# Patient Record
Sex: MALE | Race: WHITE | NOT HISPANIC OR LATINO | Employment: OTHER | URBAN - METROPOLITAN AREA
[De-identification: names, ages, dates, MRNs, and addresses within clinical notes are randomized per-mention and may not be internally consistent; named-entity substitution may affect disease eponyms.]

---

## 2017-01-10 ENCOUNTER — APPOINTMENT (OUTPATIENT)
Dept: LAB | Facility: CLINIC | Age: 82
End: 2017-01-10
Payer: MEDICARE

## 2017-01-10 DIAGNOSIS — D64.9 ANEMIA: ICD-10-CM

## 2017-01-10 LAB
BASOPHILS # BLD AUTO: 0.05 THOUSANDS/ΜL (ref 0–0.1)
BASOPHILS NFR BLD AUTO: 1 % (ref 0–1)
EOSINOPHIL # BLD AUTO: 0.47 THOUSAND/ΜL (ref 0–0.61)
EOSINOPHIL NFR BLD AUTO: 4 % (ref 0–6)
ERYTHROCYTE [DISTWIDTH] IN BLOOD BY AUTOMATED COUNT: 15.3 % (ref 11.6–15.1)
FERRITIN SERPL-MCNC: 46 NG/ML (ref 8–388)
HCT VFR BLD AUTO: 34.1 % (ref 36.5–49.3)
HGB BLD-MCNC: 11.1 G/DL (ref 12–17)
IRON SATN MFR SERPL: 22 %
IRON SERPL-MCNC: 60 UG/DL (ref 65–175)
LYMPHOCYTES # BLD AUTO: 2.64 THOUSANDS/ΜL (ref 0.6–4.47)
LYMPHOCYTES NFR BLD AUTO: 25 % (ref 14–44)
MCH RBC QN AUTO: 28 PG (ref 26.8–34.3)
MCHC RBC AUTO-ENTMCNC: 32.6 G/DL (ref 31.4–37.4)
MCV RBC AUTO: 86 FL (ref 82–98)
MONOCYTES # BLD AUTO: 1.06 THOUSAND/ΜL (ref 0.17–1.22)
MONOCYTES NFR BLD AUTO: 10 % (ref 4–12)
NEUTROPHILS # BLD AUTO: 6.4 THOUSANDS/ΜL (ref 1.85–7.62)
NEUTS SEG NFR BLD AUTO: 60 % (ref 43–75)
NRBC BLD AUTO-RTO: 0 /100 WBCS
PLATELET # BLD AUTO: 327 THOUSANDS/UL (ref 149–390)
PMV BLD AUTO: 10.6 FL (ref 8.9–12.7)
RBC # BLD AUTO: 3.96 MILLION/UL (ref 3.88–5.62)
TIBC SERPL-MCNC: 279 UG/DL (ref 250–450)
WBC # BLD AUTO: 10.65 THOUSAND/UL (ref 4.31–10.16)

## 2017-01-10 PROCEDURE — 85025 COMPLETE CBC W/AUTO DIFF WBC: CPT

## 2017-01-10 PROCEDURE — 83550 IRON BINDING TEST: CPT

## 2017-01-10 PROCEDURE — 36415 COLL VENOUS BLD VENIPUNCTURE: CPT

## 2017-01-10 PROCEDURE — 83540 ASSAY OF IRON: CPT

## 2017-01-10 PROCEDURE — 82728 ASSAY OF FERRITIN: CPT

## 2017-01-12 ENCOUNTER — GENERIC CONVERSION - ENCOUNTER (OUTPATIENT)
Dept: OTHER | Facility: OTHER | Age: 82
End: 2017-01-12

## 2017-01-13 DIAGNOSIS — I10 ESSENTIAL (PRIMARY) HYPERTENSION: ICD-10-CM

## 2017-01-13 DIAGNOSIS — R79.9 ABNORMAL FINDING OF BLOOD CHEMISTRY: ICD-10-CM

## 2017-01-13 DIAGNOSIS — D64.9 ANEMIA: ICD-10-CM

## 2017-02-28 ENCOUNTER — TRANSCRIBE ORDERS (OUTPATIENT)
Dept: LAB | Facility: CLINIC | Age: 82
End: 2017-02-28

## 2017-02-28 ENCOUNTER — APPOINTMENT (OUTPATIENT)
Dept: LAB | Facility: CLINIC | Age: 82
End: 2017-02-28
Payer: MEDICARE

## 2017-02-28 DIAGNOSIS — R79.9 ABNORMAL FINDING OF BLOOD CHEMISTRY: ICD-10-CM

## 2017-02-28 DIAGNOSIS — I10 ESSENTIAL (PRIMARY) HYPERTENSION: ICD-10-CM

## 2017-02-28 DIAGNOSIS — D64.9 ANEMIA: ICD-10-CM

## 2017-02-28 LAB
BASOPHILS # BLD AUTO: 0.04 THOUSANDS/ΜL (ref 0–0.1)
BASOPHILS NFR BLD AUTO: 1 % (ref 0–1)
EOSINOPHIL # BLD AUTO: 0.4 THOUSAND/ΜL (ref 0–0.61)
EOSINOPHIL NFR BLD AUTO: 5 % (ref 0–6)
ERYTHROCYTE [DISTWIDTH] IN BLOOD BY AUTOMATED COUNT: 14.3 % (ref 11.6–15.1)
FERRITIN SERPL-MCNC: 55 NG/ML (ref 8–388)
HCT VFR BLD AUTO: 35.4 % (ref 36.5–49.3)
HGB BLD-MCNC: 11.7 G/DL (ref 12–17)
LYMPHOCYTES # BLD AUTO: 2.51 THOUSANDS/ΜL (ref 0.6–4.47)
LYMPHOCYTES NFR BLD AUTO: 28 % (ref 14–44)
MCH RBC QN AUTO: 28.8 PG (ref 26.8–34.3)
MCHC RBC AUTO-ENTMCNC: 33.1 G/DL (ref 31.4–37.4)
MCV RBC AUTO: 87 FL (ref 82–98)
MONOCYTES # BLD AUTO: 0.95 THOUSAND/ΜL (ref 0.17–1.22)
MONOCYTES NFR BLD AUTO: 11 % (ref 4–12)
NEUTROPHILS # BLD AUTO: 4.93 THOUSANDS/ΜL (ref 1.85–7.62)
NEUTS SEG NFR BLD AUTO: 55 % (ref 43–75)
NRBC BLD AUTO-RTO: 0 /100 WBCS
PLATELET # BLD AUTO: 303 THOUSANDS/UL (ref 149–390)
PMV BLD AUTO: 10.9 FL (ref 8.9–12.7)
RBC # BLD AUTO: 4.06 MILLION/UL (ref 3.88–5.62)
WBC # BLD AUTO: 8.84 THOUSAND/UL (ref 4.31–10.16)

## 2017-02-28 PROCEDURE — 85025 COMPLETE CBC W/AUTO DIFF WBC: CPT

## 2017-02-28 PROCEDURE — 82728 ASSAY OF FERRITIN: CPT

## 2017-02-28 PROCEDURE — 36415 COLL VENOUS BLD VENIPUNCTURE: CPT

## 2017-03-17 ENCOUNTER — LAB CONVERSION - ENCOUNTER (OUTPATIENT)
Dept: OTHER | Facility: OTHER | Age: 82
End: 2017-03-17

## 2017-03-17 ENCOUNTER — LAB REQUISITION (OUTPATIENT)
Dept: LAB | Facility: HOSPITAL | Age: 82
End: 2017-03-17
Payer: MEDICARE

## 2017-03-17 DIAGNOSIS — K44.9 DIAPHRAGMATIC HERNIA WITHOUT OBSTRUCTION OR GANGRENE: ICD-10-CM

## 2017-03-17 DIAGNOSIS — K22.70 BARRETT'S ESOPHAGUS WITHOUT DYSPLASIA: ICD-10-CM

## 2017-03-17 PROCEDURE — 88305 TISSUE EXAM BY PATHOLOGIST: CPT | Performed by: INTERNAL MEDICINE

## 2017-04-05 ENCOUNTER — ALLSCRIPTS OFFICE VISIT (OUTPATIENT)
Dept: OTHER | Facility: OTHER | Age: 82
End: 2017-04-05

## 2017-07-13 DIAGNOSIS — I10 ESSENTIAL (PRIMARY) HYPERTENSION: ICD-10-CM

## 2017-07-14 ENCOUNTER — GENERIC CONVERSION - ENCOUNTER (OUTPATIENT)
Dept: OTHER | Facility: OTHER | Age: 82
End: 2017-07-14

## 2017-07-20 ENCOUNTER — TRANSCRIBE ORDERS (OUTPATIENT)
Dept: LAB | Facility: CLINIC | Age: 82
End: 2017-07-20

## 2017-07-20 ENCOUNTER — APPOINTMENT (OUTPATIENT)
Dept: LAB | Facility: CLINIC | Age: 82
End: 2017-07-20
Payer: MEDICARE

## 2017-07-20 DIAGNOSIS — I10 ESSENTIAL (PRIMARY) HYPERTENSION: ICD-10-CM

## 2017-07-20 LAB
25(OH)D3 SERPL-MCNC: 36.4 NG/ML (ref 30–100)
ALBUMIN SERPL BCP-MCNC: 3.5 G/DL (ref 3.5–5)
ALP SERPL-CCNC: 108 U/L (ref 46–116)
ALT SERPL W P-5'-P-CCNC: 24 U/L (ref 12–78)
ANION GAP SERPL CALCULATED.3IONS-SCNC: 5 MMOL/L (ref 4–13)
AST SERPL W P-5'-P-CCNC: 15 U/L (ref 5–45)
BASOPHILS # BLD AUTO: 0.06 THOUSANDS/ΜL (ref 0–0.1)
BASOPHILS NFR BLD AUTO: 1 % (ref 0–1)
BILIRUB SERPL-MCNC: 0.37 MG/DL (ref 0.2–1)
BILIRUB UR QL STRIP: NEGATIVE
BUN SERPL-MCNC: 28 MG/DL (ref 5–25)
CALCIUM SERPL-MCNC: 9.2 MG/DL (ref 8.3–10.1)
CHLORIDE SERPL-SCNC: 105 MMOL/L (ref 100–108)
CHOLEST SERPL-MCNC: 155 MG/DL (ref 50–200)
CLARITY UR: CLEAR
CO2 SERPL-SCNC: 29 MMOL/L (ref 21–32)
COLOR UR: YELLOW
CREAT SERPL-MCNC: 1.6 MG/DL (ref 0.6–1.3)
EOSINOPHIL # BLD AUTO: 0.75 THOUSAND/ΜL (ref 0–0.61)
EOSINOPHIL NFR BLD AUTO: 8 % (ref 0–6)
ERYTHROCYTE [DISTWIDTH] IN BLOOD BY AUTOMATED COUNT: 13.8 % (ref 11.6–15.1)
GFR SERPL CREATININE-BSD FRML MDRD: 41.7 ML/MIN/1.73SQ M
GLUCOSE P FAST SERPL-MCNC: 94 MG/DL (ref 65–99)
GLUCOSE UR STRIP-MCNC: NEGATIVE MG/DL
HCT VFR BLD AUTO: 35.1 % (ref 36.5–49.3)
HDLC SERPL-MCNC: 53 MG/DL (ref 40–60)
HGB BLD-MCNC: 11.7 G/DL (ref 12–17)
HGB UR QL STRIP.AUTO: NEGATIVE
KETONES UR STRIP-MCNC: NEGATIVE MG/DL
LDLC SERPL CALC-MCNC: 91 MG/DL (ref 0–100)
LEUKOCYTE ESTERASE UR QL STRIP: NEGATIVE
LYMPHOCYTES # BLD AUTO: 2.67 THOUSANDS/ΜL (ref 0.6–4.47)
LYMPHOCYTES NFR BLD AUTO: 29 % (ref 14–44)
MCH RBC QN AUTO: 29.1 PG (ref 26.8–34.3)
MCHC RBC AUTO-ENTMCNC: 33.3 G/DL (ref 31.4–37.4)
MCV RBC AUTO: 87 FL (ref 82–98)
MONOCYTES # BLD AUTO: 0.94 THOUSAND/ΜL (ref 0.17–1.22)
MONOCYTES NFR BLD AUTO: 10 % (ref 4–12)
NEUTROPHILS # BLD AUTO: 4.85 THOUSANDS/ΜL (ref 1.85–7.62)
NEUTS SEG NFR BLD AUTO: 52 % (ref 43–75)
NITRITE UR QL STRIP: NEGATIVE
NRBC BLD AUTO-RTO: 0 /100 WBCS
PH UR STRIP.AUTO: 6 [PH] (ref 4.5–8)
PLATELET # BLD AUTO: 303 THOUSANDS/UL (ref 149–390)
PMV BLD AUTO: 10.9 FL (ref 8.9–12.7)
POTASSIUM SERPL-SCNC: 4.2 MMOL/L (ref 3.5–5.3)
PROT SERPL-MCNC: 7.2 G/DL (ref 6.4–8.2)
PROT UR STRIP-MCNC: NEGATIVE MG/DL
RBC # BLD AUTO: 4.02 MILLION/UL (ref 3.88–5.62)
SODIUM SERPL-SCNC: 139 MMOL/L (ref 136–145)
SP GR UR STRIP.AUTO: 1.01 (ref 1–1.03)
TRIGL SERPL-MCNC: 57 MG/DL
TSH SERPL DL<=0.05 MIU/L-ACNC: 0.49 UIU/ML (ref 0.36–3.74)
UROBILINOGEN UR QL STRIP.AUTO: 0.2 E.U./DL
WBC # BLD AUTO: 9.29 THOUSAND/UL (ref 4.31–10.16)

## 2017-07-20 PROCEDURE — 36415 COLL VENOUS BLD VENIPUNCTURE: CPT

## 2017-07-20 PROCEDURE — 80061 LIPID PANEL: CPT

## 2017-07-20 PROCEDURE — 85025 COMPLETE CBC W/AUTO DIFF WBC: CPT

## 2017-07-20 PROCEDURE — 80053 COMPREHEN METABOLIC PANEL: CPT

## 2017-07-20 PROCEDURE — 81003 URINALYSIS AUTO W/O SCOPE: CPT

## 2017-07-20 PROCEDURE — 84443 ASSAY THYROID STIM HORMONE: CPT

## 2017-07-20 PROCEDURE — 82306 VITAMIN D 25 HYDROXY: CPT

## 2017-12-19 ENCOUNTER — TRANSCRIBE ORDERS (OUTPATIENT)
Dept: LAB | Facility: CLINIC | Age: 82
End: 2017-12-19

## 2017-12-19 ENCOUNTER — ALLSCRIPTS OFFICE VISIT (OUTPATIENT)
Dept: OTHER | Facility: OTHER | Age: 82
End: 2017-12-19

## 2017-12-19 ENCOUNTER — APPOINTMENT (OUTPATIENT)
Dept: LAB | Facility: CLINIC | Age: 82
End: 2017-12-19
Payer: MEDICARE

## 2017-12-19 DIAGNOSIS — I10 ESSENTIAL (PRIMARY) HYPERTENSION: ICD-10-CM

## 2017-12-19 LAB
ANION GAP SERPL CALCULATED.3IONS-SCNC: 7 MMOL/L (ref 4–13)
BASOPHILS # BLD AUTO: 0.05 THOUSANDS/ΜL (ref 0–0.1)
BASOPHILS NFR BLD AUTO: 1 % (ref 0–1)
BUN SERPL-MCNC: 30 MG/DL (ref 5–25)
CALCIUM SERPL-MCNC: 9.4 MG/DL (ref 8.3–10.1)
CHLORIDE SERPL-SCNC: 105 MMOL/L (ref 100–108)
CO2 SERPL-SCNC: 27 MMOL/L (ref 21–32)
CREAT SERPL-MCNC: 1.78 MG/DL (ref 0.6–1.3)
EOSINOPHIL # BLD AUTO: 0.76 THOUSAND/ΜL (ref 0–0.61)
EOSINOPHIL NFR BLD AUTO: 8 % (ref 0–6)
ERYTHROCYTE [DISTWIDTH] IN BLOOD BY AUTOMATED COUNT: 13.9 % (ref 11.6–15.1)
GFR SERPL CREATININE-BSD FRML MDRD: 35 ML/MIN/1.73SQ M
GLUCOSE SERPL-MCNC: 91 MG/DL (ref 65–140)
HCT VFR BLD AUTO: 37.6 % (ref 36.5–49.3)
HGB BLD-MCNC: 12.3 G/DL (ref 12–17)
LYMPHOCYTES # BLD AUTO: 2.27 THOUSANDS/ΜL (ref 0.6–4.47)
LYMPHOCYTES NFR BLD AUTO: 23 % (ref 14–44)
MCH RBC QN AUTO: 29.6 PG (ref 26.8–34.3)
MCHC RBC AUTO-ENTMCNC: 32.7 G/DL (ref 31.4–37.4)
MCV RBC AUTO: 90 FL (ref 82–98)
MONOCYTES # BLD AUTO: 1.03 THOUSAND/ΜL (ref 0.17–1.22)
MONOCYTES NFR BLD AUTO: 10 % (ref 4–12)
NEUTROPHILS # BLD AUTO: 5.87 THOUSANDS/ΜL (ref 1.85–7.62)
NEUTS SEG NFR BLD AUTO: 58 % (ref 43–75)
NRBC BLD AUTO-RTO: 0 /100 WBCS
PLATELET # BLD AUTO: 343 THOUSANDS/UL (ref 149–390)
PMV BLD AUTO: 11.3 FL (ref 8.9–12.7)
POTASSIUM SERPL-SCNC: 4.7 MMOL/L (ref 3.5–5.3)
RBC # BLD AUTO: 4.16 MILLION/UL (ref 3.88–5.62)
SODIUM SERPL-SCNC: 139 MMOL/L (ref 136–145)
VIT B12 SERPL-MCNC: 688 PG/ML (ref 100–900)
WBC # BLD AUTO: 10 THOUSAND/UL (ref 4.31–10.16)

## 2017-12-19 PROCEDURE — 82607 VITAMIN B-12: CPT

## 2017-12-19 PROCEDURE — 84165 PROTEIN E-PHORESIS SERUM: CPT

## 2017-12-19 PROCEDURE — 36415 COLL VENOUS BLD VENIPUNCTURE: CPT

## 2017-12-19 PROCEDURE — 80048 BASIC METABOLIC PNL TOTAL CA: CPT

## 2017-12-19 PROCEDURE — 85025 COMPLETE CBC W/AUTO DIFF WBC: CPT

## 2017-12-20 NOTE — PROGRESS NOTES
Assessment  1  Occasional alcohol use   2  Former smoker (V15 82) (M20 481)   3  Hypertension (401 9) (I10)   4  Anemia (285 9) (D64 9)    Plan  Health Maintenance    · Begin a limited exercise program ; Status:Complete - Retrospective Authorization;   Done:19Dec2017  Hypertension    · (1) BASIC METABOLIC PROFILE; Status:Active - Retrospective Authorization; Requestedfor:19Dec2017;    · (1) CBC/PLT/DIFF; Status:Active - Retrospective Authorization; Requested for:19Dec2017;    · (1) PROTEIN ELECTRO, SERUM; Status:Active - Retrospective Authorization; Requestedfor:19Dec2017;    · (1) VITAMIN B12; Status:Active - Retrospective Authorization; Requested for:19Dec2017;     Discussion/Summary  Discussion Summary:   Logan Bautista appears well comfortable and in no distress I took his blood pressure multiple times with a large cuff was approximately 170/90 sitting 156/80 supine he has got a grade 2 to 3/60 harsh ejection murmur at the upper right sternal border lungs are clear the carotids are normal there is a trace of peripheral edema  At this point we are going to have him get some lab studies which will consist of a CBC BMP protein electrophoresis and B12 level of this is being done in view of the fact that he has got hemoglobin of 11 7 previously as far as his blood pressures concerned when asked him it take his pressures at home recorded and reported to us before making any changes in his medications are like to see what his last electrolytes look like I will discuss the situation with him on the phone after completion of the lab studies  Chief Complaint  Chief Complaint Free Text Note Form: 6 MONTH FOLLOWUP  Chief Complaint Chronic Condition [facility]: Patient is here today for follow up of chronic conditions described in HPI  History of Present Illness  Incontinence, Urinary (Initial): The patient is being seen for an initial evaluation of urinary incontinence  The patient is currently asymptomatic   No associated symptoms are reported  HPI: Vikash Clarke is here today for visit he feels well he did have a back surgery done by Dr Sidney Davis which was fortunately very successful and has resulted in an incredible Betha Citron a romero of all of his back pain  He had been using some hydrochlorothiazide for his high blood pressure but has been taking only      Active Problems  1  Anemia (285 9) (D64 9)   2  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)   3  Esophageal reflux (530 81) (K21 9)   4  Hyperlipidemia (272 4) (E78 5)   5  Hypertension (401 9) (I10)   6  Prostate cancer screening (V76 44) (Z12 5)   7  Spinal stenosis of lumbar region (724 02) (M48 061)    Past Medical History  1  Hearing loss (389 9) (H91 90)   2  History of rheumatic fever (V12 09) (Z86 79)   3  Screening for depression (V79 0) (Z13 89)   4  Screening for neurological condition (V80 09) (Z13 89)    Surgical History  1  History of Appendectomy   2  History of Cataract Extraction   3  History of Knee Surgery Left   4  History of Lower Back Surgery   5  History of Tonsillectomy With Adenoidectomy  Surgical History Reviewed: The surgical history was reviewed and updated today  Family History  Mother    1  Family history of Old age  Father    2  Family history of Esophageal cancer   3  Family history of Old age  Son    3  Family history of alcoholism (V17 0) (Z81 1)  Family History Reviewed: The family history was reviewed and updated today  Social History   · Denied: History of Alcohol Use (History)   · Former smoker (V15 82) (W59 476)   · Occasional alcohol use  Social History Reviewed: The social history was reviewed and updated today  The social history was reviewed and is unchanged  Current Meds   1  Aspirin 81 MG Oral Tablet Delayed Release; Therapy: 47IOU4099 to Recorded   2  Ferrous Sulfate 325 (65 Fe) MG Oral Tablet; Therapy: 86LJP8653 to Recorded   3  HydroCHLOROthiazide 25 MG Oral Tablet; take 1/2 tablet daily;  Therapy: 51KFG0763 to (Evaluate:73Qzu1754) Requested for: 99Ocd1508 Recorded   4  Multi Vitamin Daily TABS; Therapy: (Recorded:66Laa9325) to Recorded   5  Omeprazole 20 MG Oral Capsule Delayed Release; Therapy: 02WRB0750 to Recorded   6  Ramipril 10 MG Oral Capsule; TAKE 1 CAPSULE DAILY; Therapy: 60VBR1936 to (Evaluate:93Mgy6236)  Requested for: 20Uxk7029; Last Rx:64Lgc0599 Ordered   7  Rosuvastatin Calcium 5 MG Oral Tablet; TAKE 1 TABLET DAILY; Therapy: 42JMC0718 to (Evaluate:01Dmc9776)  Requested for: 32Pgo6699; Last Rx:57Okk7326 Ordered  Medication List Reviewed: The medication list was reviewed and updated today  Allergies  1  No Known Drug Allergies    Vitals  Vital Signs    Recorded: 58LUF1958 11:26AM Recorded: 60LLR6804 10:38AM   Heart Rate  52   Pulse Quality Normal    Systolic 092, Supine    Diastolic 80, Supine    Height  5 ft 9 in   Weight  211 lb 8 oz   BMI Calculated  31 23   BSA Calculated  2 12   O2 Saturation  97     Physical Exam   Constitutional  General appearance: No acute distress, well appearing and well nourished  Eyes  Conjunctiva and lids: No swelling, erythema, or discharge  Cardiovascular  Auscultation of heart: Abnormal    Examination of extremities for edema and/or varicosities: Abnormal    Carotid pulses: Normal    Abdomen  Abdomen: Non-tender, no masses  Liver and spleen: No hepatomegaly or splenomegaly     Musculoskeletal  Gait and station: Normal    Psychiatric  Orientation to person, place and time: Normal    Mood and affect: Normal          Results/Data  *VB - Urinary Incontinence Screen (Dx Z13 89 Screen for UI) 65UQG0821 10:37AM Dell Henry     Test Name Result Flag Reference   Urinary Incontinence Assessment 19BGE4993         Signatures   Electronically signed by : ROSA M Meadows ; Dec 19 2017 11:28AM EST                       (Author)

## 2017-12-22 LAB
ALBUMIN SERPL ELPH-MCNC: 4.28 G/DL (ref 3.5–5)
ALBUMIN SERPL ELPH-MCNC: 57.1 % (ref 52–65)
ALPHA1 GLOB SERPL ELPH-MCNC: 0.41 G/DL (ref 0.1–0.4)
ALPHA1 GLOB SERPL ELPH-MCNC: 5.4 % (ref 2.5–5)
ALPHA2 GLOB SERPL ELPH-MCNC: 0.87 G/DL (ref 0.4–1.2)
ALPHA2 GLOB SERPL ELPH-MCNC: 11.6 % (ref 7–13)
BETA GLOB ABNORMAL SERPL ELPH-MCNC: 0.4 G/DL (ref 0.4–0.8)
BETA1 GLOB SERPL ELPH-MCNC: 5.3 % (ref 5–13)
BETA2 GLOB SERPL ELPH-MCNC: 5.5 % (ref 2–8)
BETA2+GAMMA GLOB SERPL ELPH-MCNC: 0.41 G/DL (ref 0.2–0.5)
GAMMA GLOB ABNORMAL SERPL ELPH-MCNC: 1.13 G/DL (ref 0.5–1.6)
GAMMA GLOB SERPL ELPH-MCNC: 15.1 % (ref 12–22)
IGG/ALB SER: 1.33 {RATIO} (ref 1.1–1.8)
PROT PATTERN SERPL ELPH-IMP: ABNORMAL
PROT SERPL-MCNC: 7.5 G/DL (ref 6.4–8.2)

## 2018-01-11 NOTE — MISCELLANEOUS
Message  I got a message to call chente Cruz on the telephone since we started to 2 5 mg of Norvasc for his blood pressure uses his legs don't feel flip"te right  They just don't " work right "   I suggested chente Cruz that we just stop the Norvasc wait a few days if the situation resolves and not add a new medicine at this time he'll call me in a few days and report his progress      Signatures   Electronically signed by : Melodee Gowers, MDM D M D ,MD; Mar 16 2016 12:24PM EST                       (Author)

## 2018-01-12 NOTE — MISCELLANEOUS
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)   4  Esophageal reflux (530 81) (K21 9)   5  Hyperlipidemia (272 4) (E78 5)   6  Hypertension (401 9) (I10)   7  Long term use of drug (V58 69) (Z79 899)   8  Prostate cancer screening (V76 44) (Z12 5)   9  Screening for genitourinary condition (V81 6) (Z13 89)   10  Spinal stenosis of lumbar region (724 02) (M48 06)   11  Vertigo (780 4) (R42)    Past Medical History    1  Screening for depression (V79 0) (Z13 89)   2  Screening for neurological condition (V80 09) (Z13 89)    Surgical History    1  History of Appendectomy   2  History of Cataract Extraction   3  History of Tonsillectomy With Adenoidectomy    Family History  Mother    1  Family history of Old age  Father    2  Family history of Esophageal cancer   3  Family history of Old age    Social History    · Denied: History of Alcohol Use (History)   · Former smoker (Y53 66) (G01 018)    Current Meds   1  AmLODIPine Besylate 2 5 MG Oral Tablet; Take 1 tablet daily; Therapy: 10SYP9411 to (Moody Liang)  Requested for: 66PLI6423; Last   Rx:28Mws2733 Ordered   2  Crestor 5 MG Oral Tablet; TAKE 1 TABLET DAILY; Therapy: 65QYJ0585 to (Moody Liang)  Requested for: 82KFG8557; Last   Rx:13Wzb9789 Ordered   3  Hydrochlorothiazide 25 MG Oral Tablet; TAKE 1 TABLET DAILY AS DIRECTED; Therapy: 35RSU0623 to (Evaluate:59Aor3620)  Requested for: 50GVI6406; Last   Rx:24Imf2685 Ordered   4  Ramipril 10 MG Oral Capsule; TAKE 1 CAPSULE DAILY; Therapy: 99ZEB6673 to (Moody Liang)  Requested for: 46EJX8946; Last   Rx:99Gjx9627 Ordered    Allergies    1  No Known Drug Allergies    Future Appointments    Date/Time Provider Specialty Site   11/21/2016 09:40 ROSA M Devries   Internal Medicine [facility]     Signatures   Electronically signed by : ROSA M Fletcher ; Jul 18 2016  5:46PM EST                       (Author)

## 2018-01-14 VITALS
SYSTOLIC BLOOD PRESSURE: 134 MMHG | BODY MASS INDEX: 30.72 KG/M2 | RESPIRATION RATE: 16 BRPM | WEIGHT: 207.38 LBS | HEIGHT: 69 IN | DIASTOLIC BLOOD PRESSURE: 70 MMHG | OXYGEN SATURATION: 96 % | HEART RATE: 50 BPM

## 2018-01-16 NOTE — MISCELLANEOUS
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 he is under the care of Dr Ryann Solares for Linda's esophagus he had a colonoscopy done in January of 2015  I did let Mr Matthew Allen know about his Lotensin the positive fecal immunoglobulin test  I also draft a letter to Dr Dell Rosas going over all of this and asking that he and reevaluate the situation  Plan  Anemia    · (1) CBC/PLT/DIFF; Status:Active; Requested for:58Kvn1819;    · (1) FERRITIN; Status:Active;  Requested for:44Osr1281;    · (1) OCCULT BLOOD, FECAL IMMUNOCHEMICAL TEST; Status:Resulted - Requires  Verification;   Done: 59BPL0128 10:58AM    Signatures   Electronically signed by : ROSA M Hayes ; Jun 6 2016 11:25AM EST                       (Author)

## 2018-01-18 NOTE — PROGRESS NOTES
Assessment    1  Encounter for preventive health examination (V70 0) (Z00 00)   2  Hypertension (401 9) (I10)   3  Spinal stenosis of lumbar region (724 02) (M48 06)    Plan  Hypertension    · (1) CBC/PLT/DIFF; Status:Active; Requested for:11May2016;    · (1) COMPREHENSIVE METABOLIC PANEL; Status:Active; Requested for:11May2016;    · (1) LIPID PANEL, FASTING; Status:Active; Requested for:11May2016;    · (1) TSH; Status:Active; Requested for:11May2016;    · (1) URINALYSIS (will reflex a microscopy if leukocytes, occult blood, protein or nitrites  are not within normal limits); Status:Active; Requested for:11May2016;   Hypertension, Long term use of drug    · (1) VITAMIN D 25-HYDROXY; Status:Active; Requested for:11May2016;   Hypertension, Prostate cancer screening    · (1) PSA (SCREEN) (Dx V76 44 Screen for Prostate Cancer); Status:Active; Requested  for:11May2016;   Screening for genitourinary condition    · *VB-Urinary Incontinence Screen (Dx V81 6 Screen for UI); Status:Complete;   Done:  75CYR9094 09:07AM    Discussion/Summary    Earlyne Caul looks good he walks with a single-point cane in his bit stooped over he has a grade 3/6 ejection murmur the cardiac base is unchanged his carotid pulses are good his lungs are clear there is no edema  I did take his blood pressure with 2 different instruments supine and sitting and on the average was one 158-160/84 or so I told her that there were several different ways that we can lower his blood pressure further one would be to restart the amlodipine  Another possibility would be to at small doses of dog Staticin or terazosin which also might help with nocturnal urination  Or he could simply increase the HCTZ to 25 daily  He chose the latter choice   I will ask him to come in in a few weeks and recheck the blood pressure when he is on the increased dose of the HCTZ a complete set of lab studies will be done today did not perform it PSA because of the patient's age declined Prevnar  Chief Complaint  Annual wellness visit  History of Present Illness  HPI: Madie Chavez is here today for a visit he feels well he and a battling his blood pressure we had the suggested he add 2 5 mg of amlodipine to his regimen he took this for a while at the lower his blood pressure very nicely but unfortunately was awake swelling which was disagreeable to him  His current medications include ramipril 10 mg daily and HCTZ 25 mg which she's only been taking on an alternate day basis he also was thought 15 mg of Crestor  At home his blood pressure he says is in the 120 or 30 range  He continues to see Dr Debbie Barrera he does have to urinate a couple times at night this is not any different for him he continues to have back pain he is already seeing Dr Kirsten Medrano in his acupuncture he's had an MRI of his lumbosacral spine and is planning to see a Dr Elmer Blanc and probably Dr Mitzi Quijano as to whether surgery might help his back  He's had a left total knee replacement by anastacio malagon  For his knee replacement he did have an echocardiogram and so forth which were generally satisfactory  He otherwise feels well there no other new issues  He continues under the care Dr Betzy Garg gastroenterologist for GERD  Denies chest pain shortness of breath or palpitations   Welcome to Medicare and Wellness Visits: The patient is being seen for the subsequent annual wellness visit  Medicare Screening and Risk Factors   Hospitalizations: no previous hospitalizations  Medicare Screening Tests Risk Questions   Abdominal aortic aneurysm risk assessment: over 72years of age  Osteoporosis risk assessment: alcohol use  HIV risk assessment: none indicated  Drug and Alcohol Use: The patient is a former cigarette smoker  The patient reports occasional alcohol use and drinking 2 drinks per month  Alcohol concern:   The patient has no concerns about alcohol abuse  He has never used illicit drugs     Diet and Physical Activity: Current diet includes well balanced meals, 2 servings of dairy products per day, 3 cups of coffee per day and 1 cups of tea per day  He exercises infrequently  Exercise: stretching 20 minutes per week  Mood Disorder and Cognitive Impairment Screening: PHQ-9 Depression Scale   Over the past 2 weeks, how often have you been bothered by the following problems? 1 ) Little interest or pleasure in doing things? Not at all    2 ) Feeling down, depressed or hopeless? Not at all    3 ) Trouble falling asleep or sleeping too much? Not at all    4 ) Feeling tired or having little energy? Not at all    5 ) Poor appetite or overeating? Not at all    6 ) Feeling bad about yourself, or that you are a failure, or have let yourself or your family down? Not at all    7 ) Trouble concentrating on things, such as reading a newspaper or watching television? Not at all    8 ) Moving or speaking so slowly that other people could have noticed, or the opposite, moving or speaking faster than usual? Not at all    9 ) Thoughts that you would be off dead or of hurting yourself in some way? Not at all  TOTAL SCORE: 0  He denies feeling down, depressed, or hopeless over the past two weeks  He denies feeling little interest or pleasure in doing things over the past two weeks  Cognitive impairment screening: denies difficulty learning/retaining new information, denies difficulty handling complex tasks, denies difficulty with reasoning, denies difficulty with spatial ability and orientation, denies difficulty with language and denies difficulty with behavior  Functional Ability/Level of Safety: Hearing is a hearing aid is used  The patient is currently able to do activities of daily living without limitations, able to do instrumental activities of daily living without limitations, able to participate in social activities without limitations and able to drive without limitations   Activities of daily living details: does not need help using the phone, no transportation help needed, does not need help shopping, no meal preparation help needed, does not need help doing laundry, does not need help managing medications and does not need help managing money  Fall risk factors: The patient fell 0 times in the past 12 months , no polypharmacy, no alcohol use, no mobility impairment, no antidepressant use, no deconditioning, no postural hypotension, no sedative use, no visual impairment, no urinary incontinence, no antihypertensive use, no cognitive impairment, up and go test was normal and no previous fall  Home safety risk factors:  loose rugs and no handrails on the stairs, but no unfamiliar surroundings, no poor household lighting, no uneven floors, no household clutter and grab bars in the bathroom  Advance Directives: Advance directives: living will, durable power of  for health care directives and advance directives  Co-Managers and Medical Equipment/Suppliers: See Patient Care Team   Preventive Quality Program 65 and Older: The patient currently has no urinary incontinence symptoms  Incontinence, Urinary (Initial): The patient is being seen for an initial evaluation of urinary incontinence  The patient is currently asymptomatic  No associated symptoms are reported  Patient Care Team    Care Team Member Role Specialty Office Number   [facility]  Orthopedic Surgery [phone]     Review of Systems    Constitutional: negative  Eyes: negative  ENT: negative  Cardiovascular: negative  Respiratory: negative  Gastrointestinal: negative  Genitourinary: negative  Musculoskeletal: as noted in HPI  Integumentary and Breasts: negative  Neurological: negative  Psychiatric: negative  Endocrine: negative  Hematologic and Lymphatic: negative  Active Problems    1  Abnormal blood chemistry (790 6) (R79 9)   2  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)   3  Esophageal reflux (530 81) (K21 9)   4   Hyperlipidemia (272  4) (E78 5)   5  Hypertension (401 9) (I10)   6  Long term use of drug (V58 69) (Z79 899)   7  Prostate cancer screening (V76 44) (Z12 5)   8  Spinal stenosis of lumbar region (724 02) (M48 06)   9  Vertigo (780 4) (R42)    Past Medical History    · Screening for depression (V79 0) (Z13 89)   · Screening for neurological condition (V80 09) (Z13 89)    Surgical History    · History of Appendectomy   · History of Cataract Extraction   · History of Tonsillectomy With Adenoidectomy    Family History  Mother    · Family history of Old age  Father    · Family history of Esophageal cancer   · Family history of Old age    Social History    · Denied: History of Alcohol Use (History)   · Former smoker (J36 46) (S51 963)  The social history was reviewed and updated today  The social history was reviewed and is unchanged  Current Meds   1  AmLODIPine Besylate 2 5 MG Oral Tablet; Take 1 tablet daily; Therapy: 62DDG0778 to (Lisbeth Smith)  Requested for: 25GVH4134; Last   Rx:73Xla5654 Ordered   2  Crestor 5 MG Oral Tablet; TAKE 1 TABLET DAILY; Therapy: 40YIE7037 to (Lisbeth Smith)  Requested for: 38DNE4761; Last   Rx:32Ugl6362 Ordered   3  Hydrochlorothiazide 25 MG Oral Tablet; TAKE 1 TABLET DAILY AS DIRECTED; Therapy: 80GCW3611 to (Evaluate:02Lmu8653)  Requested for: 03PHC3045; Last   Rx:81Zen6522 Ordered   4  Ramipril 10 MG Oral Capsule; TAKE 1 CAPSULE DAILY; Therapy: 62FTL2656 to (Lisbeth Smith)  Requested for: 82IQK4451; Last   Rx:64Ntg7171 Ordered    Allergies    1  No Known Drug Allergies    Immunizations   ** Printed in Appendix #1 below       Vitals  Signs [Data Includes: Current Encounter]    Pulse Quality: Normal  Systolic: 861  Diastolic: 82   Temperature: 98 1 F, Tympanic  Heart Rate: 64  Height: 5 ft 9 in  Weight: 212 lb 4 oz  BMI Calculated: 31 34  BSA Calculated: 2 12  O2 Saturation: 97    Physical Exam    Constitutional   General appearance: No acute distress, well appearing and well nourished  Head and Face   Head and face: Normal     Eyes   Conjunctiva and lids: No erythema, swelling or discharge  Pupils and irises: Equal, round, reactive to light  Ears, Nose, Mouth, and Throat   Otoscopic examination: Tympanic membranes translucent with normal light reflex  Canals patent without erythema  Hearing: Abnormal     Lips, teeth, and gums: Normal, good dentition  Oropharynx: Normal with no erythema, edema, exudate or lesions  Neck   Neck: Supple, symmetric, trachea midline, no masses  Thyroid: Normal, no thyromegaly  Pulmonary   Respiratory effort: No increased work of breathing or signs of respiratory distress  Percussion of chest: Normal     Auscultation of lungs: Clear to auscultation  Cardiovascular   Palpation of heart: Normal PMI, no thrills  Auscultation of heart: Abnormal     Carotid pulses: 2+ bilaterally  Not palpable  Pedal pulses: 2+ bilaterally  Examination of extremities for edema and/or varicosities: Abnormal     Lymphatic   Palpation of lymph nodes in neck: No lymphadenopathy  Skin   Skin and subcutaneous tissue: Normal without rashes or lesions  Neurologic   Cranial nerves: Cranial nerves 2-12 intact  Cortical function: Normal mental status  Psychiatric   Judgment and insight: Normal     Orientation to person, place and time: Normal     Recent and remote memory: Intact  Mood and affect: Normal        Results/Data  *VB-Urinary Incontinence Screen (Dx V81 6 Screen for UI) 50BVJ7335 09:07AM [place]     Test Name Result Flag Reference   Urinary Incontinence Assessment 55NWK9016       PHQ-2 Adult Depression Screening 39IMW8732 09:05AM User, s     Test Name Result Flag Reference   PHQ-2 Adult Depression Score 0     Over the last two weeks, how often have you been bothered by any of the following problems?   Little interest or pleasure in doing things: Not at all - 0  Feeling down, depressed, or hopeless: Not at all - 0   PHQ-2 Adult Depression Screening Negative       Falls Risk Assessment (Dx V80 09 Screen for Neurologic Disorder) 40PCH1648 09:05AM User, Ahs     Test Name Result Flag Reference   Falls Risk      No falls in the past year       Signatures   Electronically signed by : ROSA M Sky ; May 11 2016  9:59AM EST                       (Author)    Appendix #1     Patient: Siddharth Godoy "" ; : 1935; MRN: [id number]      1 2 3 4 5 6    Hepatitis A  2000 69Wnm6177        Influenza  15GCX9934 13WHE8439 10VME1803 Dec 2005 2007 10/03/2012    Zoster  Sep 2007

## 2018-01-19 ENCOUNTER — APPOINTMENT (OUTPATIENT)
Dept: LAB | Facility: CLINIC | Age: 83
End: 2018-01-19
Payer: MEDICARE

## 2018-01-19 ENCOUNTER — TRANSCRIBE ORDERS (OUTPATIENT)
Dept: LAB | Facility: CLINIC | Age: 83
End: 2018-01-19

## 2018-01-19 DIAGNOSIS — I10 ESSENTIAL (PRIMARY) HYPERTENSION: ICD-10-CM

## 2018-01-19 LAB
ANION GAP SERPL CALCULATED.3IONS-SCNC: 6 MMOL/L (ref 4–13)
BUN SERPL-MCNC: 32 MG/DL (ref 5–25)
CALCIUM SERPL-MCNC: 9.5 MG/DL (ref 8.3–10.1)
CHLORIDE SERPL-SCNC: 104 MMOL/L (ref 100–108)
CO2 SERPL-SCNC: 28 MMOL/L (ref 21–32)
CREAT SERPL-MCNC: 1.77 MG/DL (ref 0.6–1.3)
GFR SERPL CREATININE-BSD FRML MDRD: 35 ML/MIN/1.73SQ M
GLUCOSE SERPL-MCNC: 147 MG/DL (ref 65–140)
POTASSIUM SERPL-SCNC: 4.6 MMOL/L (ref 3.5–5.3)
SODIUM SERPL-SCNC: 138 MMOL/L (ref 136–145)

## 2018-01-19 PROCEDURE — 80048 BASIC METABOLIC PNL TOTAL CA: CPT

## 2018-01-19 PROCEDURE — 36415 COLL VENOUS BLD VENIPUNCTURE: CPT

## 2018-01-23 VITALS
DIASTOLIC BLOOD PRESSURE: 80 MMHG | OXYGEN SATURATION: 97 % | WEIGHT: 211.5 LBS | BODY MASS INDEX: 31.32 KG/M2 | HEIGHT: 69 IN | SYSTOLIC BLOOD PRESSURE: 156 MMHG | HEART RATE: 52 BPM

## 2018-01-30 ENCOUNTER — TELEPHONE (OUTPATIENT)
Dept: INTERNAL MEDICINE CLINIC | Facility: CLINIC | Age: 83
End: 2018-01-30

## 2018-04-19 ENCOUNTER — APPOINTMENT (OUTPATIENT)
Dept: LAB | Facility: CLINIC | Age: 83
End: 2018-04-19
Payer: MEDICARE

## 2018-04-19 ENCOUNTER — TRANSCRIBE ORDERS (OUTPATIENT)
Dept: LAB | Facility: CLINIC | Age: 83
End: 2018-04-19

## 2018-04-19 ENCOUNTER — OFFICE VISIT (OUTPATIENT)
Dept: INTERNAL MEDICINE CLINIC | Facility: CLINIC | Age: 83
End: 2018-04-19
Payer: MEDICARE

## 2018-04-19 VITALS
WEIGHT: 208 LBS | OXYGEN SATURATION: 97 % | HEIGHT: 70 IN | HEART RATE: 45 BPM | TEMPERATURE: 97.1 F | BODY MASS INDEX: 29.78 KG/M2

## 2018-04-19 DIAGNOSIS — I10 HYPERTENSION, UNSPECIFIED TYPE: Primary | ICD-10-CM

## 2018-04-19 DIAGNOSIS — I10 HYPERTENSION, UNSPECIFIED TYPE: ICD-10-CM
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
## 2018-04-19 DIAGNOSIS — Z00.00 MEDICARE ANNUAL WELLNESS VISIT, SUBSEQUENT: ICD-10-CM

## 2018-04-19 LAB
ANION GAP SERPL CALCULATED.3IONS-SCNC: 7 MMOL/L (ref 4–13)
BASOPHILS # BLD AUTO: 0.05 THOUSANDS/ΜL (ref 0–0.1)
BASOPHILS NFR BLD AUTO: 1 % (ref 0–1)
BUN SERPL-MCNC: 35 MG/DL (ref 5–25)
CALCIUM SERPL-MCNC: 9.7 MG/DL (ref 8.3–10.1)
CHLORIDE SERPL-SCNC: 106 MMOL/L (ref 100–108)
CO2 SERPL-SCNC: 26 MMOL/L (ref 21–32)
CREAT SERPL-MCNC: 1.79 MG/DL (ref 0.6–1.3)
EOSINOPHIL # BLD AUTO: 0.42 THOUSAND/ΜL (ref 0–0.61)
EOSINOPHIL NFR BLD AUTO: 4 % (ref 0–6)
ERYTHROCYTE [DISTWIDTH] IN BLOOD BY AUTOMATED COUNT: 14 % (ref 11.6–15.1)
GFR SERPL CREATININE-BSD FRML MDRD: 35 ML/MIN/1.73SQ M
GLUCOSE SERPL-MCNC: 101 MG/DL (ref 65–140)
HCT VFR BLD AUTO: 38.1 % (ref 36.5–49.3)
HGB BLD-MCNC: 12.1 G/DL (ref 12–17)
LYMPHOCYTES # BLD AUTO: 2.98 THOUSANDS/ΜL (ref 0.6–4.47)
LYMPHOCYTES NFR BLD AUTO: 30 % (ref 14–44)
MCH RBC QN AUTO: 29.2 PG (ref 26.8–34.3)
MCHC RBC AUTO-ENTMCNC: 31.8 G/DL (ref 31.4–37.4)
MCV RBC AUTO: 92 FL (ref 82–98)
MONOCYTES # BLD AUTO: 1 THOUSAND/ΜL (ref 0.17–1.22)
MONOCYTES NFR BLD AUTO: 10 % (ref 4–12)
NEUTROPHILS # BLD AUTO: 5.58 THOUSANDS/ΜL (ref 1.85–7.62)
NEUTS SEG NFR BLD AUTO: 55 % (ref 43–75)
NRBC BLD AUTO-RTO: 0 /100 WBCS
PLATELET # BLD AUTO: 332 THOUSANDS/UL (ref 149–390)
PMV BLD AUTO: 10.6 FL (ref 8.9–12.7)
POTASSIUM SERPL-SCNC: 4.9 MMOL/L (ref 3.5–5.3)
RBC # BLD AUTO: 4.15 MILLION/UL (ref 3.88–5.62)
SODIUM SERPL-SCNC: 139 MMOL/L (ref 136–145)
WBC # BLD AUTO: 10.06 THOUSAND/UL (ref 4.31–10.16)

## 2018-04-19 PROCEDURE — 36415 COLL VENOUS BLD VENIPUNCTURE: CPT

## 2018-04-19 PROCEDURE — 85025 COMPLETE CBC W/AUTO DIFF WBC: CPT

## 2018-04-19 PROCEDURE — 99213 OFFICE O/P EST LOW 20 MIN: CPT | Performed by: INTERNAL MEDICINE

## 2018-04-19 PROCEDURE — 80048 BASIC METABOLIC PNL TOTAL CA: CPT

## 2018-04-19 PROCEDURE — G0439 PPPS, SUBSEQ VISIT: HCPCS | Performed by: INTERNAL MEDICINE

## 2018-04-19 RX ORDER — OMEPRAZOLE 20 MG/1
CAPSULE, DELAYED RELEASE ORAL
COMMUNITY
Start: 2017-04-05 | End: 2020-01-14 | Stop reason: SDUPTHER

## 2018-04-19 RX ORDER — RAMIPRIL 10 MG/1
CAPSULE ORAL
COMMUNITY
Start: 2018-03-12 | End: 2018-12-10 | Stop reason: SDUPTHER

## 2018-04-19 RX ORDER — ROSUVASTATIN CALCIUM 5 MG/1
TABLET, COATED ORAL
COMMUNITY
Start: 2018-03-12 | End: 2018-08-20 | Stop reason: SDUPTHER

## 2018-04-19 RX ORDER — FERROUS SULFATE 325(65) MG
TABLET ORAL
COMMUNITY
Start: 2017-04-05 | End: 2019-06-21 | Stop reason: ALTCHOICE

## 2018-04-19 RX ORDER — MULTIVITAMIN
TABLET ORAL
COMMUNITY

## 2018-04-19 RX ORDER — HYDROCHLOROTHIAZIDE 25 MG/1
TABLET ORAL
COMMUNITY
Start: 2018-03-12 | End: 2018-05-07 | Stop reason: SDUPTHER

## 2018-04-19 NOTE — PROGRESS NOTES
Assessment/Plan:    No problem-specific Assessment & Plan notes found for this encounter  Diagnoses and all orders for this visit:    Hypertension, unspecified type  -     CBC and differential; Future  -     Basic metabolic panel; Future    Fall, initial encounter    Medicare annual wellness visit, subsequent    Other orders  -     aspirin 81 MG tablet; Take 162 mg by mouth  -     ferrous sulfate 325 (65 Fe) mg tablet; Take by mouth  -     hydrochlorothiazide (HYDRODIURIL) 25 mg tablet;   -     Multiple Vitamin (MULTI-VITAMIN DAILY) TABS; Take by mouth  -     omeprazole (PriLOSEC) 20 mg delayed release capsule; Take by mouth  -     ramipril (ALTACE) 10 MG capsule;   -     rosuvastatin (CRESTOR) 5 mg tablet;         Subjective:      Patient ID: Pacheco An is a 80 y o  male  HPI     Curt Ramirez is here today for visit accompanied by his wife Seng Stark  He generally feels quite well  He and his wife were visiting his wife's brother in the hospital [facility] in apparently the weather was a bit inclement they were rushing to get into the hospital and Curt Ramirez tripped over his own feet and fell he was seen in the emergency room of this hospital where he was examined the CT scan was done which was negative blood studies were not taken any was released his blood pressure during that event was somewhat elevated as was his pulse rate and this was attributable to anxiety and the fall  He has been taking his blood pressures at home on a regular basis and typically at home he recorded 113/78  He was at a dentist that too long ago was found to be 148/82    He takes his medications carefully he has been under the care of Dr Marialuisa Weinberg his gastroenterologist and on a combination of omeprazole and Raya Fore but is now using the omeprazole own as well sometimes he no longer has any GERD he feels well he had no other new issues Curt Ramirez is basically 80years old and happy to be alive    The following portions of the patient's history were reviewed and updated as appropriate: allergies, current medications, past medical history and past social history  Review of Systems    NoncontributoryObjective:      Pulse (!) 45   Temp (!) 97 1 °F (36 2 °C) (Tympanic)   Ht 5' 9 5" (1 765 m)   Wt 94 3 kg (208 lb)   SpO2 97%   BMI 30 28 kg/m²          Physical Exam      On physical examination he is an older gentleman he is alert sharp and gets around without any difficulty he has a resolving ecchymosis around his right eye the lungs are clear neck veins are flat carotid pulses are unremarkable is a grade 3/6 harsh ejection murmur at the cardiac base the rhythm is regular with a physiologic rate there is a trace of dependent edema    Recent lab studies showed a creatinine of 1 77 his electrolytes are otherwise unremarkable I did go over with the Logan Bautista the some basic ideas about hypertension his office blood pressures are somewhat elevated but his blood pressures outside the office seems to be quite normal at this point rather than making a change in his medications and going to ask him to simply a record carefully blood pressures that he takes at home and the next time he visits with us to bring his blood pressure instrument into the office of the we can calibrate it might going to ask him also to get a CBC and BMP    AWV Clinical     ISAR:   Previous hospitalizations?:  Yes   How many hospitalizations have you had in the last year?:  1-2       Once in a Lifetime Medicare Screening:   EKG performed?:  No    AAA screening performed? (if performed, please add date to Health Maintenance):  No       Medicare Screening Tests and Risk Assessment:   AAA Risk Assessment    None Indicated:  Yes    Osteoporosis Risk Assessment     Female:  No   :  Yes :  No   Age over 48:  Yes Low body weight (<127lbs):  No   Tobacco use:  No Alcohol use:  No   Low calcium diet:  No    HIV Risk Assessment    None indicated:  Yes        Drug and Alcohol Use:   Tobacco use    Cigarettes:  former smoker    Tobacco use duration    Tobacco Cessation Readiness    Alcohol use    Alcohol use:  occasional use    Concern about alcohol use:  No    Alcohol Treatment Readiness   Illicit Drug Use    Drug use:  never        Diet & Exercise:   Diet   How many servings a day of the following:   Exercise        Cognitive Impairment Screening:   Depression screening preformed:  Yes     PHQ-9 Depression scale score:  1   Depression screening results:  no significant symptoms   Cognitive Impairment Screening    Do you have difficulty learning or retaining new information?:  No Do you have difficulty handling new tasks?:  No   Do you have difficulty with reasoning?:  No Do you have difficulty with spatial ability and orientation?:  No   Do you have difficulty with language?:  No Do you have difficulty with behavior?:  No       Functional Ability/Level of Safety:   Hearing    Hearing difficulties:  Yes    Hearing aid:  Yes    Hearing Impairment Assessment    Current Activities    Status:  unlimited driving, unlimited ADL's, unlimited IADL's, unlimited social activities   Help needed with the folllowing:    Using the phone:  No Transportation:  No   Shopping:  No    Doing Housework:  No Doing Laundry:  No   Managing Medications:  No Managing Money:  No   ADL    Feeding:  Independant   Oral hygiene and Facial grooming:  Independant   Bathing:  Independant   Upper Body Dressing:  Independant   Lower Body Dressing:  Independant   Toileting:  Independant   Bed Mobility:  Independant   Fall Risk   Have you fallen in the last 12 months?:  Yes    How many times?:  1    Injury History    Antidepressant Use:  No   Previous Fall:  Yes Alcohol Use:  Yes    Urinary Incontinence:  No       Home Safety:   Home Safety Risk Factors   Unfamilar with surroundings:  No Uneven floors:  No   Stairs or handrail saftey risk:  Yes Loose rugs:  Yes   Household clutter:  No    No grab bars in bathroom:  No        Advanced Directives:   Advanced Directives    Living Will:  Yes Durable POA for healthcare:   Yes   Advanced directive:  Yes    Patient's End of Life Decisions        Urinary Incontinence:   Do you have urinary incontinence?:  No        Glaucoma:

## 2018-05-07 DIAGNOSIS — I10 HYPERTENSION, UNSPECIFIED TYPE: Primary | ICD-10-CM

## 2018-05-07 RX ORDER — HYDROCHLOROTHIAZIDE 25 MG/1
25 TABLET ORAL DAILY
Qty: 90 TABLET | Refills: 1 | Status: SHIPPED | OUTPATIENT
Start: 2018-05-07 | End: 2018-11-08 | Stop reason: SDUPTHER

## 2018-08-02 ENCOUNTER — OFFICE VISIT (OUTPATIENT)
Dept: INTERNAL MEDICINE CLINIC | Facility: CLINIC | Age: 83
End: 2018-08-02
Payer: MEDICARE

## 2018-08-02 VITALS
HEIGHT: 70 IN | HEART RATE: 64 BPM | SYSTOLIC BLOOD PRESSURE: 160 MMHG | BODY MASS INDEX: 29.81 KG/M2 | WEIGHT: 208.2 LBS | DIASTOLIC BLOOD PRESSURE: 80 MMHG | OXYGEN SATURATION: 95 %

## 2018-08-02 DIAGNOSIS — I10 HYPERTENSION, UNSPECIFIED TYPE: ICD-10-CM

## 2018-08-02 DIAGNOSIS — Z12.5 SCREENING PSA (PROSTATE SPECIFIC ANTIGEN): ICD-10-CM

## 2018-08-02 DIAGNOSIS — D64.9 ANEMIA, UNSPECIFIED TYPE: Primary | ICD-10-CM

## 2018-08-02 PROCEDURE — 99213 OFFICE O/P EST LOW 20 MIN: CPT | Performed by: INTERNAL MEDICINE

## 2018-08-02 NOTE — PROGRESS NOTES
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 he also feels well he had some back surgery a couple of years ago by Dr Elie Grigsby after which she had a drop in hemoglobin and has taken iron for some time  He was on a bus trip sitting for prolonged period of time and has recurrent episode of back pain he will be seeing Dr Ankush Ho again in near future  He takes his medications correctly generally feels quite well perhaps an occasional mild nondescript dizzy spell at home  The following portions of the patient's history were reviewed and updated as appropriate: allergies, current medications, past family history, past medical history, past social history, past surgical history and problem list     Review of Systems  And  NoncontributoryObjective:      /80 (BP Location: Left arm, Patient Position: Sitting, Cuff Size: Standard)   Pulse 64   Ht 5' 9 5" (1 765 m)   Wt 94 4 kg (208 lb 3 2 oz)   SpO2 95%   BMI 30 30 kg/m²          Physical Exam  on physical examination Yaritza Room appears well in no distress his pressure is 140/70 his pulse is in the mid 70s and is unremarkable  There is a grade 2-3/6 ejection murmur at the cardiac base is bit been present for a long time is unchanged the lungs are clear there is a trace of peripheral edema  I reviewed Manuel's lab studies with him his creatinine is about 1 79 not much different than it has been    It Manuel's medications are adequate I told him that in the summer he he may wish to have or skip his HCTZ periodically I suggested he continue taking his own blood pressure on a regular basis    Variety of lab studies will be done today will check his creatinine again and also get a ferritin to see whether he needs to be on continued iron replacement I also had a discussion about the new shingles vaccine with him and he would like to defer that this time he did however requested in addition to his regular lab studies he would like to have his PSA tested and will go ahead and order this no other changes are made will see him back in 4 months

## 2018-08-09 ENCOUNTER — APPOINTMENT (OUTPATIENT)
Dept: LAB | Facility: CLINIC | Age: 83
End: 2018-08-09
Payer: MEDICARE

## 2018-08-09 ENCOUNTER — TELEPHONE (OUTPATIENT)
Dept: INTERNAL MEDICINE CLINIC | Facility: CLINIC | Age: 83
End: 2018-08-09

## 2018-08-09 DIAGNOSIS — D64.9 ANEMIA, UNSPECIFIED TYPE: ICD-10-CM

## 2018-08-09 DIAGNOSIS — I10 HYPERTENSION, UNSPECIFIED TYPE: ICD-10-CM

## 2018-08-09 DIAGNOSIS — Z12.5 SCREENING PSA (PROSTATE SPECIFIC ANTIGEN): ICD-10-CM

## 2018-08-09 LAB
ANION GAP SERPL CALCULATED.3IONS-SCNC: 6 MMOL/L (ref 4–13)
BASOPHILS # BLD AUTO: 0.08 THOUSANDS/ΜL (ref 0–0.1)
BASOPHILS NFR BLD AUTO: 1 % (ref 0–1)
BILIRUB UR QL STRIP: NEGATIVE
BUN SERPL-MCNC: 40 MG/DL (ref 5–25)
CALCIUM SERPL-MCNC: 9.8 MG/DL (ref 8.3–10.1)
CHLORIDE SERPL-SCNC: 105 MMOL/L (ref 100–108)
CLARITY UR: CLEAR
CO2 SERPL-SCNC: 28 MMOL/L (ref 21–32)
COLOR UR: YELLOW
CREAT SERPL-MCNC: 1.67 MG/DL (ref 0.6–1.3)
EOSINOPHIL # BLD AUTO: 0.65 THOUSAND/ΜL (ref 0–0.61)
EOSINOPHIL NFR BLD AUTO: 7 % (ref 0–6)
ERYTHROCYTE [DISTWIDTH] IN BLOOD BY AUTOMATED COUNT: 13.3 % (ref 11.6–15.1)
FERRITIN SERPL-MCNC: 111 NG/ML (ref 8–388)
GFR SERPL CREATININE-BSD FRML MDRD: 38 ML/MIN/1.73SQ M
GLUCOSE P FAST SERPL-MCNC: 93 MG/DL (ref 65–99)
GLUCOSE UR STRIP-MCNC: NEGATIVE MG/DL
HCT VFR BLD AUTO: 36.4 % (ref 36.5–49.3)
HGB BLD-MCNC: 11.6 G/DL (ref 12–17)
HGB UR QL STRIP.AUTO: NEGATIVE
IMM GRANULOCYTES # BLD AUTO: 0.02 THOUSAND/UL (ref 0–0.2)
IMM GRANULOCYTES NFR BLD AUTO: 0 % (ref 0–2)
KETONES UR STRIP-MCNC: NEGATIVE MG/DL
LEUKOCYTE ESTERASE UR QL STRIP: NEGATIVE
LYMPHOCYTES # BLD AUTO: 2.36 THOUSANDS/ΜL (ref 0.6–4.47)
LYMPHOCYTES NFR BLD AUTO: 27 % (ref 14–44)
MCH RBC QN AUTO: 29.4 PG (ref 26.8–34.3)
MCHC RBC AUTO-ENTMCNC: 31.9 G/DL (ref 31.4–37.4)
MCV RBC AUTO: 92 FL (ref 82–98)
MONOCYTES # BLD AUTO: 0.83 THOUSAND/ΜL (ref 0.17–1.22)
MONOCYTES NFR BLD AUTO: 9 % (ref 4–12)
NEUTROPHILS # BLD AUTO: 4.94 THOUSANDS/ΜL (ref 1.85–7.62)
NEUTS SEG NFR BLD AUTO: 56 % (ref 43–75)
NITRITE UR QL STRIP: NEGATIVE
NRBC BLD AUTO-RTO: 0 /100 WBCS
PH UR STRIP.AUTO: 7 [PH] (ref 4.5–8)
PLATELET # BLD AUTO: 305 THOUSANDS/UL (ref 149–390)
PMV BLD AUTO: 11 FL (ref 8.9–12.7)
POTASSIUM SERPL-SCNC: 4.7 MMOL/L (ref 3.5–5.3)
PROT UR STRIP-MCNC: NEGATIVE MG/DL
PSA SERPL-MCNC: 2.6 NG/ML (ref 0–4)
RBC # BLD AUTO: 3.94 MILLION/UL (ref 3.88–5.62)
SODIUM SERPL-SCNC: 139 MMOL/L (ref 136–145)
SP GR UR STRIP.AUTO: 1.02 (ref 1–1.03)
TSH SERPL DL<=0.05 MIU/L-ACNC: 0.48 UIU/ML (ref 0.36–3.74)
UROBILINOGEN UR QL STRIP.AUTO: 0.2 E.U./DL
WBC # BLD AUTO: 8.88 THOUSAND/UL (ref 4.31–10.16)

## 2018-08-09 PROCEDURE — 84443 ASSAY THYROID STIM HORMONE: CPT

## 2018-08-09 PROCEDURE — 85025 COMPLETE CBC W/AUTO DIFF WBC: CPT

## 2018-08-09 PROCEDURE — G0103 PSA SCREENING: HCPCS

## 2018-08-09 PROCEDURE — 81003 URINALYSIS AUTO W/O SCOPE: CPT | Performed by: INTERNAL MEDICINE

## 2018-08-09 PROCEDURE — 82728 ASSAY OF FERRITIN: CPT

## 2018-08-09 PROCEDURE — 36415 COLL VENOUS BLD VENIPUNCTURE: CPT

## 2018-08-09 PROCEDURE — 80048 BASIC METABOLIC PNL TOTAL CA: CPT

## 2018-08-20 DIAGNOSIS — E78.5 HYPERLIPIDEMIA, UNSPECIFIED HYPERLIPIDEMIA TYPE: Primary | ICD-10-CM

## 2018-08-20 RX ORDER — ROSUVASTATIN CALCIUM 5 MG/1
5 TABLET, COATED ORAL DAILY
Qty: 90 TABLET | Refills: 1 | Status: SHIPPED | OUTPATIENT
Start: 2018-08-20 | End: 2019-03-12 | Stop reason: SDUPTHER

## 2018-10-08 ENCOUNTER — TELEPHONE (OUTPATIENT)
Dept: INTERNAL MEDICINE CLINIC | Facility: CLINIC | Age: 83
End: 2018-10-08

## 2018-10-08 ENCOUNTER — OFFICE VISIT (OUTPATIENT)
Dept: INTERNAL MEDICINE CLINIC | Facility: CLINIC | Age: 83
End: 2018-10-08
Payer: MEDICARE

## 2018-10-08 ENCOUNTER — APPOINTMENT (OUTPATIENT)
Dept: LAB | Facility: CLINIC | Age: 83
End: 2018-10-08
Payer: MEDICARE

## 2018-10-08 VITALS
HEART RATE: 58 BPM | WEIGHT: 207 LBS | HEIGHT: 70 IN | TEMPERATURE: 98.4 F | BODY MASS INDEX: 29.63 KG/M2 | OXYGEN SATURATION: 93 %

## 2018-10-08 DIAGNOSIS — I49.1 PAC (PREMATURE ATRIAL CONTRACTION): ICD-10-CM

## 2018-10-08 DIAGNOSIS — I49.8 OTHER SPECIFIED CARDIAC ARRHYTHMIAS: ICD-10-CM

## 2018-10-08 DIAGNOSIS — I49.1 PAC (PREMATURE ATRIAL CONTRACTION): Primary | ICD-10-CM

## 2018-10-08 LAB
ALBUMIN SERPL BCP-MCNC: 4.1 G/DL (ref 3.5–5)
ALP SERPL-CCNC: 87 U/L (ref 46–116)
ALT SERPL W P-5'-P-CCNC: 26 U/L (ref 12–78)
ANION GAP SERPL CALCULATED.3IONS-SCNC: 6 MMOL/L (ref 4–13)
AST SERPL W P-5'-P-CCNC: 19 U/L (ref 5–45)
BASOPHILS # BLD AUTO: 0.1 THOUSANDS/ΜL (ref 0–0.1)
BASOPHILS NFR BLD AUTO: 1 % (ref 0–1)
BILIRUB SERPL-MCNC: 0.4 MG/DL (ref 0.2–1)
BUN SERPL-MCNC: 35 MG/DL (ref 5–25)
CALCIUM SERPL-MCNC: 9.6 MG/DL (ref 8.3–10.1)
CHLORIDE SERPL-SCNC: 102 MMOL/L (ref 100–108)
CO2 SERPL-SCNC: 27 MMOL/L (ref 21–32)
CREAT SERPL-MCNC: 1.84 MG/DL (ref 0.6–1.3)
EOSINOPHIL # BLD AUTO: 0.52 THOUSAND/ΜL (ref 0–0.61)
EOSINOPHIL NFR BLD AUTO: 5 % (ref 0–6)
ERYTHROCYTE [DISTWIDTH] IN BLOOD BY AUTOMATED COUNT: 13.2 % (ref 11.6–15.1)
GFR SERPL CREATININE-BSD FRML MDRD: 33 ML/MIN/1.73SQ M
GLUCOSE SERPL-MCNC: 94 MG/DL (ref 65–140)
HCT VFR BLD AUTO: 37.9 % (ref 36.5–49.3)
HGB BLD-MCNC: 12.1 G/DL (ref 12–17)
IMM GRANULOCYTES # BLD AUTO: 0.03 THOUSAND/UL (ref 0–0.2)
IMM GRANULOCYTES NFR BLD AUTO: 0 % (ref 0–2)
LYMPHOCYTES # BLD AUTO: 2.33 THOUSANDS/ΜL (ref 0.6–4.47)
LYMPHOCYTES NFR BLD AUTO: 24 % (ref 14–44)
MCH RBC QN AUTO: 29.3 PG (ref 26.8–34.3)
MCHC RBC AUTO-ENTMCNC: 31.9 G/DL (ref 31.4–37.4)
MCV RBC AUTO: 92 FL (ref 82–98)
MONOCYTES # BLD AUTO: 1.04 THOUSAND/ΜL (ref 0.17–1.22)
MONOCYTES NFR BLD AUTO: 11 % (ref 4–12)
NEUTROPHILS # BLD AUTO: 5.64 THOUSANDS/ΜL (ref 1.85–7.62)
NEUTS SEG NFR BLD AUTO: 59 % (ref 43–75)
NRBC BLD AUTO-RTO: 0 /100 WBCS
PLATELET # BLD AUTO: 350 THOUSANDS/UL (ref 149–390)
PMV BLD AUTO: 11 FL (ref 8.9–12.7)
POTASSIUM SERPL-SCNC: 4.8 MMOL/L (ref 3.5–5.3)
PROT SERPL-MCNC: 7.9 G/DL (ref 6.4–8.2)
RBC # BLD AUTO: 4.13 MILLION/UL (ref 3.88–5.62)
SODIUM SERPL-SCNC: 135 MMOL/L (ref 136–145)
TSH SERPL DL<=0.05 MIU/L-ACNC: 0.43 UIU/ML (ref 0.36–3.74)
WBC # BLD AUTO: 9.66 THOUSAND/UL (ref 4.31–10.16)

## 2018-10-08 PROCEDURE — 99214 OFFICE O/P EST MOD 30 MIN: CPT | Performed by: INTERNAL MEDICINE

## 2018-10-08 PROCEDURE — 36415 COLL VENOUS BLD VENIPUNCTURE: CPT

## 2018-10-08 PROCEDURE — 80053 COMPREHEN METABOLIC PANEL: CPT

## 2018-10-08 PROCEDURE — 85025 COMPLETE CBC W/AUTO DIFF WBC: CPT

## 2018-10-08 PROCEDURE — 84443 ASSAY THYROID STIM HORMONE: CPT

## 2018-10-08 NOTE — TELEPHONE ENCOUNTER
Creatinine slightly higher  Pt to decrease hctz to12 5 qd  Repeat bmp 3-4 wks   Also order free t4 andfree t3

## 2018-10-08 NOTE — PROGRESS NOTES
Assessment/Plan:    No problem-specific Assessment & Plan notes found for this encounter  Diagnoses and all orders for this visit:    PAC (premature atrial contraction)  -     Comprehensive metabolic panel; Future  -     CBC and differential; Future  -     TSH, 3rd generation; Future  -     Ambulatory referral to Cardiology; Future    Other specified cardiac arrhythmias   -     TSH, 3rd generation; Future        Subjective:      Monica ent ID: Alvaro Larkin is on that day he was at a restaurant had 3 glasses of iced tea I can not believe this thing just erase my entire dictation  HPI     Luigi Mccoy is here today for visit accompanied by his wife  He developed a 15 min episode where his heart was beating very rapidly and his wife believes irregularly  His wife is a retired registered nurse  The situation began abruptly and terminated abruptly  Before that he went to a  which was upsetting visited a friend in a nursing home which was upsetting and had trouble with his car required a couple of calls to a  which was also upsetting  Finally he was sitting in a restaurant had 3 glasses of iced tea that day  He has a known heart murmur and had a echocardiogram done a few years ago  He is on medication for hypertension  The following portions of the patient's history were reviewed and updated as appropriate: allergies, current medications, past family history, past medical history, past social history, past surgical history and problem list     Review of Systems      Objective:      Pulse 58   Temp 98 4 °F (36 9 °C) (Tympanic)   Ht 5' 9 5" (1 765 m)   Wt 93 9 kg (207 lb)   SpO2 93%   BMI 30 13 kg/m²          Physical Exam  he appears well in no distress his pressure is 138/78 the lungs are clear there is no edema he appears to be clinically in a regular rhythm with a rate in the mid 50s there is a grade 2-3/6 ejection murmur at the cardiac base    Luigi Mccoy appears to have had a 15 min episode of a paroxysmal arrhythmia  His resting a lot to electrocardiogram showed is shows a sinus bradycardia left anterior hemiblock and a single premature atrial beat    Will set him up for some lab studies and refer him to a cardiologist

## 2018-11-01 ENCOUNTER — HOSPITAL ENCOUNTER (EMERGENCY)
Facility: HOSPITAL | Age: 83
Discharge: HOME | End: 2018-11-01
Attending: EMERGENCY MEDICINE
Payer: MEDICARE

## 2018-11-01 VITALS
WEIGHT: 223.33 LBS | SYSTOLIC BLOOD PRESSURE: 172 MMHG | TEMPERATURE: 98 F | HEART RATE: 56 BPM | DIASTOLIC BLOOD PRESSURE: 81 MMHG | BODY MASS INDEX: 32.51 KG/M2 | RESPIRATION RATE: 20 BRPM | OXYGEN SATURATION: 96 %

## 2018-11-01 DIAGNOSIS — D64.9 ANEMIA: ICD-10-CM

## 2018-11-01 DIAGNOSIS — Z87.898 HISTORY OF PALPITATIONS: Primary | ICD-10-CM

## 2018-11-01 LAB
ANION GAP SERPL CALCULATED.3IONS-SCNC: 8 MMOL/L (ref 4–13)
ATRIAL RATE: 64 BPM
BASOPHILS # BLD AUTO: 0.06 THOUSANDS/ΜL (ref 0–0.1)
BASOPHILS NFR BLD AUTO: 1 % (ref 0–1)
BILIRUB UR QL STRIP: NEGATIVE
BUN SERPL-MCNC: 35 MG/DL (ref 5–25)
CALCIUM SERPL-MCNC: 9.4 MG/DL (ref 8.3–10.1)
CHLORIDE SERPL-SCNC: 104 MMOL/L (ref 100–108)
CLARITY UR: CLEAR
CLARITY, POC: CLEAR
CO2 SERPL-SCNC: 28 MMOL/L (ref 21–32)
COLOR UR: YELLOW
COLOR, POC: YELLOW
CREAT SERPL-MCNC: 1.72 MG/DL (ref 0.6–1.3)
EOSINOPHIL # BLD AUTO: 0.5 THOUSAND/ΜL (ref 0–0.61)
EOSINOPHIL NFR BLD AUTO: 6 % (ref 0–6)
ERYTHROCYTE [DISTWIDTH] IN BLOOD BY AUTOMATED COUNT: 13.2 % (ref 11.6–15.1)
GFR SERPL CREATININE-BSD FRML MDRD: 36 ML/MIN/1.73SQ M
GLUCOSE SERPL-MCNC: 125 MG/DL (ref 65–140)
GLUCOSE UR STRIP-MCNC: NEGATIVE MG/DL
HCT VFR BLD AUTO: 32.6 % (ref 36.5–49.3)
HGB BLD-MCNC: 10.7 G/DL (ref 12–17)
HGB UR QL STRIP.AUTO: NEGATIVE
IMM GRANULOCYTES # BLD AUTO: 0.03 THOUSAND/UL (ref 0–0.2)
IMM GRANULOCYTES NFR BLD AUTO: 0 % (ref 0–2)
KETONES UR STRIP-MCNC: NEGATIVE MG/DL
LEUKOCYTE ESTERASE UR QL STRIP: NEGATIVE
LYMPHOCYTES # BLD AUTO: 2.31 THOUSANDS/ΜL (ref 0.6–4.47)
LYMPHOCYTES NFR BLD AUTO: 25 % (ref 14–44)
MAGNESIUM SERPL-MCNC: 2 MG/DL (ref 1.6–2.6)
MCH RBC QN AUTO: 29.4 PG (ref 26.8–34.3)
MCHC RBC AUTO-ENTMCNC: 32.8 G/DL (ref 31.4–37.4)
MCV RBC AUTO: 90 FL (ref 82–98)
MONOCYTES # BLD AUTO: 0.96 THOUSAND/ΜL (ref 0.17–1.22)
MONOCYTES NFR BLD AUTO: 11 % (ref 4–12)
NEUTROPHILS # BLD AUTO: 5.31 THOUSANDS/ΜL (ref 1.85–7.62)
NEUTS SEG NFR BLD AUTO: 57 % (ref 43–75)
NITRITE UR QL STRIP: NEGATIVE
NRBC BLD AUTO-RTO: 0 /100 WBCS
P AXIS: 63 DEGREES
PH UR STRIP.AUTO: 7 [PH] (ref 4.5–8)
PLATELET # BLD AUTO: 285 THOUSANDS/UL (ref 149–390)
PMV BLD AUTO: 10.4 FL (ref 8.9–12.7)
POTASSIUM SERPL-SCNC: 3.6 MMOL/L (ref 3.5–5.3)
PR INTERVAL: 170 MS
PROT UR STRIP-MCNC: NEGATIVE MG/DL
QRS AXIS: -59 DEGREES
QRSD INTERVAL: 100 MS
QT INTERVAL: 412 MS
QTC INTERVAL: 425 MS
RBC # BLD AUTO: 3.64 MILLION/UL (ref 3.88–5.62)
SODIUM SERPL-SCNC: 140 MMOL/L (ref 136–145)
SP GR UR STRIP.AUTO: 1.01 (ref 1–1.03)
T WAVE AXIS: 42 DEGREES
TROPONIN I SERPL-MCNC: 0.03 NG/ML
TSH SERPL DL<=0.05 MIU/L-ACNC: 0.4 UIU/ML (ref 0.36–3.74)
UROBILINOGEN UR QL STRIP.AUTO: 0.2 E.U./DL
VENTRICULAR RATE: 64 BPM
WBC # BLD AUTO: 9.17 THOUSAND/UL (ref 4.31–10.16)

## 2018-11-01 PROCEDURE — 80048 BASIC METABOLIC PNL TOTAL CA: CPT | Performed by: EMERGENCY MEDICINE

## 2018-11-01 PROCEDURE — 93005 ELECTROCARDIOGRAM TRACING: CPT

## 2018-11-01 PROCEDURE — 85025 COMPLETE CBC W/AUTO DIFF WBC: CPT | Performed by: EMERGENCY MEDICINE

## 2018-11-01 PROCEDURE — 83735 ASSAY OF MAGNESIUM: CPT | Performed by: EMERGENCY MEDICINE

## 2018-11-01 PROCEDURE — 99285 EMERGENCY DEPT VISIT HI MDM: CPT

## 2018-11-01 PROCEDURE — 36415 COLL VENOUS BLD VENIPUNCTURE: CPT | Performed by: EMERGENCY MEDICINE

## 2018-11-01 PROCEDURE — 93010 ELECTROCARDIOGRAM REPORT: CPT | Performed by: INTERNAL MEDICINE

## 2018-11-01 PROCEDURE — 84443 ASSAY THYROID STIM HORMONE: CPT | Performed by: EMERGENCY MEDICINE

## 2018-11-01 PROCEDURE — 84484 ASSAY OF TROPONIN QUANT: CPT | Performed by: EMERGENCY MEDICINE

## 2018-11-01 PROCEDURE — 81003 URINALYSIS AUTO W/O SCOPE: CPT

## 2018-11-01 NOTE — DISCHARGE INSTRUCTIONS
Heart Palpitations   WHAT YOU NEED TO KNOW:   Heart palpitations are feelings that your heart races, jumps, throbs, or flutters  You may feel extra beats, no beats for a short time, or skipped beats  You may have these feelings in your chest, throat, or neck  They may happen when you are sitting, standing, or lying  Heart palpitations may be frightening, but are usually not caused by a serious problem  DISCHARGE INSTRUCTIONS:   Call 911 or have someone else call for any of the following:   · You have any of the following signs of a heart attack:      ¨ Squeezing, pressure, or pain in your chest that lasts longer than 5 minutes or returns    ¨ Discomfort or pain in your back, neck, jaw, stomach, or arm     ¨ Trouble breathing    ¨ Nausea or vomiting    ¨ Lightheadedness or a sudden cold sweat, especially with chest pain or trouble breathing    · You have any of the following signs of a stroke:      ¨ Numbness or drooping on one side of your face     ¨ Weakness in an arm or leg    ¨ Confusion or difficulty speaking    ¨ Dizziness, a severe headache, or vision loss    · You faint or lose consciousness  Return to the emergency department if:   · Your palpitations happen more often or get more intense  Contact your healthcare provider if:   · You have new or worsening swelling in your feet or ankles  · You have questions or concerns about your condition or care  Follow up with your healthcare provider as directed: You may need to follow up with a cardiologist  Kathie Gonzalez may need tests to check for heart problems that cause palpitations  Write down your questions so you remember to ask them during your visits  Keep a record:  Write down when your palpitations start and stop, what you were doing when they started, and your symptoms  Keep track of what you ate or drank within a few hours of your palpitations  Include anything that seemed to help your symptoms, such as lying down or holding your breath   This record will help you and your healthcare provider learn what triggers your palpitations  Bring this record with you to your follow up visits  Help prevent heart palpitations:   · Manage stress and anxiety  Find ways to relax such as listening to music, meditating, or doing yoga  Exercise can also help decrease stress and anxiety  Talk to someone you trust about your stress or anxiety  You can also talk to a therapist      · Get plenty of sleep every night  Ask your healthcare provider how much sleep you need each night  · Do not drink caffeine or alcohol  Caffeine and alcohol can make your palpitations worse  Caffeine is found in soda, coffee, tea, chocolate, and drinks that increase your energy  · Do not smoke  Nicotine and other chemicals in cigarettes and cigars may damage your heart and blood vessels  Ask your healthcare provider for information if you currently smoke and need help to quit  E-cigarettes or smokeless tobacco still contain nicotine  Talk to your healthcare provider before you use these products  · Do not use illegal drugs  Talk to your healthcare provider if you use illegal drugs and want help to quit  © 2017 [facility] Information is for End User's use only and may not be sold, redistributed or otherwise used for commercial purposes  All illustrations and images included in CareNotes® are the copyrighted property of [employer] D [employer]  or Gil [employer]allister  The above information is an  only  It is not intended as medical advice for individual conditions or treatments  Talk to your doctor, nurse or pharmacist before following any medical regimen to see if it is safe and effective for you

## 2018-11-01 NOTE — ED PROVIDER NOTES
History  Chief Complaint   Patient presents with    Rapid Heart Rate     Patient arrived via EMS  Around 1330 patient had an episode of tachycardia  Patient states he gets weak when it comes on  Denies CP, SOB, N/V at this time  Per EMS, patient was in the 80's upon arrival  hx of tachycardia  80-year-old male presenting for evaluation sensation of increased heart rate  Patient reports that he was sitting in the car after eating lunch when he felt his heart racing  He reports that symptoms lasted for approximately 15 min and resolved spontaneously  No symptoms at this time  Patient denies any lightheadedness, dizziness, diaphoresis, CP, SOB, nausea or other symptoms at that time  History of 1 prior episode approximately 1 month ago with similar symptoms  Patient saw his PCP at that time who ordered blood work which was all unremarkable  Patient has an upcoming appointment with Cardiology 1 week from today  Patient has history of HTN, HLD, denies any recent changes to his medications  No history of or risk factors for DVT/PE  No history of CAD/mi  Lives at home with wife is at bedside  A/P:  80-year-old male with reported episode of tachycardia, resolved at this time a normal sinus rhythm on EKG/monitor, will get labs to assess for electrolyte abnormality/thyroid dysfunction, have patient follow up with Cardiology next week as already scheduled             10/8 PCP visit  - PACs, referred to Cards    Prior to Admission Medications   Prescriptions Last Dose Informant Patient Reported? Taking?    Multiple Vitamin (MULTI-VITAMIN DAILY) TABS  Self Yes No   Sig: Take by mouth   aspirin 81 MG tablet  Self Yes No   Sig: Take 162 mg by mouth   ferrous sulfate 325 (65 Fe) mg tablet  Self Yes No   Sig: Take by mouth   hydrochlorothiazide (HYDRODIURIL) 25 mg tablet   No No   Sig: Take 1 tablet (25 mg total) by mouth daily   omeprazole (PriLOSEC) 20 mg delayed release capsule  Self Yes No   Sig: Take by mouth ramipril (ALTACE) 10 MG capsule  Self Yes No   rosuvastatin (CRESTOR) 5 mg tablet   No No   Sig: Take 1 tablet (5 mg total) by mouth daily      Facility-Administered Medications: None       Past Medical History:   Diagnosis Date    Hearing loss     last assessed 11/8/17    Rheumatic fever        Past Surgical History:   Procedure Laterality Date    APPENDECTOMY      BACK SURGERY      lower    CATARACT EXTRACTION      KNEE SURGERY Left     TONSILLECTOMY AND ADENOIDECTOMY         Family History   Problem Relation Age of Onset    Other Mother         old age   [facility] Esophageal cancer Father    [facility] Other Father         old age   [facility] Alcohol abuse Son         alcoholism     I have reviewed and agree with the history as documented  Social History   Substance Use Topics    Smoking status: Former Smoker    Smokeless tobacco: Never Used    Alcohol use No      Comment: conflicting no and occasional wine per [employer]        Review of Systems   Constitutional: Negative for chills, fever and unexpected weight change  HENT: Negative for ear pain, rhinorrhea and sore throat  Eyes: Negative for pain and visual disturbance  Respiratory: Negative for cough and shortness of breath  Cardiovascular: Positive for palpitations  Negative for chest pain and leg swelling  Gastrointestinal: Negative for abdominal pain, constipation, diarrhea, nausea and vomiting  Endocrine: Negative for polydipsia, polyphagia and polyuria  Genitourinary: Negative for dysuria, frequency, hematuria and urgency  Musculoskeletal: Negative for back pain, myalgias and neck pain  Skin: Negative for color change and rash  Allergic/Immunologic: Negative for environmental allergies and immunocompromised state  Neurological: Negative for dizziness, weakness, light-headedness, numbness and headaches  Hematological: Negative for adenopathy  Does not bruise/bleed easily  Psychiatric/Behavioral: Negative for agitation and confusion  All other systems reviewed and are negative  Physical Exam  Physical Exam   Constitutional: He is oriented to person, place, and time  He appears well-developed and well-nourished  HENT:   Head: Normocephalic and atraumatic  Nose: Nose normal    Mouth/Throat: Oropharynx is clear and moist    Eyes: Conjunctivae and EOM are normal    Neck: Normal range of motion  Neck supple  Cardiovascular: Normal rate, regular rhythm, normal heart sounds and intact distal pulses  Pulmonary/Chest: Effort normal and breath sounds normal  No stridor  No respiratory distress  He has no wheezes  He has no rales  He exhibits no tenderness  Abdominal: Soft  He exhibits no distension  There is no tenderness  There is no rebound and no guarding  Musculoskeletal: He exhibits no edema or deformity  No calf swelling/ttp   Neurological: He is alert and oriented to person, place, and time  He exhibits normal muscle tone  Coordination normal    Skin: Skin is warm and dry  No rash noted  Psychiatric: He has a normal mood and affect  Judgment and thought content normal    Nursing note and vitals reviewed        Vital Signs  ED Triage Vitals [11/01/18 1525]   Temperature Pulse Respirations Blood Pressure SpO2   98 °F (36 7 °C) 66 18 (!) 171/83 96 %      Temp Source Heart Rate Source Patient Position - Orthostatic VS BP Location FiO2 (%)   Oral Monitor Lying Right arm --      Pain Score       No Pain           Vitals:    11/01/18 1525 11/01/18 1626 11/01/18 1730   BP: (!) 171/83 164/79 (!) 172/81   Pulse: 66 57 56   Patient Position - Orthostatic VS: Lying Lying Lying       Visual Acuity      ED Medications  Medications - No data to display    Diagnostic Studies  Results Reviewed     Procedure Component Value Units Date/Time    Basic metabolic panel [[id number]]  (Abnormal) Collected:  11/01/18 1618    Lab Status:  Final result Specimen:  Blood from Hand, Left Updated:  11/01/18 1648     Sodium 140 mmol/L      Potassium 3 6 mmol/L Chloride 104 mmol/L      CO2 28 mmol/L      ANION GAP 8 mmol/L      BUN 35 (H) mg/dL      Creatinine 1 72 (H) mg/dL      Glucose 125 mg/dL      Calcium 9 4 mg/dL      eGFR 36 ml/min/1 73sq m     Narrative:         National Kidney Disease Education Program recommendations are as follows:  GFR calculation is accurate only with a steady state creatinine  Chronic Kidney disease less than 60 ml/min/1 73 sq  meters  Kidney failure less than 15 ml/min/1 73 sq  meters  TSH, 3rd generation with Free T4 reflex [[id number]]  (Normal) Collected:  11/01/18 1618    Lab Status:  Final result Specimen:  Blood from Hand, Left Updated:  11/01/18 1648     TSH 3RD GENERATON 0 399 uIU/mL     Narrative:         Patients undergoing fluorescein dye angiography may retain small amounts of fluorescein in the body for 48-72 hours post procedure  Samples containing fluorescein can produce falsely depressed TSH values  If the patient had this procedure,a specimen should be resubmitted post fluorescein clearance      Magnesium [[id number]]  (Normal) Collected:  11/01/18 1618    Lab Status:  Final result Specimen:  Blood from Hand, Left Updated:  11/01/18 1648     Magnesium 2 0 mg/dL     Troponin I [[id number]]  (Normal) Collected:  11/01/18 1618    Lab Status:  Final result Specimen:  Blood from Arm, Left Updated:  11/01/18 1641     Troponin I 0 03 ng/mL     CBC and differential [[id number]]  (Abnormal) Collected:  11/01/18 1618    Lab Status:  Final result Specimen:  Blood from Hand, Left Updated:  11/01/18 1624     WBC 9 17 Thousand/uL      RBC 3 64 (L) Million/uL      Hemoglobin 10 7 (L) g/dL      Hematocrit 32 6 (L) %      MCV 90 fL      MCH 29 4 pg      MCHC 32 8 g/dL      RDW 13 2 %      MPV 10 4 fL      Platelets 993 Thousands/uL      nRBC 0 /100 WBCs      Neutrophils Relative 57 %      Immat GRANS % 0 %      Lymphocytes Relative 25 %      Monocytes Relative 11 %      Eosinophils Relative 6 %      Basophils Relative 1 %      Neutrophils Absolute 5 31 Thousands/µL      Immature Grans Absolute 0 03 Thousand/uL      Lymphocytes Absolute 2 31 Thousands/µL      Monocytes Absolute 0 96 Thousand/µL      Eosinophils Absolute 0 50 Thousand/µL      Basophils Absolute 0 06 Thousands/µL     POCT urinalysis dipstick [[id number]]  (Normal) Resulted:  11/01/18 1617    Lab Status:  Final result Specimen:  Urine from Urine, Other Updated:  11/01/18 1617     Color, UA yellow     Clarity, UA clear    ED Urine Macroscopic [[id number]] Collected:  11/01/18 1621    Lab Status:  Final result Specimen:  Urine Updated:  11/01/18 1609     Color, UA Yellow     Clarity, UA Clear     pH, UA 7 0     Leukocytes, UA Negative     Nitrite, UA Negative     Protein, UA Negative mg/dl      Glucose, UA Negative mg/dl      Ketones, UA Negative mg/dl      Urobilinogen, UA 0 2 E U /dl      Bilirubin, UA Negative     Blood, UA Negative     Specific Gravity, UA 1 015    Narrative:       CLINITEK RESULT                 No orders to display              Procedures  ECG 12 Lead Documentation  Date/Time: 11/1/2018 4:20 PM  Performed by: Tano MENDOSA  Authorized by: Tano MENDOSA     Indications / Diagnosis:  Reported palpitations  ECG reviewed by me, the ED Provider: yes    Patient location:  ED  Previous ECG:     Previous ECG:  Unavailable  Rate:     ECG rate:  64  Rhythm:     Rhythm: sinus rhythm    QRS:     QRS axis:  Left  ST segments:     ST segments:  Normal  T waves:     T waves: normal             Phone Contacts  ED Phone Contact    ED Course  ED Course as of Nov 03 2038   Thu Nov 01, 2018   1614 Attempted to call pt's PCP, Dr Veronica Salmeron who is away until 11/15    1629 12 1 3 weeks ago Hemoglobin: (!) 10 7   1713 Baseline per records Creatinine: (!) 1 72   1730 No evidence of bleeding, heme negative Hemoglobin: (!) 10 7   1737 Discussed with pt to get repeat Hgb in 2 days and to get Holter monitor placed   Family at bedside and all in agreement with plan, return precautions verbally discussed MDM  Number of Diagnoses or Management Options  Anemia:   History of palpitations:   Diagnosis management comments: 81 yo M with h/o palpitaitons, not present in ED- given rx to get holter and he will f/u with Cards next week  He also was noted to have anemia, hemoccult negative, given rx to get rechecked  Strict return precautions discussed, family agreeable with plan       Amount and/or Complexity of Data Reviewed  Clinical lab tests: ordered and reviewed  Tests in the radiology section of CPT®: ordered and reviewed  Tests in the medicine section of CPT®: ordered and reviewed      CritCare Time    Disposition  Final diagnoses:   History of palpitations   Anemia     Time reflects when diagnosis was documented in both MDM as applicable and the Disposition within this note     Time User Action Codes Description Comment    11/1/2018  5:34 PM Felicity Cost Add [D09 436] History of palpitations     11/1/2018  5:34 PM Felicity Cost Add [D64 9] Anemia       ED Disposition     ED Disposition Condition Comment    Discharge  Marielena Lund discharge to home/self care  Condition at discharge: Stable        Follow-up Information     Follow up With Specialties Details Why [address] Bunch MD Internal Medicine   [address] [place] [address]          Please follow up with Cardiology as previously scheduled  Please get Holter monitor placed  Please get hemoglobin checked in 2 days   Return if any new/concerning symptoms including but not limited to near syncope, chest pain, trouble breathing, bleeding          Discharge Medication List as of 11/1/2018  5:37 PM      CONTINUE these medications which have NOT CHANGED    Details   aspirin 81 MG tablet Take 162 mg by mouth, Historical Med      ferrous sulfate 325 (65 Fe) mg tablet Take by mouth, Starting Wed 4/5/2017, Historical Med      hydrochlorothiazide (HYDRODIURIL) 25 mg tablet Take 1 tablet (25 mg total) by mouth daily, Starting Mon 5/7/2018, Normal      Multiple Vitamin (MULTI-VITAMIN DAILY) TABS Take by mouth, Historical Med      omeprazole (PriLOSEC) 20 mg delayed release capsule Take by mouth, Starting Wed 4/5/2017, Historical Med      ramipril (ALTACE) 10 MG capsule Starting Mon 3/12/2018, Historical Med      rosuvastatin (CRESTOR) 5 mg tablet Take 1 tablet (5 mg total) by mouth daily, Starting Mon 8/20/2018, Normal             Outpatient Discharge Orders  Hemoglobin   Standing Status: Future Number of Occurrences: 1 Standing Exp  Date: 11/01/19     Holter monitor - 48 hour   Standing Status: Future Number of Occurrences: 1 Standing Exp   Date: 11/01/22         ED Provider  Electronically Signed by           Nessa Lloyd DO  11/03/18 2038

## 2018-11-02 ENCOUNTER — HOSPITAL ENCOUNTER (OUTPATIENT)
Dept: NON INVASIVE DIAGNOSTICS | Facility: HOSPITAL | Age: 83
Discharge: HOME | End: 2018-11-02
Attending: EMERGENCY MEDICINE
Payer: MEDICARE

## 2018-11-02 DIAGNOSIS — Z87.898 HISTORY OF PALPITATIONS: ICD-10-CM

## 2018-11-02 PROCEDURE — 93226 XTRNL ECG REC<48 HR SCAN A/R: CPT

## 2018-11-02 PROCEDURE — 93227 XTRNL ECG REC<48 HR R&I: CPT | Performed by: INTERNAL MEDICINE

## 2018-11-02 PROCEDURE — 93225 XTRNL ECG REC<48 HRS REC: CPT

## 2018-11-03 ENCOUNTER — APPOINTMENT (OUTPATIENT)
Dept: LAB | Facility: HOSPITAL | Age: 83
End: 2018-11-03
Attending: EMERGENCY MEDICINE
Payer: MEDICARE

## 2018-11-03 DIAGNOSIS — D64.9 ANEMIA: ICD-10-CM

## 2018-11-03 LAB — HGB BLD-MCNC: 11.4 G/DL (ref 12–17)

## 2018-11-03 PROCEDURE — 36415 COLL VENOUS BLD VENIPUNCTURE: CPT

## 2018-11-03 PROCEDURE — 85018 HEMOGLOBIN: CPT

## 2018-11-07 PROBLEM — I47.10 PSVT (PAROXYSMAL SUPRAVENTRICULAR TACHYCARDIA): Status: ACTIVE | Noted: 2018-11-07

## 2018-11-07 PROBLEM — I47.1 PSVT (PAROXYSMAL SUPRAVENTRICULAR TACHYCARDIA) (HCC): Status: ACTIVE | Noted: 2018-11-07

## 2018-11-08 ENCOUNTER — OFFICE VISIT (OUTPATIENT)
Dept: CARDIOLOGY CLINIC | Facility: CLINIC | Age: 83
End: 2018-11-08
Payer: MEDICARE

## 2018-11-08 VITALS
HEIGHT: 70 IN | WEIGHT: 210.8 LBS | DIASTOLIC BLOOD PRESSURE: 82 MMHG | SYSTOLIC BLOOD PRESSURE: 144 MMHG | BODY MASS INDEX: 30.18 KG/M2 | HEART RATE: 60 BPM | RESPIRATION RATE: 16 BRPM

## 2018-11-08 DIAGNOSIS — I47.1 PSVT (PAROXYSMAL SUPRAVENTRICULAR TACHYCARDIA) (HCC): ICD-10-CM

## 2018-11-08 DIAGNOSIS — R00.2 PALPITATIONS: Primary | ICD-10-CM

## 2018-11-08 DIAGNOSIS — R01.1 SYSTOLIC MURMUR: ICD-10-CM

## 2018-11-08 DIAGNOSIS — E78.2 MIXED HYPERLIPIDEMIA: ICD-10-CM

## 2018-11-08 DIAGNOSIS — I10 ESSENTIAL HYPERTENSION: ICD-10-CM

## 2018-11-08 PROCEDURE — 99203 OFFICE O/P NEW LOW 30 MIN: CPT | Performed by: INTERNAL MEDICINE

## 2018-11-08 RX ORDER — HYDROCHLOROTHIAZIDE 25 MG/1
12.5 TABLET ORAL DAILY
Qty: 90 TABLET | Refills: 0
Start: 2018-11-08 | End: 2018-11-12 | Stop reason: SDUPTHER

## 2018-11-08 NOTE — PROGRESS NOTES
Cardiology Consultation     [facility]  [id number]  1935  [address]    Reason for visit: here for evaluation of PACs and PVCs  Adarsh George Also had palpitations about one month ago  Also has HTN and HLP    1  Essential hypertension     2  Mixed hyperlipidemia     3  PSVT (paroxysmal supraventricular tachycardia) ([address] )     4  PAC (premature atrial contraction)  Ambulatory referral to Cardiology     HPI: The patient presents with palpitations  He had an episode of palpitations that lasted 10 minutes one month ago  He had just sat to eat  Adarsh George He had a similar episode a week ago  While sitting in the car  He has had skips for some time  He did not have lightheaded  He did have some nausea  He denies SOB or CP with the episodes  He denies exertional CP or SOB  He does exercise 2x per week  He denies edema    He does have a hx of heart murmur        Patient Active Problem List   Diagnosis    Anemia    Linda esophagus    Esophageal reflux    Mixed hyperlipidemia    Hypertension    Spinal stenosis of lumbar region    Spondylolisthesis    PSVT (paroxysmal [facility]ventricular tachycardia) [facility])     Past Medical History:   Diagnosis Date    Hearing loss     last assessed 11/8/17    Rheumatic fever      Social History     Social History    Marital status: /Civil Union     Spouse name: N/A    Number of children: N/A    Years of education: N/A     Occupational History    Not on file       Social History Main Topics    Smoking status: Former Smoker    Smokeless tobacco: Never Used    Alcohol use No      Comment: conflicting no and occasional wine per [employer]    Drug use: No    Sexual activity: Not on file     Other Topics Concern    Not on file     Social History Narrative    No narrative on file      Family History   Problem Relation Age of Onset    Other Mother         old age   [facility] Esophageal cancer Father    [facility] Other Father         old age   [facility] Alcohol abuse Son         alcoholism     Past Surgical History:   Procedure Laterality Date    APPENDECTOMY      BACK SURGERY      lower    CATARACT EXTRACTION      KNEE SURGERY Left     TONSILLECTOMY AND ADENOIDECTOMY         Current Outpatient Prescriptions:     aspirin 81 MG tablet, Take 162 mg by mouth, Disp: , Rfl:     ferrous sulfate 325 (65 Fe) mg tablet, Take by mouth, Disp: , Rfl:     hydrochlorothiazide (HYDRODIURIL) 25 mg tablet, Take 1 tablet (25 mg total) by mouth daily (Patient taking differently: Take 25 mg by mouth daily EVERY OTHER DAY ), Disp: 90 tablet, Rfl: 1    Multiple Vitamin (MULTI-VITAMIN DAILY) TABS, Take by mouth, Disp: , Rfl:     omeprazole (PriLOSEC) 20 mg delayed release capsule, Take by mouth, Disp: , Rfl:     ramipril (ALTACE) 10 MG capsule, , Disp: , Rfl:     rosuvastatin (CRESTOR) 5 mg tablet, Take 1 tablet (5 mg total) by mouth daily, Disp: 90 tablet, Rfl: 1  No Known Allergies  Vitals:    11/08/18 1449   BP: 144/82   Pulse: 60   Resp: 16   Weight: 95 6 kg (210 lb 12 8 oz)   Height: 5' 9 5" (1 765 m)       Review of Systems:  Review of Systems   Constitutional: Negative for fatigue and unexpected weight change  Respiratory: Negative for shortness of breath  Cardiovascular: Positive for palpitations  Negative for chest pain and leg swelling  Gastrointestinal: Positive for constipation  Negative for blood in stool and diarrhea  Genitourinary: Negative for frequency and hematuria  Musculoskeletal: Positive for arthralgias and back pain  Neurological: Negative for dizziness and light-headedness  Psychiatric/Behavioral: Negative for agitation, behavioral problems, confusion and decreased concentration         Physical Exam:  Vitals:    11/08/18 1449   BP: 144/82   Pulse: 60   Resp: 16   Weight: 95 6 kg (210 lb 12 8 oz)   Height: 5' 9 5" (1 765 m)       Physical Exam   Constitutional: He appears well-developed and well-nourished  No distress  HENT:   Head: Normocephalic and atraumatic  Mouth/Throat: No oropharyngeal exudate  Eyes: No scleral icterus  Pale conjunctiva   Neck: Neck supple  Decreased carotid pulses (somewhat decreased upstroke) present  No JVD present  Carotid bruit is present (transmitted murmur)  No thyromegaly present  Cardiovascular: Normal rate and regular rhythm  Exam reveals no gallop and no friction rub  Murmur heard  Crescendo decrescendo systolic (basal) murmur is present with a grade of 3/6   Pulses:       Dorsalis pedis pulses are 1+ on the right side, and 1+ on the left side  Posterior tibial pulses are 1+ on the right side, and 1+ on the left side  Pulmonary/Chest: Breath sounds normal  He has no wheezes  He has no rhonchi  He has no rales  Abdominal: Soft  He exhibits no mass  There is no hepatosplenomegaly  There is no tenderness  Musculoskeletal: He exhibits edema (1+ edema of the LEs)  Discussion/Summary:  1/2  Palpitations/PSVT- likely sustained SVT causing sx  Jose Antonio Po Hesitant to add av tay blocking agent with min HR of 36 bpm  Will see how it plays out    May need PPM prior to AV tay blocking agents vs direct PSVT ablation  3  HTN-reasonable control on HCTZ/ramipril  4  Mixed HLP-good control on rosuvastatin (LDL<100)  5  Systolic Murmur    Had Aortic sclerosis 5 yrs ago    Likely AS now    Will check echo      Phone FU for echo    Radha Contreras MD

## 2018-11-12 DIAGNOSIS — I10 ESSENTIAL HYPERTENSION: ICD-10-CM

## 2018-11-12 RX ORDER — HYDROCHLOROTHIAZIDE 25 MG/1
12.5 TABLET ORAL DAILY
Qty: 90 TABLET | Refills: 0 | OUTPATIENT
Start: 2018-11-12 | End: 2019-11-15 | Stop reason: SDUPTHER

## 2018-11-13 ENCOUNTER — HOSPITAL ENCOUNTER (OUTPATIENT)
Dept: NON INVASIVE DIAGNOSTICS | Facility: CLINIC | Age: 83
Discharge: HOME | End: 2018-11-13
Payer: MEDICARE

## 2018-11-13 DIAGNOSIS — R01.1 SYSTOLIC MURMUR: ICD-10-CM

## 2018-11-13 DIAGNOSIS — R00.2 PALPITATIONS: ICD-10-CM

## 2018-11-13 PROCEDURE — 93306 TTE W/DOPPLER COMPLETE: CPT

## 2018-11-13 PROCEDURE — 93306 TTE W/DOPPLER COMPLETE: CPT | Performed by: INTERNAL MEDICINE

## 2018-12-06 ENCOUNTER — OFFICE VISIT (OUTPATIENT)
Dept: INTERNAL MEDICINE CLINIC | Facility: CLINIC | Age: 83
End: 2018-12-06
Payer: MEDICARE

## 2018-12-06 VITALS
BODY MASS INDEX: 30.32 KG/M2 | OXYGEN SATURATION: 97 % | HEIGHT: 70 IN | TEMPERATURE: 97.5 F | WEIGHT: 211.8 LBS | HEART RATE: 57 BPM

## 2018-12-06 DIAGNOSIS — I10 ESSENTIAL HYPERTENSION: Primary | ICD-10-CM

## 2018-12-06 PROCEDURE — 99213 OFFICE O/P EST LOW 20 MIN: CPT | Performed by: INTERNAL MEDICINE

## 2018-12-06 NOTE — PROGRESS NOTES
Assessment/Plan:    No problem-specific Assessment & Plan notes found for this encounter  Subjective: For visit he feels quite well   Patient ID: Liam Arciniega is a 80 y o  male  HPI  Garret Harkins is here today for visit he feels well he developed a couple of episodes number of months ago where he felt his heart was beating very rapidly  He was subsequently seen by my colleague Dr Cory Gandara who felt that he probably had a couple of episodes of paroxysmal supraventricular tachycardia  Garret Harkins tends to have a relatively slow resting heart rate in for that reason the beta-blockers were not instituted maybe that at some point in the future he will wind up with a sick sinus syndrome and and needed device implanted but right now he is asymptomatic  He had appropriate imaging studies that showed good ventricle and moderate aortic stenosis    The following portions of the patient's history were reviewed and updated as appropriate: allergies, current medications, past family history, past medical history, past social history, past surgical history and problem list     Review of Systems  at this point Garret Hrakins feels quite well he reduced his hydrochlorothiazide from 25-12 mg which I think is satisfactory his wife takes who his blood pressure on a regular basis 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    Objective:      Pulse 57   Temp 97 5 °F (36 4 °C) (Tympanic)   Ht 5' 9 5" (1 765 m)   Wt 96 1 kg (211 lb 12 8 oz)   SpO2 97%   BMI 30 83 kg/m²          Physical Exam  appears well in no distress his blood pressure is 140/72 in the left arm sitting and approximately 132/72 in the right arm supine he has a grade 3/6 harsh ejection murmur at the upper right sternal border his lungs are clear the carotids are quiet the abdomen is unremarkable there is no significant peripheral edema he is clearly in a sinus rhythm    The patient is quite stable he will continue with aspirin 81, ramipril 10, Crestor 5, and HCTZ 12 5 I will get some electrolytes and blood count before his next visit

## 2018-12-10 DIAGNOSIS — I10 HYPERTENSION, UNSPECIFIED TYPE: Primary | ICD-10-CM

## 2018-12-10 RX ORDER — RAMIPRIL 10 MG/1
10 CAPSULE ORAL DAILY
Qty: 90 CAPSULE | Refills: 1 | Status: SHIPPED | OUTPATIENT
Start: 2018-12-10 | End: 2019-05-14 | Stop reason: SDUPTHER

## 2019-01-25 ENCOUNTER — TELEPHONE (OUTPATIENT)
Dept: CARDIOLOGY CLINIC | Facility: CLINIC | Age: 84
End: 2019-01-25

## 2019-01-25 NOTE — TELEPHONE ENCOUNTER
Pt calling, with complaints of dizziness during an exercise class today  Pt did stop the class early   when pt got home after class  HR still high  BP before class was 140/70, BP after class was 110/60  Pt is concerned  Would like phone call back  Please advise

## 2019-03-12 DIAGNOSIS — E78.5 HYPERLIPIDEMIA, UNSPECIFIED HYPERLIPIDEMIA TYPE: ICD-10-CM

## 2019-03-12 RX ORDER — ROSUVASTATIN CALCIUM 5 MG/1
5 TABLET, COATED ORAL DAILY
Qty: 90 TABLET | Refills: 0 | Status: SHIPPED | OUTPATIENT
Start: 2019-03-12 | End: 2019-06-12 | Stop reason: SDUPTHER

## 2019-05-10 ENCOUNTER — APPOINTMENT (OUTPATIENT)
Dept: LAB | Facility: CLINIC | Age: 84
End: 2019-05-10
Payer: MEDICARE

## 2019-05-10 DIAGNOSIS — I10 ESSENTIAL HYPERTENSION: ICD-10-CM

## 2019-05-10 LAB
ALBUMIN SERPL BCP-MCNC: 3.6 G/DL (ref 3.5–5)
ALP SERPL-CCNC: 84 U/L (ref 46–116)
ALT SERPL W P-5'-P-CCNC: 32 U/L (ref 12–78)
ANION GAP SERPL CALCULATED.3IONS-SCNC: 5 MMOL/L (ref 4–13)
AST SERPL W P-5'-P-CCNC: 22 U/L (ref 5–45)
BASOPHILS # BLD AUTO: 0.07 THOUSANDS/ΜL (ref 0–0.1)
BASOPHILS NFR BLD AUTO: 1 % (ref 0–1)
BILIRUB SERPL-MCNC: 0.38 MG/DL (ref 0.2–1)
BUN SERPL-MCNC: 41 MG/DL (ref 5–25)
CALCIUM SERPL-MCNC: 9.2 MG/DL (ref 8.3–10.1)
CHLORIDE SERPL-SCNC: 108 MMOL/L (ref 100–108)
CO2 SERPL-SCNC: 26 MMOL/L (ref 21–32)
CREAT SERPL-MCNC: 1.81 MG/DL (ref 0.6–1.3)
EOSINOPHIL # BLD AUTO: 0.42 THOUSAND/ΜL (ref 0–0.61)
EOSINOPHIL NFR BLD AUTO: 5 % (ref 0–6)
ERYTHROCYTE [DISTWIDTH] IN BLOOD BY AUTOMATED COUNT: 13.4 % (ref 11.6–15.1)
GFR SERPL CREATININE-BSD FRML MDRD: 34 ML/MIN/1.73SQ M
GLUCOSE P FAST SERPL-MCNC: 90 MG/DL (ref 65–99)
HCT VFR BLD AUTO: 36.1 % (ref 36.5–49.3)
HGB BLD-MCNC: 11.6 G/DL (ref 12–17)
IMM GRANULOCYTES # BLD AUTO: 0.04 THOUSAND/UL (ref 0–0.2)
IMM GRANULOCYTES NFR BLD AUTO: 1 % (ref 0–2)
LYMPHOCYTES # BLD AUTO: 2.3 THOUSANDS/ΜL (ref 0.6–4.47)
LYMPHOCYTES NFR BLD AUTO: 26 % (ref 14–44)
MCH RBC QN AUTO: 29.5 PG (ref 26.8–34.3)
MCHC RBC AUTO-ENTMCNC: 32.1 G/DL (ref 31.4–37.4)
MCV RBC AUTO: 92 FL (ref 82–98)
MONOCYTES # BLD AUTO: 0.88 THOUSAND/ΜL (ref 0.17–1.22)
MONOCYTES NFR BLD AUTO: 10 % (ref 4–12)
NEUTROPHILS # BLD AUTO: 5.03 THOUSANDS/ΜL (ref 1.85–7.62)
NEUTS SEG NFR BLD AUTO: 57 % (ref 43–75)
NRBC BLD AUTO-RTO: 0 /100 WBCS
PLATELET # BLD AUTO: 302 THOUSANDS/UL (ref 149–390)
PMV BLD AUTO: 11.1 FL (ref 8.9–12.7)
POTASSIUM SERPL-SCNC: 4.6 MMOL/L (ref 3.5–5.3)
PROT SERPL-MCNC: 7.1 G/DL (ref 6.4–8.2)
RBC # BLD AUTO: 3.93 MILLION/UL (ref 3.88–5.62)
SODIUM SERPL-SCNC: 139 MMOL/L (ref 136–145)
WBC # BLD AUTO: 8.74 THOUSAND/UL (ref 4.31–10.16)

## 2019-05-10 PROCEDURE — 36415 COLL VENOUS BLD VENIPUNCTURE: CPT

## 2019-05-10 PROCEDURE — 85025 COMPLETE CBC W/AUTO DIFF WBC: CPT

## 2019-05-10 PROCEDURE — 80053 COMPREHEN METABOLIC PANEL: CPT

## 2019-05-14 ENCOUNTER — OFFICE VISIT (OUTPATIENT)
Dept: INTERNAL MEDICINE CLINIC | Facility: CLINIC | Age: 84
End: 2019-05-14
Payer: MEDICARE

## 2019-05-14 VITALS
OXYGEN SATURATION: 98 % | SYSTOLIC BLOOD PRESSURE: 140 MMHG | BODY MASS INDEX: 29.49 KG/M2 | DIASTOLIC BLOOD PRESSURE: 78 MMHG | HEIGHT: 70 IN | HEART RATE: 71 BPM | WEIGHT: 206 LBS | TEMPERATURE: 98.5 F

## 2019-05-14 DIAGNOSIS — I10 ESSENTIAL HYPERTENSION: Primary | ICD-10-CM

## 2019-05-14 DIAGNOSIS — D64.9 ANEMIA, UNSPECIFIED TYPE: ICD-10-CM

## 2019-05-14 DIAGNOSIS — I10 HYPERTENSION, UNSPECIFIED TYPE: ICD-10-CM

## 2019-05-14 DIAGNOSIS — I47.1 PSVT (PAROXYSMAL SUPRAVENTRICULAR TACHYCARDIA) (HCC): ICD-10-CM

## 2019-05-14 DIAGNOSIS — Z00.00 MEDICARE ANNUAL WELLNESS VISIT, SUBSEQUENT: ICD-10-CM

## 2019-05-14 PROCEDURE — G0439 PPPS, SUBSEQ VISIT: HCPCS | Performed by: INTERNAL MEDICINE

## 2019-05-14 PROCEDURE — 99214 OFFICE O/P EST MOD 30 MIN: CPT | Performed by: INTERNAL MEDICINE

## 2019-05-14 RX ORDER — RAMIPRIL 10 MG/1
10 CAPSULE ORAL DAILY
Qty: 90 CAPSULE | Refills: 1 | Status: SHIPPED | OUTPATIENT
Start: 2019-05-14 | End: 2019-12-11 | Stop reason: SDUPTHER

## 2019-05-28 ENCOUNTER — OFFICE VISIT (OUTPATIENT)
Dept: CARDIOLOGY CLINIC | Facility: CLINIC | Age: 84
End: 2019-05-28
Payer: MEDICARE

## 2019-05-28 VITALS
HEIGHT: 69 IN | BODY MASS INDEX: 30.07 KG/M2 | WEIGHT: 203 LBS | SYSTOLIC BLOOD PRESSURE: 122 MMHG | DIASTOLIC BLOOD PRESSURE: 80 MMHG | HEART RATE: 60 BPM

## 2019-05-28 DIAGNOSIS — I10 ESSENTIAL HYPERTENSION: ICD-10-CM

## 2019-05-28 DIAGNOSIS — E78.2 MIXED HYPERLIPIDEMIA: ICD-10-CM

## 2019-05-28 DIAGNOSIS — I47.1 PSVT (PAROXYSMAL SUPRAVENTRICULAR TACHYCARDIA) (HCC): Primary | ICD-10-CM

## 2019-05-28 PROCEDURE — 99213 OFFICE O/P EST LOW 20 MIN: CPT | Performed by: INTERNAL MEDICINE

## 2019-06-06 ENCOUNTER — TELEPHONE (OUTPATIENT)
Dept: CARDIOLOGY CLINIC | Facility: CLINIC | Age: 84
End: 2019-06-06

## 2019-06-06 ENCOUNTER — HOSPITAL ENCOUNTER (EMERGENCY)
Facility: HOSPITAL | Age: 84
Discharge: HOME | End: 2019-06-06
Attending: EMERGENCY MEDICINE | Admitting: EMERGENCY MEDICINE
Payer: MEDICARE

## 2019-06-06 VITALS
SYSTOLIC BLOOD PRESSURE: 137 MMHG | TEMPERATURE: 97.6 F | BODY MASS INDEX: 30.67 KG/M2 | WEIGHT: 207.67 LBS | RESPIRATION RATE: 17 BRPM | DIASTOLIC BLOOD PRESSURE: 80 MMHG | OXYGEN SATURATION: 97 % | HEART RATE: 59 BPM

## 2019-06-06 DIAGNOSIS — I47.1 SVT (SUPRAVENTRICULAR TACHYCARDIA) (HCC): Primary | ICD-10-CM

## 2019-06-06 LAB
ALBUMIN SERPL BCP-MCNC: 3.4 G/DL (ref 3.5–5)
ALP SERPL-CCNC: 81 U/L (ref 46–116)
ALT SERPL W P-5'-P-CCNC: 24 U/L (ref 12–78)
ANION GAP SERPL CALCULATED.3IONS-SCNC: 12 MMOL/L (ref 4–13)
AST SERPL W P-5'-P-CCNC: 18 U/L (ref 5–45)
ATRIAL RATE: 77 BPM
BASOPHILS # BLD AUTO: 0.06 THOUSANDS/ΜL (ref 0–0.1)
BASOPHILS NFR BLD AUTO: 1 % (ref 0–1)
BILIRUB SERPL-MCNC: 0.33 MG/DL (ref 0.2–1)
BUN SERPL-MCNC: 31 MG/DL (ref 5–25)
CALCIUM SERPL-MCNC: 9.5 MG/DL (ref 8.3–10.1)
CHLORIDE SERPL-SCNC: 107 MMOL/L (ref 100–108)
CO2 SERPL-SCNC: 23 MMOL/L (ref 21–32)
CREAT SERPL-MCNC: 1.76 MG/DL (ref 0.6–1.3)
EOSINOPHIL # BLD AUTO: 0.33 THOUSAND/ΜL (ref 0–0.61)
EOSINOPHIL NFR BLD AUTO: 4 % (ref 0–6)
ERYTHROCYTE [DISTWIDTH] IN BLOOD BY AUTOMATED COUNT: 13.2 % (ref 11.6–15.1)
GFR SERPL CREATININE-BSD FRML MDRD: 35 ML/MIN/1.73SQ M
GLUCOSE SERPL-MCNC: 109 MG/DL (ref 65–140)
HCT VFR BLD AUTO: 34.7 % (ref 36.5–49.3)
HGB BLD-MCNC: 11.3 G/DL (ref 12–17)
IMM GRANULOCYTES # BLD AUTO: 0.02 THOUSAND/UL (ref 0–0.2)
IMM GRANULOCYTES NFR BLD AUTO: 0 % (ref 0–2)
LYMPHOCYTES # BLD AUTO: 1.8 THOUSANDS/ΜL (ref 0.6–4.47)
LYMPHOCYTES NFR BLD AUTO: 24 % (ref 14–44)
MAGNESIUM SERPL-MCNC: 1.9 MG/DL (ref 1.6–2.6)
MCH RBC QN AUTO: 29.7 PG (ref 26.8–34.3)
MCHC RBC AUTO-ENTMCNC: 32.6 G/DL (ref 31.4–37.4)
MCV RBC AUTO: 91 FL (ref 82–98)
MONOCYTES # BLD AUTO: 0.73 THOUSAND/ΜL (ref 0.17–1.22)
MONOCYTES NFR BLD AUTO: 10 % (ref 4–12)
NEUTROPHILS # BLD AUTO: 4.56 THOUSANDS/ΜL (ref 1.85–7.62)
NEUTS SEG NFR BLD AUTO: 61 % (ref 43–75)
NRBC BLD AUTO-RTO: 0 /100 WBCS
P AXIS: 60 DEGREES
PHOSPHATE SERPL-MCNC: 2.7 MG/DL (ref 2.3–4.1)
PLATELET # BLD AUTO: 271 THOUSANDS/UL (ref 149–390)
PMV BLD AUTO: 10.3 FL (ref 8.9–12.7)
POTASSIUM SERPL-SCNC: 4.2 MMOL/L (ref 3.5–5.3)
PR INTERVAL: 160 MS
PROT SERPL-MCNC: 7 G/DL (ref 6.4–8.2)
QRS AXIS: -37 DEGREES
QRSD INTERVAL: 100 MS
QT INTERVAL: 402 MS
QTC INTERVAL: 454 MS
RBC # BLD AUTO: 3.8 MILLION/UL (ref 3.88–5.62)
SODIUM SERPL-SCNC: 142 MMOL/L (ref 136–145)
T WAVE AXIS: 31 DEGREES
TROPONIN I SERPL-MCNC: 0.03 NG/ML
TSH SERPL DL<=0.05 MIU/L-ACNC: 0.48 UIU/ML (ref 0.36–3.74)
VENTRICULAR RATE: 77 BPM
WBC # BLD AUTO: 7.5 THOUSAND/UL (ref 4.31–10.16)

## 2019-06-06 PROCEDURE — 36415 COLL VENOUS BLD VENIPUNCTURE: CPT | Performed by: EMERGENCY MEDICINE

## 2019-06-06 PROCEDURE — 85025 COMPLETE CBC W/AUTO DIFF WBC: CPT | Performed by: EMERGENCY MEDICINE

## 2019-06-06 PROCEDURE — 83735 ASSAY OF MAGNESIUM: CPT | Performed by: EMERGENCY MEDICINE

## 2019-06-06 PROCEDURE — 84100 ASSAY OF PHOSPHORUS: CPT | Performed by: EMERGENCY MEDICINE

## 2019-06-06 PROCEDURE — 99283 EMERGENCY DEPT VISIT LOW MDM: CPT | Performed by: EMERGENCY MEDICINE

## 2019-06-06 PROCEDURE — 80053 COMPREHEN METABOLIC PANEL: CPT | Performed by: EMERGENCY MEDICINE

## 2019-06-06 PROCEDURE — 93005 ELECTROCARDIOGRAM TRACING: CPT

## 2019-06-06 PROCEDURE — 84484 ASSAY OF TROPONIN QUANT: CPT | Performed by: EMERGENCY MEDICINE

## 2019-06-06 PROCEDURE — 93010 ELECTROCARDIOGRAM REPORT: CPT | Performed by: INTERNAL MEDICINE

## 2019-06-06 PROCEDURE — 84443 ASSAY THYROID STIM HORMONE: CPT | Performed by: EMERGENCY MEDICINE

## 2019-06-06 PROCEDURE — 99285 EMERGENCY DEPT VISIT HI MDM: CPT

## 2019-06-06 RX ORDER — ADENOSINE 3 MG/ML
1 INJECTION, SOLUTION INTRAVENOUS ONCE
Status: COMPLETED | OUTPATIENT
Start: 2019-06-06 | End: 2019-06-06

## 2019-06-12 DIAGNOSIS — E78.5 HYPERLIPIDEMIA, UNSPECIFIED HYPERLIPIDEMIA TYPE: ICD-10-CM

## 2019-06-12 RX ORDER — ROSUVASTATIN CALCIUM 5 MG/1
5 TABLET, COATED ORAL DAILY
Qty: 90 TABLET | Refills: 0 | Status: SHIPPED | OUTPATIENT
Start: 2019-06-12 | End: 2019-12-11 | Stop reason: SDUPTHER

## 2019-06-21 ENCOUNTER — OFFICE VISIT (OUTPATIENT)
Dept: CARDIOLOGY CLINIC | Facility: CLINIC | Age: 84
End: 2019-06-21
Payer: MEDICARE

## 2019-06-21 VITALS
SYSTOLIC BLOOD PRESSURE: 140 MMHG | DIASTOLIC BLOOD PRESSURE: 80 MMHG | WEIGHT: 203 LBS | HEIGHT: 69 IN | BODY MASS INDEX: 30.07 KG/M2 | HEART RATE: 60 BPM

## 2019-06-21 DIAGNOSIS — I47.1 PSVT (PAROXYSMAL SUPRAVENTRICULAR TACHYCARDIA) (HCC): Primary | ICD-10-CM

## 2019-06-21 DIAGNOSIS — I10 ESSENTIAL HYPERTENSION: ICD-10-CM

## 2019-06-21 DIAGNOSIS — E78.2 MIXED HYPERLIPIDEMIA: ICD-10-CM

## 2019-06-21 PROCEDURE — 99213 OFFICE O/P EST LOW 20 MIN: CPT | Performed by: INTERNAL MEDICINE

## 2019-06-21 PROCEDURE — 93000 ELECTROCARDIOGRAM COMPLETE: CPT | Performed by: INTERNAL MEDICINE

## 2019-07-10 ENCOUNTER — OFFICE VISIT (OUTPATIENT)
Dept: CARDIOLOGY CLINIC | Facility: CLINIC | Age: 84
End: 2019-07-10
Payer: MEDICARE

## 2019-07-10 VITALS
BODY MASS INDEX: 29.92 KG/M2 | WEIGHT: 202 LBS | HEIGHT: 69 IN | DIASTOLIC BLOOD PRESSURE: 78 MMHG | HEART RATE: 72 BPM | SYSTOLIC BLOOD PRESSURE: 140 MMHG

## 2019-07-10 DIAGNOSIS — R00.2 PALPITATIONS: ICD-10-CM

## 2019-07-10 DIAGNOSIS — I10 ESSENTIAL HYPERTENSION: ICD-10-CM

## 2019-07-10 DIAGNOSIS — I47.1 PSVT (PAROXYSMAL SUPRAVENTRICULAR TACHYCARDIA) (HCC): Primary | ICD-10-CM

## 2019-07-10 PROCEDURE — 93000 ELECTROCARDIOGRAM COMPLETE: CPT | Performed by: INTERNAL MEDICINE

## 2019-07-10 PROCEDURE — 99204 OFFICE O/P NEW MOD 45 MIN: CPT | Performed by: INTERNAL MEDICINE

## 2019-07-10 NOTE — H&P (VIEW-ONLY)
EPS Consultation/New Patient Evaluation - Howard Spivey 80 y o  male MRN: [id number]           ASSESSMENT:  1  PSVT (paroxysmal supraventricular tachycardia) (Formerly [facility])  POCT ECG   2  Essential hypertension     3  Palpitations             PLAN:  1  Paroxysmal SVT   S/p ER visit placed on Metoprolol 25 mg with 12 5 mg at onset of an episode  LEF 60% from Echo in November 2018    Recommendations were discussed with patient and his wife of Catheter ablation vs PPM and up titration of Metoprolol  Patient would like to to proceed with a catheter ablation  We discussed risks including but not limited to bleeding bruising need for 2nd procedure small risk of stroke heart attack heart perforation and need for permanent pacemaker  He felt terrible during the last SVT episode and is eager to move forward  He does understand if it is an atrial tachycardia I may not be able to started and ablated and he may require pacemaker  CC/HPI:   Howard Spivey is a 80 y o  male who was referred by Dr Jonna Herring s/p ER visit  Patient was seen in the ER June 6, 2019 for palpitations and was founded to be in paroxysmal SVT  Patient states he has a history of PVC's for many years and one year ago he start having tachycardia  This last episode he has it happened while he was at [employer]  He states it really scared him and does not want for it to happen again  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 drinks socially with dinner but had his last drink a week ago  ROS:   Negative for shortness of breath, chest discomfort, presyncope or syncope, lower extremity swelling  Positive for palpitations  All other 12 point ROS negative       Objective:     Vitals: Blood pressure 140/78, pulse 72, height 5' 9" (1 753 m), weight 91 6 kg (202 lb)  , Body mass index is 29 83 kg/m²  ,        Physical Exam:    GEN: Howard Spivey appears well, alert and oriented x 3, pleasant and cooperative   HEENT: pupils equal, round, and reactive to light; extraocular muscles intact  NECK: supple, no carotid bruits   HEART: regular rhythm, normal S1 and S2, 3 over 6 murmur, clicks, gallops or rubs   LUNGS: clear to auscultation bilaterally; no wheezes, rales, or rhonchi   ABDOMEN: normal bowel sounds, soft, no tenderness, no distention  EXTREMITIES: peripheral pulses normal; no clubbing, cyanosis, or edema  NEURO: no focal findings   SKIN: normal without suspicious lesions on exposed skin    Medications:      Current Outpatient Medications:     aspirin 81 MG tablet, Take 162 mg by mouth, Disp: , Rfl:     hydrochlorothiazide (HYDRODIURIL) 25 mg tablet, Take 0 5 tablets (12 5 mg total) by mouth daily, Disp: 90 tablet, Rfl: 0    metoprolol tartrate (LOPRESSOR) 25 mg tablet, Take 0 5 tablets (12 5 mg total) by mouth see administration instructions 1/2 tablet daily prn palpitations   , Disp: 5 tablet, Rfl: 0    Multiple Vitamin (MULTI-VITAMIN DAILY) TABS, Take by mouth, Disp: , Rfl:     omeprazole (PriLOSEC) 20 mg delayed release capsule, Take by mouth, Disp: , Rfl:     ramipril (ALTACE) 10 MG capsule, Take 1 capsule (10 mg total) by mouth daily, Disp: 90 capsule, Rfl: 1    rosuvastatin (CRESTOR) 5 mg tablet, Take 1 tablet (5 mg total) by mouth daily, Disp: 90 tablet, Rfl: 0     Family History   Problem Relation Age of Onset    Other Mother         old age   [de-identified] Esophageal cancer Father    [de-identified] Other Father         old age   [de-identified] Alcohol abuse Son         alcoholism     Social History     Socioeconomic History    Marital status: /Civil Union     Spouse name: Not on file    Number of children: Not on file    Years of education: Not on file    Highest education level: Not on file   Occupational History    Not on file   Social Needs    Financial resource strain: Not on file    Food insecurity:     Worry: Not on file     Inability: Not on file    Transportation needs:     Medical: Not on file     Non-medical: Not on file Tobacco Use    Smoking status: Former Smoker    Smokeless tobacco: Never Used   Substance and Sexual Activity    Alcohol use: No     Comment: conflicting no and occasional wine per [employer]    Drug use: No    Sexual activity: Not on file   Lifestyle    Physical activity:     Days per week: Not on file     Minutes per session: Not on file    Stress: Not on file   Relationships    Social connections:     Talks on phone: Not on file     Gets together: Not on file     Attends [employer] service: Not on file     Active member of club or organization: Not on file     Attends meetings of clubs or organizations: Not on file     Relationship status: Not on file    Intimate partner violence:     Fear of current or ex partner: Not on file     Emotionally abused: Not on file     Physically abused: Not on file     Forced sexual activity: Not on file   Other Topics Concern    Not on file   Social History Narrative    Not on file     Social History     Tobacco Use   Smoking Status Former Smoker   Smokeless Tobacco Never Used     Social History     Substance and Sexual Activity   Alcohol Use No    Comment: conflicting no and occasional wine per [employer]       Labs & Results:  Below is the patient's most recent value for Albumin, ALT, AST, BUN, Calcium, Chloride, Cholesterol, CO2, Creatinine, GFR, Glucose, HDL, Hematocrit, Hemoglobin, Hemoglobin A1C, LDL, Magnesium, Phosphorus, Platelets, Potassium, PSA, Sodium, Triglycerides, and WBC     Lab Results   Component Value Date    ALT 24 06/06/2019    AST 18 06/06/2019    BUN 31 (H) 06/06/2019    CALCIUM 9 5 06/06/2019     06/06/2019    CHOL 188 05/15/2015    CO2 23 06/06/2019    CREATININE 1 76 (H) 06/06/2019    HDL 53 07/20/2017    HCT 34 7 (L) 06/06/2019    HGB 11 3 (L) 06/06/2019    MG 1 9 06/06/2019    PHOS 2 7 06/06/2019     06/06/2019    K 4 2 06/06/2019    PSA 2 6 08/09/2018     05/15/2015    TRIG 57 07/20/2017    WBC 7 50 06/06/2019     Note: for a comprehensive list of the patient's lab results, access the Results Review activity  Cardiac testing:   Results for orders placed during the hospital encounter of 18   Echo complete with contrast if indicated    Narrative [address]  [place], [address]  [phone]    Transthoracic Echocardiogram  2D, M-mode, Doppler, and Color Doppler    Study date:  2018    Patient: Opal Pretty  MR number: [id number]  Account number: [de-identified]  : [id number]  Age: 80 years  Gender: Male  Status: Outpatient  Location: [address] [place] [address]  Height: 70 in  Weight: 210 lb  BP: 144/ 82 mmHg    Indications: Murmur    Diagnoses: R01 1 - Cardiac murmur, unspecified    Sonographer:  [address]  Primary Physician:  Duane Zelaya MD  Referring Physician:  Zac Schneider MD  Group:  [facility]  Interpreting Physician:  Zac Schneider MD    SUMMARY    LEFT VENTRICLE:  Systolic function was normal  Ejection fraction was estimated to be 60 %  There were no regional wall motion abnormalities  Wall thickness was mildly increased  LEFT ATRIUM:  The atrium was mildly to moderately dilated  RIGHT ATRIUM:  The atrium was mildly dilated  MITRAL VALVE:  There was mild to moderate annular calcification  There was mild regurgitation  AORTIC VALVE:  The valve was trileaflet  Leaflets exhibited moderately increased thickness  There was mild to moderate stenosis  There was mild regurgitation  TRICUSPID VALVE:  There was mild regurgitation  HISTORY: PRIOR HISTORY: AV disease, anemia, palpitations, HLD, HTN, GERD, spinal stenosis    PROCEDURE: The study was performed in the [address] [place] [address]  This was a routine study  The transthoracic approach was used  The study included complete 2D imaging, M-mode, complete spectral Doppler, and color Doppler    Echocardiographic views were limited due to decreased penetration and lung interference  This was a technically difficult study  LEFT VENTRICLE: Size was normal  Systolic function was normal  Ejection fraction was estimated to be 60 %  There were no regional wall motion abnormalities  Wall thickness was mildly increased  DOPPLER: There was an increased relative  contribution of atrial contraction to ventricular filling  The deceleration time of the early transmitral flow velocity was increased  RIGHT VENTRICLE: The size was normal  Systolic function was normal  Wall thickness was normal     LEFT ATRIUM: The atrium was mildly to moderately dilated  RIGHT ATRIUM: The atrium was mildly dilated  MITRAL VALVE: There was mild to moderate annular calcification  DOPPLER: There was no evidence for stenosis  There was mild regurgitation  AORTIC VALVE: The valve was trileaflet  Leaflets exhibited moderately increased thickness  DOPPLER: There was mild to moderate stenosis  There was mild regurgitation  TRICUSPID VALVE: The valve structure was normal  There was normal leaflet separation  DOPPLER: The transtricuspid velocity was within the normal range  There was no evidence for stenosis  There was mild regurgitation  Pulmonary artery  systolic pressure was mildly increased  Estimated peak PA pressure was 34 mmHg  PULMONIC VALVE: Leaflets exhibited normal thickness, no calcification, and normal cuspal separation  DOPPLER: The transpulmonic velocity was within the normal range  There was no regurgitation  PERICARDIUM: There was no pericardial effusion  The pericardium was normal in appearance  AORTA: The root exhibited normal size  SYSTEMIC VEINS: IVC: The inferior vena cava was normal in size and course  Respirophasic changes were normal     SYSTEM MEASUREMENT TABLES    2D  Ao Diam: 3 5 cm  IVSd: 1 2 cm  LA Diam: 4 7 cm  LVEF MOD A4C: 68 3 %  LVIDd: 4 8 cm  LVIDs: 3 cm  LVPWd: 1 2 cm    CW  AV Env  Ti: 341 4 ms  AV VTI: 67 8 cm  AV Vmax: 2 8 m/s  AV Vmean: 2 m/s  AV meanP 9 mmHg  TR Vmax: 2 7 m/s  TR maxP 3 mmHg    PW  MARLY (VTI): 1 5 cm2  MARLY Vmax: 1 6 cm2  LVOT VTI: 25 5 cm  LVOT Vmax: 1 1 m/s  LVOT Vmean: 0 7 m/s  LVOT maxP 4 mmHg  LVOT meanP 2 mmHg    [facility]    Prepared and electronically signed by    Cora Zamarripa MD  Signed [id number] 18:03:54       No results found for this or any previous visit  No results found for this or any previous visit  No results found for this or any previous visit           Scribe Attestation    I,:   Eveline Badillo am acting as a scribe while in the presence of the attending physician :        I,:   Oliverio Ureña DO personally performed the services described in this documentation    as scribed in my presence :

## 2019-07-10 NOTE — PROGRESS NOTES
EPS Consultation/New Patient Evaluation - Acey Goldmann 80 y o  male MRN: [id number]           ASSESSMENT:  1  PSVT (paroxysmal supraventricular tachycardia) ([facility])  POCT ECG   2  Essential hypertension     3  Palpitations             PLAN:  1  Paroxysmal SVT   S/p ER visit placed on Metoprolol 25 mg with 12 5 mg at onset of an episode  LEF 60% from Echo in November 2018    Recommendations were discussed with patient and his wife of Catheter ablation vs PPM and up titration of Metoprolol  Patient would like to to proceed with a catheter ablation  We discussed risks including but not limited to bleeding bruising need for 2nd procedure small risk of stroke heart attack heart perforation and need for permanent pacemaker  He felt terrible during the last SVT episode and is eager to move forward  He does understand if it is an atrial tachycardia I may not be able to started and ablated and he may require pacemaker  CC/HPI:   Acey Goldmann is a 80 y o  male who was referred by Dr Elda Dang s/p ER visit  Patient was seen in the ER June 6, 2019 for palpitations and was founded to be in paroxysmal SVT  Patient states he has a history of PVC's for many years and one year ago he start having tachycardia  This last episode he has it happened while he was at [employer]  He states it really scared him and does not want for it to happen again  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 drinks socially with dinner but had his last drink a week ago  ROS:   Negative for shortness of breath, chest discomfort, presyncope or syncope, lower extremity swelling  Positive for palpitations  All other 12 point ROS negative       Objective:     Vitals: Blood pressure 140/78, pulse 72, height 5' 9" (1 753 m), weight 91 6 kg (202 lb)  , Body mass index is 29 83 kg/m²  ,        Physical Exam:    GEN: Acey Goldmann appears well, alert and oriented x 3, pleasant and cooperative   HEENT: pupils equal, round, and reactive to light; extraocular muscles intact  NECK: supple, no carotid bruits   HEART: regular rhythm, normal S1 and S2, 3 over 6 murmur, clicks, gallops or rubs   LUNGS: clear to auscultation bilaterally; no wheezes, rales, or rhonchi   ABDOMEN: normal bowel sounds, soft, no tenderness, no distention  EXTREMITIES: peripheral pulses normal; no clubbing, cyanosis, or edema  NEURO: no focal findings   SKIN: normal without suspicious lesions on exposed skin    Medications:      Current Outpatient Medications:     aspirin 81 MG tablet, Take 162 mg by mouth, Disp: , Rfl:     hydrochlorothiazide (HYDRODIURIL) 25 mg tablet, Take 0 5 tablets (12 5 mg total) by mouth daily, Disp: 90 tablet, Rfl: 0    metoprolol tartrate (LOPRESSOR) 25 mg tablet, Take 0 5 tablets (12 5 mg total) by mouth see administration instructions 1/2 tablet daily prn palpitations   , Disp: 5 tablet, Rfl: 0    Multiple Vitamin (MULTI-VITAMIN DAILY) TABS, Take by mouth, Disp: , Rfl:     omeprazole (PriLOSEC) 20 mg delayed release capsule, Take by mouth, Disp: , Rfl:     ramipril (ALTACE) 10 MG capsule, Take 1 capsule (10 mg total) by mouth daily, Disp: 90 capsule, Rfl: 1    rosuvastatin (CRESTOR) 5 mg tablet, Take 1 tablet (5 mg total) by mouth daily, Disp: 90 tablet, Rfl: 0     Family History   Problem Relation Age of Onset    Other Mother         old age   [facility] Esophageal cancer Father    [facility] Other Father         old age   [facility] Alcohol abuse Son         alcoholism     Social History     Socioeconomic History    Marital status: /Civil Union     Spouse name: Not on file    Number of children: Not on file    Years of education: Not on file    Highest education level: Not on file   Occupational History    Not on file   Social Needs    Financial resource strain: Not on file    Food insecurity:     Worry: Not on file     Inability: Not on file    Transportation needs:     Medical: Not on file     Non-medical: Not on file Tobacco Use    Smoking status: Former Smoker    Smokeless tobacco: Never Used   Substance and Sexual Activity    Alcohol use: No     Comment: conflicting no and occasional wine per [employer]    Drug use: No    Sexual activity: Not on file   Lifestyle    Physical activity:     Days per week: Not on file     Minutes per session: Not on file    Stress: Not on file   Relationships    Social connections:     Talks on phone: Not on file     Gets together: Not on file     Attends [employer] service: Not on file     Active member of club or organization: Not on file     Attends meetings of clubs or organizations: Not on file     Relationship status: Not on file    Intimate partner violence:     Fear of current or ex partner: Not on file     Emotionally abused: Not on file     Physically abused: Not on file     Forced sexual activity: Not on file   Other Topics Concern    Not on file   Social History Narrative    Not on file     Social History     Tobacco Use   Smoking Status Former Smoker   Smokeless Tobacco Never Used     Social History     Substance and Sexual Activity   Alcohol Use No    Comment: conflicting no and occasional wine per [employer]       Labs & Results:  Below is the patient's most recent value for Albumin, ALT, AST, BUN, Calcium, Chloride, Cholesterol, CO2, Creatinine, GFR, Glucose, HDL, Hematocrit, Hemoglobin, Hemoglobin A1C, LDL, Magnesium, Phosphorus, Platelets, Potassium, PSA, Sodium, Triglycerides, and WBC     Lab Results   Component Value Date    ALT 24 06/06/2019    AST 18 06/06/2019    BUN 31 (H) 06/06/2019    CALCIUM 9 5 06/06/2019     06/06/2019    CHOL 188 05/15/2015    CO2 23 06/06/2019    CREATININE 1 76 (H) 06/06/2019    HDL 53 07/20/2017    HCT 34 7 (L) 06/06/2019    HGB 11 3 (L) 06/06/2019    MG 1 9 06/06/2019    PHOS 2 7 06/06/2019     06/06/2019    K 4 2 06/06/2019    PSA 2 6 08/09/2018     05/15/2015    TRIG 57 07/20/2017    WBC 7 50 06/06/2019     Note: for a comprehensive list of the patient's lab results, access the Results Review activity  Cardiac testing:   Results for orders placed during the hospital encounter of 18   Echo complete with contrast if indicated    Narrative [address] [place] [address] 35  [place], [address]  [phone]    Transthoracic Echocardiogram  2D, M-mode, Doppler, and Color Doppler    Study date:  2018    Patient: Ban Campbell  MR number: [id number]  Account number: [de-identified]  : [id number]  Age: 80 years  Gender: Male  Status: Outpatient  Location: [facility]  Height: 70 in  Weight: 210 lb  BP: 144/ 82 mmHg    Indications: Murmur    Diagnoses: R01 1 - Cardiac murmur, unspecified    Sonographer:  [address]  Primary Physician:  Eulalio Rider MD  Referring Physician:  Qing Linn MD  Group[facility]  [facility]  Interpreting Physician:  Qing Linn MD    SUMMARY    LEFT VENTRICLE:  Systolic function was normal  Ejection fraction was estimated to be 60 %  There were no regional wall motion abnormalities  Wall thickness was mildly increased  LEFT ATRIUM:  The atrium was mildly to moderately dilated  RIGHT ATRIUM:  The atrium was mildly dilated  MITRAL VALVE:  There was mild to moderate annular calcification  There was mild regurgitation  AORTIC VALVE:  The valve was trileaflet  Leaflets exhibited moderately increased thickness  There was mild to moderate stenosis  There was mild regurgitation  TRICUSPID VALVE:  There was mild regurgitation  HISTORY: PRIOR HISTORY: AV disease, anemia, palpitations, HLD, HTN, GERD, spinal stenosis    PROCEDURE: The study was performed in the [facility]  This was a routine study  The transthoracic approach was used  The study included complete 2D imaging, M-mode, complete spectral Doppler, and color Doppler    Echocardiographic views were limited due to decreased penetration and lung interference  This was a technically difficult study  LEFT VENTRICLE: Size was normal  Systolic function was normal  Ejection fraction was estimated to be 60 %  There were no regional wall motion abnormalities  Wall thickness was mildly increased  DOPPLER: There was an increased relative  contribution of atrial contraction to ventricular filling  The deceleration time of the early transmitral flow velocity was increased  RIGHT VENTRICLE: The size was normal  Systolic function was normal  Wall thickness was normal     LEFT ATRIUM: The atrium was mildly to moderately dilated  RIGHT ATRIUM: The atrium was mildly dilated  MITRAL VALVE: There was mild to moderate annular calcification  DOPPLER: There was no evidence for stenosis  There was mild regurgitation  AORTIC VALVE: The valve was trileaflet  Leaflets exhibited moderately increased thickness  DOPPLER: There was mild to moderate stenosis  There was mild regurgitation  TRICUSPID VALVE: The valve structure was normal  There was normal leaflet separation  DOPPLER: The transtricuspid velocity was within the normal range  There was no evidence for stenosis  There was mild regurgitation  Pulmonary artery  systolic pressure was mildly increased  Estimated peak PA pressure was 34 mmHg  PULMONIC VALVE: Leaflets exhibited normal thickness, no calcification, and normal cuspal separation  DOPPLER: The transpulmonic velocity was within the normal range  There was no regurgitation  PERICARDIUM: There was no pericardial effusion  The pericardium was normal in appearance  AORTA: The root exhibited normal size  SYSTEMIC VEINS: IVC: The inferior vena cava was normal in size and course  Respirophasic changes were normal     SYSTEM MEASUREMENT TABLES    2D  Ao Diam: 3 5 cm  IVSd: 1 2 cm  LA Diam: 4 7 cm  LVEF MOD A4C: 68 3 %  LVIDd: 4 8 cm  LVIDs: 3 cm  LVPWd: 1 2 cm    CW  AV Env  Ti: 341 4 ms  AV VTI: 67 8 cm  AV Vmax: 2 8 m/s  AV Vmean: 2 m/s  AV meanP 9 mmHg  TR Vmax: 2 7 m/s  TR maxP 3 mmHg    PW  MARLY (VTI): 1 5 cm2  MARLY Vmax: 1 6 cm2  LVOT VTI: 25 5 cm  LVOT Vmax: 1 1 m/s  LVOT Vmean: 0 7 m/s  LVOT maxP 4 mmHg  LVOT meanP 2 mmHg    [facility]    Prepared and electronically signed by    Cora Zamarripa MD  Signed [id number] 18:03:54       No results found for this or any previous visit  No results found for this or any previous visit  No results found for this or any previous visit           Scribe Attestation    I,:   Eveline Badillo am acting as a scribe while in the presence of the attending physician :        I,:   Oliverio Ureña DO personally performed the services described in this documentation    as scribed in my presence :

## 2019-07-12 ENCOUNTER — TELEPHONE (OUTPATIENT)
Dept: CARDIOLOGY CLINIC | Facility: CLINIC | Age: 84
End: 2019-07-12

## 2019-07-12 NOTE — TELEPHONE ENCOUNTER
Pt is scheduled on 07/17/19 for an SVT Ablation with Dr Pj Hernandez at [facility]      Pt aware of instructions

## 2019-07-16 ENCOUNTER — TELEPHONE (OUTPATIENT)
Dept: INPATIENT UNIT | Facility: HOSPITAL | Age: 84
End: 2019-07-16

## 2019-07-17 ENCOUNTER — HOSPITAL ENCOUNTER (OUTPATIENT)
Dept: NON INVASIVE DIAGNOSTICS | Facility: HOSPITAL | Age: 84
Discharge: HOME | End: 2019-07-17
Attending: INTERNAL MEDICINE | Admitting: INTERNAL MEDICINE
Payer: MEDICARE

## 2019-07-17 ENCOUNTER — ANESTHESIA EVENT (OUTPATIENT)
Dept: NON INVASIVE DIAGNOSTICS | Facility: HOSPITAL | Age: 84
End: 2019-07-17
Payer: MEDICARE

## 2019-07-17 ENCOUNTER — ANESTHESIA (OUTPATIENT)
Dept: NON INVASIVE DIAGNOSTICS | Facility: HOSPITAL | Age: 84
End: 2019-07-17
Payer: MEDICARE

## 2019-07-17 VITALS
WEIGHT: 200 LBS | RESPIRATION RATE: 18 BRPM | HEART RATE: 51 BPM | DIASTOLIC BLOOD PRESSURE: 66 MMHG | OXYGEN SATURATION: 97 % | TEMPERATURE: 97.4 F | HEIGHT: 69 IN | SYSTOLIC BLOOD PRESSURE: 125 MMHG | BODY MASS INDEX: 29.62 KG/M2

## 2019-07-17 DIAGNOSIS — I47.1 PAROXYSMAL SUPRAVENTRICULAR TACHYCARDIA (HCC): ICD-10-CM

## 2019-07-17 LAB
ANION GAP SERPL CALCULATED.3IONS-SCNC: 5 MMOL/L (ref 4–13)
ATRIAL RATE: 62 BPM
ATRIAL RATE: 75 BPM
ATRIAL RATE: 76 BPM
BASOPHILS # BLD AUTO: 0.07 THOUSANDS/ΜL (ref 0–0.1)
BASOPHILS NFR BLD AUTO: 1 % (ref 0–1)
BUN SERPL-MCNC: 40 MG/DL (ref 5–25)
CALCIUM SERPL-MCNC: 9.1 MG/DL (ref 8.3–10.1)
CHLORIDE SERPL-SCNC: 108 MMOL/L (ref 100–108)
CO2 SERPL-SCNC: 25 MMOL/L (ref 21–32)
CREAT SERPL-MCNC: 1.95 MG/DL (ref 0.6–1.3)
EOSINOPHIL # BLD AUTO: 0.93 THOUSAND/ΜL (ref 0–0.61)
EOSINOPHIL NFR BLD AUTO: 11 % (ref 0–6)
ERYTHROCYTE [DISTWIDTH] IN BLOOD BY AUTOMATED COUNT: 12.8 % (ref 11.6–15.1)
GFR SERPL CREATININE-BSD FRML MDRD: 31 ML/MIN/1.73SQ M
GLUCOSE P FAST SERPL-MCNC: 101 MG/DL (ref 65–99)
GLUCOSE SERPL-MCNC: 101 MG/DL (ref 65–140)
HCT VFR BLD AUTO: 33.2 % (ref 36.5–49.3)
HGB BLD-MCNC: 10.8 G/DL (ref 12–17)
IMM GRANULOCYTES # BLD AUTO: 0.02 THOUSAND/UL (ref 0–0.2)
IMM GRANULOCYTES NFR BLD AUTO: 0 % (ref 0–2)
INR PPP: 1.13 (ref 0.84–1.19)
LYMPHOCYTES # BLD AUTO: 2.15 THOUSANDS/ΜL (ref 0.6–4.47)
LYMPHOCYTES NFR BLD AUTO: 24 % (ref 14–44)
MCH RBC QN AUTO: 29 PG (ref 26.8–34.3)
MCHC RBC AUTO-ENTMCNC: 32.5 G/DL (ref 31.4–37.4)
MCV RBC AUTO: 89 FL (ref 82–98)
MONOCYTES # BLD AUTO: 1 THOUSAND/ΜL (ref 0.17–1.22)
MONOCYTES NFR BLD AUTO: 11 % (ref 4–12)
NEUTROPHILS # BLD AUTO: 4.67 THOUSANDS/ΜL (ref 1.85–7.62)
NEUTS SEG NFR BLD AUTO: 53 % (ref 43–75)
NRBC BLD AUTO-RTO: 0 /100 WBCS
P AXIS: 50 DEGREES
P AXIS: 51 DEGREES
P AXIS: 54 DEGREES
PLATELET # BLD AUTO: 262 THOUSANDS/UL (ref 149–390)
PMV BLD AUTO: 10.1 FL (ref 8.9–12.7)
POTASSIUM SERPL-SCNC: 3.7 MMOL/L (ref 3.5–5.3)
PR INTERVAL: 168 MS
PR INTERVAL: 183 MS
PR INTERVAL: 196 MS
PROTHROMBIN TIME: 14.1 SECONDS (ref 11.6–14.5)
QRS AXIS: -43 DEGREES
QRS AXIS: -48 DEGREES
QRS AXIS: -54 DEGREES
QRSD INTERVAL: 100 MS
QRSD INTERVAL: 104 MS
QRSD INTERVAL: 108 MS
QT INTERVAL: 442 MS
QT INTERVAL: 452 MS
QT INTERVAL: 475 MS
QTC INTERVAL: 458 MS
QTC INTERVAL: 494 MS
QTC INTERVAL: 535 MS
RBC # BLD AUTO: 3.73 MILLION/UL (ref 3.88–5.62)
SODIUM SERPL-SCNC: 138 MMOL/L (ref 136–145)
T WAVE AXIS: 16 DEGREES
T WAVE AXIS: 18 DEGREES
T WAVE AXIS: 30 DEGREES
VENTRICULAR RATE: 62 BPM
VENTRICULAR RATE: 75 BPM
VENTRICULAR RATE: 76 BPM
WBC # BLD AUTO: 8.84 THOUSAND/UL (ref 4.31–10.16)

## 2019-07-17 PROCEDURE — C1894 INTRO/SHEATH, NON-LASER: HCPCS | Performed by: INTERNAL MEDICINE

## 2019-07-17 PROCEDURE — 85025 COMPLETE CBC W/AUTO DIFF WBC: CPT | Performed by: PHYSICIAN ASSISTANT

## 2019-07-17 PROCEDURE — 93010 ELECTROCARDIOGRAM REPORT: CPT | Performed by: INTERNAL MEDICINE

## 2019-07-17 PROCEDURE — 93653 COMPRE EP EVAL TX SVT: CPT | Performed by: INTERNAL MEDICINE

## 2019-07-17 PROCEDURE — 80048 BASIC METABOLIC PNL TOTAL CA: CPT | Performed by: PHYSICIAN ASSISTANT

## 2019-07-17 PROCEDURE — 93613 INTRACARDIAC EPHYS 3D MAPG: CPT | Performed by: INTERNAL MEDICINE

## 2019-07-17 PROCEDURE — NC001 PR NO CHARGE: Performed by: INTERNAL MEDICINE

## 2019-07-17 PROCEDURE — 93621 COMP EP EVL L PAC&REC C SINS: CPT | Performed by: INTERNAL MEDICINE

## 2019-07-17 PROCEDURE — 93623 PRGRMD STIMJ&PACG IV RX NFS: CPT | Performed by: INTERNAL MEDICINE

## 2019-07-17 PROCEDURE — C1730 CATH, EP, 19 OR FEW ELECT: HCPCS | Performed by: INTERNAL MEDICINE

## 2019-07-17 PROCEDURE — C1893 INTRO/SHEATH, FIXED,NON-PEEL: HCPCS | Performed by: INTERNAL MEDICINE

## 2019-07-17 PROCEDURE — C1733 CATH, EP, OTHR THAN COOL-TIP: HCPCS | Performed by: INTERNAL MEDICINE

## 2019-07-17 PROCEDURE — 93005 ELECTROCARDIOGRAM TRACING: CPT

## 2019-07-17 PROCEDURE — 85610 PROTHROMBIN TIME: CPT | Performed by: PHYSICIAN ASSISTANT

## 2019-07-17 RX ORDER — ALBUTEROL SULFATE 2.5 MG/3ML
2.5 SOLUTION RESPIRATORY (INHALATION) ONCE AS NEEDED
Status: DISCONTINUED | OUTPATIENT
Start: 2019-07-17 | End: 2019-07-17 | Stop reason: HOSPADM

## 2019-07-17 RX ORDER — EPHEDRINE SULFATE 50 MG/ML
INJECTION INTRAVENOUS AS NEEDED
Status: DISCONTINUED | OUTPATIENT
Start: 2019-07-17 | End: 2019-07-17 | Stop reason: SURG

## 2019-07-17 RX ORDER — PROPOFOL 10 MG/ML
INJECTION, EMULSION INTRAVENOUS CONTINUOUS PRN
Status: DISCONTINUED | OUTPATIENT
Start: 2019-07-17 | End: 2019-07-17 | Stop reason: SURG

## 2019-07-17 RX ORDER — MIDAZOLAM HYDROCHLORIDE 2 MG/ML
SYRUP ORAL AS NEEDED
Status: DISCONTINUED | OUTPATIENT
Start: 2019-07-17 | End: 2019-07-17 | Stop reason: SURG

## 2019-07-17 RX ORDER — SODIUM CHLORIDE 9 MG/ML
100 INJECTION, SOLUTION INTRAVENOUS CONTINUOUS
Status: DISCONTINUED | OUTPATIENT
Start: 2019-07-17 | End: 2019-07-17 | Stop reason: HOSPADM

## 2019-07-17 RX ORDER — GLYCOPYRROLATE 0.2 MG/ML
INJECTION INTRAMUSCULAR; INTRAVENOUS AS NEEDED
Status: DISCONTINUED | OUTPATIENT
Start: 2019-07-17 | End: 2019-07-17 | Stop reason: SURG

## 2019-07-17 RX ORDER — PROPOFOL 10 MG/ML
INJECTION, EMULSION INTRAVENOUS AS NEEDED
Status: DISCONTINUED | OUTPATIENT
Start: 2019-07-17 | End: 2019-07-17 | Stop reason: SURG

## 2019-07-17 RX ORDER — DEXAMETHASONE SODIUM PHOSPHATE 10 MG/ML
INJECTION, SOLUTION INTRAMUSCULAR; INTRAVENOUS AS NEEDED
Status: DISCONTINUED | OUTPATIENT
Start: 2019-07-17 | End: 2019-07-17 | Stop reason: SURG

## 2019-07-17 RX ORDER — ACETAMINOPHEN 325 MG/1
650 TABLET ORAL EVERY 6 HOURS PRN
Status: DISCONTINUED | OUTPATIENT
Start: 2019-07-17 | End: 2019-07-17 | Stop reason: HOSPADM

## 2019-07-17 RX ORDER — FENTANYL CITRATE 50 UG/ML
INJECTION, SOLUTION INTRAMUSCULAR; INTRAVENOUS AS NEEDED
Status: DISCONTINUED | OUTPATIENT
Start: 2019-07-17 | End: 2019-07-17 | Stop reason: SURG

## 2019-07-17 RX ORDER — SODIUM CHLORIDE 9 MG/ML
INJECTION, SOLUTION INTRAVENOUS CONTINUOUS PRN
Status: DISCONTINUED | OUTPATIENT
Start: 2019-07-17 | End: 2019-07-17

## 2019-07-17 RX ORDER — ONDANSETRON 2 MG/ML
INJECTION INTRAMUSCULAR; INTRAVENOUS AS NEEDED
Status: DISCONTINUED | OUTPATIENT
Start: 2019-07-17 | End: 2019-07-17 | Stop reason: SURG

## 2019-07-17 RX ORDER — KETAMINE HYDROCHLORIDE 50 MG/ML
INJECTION, SOLUTION, CONCENTRATE INTRAMUSCULAR; INTRAVENOUS AS NEEDED
Status: DISCONTINUED | OUTPATIENT
Start: 2019-07-17 | End: 2019-07-17 | Stop reason: SURG

## 2019-07-17 RX ORDER — SODIUM CHLORIDE 9 MG/ML
INJECTION, SOLUTION INTRAVENOUS CONTINUOUS PRN
Status: DISCONTINUED | OUTPATIENT
Start: 2019-07-17 | End: 2019-07-17 | Stop reason: SURG

## 2019-07-17 RX ORDER — HEPARIN SODIUM 1000 [USP'U]/ML
INJECTION, SOLUTION INTRAVENOUS; SUBCUTANEOUS CODE/TRAUMA/SEDATION MEDICATION
Status: COMPLETED | OUTPATIENT
Start: 2019-07-17 | End: 2019-07-17

## 2019-07-17 RX ORDER — FENTANYL CITRATE/PF 50 MCG/ML
25 SYRINGE (ML) INJECTION
Status: DISCONTINUED | OUTPATIENT
Start: 2019-07-17 | End: 2019-07-17 | Stop reason: HOSPADM

## 2019-07-17 RX ORDER — ONDANSETRON 2 MG/ML
4 INJECTION INTRAMUSCULAR; INTRAVENOUS ONCE AS NEEDED
Status: DISCONTINUED | OUTPATIENT
Start: 2019-07-17 | End: 2019-07-17 | Stop reason: HOSPADM

## 2019-07-17 RX ORDER — LIDOCAINE HYDROCHLORIDE 10 MG/ML
INJECTION, SOLUTION INFILTRATION; PERINEURAL CODE/TRAUMA/SEDATION MEDICATION
Status: COMPLETED | OUTPATIENT
Start: 2019-07-17 | End: 2019-07-17

## 2019-07-17 RX ADMIN — LIDOCAINE HYDROCHLORIDE 5 ML: 10 INJECTION, SOLUTION INFILTRATION; PERINEURAL at 12:22

## 2019-07-17 RX ADMIN — FENTANYL CITRATE 50 MCG: 50 INJECTION, SOLUTION INTRAMUSCULAR; INTRAVENOUS at 13:15

## 2019-07-17 RX ADMIN — PHENYLEPHRINE HYDROCHLORIDE 30 MCG/MIN: 10 INJECTION INTRAVENOUS at 12:10

## 2019-07-17 RX ADMIN — PROPOFOL 40 MG: 10 INJECTION, EMULSION INTRAVENOUS at 11:46

## 2019-07-17 RX ADMIN — MIDAZOLAM HYDROCHLORIDE 1 MG: 2 SYRUP ORAL at 13:26

## 2019-07-17 RX ADMIN — ISOPROTERENOL HYDROCHLORIDE 2 MCG/MIN: 0.2 INJECTION, SOLUTION INTRAMUSCULAR; INTRAVENOUS at 13:07

## 2019-07-17 RX ADMIN — SODIUM CHLORIDE: 0.9 INJECTION, SOLUTION INTRAVENOUS at 11:36

## 2019-07-17 RX ADMIN — ONDANSETRON 4 MG: 2 INJECTION INTRAMUSCULAR; INTRAVENOUS at 14:01

## 2019-07-17 RX ADMIN — GLYCOPYRROLATE 0.2 MG: 0.2 INJECTION, SOLUTION INTRAMUSCULAR; INTRAVENOUS at 12:12

## 2019-07-17 RX ADMIN — EPHEDRINE SULFATE 5 MG: 50 INJECTION, SOLUTION INTRAVENOUS at 13:39

## 2019-07-17 RX ADMIN — PROPOFOL 120 MG: 10 INJECTION, EMULSION INTRAVENOUS at 13:31

## 2019-07-17 RX ADMIN — PROPOFOL 80 MCG/KG/MIN: 10 INJECTION, EMULSION INTRAVENOUS at 11:47

## 2019-07-17 RX ADMIN — KETAMINE HYDROCHLORIDE 25 MG: 50 INJECTION, SOLUTION INTRAMUSCULAR; INTRAVENOUS at 13:26

## 2019-07-17 RX ADMIN — PHENYLEPHRINE HYDROCHLORIDE 100 MCG: 10 INJECTION INTRAVENOUS at 14:36

## 2019-07-17 RX ADMIN — FENTANYL CITRATE 25 MCG: 50 INJECTION, SOLUTION INTRAMUSCULAR; INTRAVENOUS at 11:46

## 2019-07-17 RX ADMIN — DEXAMETHASONE SODIUM PHOSPHATE 5 MG: 10 INJECTION, SOLUTION INTRAMUSCULAR; INTRAVENOUS at 13:44

## 2019-07-17 RX ADMIN — HEPARIN SODIUM 5000 UNITS: 1000 INJECTION INTRAVENOUS; SUBCUTANEOUS at 12:31

## 2019-07-17 RX ADMIN — FENTANYL CITRATE 50 MCG: 50 INJECTION, SOLUTION INTRAMUSCULAR; INTRAVENOUS at 13:23

## 2019-07-17 NOTE — DISCHARGE INSTRUCTIONS
PLEASE STOP TAKING YOUR METOPROLOL (EVEN ON AN AS NEED BASIS)  No heavy lifting or strenuous activity for one week  No soaking in a bath tub/hot tub/swimming pool for one week or until groin heals  If you notice ongoing bleeding, swelling, or firm lumps in groin near ablation incision, please contact Dr Casey Leiva office - [phone]

## 2019-07-17 NOTE — ANESTHESIA POSTPROCEDURE EVALUATION
Post-Op Assessment Note    CV Status:  Stable  Pain Score: 0    Pain management: adequate     Mental Status:  Alert and awake   Hydration Status:  Euvolemic   PONV Controlled:  Controlled   Airway Patency:  Patent   Post Op Vitals Reviewed: Yes      Staff: CRNA, Anesthesiologist   Comments: Patient resting in PACU, answering questions appropriately  No c/o pain or nausea  Airway patent, VSS             BP   119/61 (84)   Temp 98 1 °F (36 7 °C) (07/17/19 1506)    Pulse   78   Resp   16   SpO2   96% on RA

## 2019-07-17 NOTE — UTILIZATION REVIEW
07/17/19 1624  Outpatient No Charge Bed Once     Transfer Service: Cardiology       Question: Admitting Physician Answer: Lianna ePnny        Cardiac eps/svt ablation    /67   Pulse 67   Temp (!) 97 4 °F (36 3 °C)   Resp (!) 9   Ht 5' 9" (1 753 m)   Wt 90 7 kg (200 lb)   SpO2 99%   BMI 29 53 kg/m²     Scheduled Meds:  Current Facility-Administered Medications:  acetaminophen 650 mg Oral Q6H PRN Dunia Beck PA-C    sodium chloride 100 mL/hr Intravenous Continuous All Green CRNA Last Rate: Stopped (07/17/19 1609)     Continuous Infusions:  sodium chloride 100 mL/hr Last Rate: Stopped (07/17/19 1609)     PRN Meds:   acetaminophen

## 2019-07-17 NOTE — ANESTHESIA PREPROCEDURE EVALUATION
Review of Systems/Medical History          Cardiovascular  Hyperlipidemia, Hypertension , Dysrhythmias , atrial fibrillation,   Comment: 11/13/18  LEFT VENTRICLE: Size was normal  Systolic function was normal  Ejection fraction was estimated to be 60 %  There were no regional wall motion abnormalities     RIGHT VENTRICLE: The size was normal  Systolic function was normal  Wall thickness was normal      LEFT ATRIUM: The atrium was mildly to moderately dilated      RIGHT ATRIUM: The atrium was mildly dilated      MITRAL VALVE: There was mild to moderate annular calcification  DOPPLER: There was no evidence for stenosis  There was mild regurgitation      AORTIC VALVE: The valve was trileaflet  Leaflets exhibited moderately increased thickness  DOPPLER: There was mild to moderate stenosis  There was mild regurgitation      TRICUSPID VALVE: The valve structure was normal  There was normal leaflet separation  DOPPLER: The transtricuspid velocity was within the normal range  There was no evidence for stenosis  There was mild regurgitation  Pulmonary artery  systolic pressure was mildly increased  Estimated peak PA pressure was 34 mmHg  ,  Pulmonary  Smoker ex-smoker  , No recent URI ,        GI/Hepatic    GERD well controlled, Esophageal disease sandoval esophagus,             Endo/Other     GYN       Hematology  Anemia ,     Musculoskeletal  Back pain , lumbar pain,        Neurology   Psychology           Physical Exam    Airway    Mallampati score:  I  TM Distance: >3 FB  Neck ROM: full     Dental   No notable dental hx     Cardiovascular      Pulmonary      Other Findings        Lab Results   Component Value Date    GLUC 101 07/17/2019    GLUF 101 (H) 07/17/2019    ALT 24 06/06/2019    AST 18 06/06/2019    BUN 40 (H) 07/17/2019    CALCIUM 9 1 07/17/2019     07/17/2019    CHOL 188 05/15/2015    CO2 25 07/17/2019    CREATININE 1 95 (H) 07/17/2019    HDL 53 07/20/2017    INR 1 13 07/17/2019    HCT 33 2 (L) 07/17/2019    HGB 10 8 (L) 07/17/2019    PROT 7 4 05/15/2015    MG 1 9 06/06/2019    PHOS 2 7 06/06/2019     07/17/2019    K 3 7 07/17/2019    PSA 2 6 08/09/2018     05/15/2015    TRIG 57 07/20/2017    WBC 8 84 07/17/2019       Anesthesia Plan  ASA Score- 3     Anesthesia Type- general with ASA Monitors  Additional Monitors:   Airway Plan: ETT  Plan Factors-Patient not instructed to abstain from smoking on day of procedure  Patient did not smoke on day of surgery  Induction- intravenous  Postoperative Plan-     Informed Consent- Anesthetic plan and risks discussed with patient and spouse  I personally reviewed this patient with the CRNA  Discussed and agreed on the Anesthesia Plan with the CRNA  Lee Harden

## 2019-07-17 NOTE — INTERVAL H&P NOTE
Please refer to Dr Darinel Bruno full consultation from 07/10/2019 for further details  In short, Enriqueta Cobian is an 27-year-old male with paroxysmal supraventricular tachycardia, essential hypertension, preserved LVEF, and palpitations  He was seen in consultation for his SVT  Options for ablation versus up titration of medications with pacemaker implantation were discussed with the patient  He had opted for ablation and presents today for this  He reports that he has not had any other episodes of SVT since he was last seen in the office  Otherwise, he denies any cardiac symptoms such as chest pain, shortness of breath, palpitations, lightheadedness, or dizziness      /80 (BP Location: Left arm)   Pulse 70   Temp 97 8 °F (36 6 °C) (Oral)   Resp 18   Ht 5' 9" (1 753 m)   Wt 90 7 kg (200 lb)   SpO2 94%   BMI 29 53 kg/m²     Physical exam today:  GEN: NAD, alert and oriented, well appearing  SKIN: dry without significant lesions or rashes  HEENT: NCAT, PERRL, EOMs intact  NECK: No JVD appreciated  CARDIOVASCULAR: RRR, normal S1, S2 with systolic murmur appreciated  LUNGS: Clear to auscultation bilaterally without wheezes, rhonchi, or rales  ABDOMEN: Soft, nontender, nondistended  EXTREMITIES/VASCULAR: perfused without clubbing, cyanosis, or edema b/l  PSYCH: Normal mood and affect

## 2019-07-17 NOTE — OP NOTE
[facility]  [address] Supraventricular Tachycardia ABLATION     PT NAME: Dang Roche  MRN: [id number]  : 1935    PERFORMING/ATTENDING PHY: Shantell VALDOVINOS /Singh Soni Emperor OF PROCEDURE: 19    PREOPERATIVE DIAGNOSIS:  Supraventricular tachycardia       POSTOPERATIVE DIAGNOSIS  1) AVNRT  2) Successful ablation of AVNRT with Slow Pathway modification  Procedures Performed  1  Ablation of supraventricular tachycardia  2    3-D mapping with NAVX  3  Drug infusion with isoproterenol  4    EP testing with left atrial pacing and recording  Anesthesia  Conscious sedation followed by LMA    Preoperative Medications   Lidocaine 1% x 20 mL subcutaneously  Procedures:    -SVT/AVNRT ablation  -Coronary sinus/left-sided recording and pacing  -3D mapping  -isoproterenol drug infusion    Description:    After a complete discussion regarding the techniques, benefits and risks of the procedure, the patient provided written consent to proceed  In the EP laboratory, a formal time-out was conducted for patient, procedure, and site verification  The patient was prepped and draped in the usual sterile fashion  Local anesthetic was used and systemic sedation was managed by the anesthesia team       Five venous sheaths were introduced into the right and left femoral veins  Quadripolar catheters were positioned in the high right atrium, right ventricular apex, and His bundle location  A steerable decapolar catheter was positioned in the coronary sinus for pacing and recording  The HV interval was 56ms, without any evidence of ventricular preexcitation in the anterograde direction  Ventricular programmed stimulation pacing demonstrated central, decremental VA conduction, with no evidence of a concealed accessory pathway    Atrial programmed stimulation revealed decremental AV node conduction without evidence of any anterograde preexcitation  An AV node jump was noted, indicating dual AV node pathways at 600/420 ms  AV node echo beats were seen during atrial extrastimulation  Isoproterenol was started and increased to achieve heart rate of 100-120 bpm  SVT with a cycle length of 290ms was initiated with A1A2 pacing (600/300ms)  The SVT had a septal VA time that was 40ms (i e  less than 70 ms), ruling out AVRT with an accessory pathway  SVT could not be sustained long enough to perform entrainment maneuvers but termination with atrial activation and VA time < 70 ms, AVNRT was likely the mechanism of the SVT  A 4mm Carto-compatible radiofrequency ablation catheter was introduced to the right atrium and positioned in the slow pathway position, just anterior to the coronary sinus ostium  3D mapping was used to track lesion location, avoid the His bundle region, and monitor catheter stability during RF delivery  RF energy was applied and junctional beats were seen, indicating heating and ablation of the slow pathway component of the AV node  Ablation duration was stopped after 27 seconds due to V-A block  Another RF lesion was placed after advancing the catheter ablation toward the ventricle  It was stopped after 12 seconds due to V-A block  Repeat programmed stimulation was no longer able to induce SVT, with atrial programmed stimulation  An isoproterenol infusion was administered and repeat programmed stimulation confirmed that SVT was no longer inducible  No other arrhythmia mechanisms were demonstrated  Atrial and ventricular pacing was performed to assess AV and VA conduction, with His bundle recording  HV interval was 44ms  VA conduction was central and decremental      Normal sinus rhythm  Cycle length 890 ms  AH 82 ms, HV 56 ms,    ms, QRS was narrow  QT interval 410 ms     AV Wenckebach cycle length 460 ms    VA Wenckebach was 520 ms, VA conduction was midline and   decremental  Final AV node ERP was drive train 344/941/137 ms on isoproterenol  Final AV Wenckebach cycle length was 420 ms on isoproterenol  Final HV 44 ms  Final AH interval was unchanged at 73ms  After the conclusion of the procedure, the femoral sheaths were removed and manual pressure was applied, achieving hemostasis  No hematoma was seen  The patient will maintain bedrest for 4 hours after sheath removal     Conclusion:  successful AV node reentry catheter ablation    Plan:   - Continue current medications  - Bedrest for 4 hours  - F/u with EP clinic in 4-6 weeks    Final Diagnosis  Successful ablation of typical AV tay reentry tachycardia with slow   pathway modification

## 2019-08-06 ENCOUNTER — APPOINTMENT (OUTPATIENT)
Dept: LAB | Facility: CLINIC | Age: 84
End: 2019-08-06
Payer: MEDICARE

## 2019-08-06 DIAGNOSIS — I10 ESSENTIAL HYPERTENSION: ICD-10-CM

## 2019-08-06 DIAGNOSIS — D64.9 ANEMIA, UNSPECIFIED TYPE: ICD-10-CM

## 2019-08-06 LAB
ALBUMIN SERPL BCP-MCNC: 3.6 G/DL (ref 3.5–5)
ALP SERPL-CCNC: 103 U/L (ref 46–116)
ALT SERPL W P-5'-P-CCNC: 25 U/L (ref 12–78)
ANION GAP SERPL CALCULATED.3IONS-SCNC: 5 MMOL/L (ref 4–13)
AST SERPL W P-5'-P-CCNC: 17 U/L (ref 5–45)
BASOPHILS # BLD AUTO: 0.05 THOUSANDS/ΜL (ref 0–0.1)
BASOPHILS NFR BLD AUTO: 1 % (ref 0–1)
BILIRUB SERPL-MCNC: 0.34 MG/DL (ref 0.2–1)
BILIRUB UR QL STRIP: NEGATIVE
BUN SERPL-MCNC: 30 MG/DL (ref 5–25)
CALCIUM SERPL-MCNC: 9.4 MG/DL (ref 8.3–10.1)
CHLORIDE SERPL-SCNC: 105 MMOL/L (ref 100–108)
CHOLEST SERPL-MCNC: 148 MG/DL (ref 50–200)
CLARITY UR: CLEAR
CO2 SERPL-SCNC: 26 MMOL/L (ref 21–32)
COLOR UR: YELLOW
CREAT SERPL-MCNC: 1.89 MG/DL (ref 0.6–1.3)
EOSINOPHIL # BLD AUTO: 0.69 THOUSAND/ΜL (ref 0–0.61)
EOSINOPHIL NFR BLD AUTO: 9 % (ref 0–6)
ERYTHROCYTE [DISTWIDTH] IN BLOOD BY AUTOMATED COUNT: 13 % (ref 11.6–15.1)
FERRITIN SERPL-MCNC: 185 NG/ML (ref 8–388)
GFR SERPL CREATININE-BSD FRML MDRD: 32 ML/MIN/1.73SQ M
GLUCOSE P FAST SERPL-MCNC: 90 MG/DL (ref 65–99)
GLUCOSE UR STRIP-MCNC: NEGATIVE MG/DL
HCT VFR BLD AUTO: 35.8 % (ref 36.5–49.3)
HDLC SERPL-MCNC: 43 MG/DL (ref 40–60)
HGB BLD-MCNC: 11.4 G/DL (ref 12–17)
HGB UR QL STRIP.AUTO: NEGATIVE
IMM GRANULOCYTES # BLD AUTO: 0.02 THOUSAND/UL (ref 0–0.2)
IMM GRANULOCYTES NFR BLD AUTO: 0 % (ref 0–2)
KETONES UR STRIP-MCNC: NEGATIVE MG/DL
LDLC SERPL CALC-MCNC: 86 MG/DL (ref 0–100)
LEUKOCYTE ESTERASE UR QL STRIP: NEGATIVE
LYMPHOCYTES # BLD AUTO: 2.07 THOUSANDS/ΜL (ref 0.6–4.47)
LYMPHOCYTES NFR BLD AUTO: 26 % (ref 14–44)
MCH RBC QN AUTO: 28.9 PG (ref 26.8–34.3)
MCHC RBC AUTO-ENTMCNC: 31.8 G/DL (ref 31.4–37.4)
MCV RBC AUTO: 91 FL (ref 82–98)
MONOCYTES # BLD AUTO: 0.83 THOUSAND/ΜL (ref 0.17–1.22)
MONOCYTES NFR BLD AUTO: 11 % (ref 4–12)
NEUTROPHILS # BLD AUTO: 4.25 THOUSANDS/ΜL (ref 1.85–7.62)
NEUTS SEG NFR BLD AUTO: 53 % (ref 43–75)
NITRITE UR QL STRIP: NEGATIVE
NONHDLC SERPL-MCNC: 105 MG/DL
NRBC BLD AUTO-RTO: 0 /100 WBCS
PH UR STRIP.AUTO: 7 [PH]
PLATELET # BLD AUTO: 283 THOUSANDS/UL (ref 149–390)
PMV BLD AUTO: 11.1 FL (ref 8.9–12.7)
POTASSIUM SERPL-SCNC: 4.4 MMOL/L (ref 3.5–5.3)
PROT SERPL-MCNC: 7.4 G/DL (ref 6.4–8.2)
PROT UR STRIP-MCNC: NEGATIVE MG/DL
RBC # BLD AUTO: 3.94 MILLION/UL (ref 3.88–5.62)
SODIUM SERPL-SCNC: 136 MMOL/L (ref 136–145)
SP GR UR STRIP.AUTO: 1.01 (ref 1–1.03)
TRIGL SERPL-MCNC: 96 MG/DL
TSH SERPL DL<=0.05 MIU/L-ACNC: 0.33 UIU/ML (ref 0.36–3.74)
UROBILINOGEN UR QL STRIP.AUTO: 0.2 E.U./DL
WBC # BLD AUTO: 7.91 THOUSAND/UL (ref 4.31–10.16)

## 2019-08-06 PROCEDURE — 80061 LIPID PANEL: CPT

## 2019-08-06 PROCEDURE — 36415 COLL VENOUS BLD VENIPUNCTURE: CPT

## 2019-08-06 PROCEDURE — 84443 ASSAY THYROID STIM HORMONE: CPT

## 2019-08-06 PROCEDURE — 85025 COMPLETE CBC W/AUTO DIFF WBC: CPT

## 2019-08-06 PROCEDURE — 82728 ASSAY OF FERRITIN: CPT

## 2019-08-06 PROCEDURE — 81003 URINALYSIS AUTO W/O SCOPE: CPT | Performed by: INTERNAL MEDICINE

## 2019-08-06 PROCEDURE — 80053 COMPREHEN METABOLIC PANEL: CPT

## 2019-08-12 ENCOUNTER — OFFICE VISIT (OUTPATIENT)
Dept: INTERNAL MEDICINE CLINIC | Facility: CLINIC | Age: 84
End: 2019-08-12
Payer: MEDICARE

## 2019-08-12 VITALS
HEIGHT: 69 IN | BODY MASS INDEX: 29.44 KG/M2 | TEMPERATURE: 98.6 F | WEIGHT: 198.8 LBS | SYSTOLIC BLOOD PRESSURE: 142 MMHG | DIASTOLIC BLOOD PRESSURE: 80 MMHG | OXYGEN SATURATION: 49 % | HEART RATE: 97 BPM

## 2019-08-12 DIAGNOSIS — I10 ESSENTIAL HYPERTENSION: ICD-10-CM

## 2019-08-12 DIAGNOSIS — I47.1 PSVT (PAROXYSMAL SUPRAVENTRICULAR TACHYCARDIA) (HCC): Primary | ICD-10-CM

## 2019-08-12 PROCEDURE — 99214 OFFICE O/P EST MOD 30 MIN: CPT | Performed by: INTERNAL MEDICINE

## 2019-08-12 NOTE — PROGRESS NOTES
Assessment/Plan:    No problem-specific Assessment & Plan notes found for this encounter      There are no diagnoses linked to this encounter  Subjective:      Patient ID: Dang Roche is a 80 y o  male  HPI Manuel's here today for visit since some saw him the last time he had an episode of SVT which was terminated the ambulance with adenosine he was seen by  Dr Bing Perez and had a very successful ablation done recently for SVT he has not been bothered by it since otherwise feels well procedure was done at [facility] he seems extremely satisfied with the results and also said the care there was excellent takes his medications correctly  Lu Gay He was reminded by Dr Darinel Bruno  that he had aortic stenosis however this should not be a big deal if it needs to be corrected    The following portions of the patient's history were reviewed and updated as appropriate: allergies, current medications, past family history, past medical history, past social history, past surgical history and problem list     Review of Systems      Objective:      /80   Pulse 97   Temp 98 6 °F (37 °C) (Tympanic)   Ht 5' 9" (1 753 m)   Wt 90 2 kg (198 lb 12 8 oz)   SpO2 (!) 49%   BMI 29 36 kg/m²          Physical Exam  history Giovanni Re looks great he is in no distress his pressure is 142/80 the carotids are palpable there is a transmitted murmur up to the carotids but not particularly forceful the lungs are clear he has sunburn there is a grade 3/6 harsh ejection murmur at the cardiac base has been present for a long time and is consistent with a clinical diagnosis of aortic stenosis extremities at this point show no edema we reviewed his lab studies which were satisfactory he does not need to take any more iron I believe is somewhat reduced hemoglobin is probably related to his chronic kidney disease    We will see him in 4 months no changes are made in his medications        Current Outpatient Medications:    aspirin 81 MG tablet, Take 162 mg by mouth, Disp: , Rfl:     hydrochlorothiazide (HYDRODIURIL) 25 mg tablet, Take 0 5 tablets (12 5 mg total) by mouth daily, Disp: 90 tablet, Rfl: 0    Multiple Vitamin (MULTI-VITAMIN DAILY) TABS, Take by mouth, Disp: , Rfl:     omeprazole (PriLOSEC) 20 mg delayed release capsule, Take by mouth, Disp: , Rfl:     ramipril (ALTACE) 10 MG capsule, Take 1 capsule (10 mg total) by mouth daily, Disp: 90 capsule, Rfl: 1    rosuvastatin (CRESTOR) 5 mg tablet, Take 1 tablet (5 mg total) by mouth daily, Disp: 90 tablet, Rfl: 0     This note was completed in part utilizing [employer] Software  Grammatical errors, random word insertions, spelling mistakes, and incomplete sentences can be an occasional consequence of this system secondary to software limitations, ambient noise, and hardware issues  If you have any question or concerns about the content, text, or information contained within the body of this dictation, please contact the provider for clarification

## 2019-08-22 ENCOUNTER — OFFICE VISIT (OUTPATIENT)
Dept: CARDIOLOGY CLINIC | Facility: CLINIC | Age: 84
End: 2019-08-22
Payer: MEDICARE

## 2019-08-22 VITALS
HEIGHT: 69 IN | HEART RATE: 53 BPM | WEIGHT: 202 LBS | SYSTOLIC BLOOD PRESSURE: 126 MMHG | BODY MASS INDEX: 29.92 KG/M2 | DIASTOLIC BLOOD PRESSURE: 80 MMHG

## 2019-08-22 DIAGNOSIS — E78.2 MIXED HYPERLIPIDEMIA: ICD-10-CM

## 2019-08-22 DIAGNOSIS — I10 ESSENTIAL HYPERTENSION: ICD-10-CM

## 2019-08-22 DIAGNOSIS — R01.1 SYSTOLIC MURMUR: ICD-10-CM

## 2019-08-22 DIAGNOSIS — I47.1 PSVT (PAROXYSMAL SUPRAVENTRICULAR TACHYCARDIA) (HCC): Primary | ICD-10-CM

## 2019-08-22 DIAGNOSIS — I49.3 PVC (PREMATURE VENTRICULAR CONTRACTION): ICD-10-CM

## 2019-08-22 DIAGNOSIS — E78.00 PURE HYPERCHOLESTEROLEMIA: ICD-10-CM

## 2019-08-22 PROBLEM — I35.0 NONRHEUMATIC AORTIC VALVE STENOSIS: Status: ACTIVE | Noted: 2019-08-22

## 2019-08-22 PROCEDURE — 93000 ELECTROCARDIOGRAM COMPLETE: CPT | Performed by: INTERNAL MEDICINE

## 2019-08-22 PROCEDURE — 99214 OFFICE O/P EST MOD 30 MIN: CPT | Performed by: INTERNAL MEDICINE

## 2019-08-22 NOTE — PROGRESS NOTES
Electrophysiology Office Note    Evangelina Hernandez  1935  [id number]  [facility]  [address]        Assessment/Plan     1  PSVT (paroxysmal supraventricular tachycardia) (HCC)  POCT ECG     1  AVNRT   * patient presents to the EP office today for follow up regarding EPS w/ AVNRT RFA by Dr Giovanna Jordan on 19  He was discharged home in NSR   * today patient is in sinus bradycardia with a HR of 53bpm AVNB agents     He states that he feels great he had had two episodes the month prior  I did explain to him that we had to be very gentle with our ablation because even far away from his AV node he was having signs that he could need a pacemaker therefore we did a small amount of ablation however this should have been enough and I would estimate 90-95% chance that 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  If it recurred we would plan on doing the ablation and putting a pacemaker in at the same time  2  Hypertension well controlled continue agents per Dr Elijah Yan    3  Aortic stenosis moderate I explained that probably around age 80/80 he will need a trans catheter aortic valve replacement     4  Bradycardia asymptomatic    5  PVCs asymptomatic    6  Hyperlipidemia tolerating Crestor      Cardiac Testing:     ECHO:   Results for orders placed during the hospital encounter of 18   Echo complete with contrast if indicated    [address]    \[facility]\"", [address]  [phone]    Transthoracic Echocardiogram  2D, M-mode, Doppler, and Color Doppler    Study date:  2018    Patient: Aniket Aguirre  MR number: [id number]  Account number: [de-identified]  : [id number]  Age: 80 years  Gender: Male  Status: Outpatient  Location: [facility]  Height: 70 in  Weight: 210 lb  BP: 144/ 82 mmHg    Indications: Murmur    Diagnoses: R01 1 - Cardiac murmur, unspecified    Sonographer:  Robert Cerrato RUST  Primary Physician:  Justin García MD  Referring Physician:  Erlinda Sandoval MD  Group:  [facility]  Interpreting Physician:  Erlinda Sandoval MD    SUMMARY    LEFT VENTRICLE:  Systolic function was normal  Ejection fraction was estimated to be 60 %  There were no regional wall motion abnormalities  Wall thickness was mildly increased  LEFT ATRIUM:  The atrium was mildly to moderately dilated  RIGHT ATRIUM:  The atrium was mildly dilated  MITRAL VALVE:  There was mild to moderate annular calcification  There was mild regurgitation  AORTIC VALVE:  The valve was trileaflet  Leaflets exhibited moderately increased thickness  There was mild to moderate stenosis  There was mild regurgitation  TRICUSPID VALVE:  There was mild regurgitation  HISTORY: PRIOR HISTORY: AV disease, anemia, palpitations, HLD, HTN, GERD, spinal stenosis    PROCEDURE: The study was performed in the [facility] [place]  This was a routine study  The transthoracic approach was used  The study included complete 2D imaging, M-mode, complete spectral Doppler, and color Doppler  Echocardiographic views were limited due to decreased penetration and lung interference  This was a technically difficult study  LEFT VENTRICLE: Size was normal  Systolic function was normal  Ejection fraction was estimated to be 60 %  There were no regional wall motion abnormalities  Wall thickness was mildly increased  DOPPLER: There was an increased relative  contribution of atrial contraction to ventricular filling  The deceleration time of the early transmitral flow velocity was increased  RIGHT VENTRICLE: The size was normal  Systolic function was normal  Wall thickness was normal     LEFT ATRIUM: The atrium was mildly to moderately dilated  RIGHT ATRIUM: The atrium was mildly dilated  MITRAL VALVE: There was mild to moderate annular calcification   DOPPLER: There was no evidence for stenosis  There was mild regurgitation  AORTIC VALVE: The valve was trileaflet  Leaflets exhibited moderately increased thickness  DOPPLER: There was mild to moderate stenosis  There was mild regurgitation  TRICUSPID VALVE: The valve structure was normal  There was normal leaflet separation  DOPPLER: The transtricuspid velocity was within the normal range  There was no evidence for stenosis  There was mild regurgitation  Pulmonary artery  systolic pressure was mildly increased  Estimated peak PA pressure was 34 mmHg  PULMONIC VALVE: Leaflets exhibited normal thickness, no calcification, and normal cuspal separation  DOPPLER: The transpulmonic velocity was within the normal range  There was no regurgitation  PERICARDIUM: There was no pericardial effusion  The pericardium was normal in appearance  AORTA: The root exhibited normal size  SYSTEMIC VEINS: IVC: The inferior vena cava was normal in size and course  Respirophasic changes were normal     SYSTEM MEASUREMENT TABLES    2D  Ao Diam: 3 5 cm  IVSd: 1 2 cm  LA Diam: 4 7 cm  LVEF MOD A4C: 68 3 %  LVIDd: 4 8 cm  LVIDs: 3 cm  LVPWd: 1 2 cm    CW  AV Env  Ti: 341 4 ms  AV VTI: 67 8 cm  AV Vmax: 2 8 m/s  AV Vmean: 2 m/s  AV meanP 9 mmHg  TR Vmax: 2 7 m/s  TR maxP 3 mmHg    PW  MARLY (VTI): 1 5 cm2  MARLY Vmax: 1 6 cm2  LVOT VTI: 25 5 cm  LVOT Vmax: 1 1 m/s  LVOT Vmean: 0 7 m/s  LVOT maxP 4 mmHg  LVOT meanP 2 mmHg    [facility]    Prepared and electronically signed by    Tadeo Abdi MD  Signed [id number] 18:03:54         CATH/STRESS TEST:   None     EKG (19): History of Present Illness     HPI/INTERVAL HISTORY: Yanci Fitzgerald is a 80 y o  male with a history of AVNRT s/p RFA (), HTN, HLD who presents to EP office for follow up of recent SVT ablation   He is normally a patient of Dr Susie Julio and Dr Pepito Reagan      19:  Patient presented to [facility] under direction of Dr Darinel Bruno on 7-17-19 for EPS due to recurrent SVT  During his study he was 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 Dr Tommy Pearl and Dr Darinel Bruno which was successfully ablated  He tolerated the procedure well with no post procedure complications  He was discharged home after 4 hours of bedrest in NSR  Since returning home patient has not had any symptoms suggestive of AV tay reentrant tachycardia  He states he feels great 12 point ROS negative for complaints today      Review of Systems  ROS as noted above, otherwise 12 point review of systems was performed and is negative         Historical Information   Social History     Socioeconomic History    Marital status: /Civil Union     Spouse name: Not on file    Number of children: Not on file    Years of education: Not on file    Highest education level: Not on file   Occupational History    Not on file   Social Needs    Financial resource strain: Not on file    Food insecurity:     Worry: Not on file     Inability: Not on file    Transportation needs:     Medical: Not on file     Non-medical: Not on file   Tobacco Use    Smoking status: Never Smoker    Smokeless tobacco: Never Used   Substance and Sexual Activity    Alcohol use: No     Comment: conflicting no and occasional wine per [employer]    Drug use: No    Sexual activity: Not on file   Lifestyle    Physical activity:     Days per week: Not on file     Minutes per session: Not on file    Stress: Not on file   Relationships    Social connections:     Talks on phone: Not on file     Gets together: Not on file     Attends [employer] service: Not on file     Active member of club or organization: Not on file     Attends meetings of clubs or organizations: Not on file     Relationship status: Not on file    Intimate partner violence:     Fear of current or ex partner: Not on file     Emotionally abused: Not on file     Physically abused: Not on file     Forced sexual activity: Not on file   Other Topics Concern    Not on file   Social History Narrative    Not on file     Past Medical History:   Diagnosis Date    Atrial fibrillation ([address] )     Hearing loss     last assessed 11/8/17    Rheumatic fever     [facility]ricular tachycardia[facility] ([facility])      Past Surgical History:   Procedure Laterality Date    APPENDECTOMY      BACK SURGERY      lower    CATARACT EXTRACTION      KNEE SURGERY Left     TONSILLECTOMY AND ADENOIDECTOMY       Social History     Substance and Sexual Activity   Alcohol Use No    Comment: conflicting no and occasional wine per [employer]     Social History     Substance and Sexual Activity   Drug Use No     Social History     Tobacco Use   Smoking Status Never Smoker   Smokeless Tobacco Never Used     Family History   Problem Relation Age of Onset    Other Mother         old age   Kendra Kaur Esophageal cancer Father    Kendra Kaur Other Father         old age   Kendra Kaur Alcohol abuse Son         alcoholism       Meds/Allergies       Current Outpatient Medications:     aspirin 81 MG tablet, Take 162 mg by mouth, Disp: , Rfl:     hydrochlorothiazide (HYDRODIURIL) 25 mg tablet, Take 0 5 tablets (12 5 mg total) by mouth daily, Disp: 90 tablet, Rfl: 0    Multiple Vitamin (MULTI-VITAMIN DAILY) TABS, Take by mouth, Disp: , Rfl:     omeprazole (PriLOSEC) 20 mg delayed release capsule, Take by mouth, Disp: , Rfl:     ramipril (ALTACE) 10 MG capsule, Take 1 capsule (10 mg total) by mouth daily, Disp: 90 capsule, Rfl: 1    rosuvastatin (CRESTOR) 5 mg tablet, Take 1 tablet (5 mg total) by mouth daily, Disp: 90 tablet, Rfl: 0    No Known Allergies    Objective   Vitals: Blood pressure 126/80, pulse (!) 53, height 5' 9" (1 753 m), weight 91 6 kg (202 lb)  Physical Exam    GEN: NAD, Alert and oriented, well appearing  HEENT:Head, neck, ears, oral pharynx: Mucus membranes moist, oral pharynx clear, nares clear   External ears normal  EYES: Pupils equal, sclera anicteric  NECK: No JVD  CARDIOVASCULAR: RRR, 3/6 ANGEL LUIS rub, gallops S1,S2 occasional ectopy  LUNGS: Clear To auscultation bilaterally no rales no rhonchi no wheezes  ABDOMEN: Soft, nondistended  EXTREMITIES/VASCULAR: No edema  PSYCH: Normal Affect  NEURO: Grossly intact, moving all extremiteis equal, face symetric  HEME: No bleeding, bruising, petechia  SKIN: No significant rashes        Labs:not applicable    Imaging: I have personally reviewed pertinent reports

## 2019-08-22 NOTE — LETTER
August 22, 2019     Referral [facility]    Patient: Marco A Jay   YOB: 1935   Date of Visit: 8/22/2019       Dear Dr Ackerman Innocent:    Thank you for referring Luke Donald to me for evaluation  Below are my notes for this consultation  If you have questions, please do not hesitate to call me  I look forward to following your patient along with you  Sincerely,        Drew Galeano MD        CC: MD Shar Montelongo,   8/22/2019 11:08 AM  Incomplete  Electrophysiology Office Note    Marco A Jay  1935  [id number]  HEART & VASCULAR [facility] [address] [facility]        Assessment/Plan     1  PSVT (paroxysmal supraventricular tachycardia) (HCC)  POCT ECG     1  AVNRT   * patient presents to the EP office today for follow up regarding EPS w/ AVNRT RFA by Dr Loraine Mandujano on 7-17-19  He was discharged home in NSR   * today patient is in sinus bradycardia with a HR of 53bpm AVNB agents     He states that he feels great he had had two episodes the month prior  I did explain to him that we had to be very gentle with our ablation because even far away from his AV node he was having signs that he could need a pacemaker therefore we did a small amount of ablation however this should have been enough and I would estimate 90-95% chance that 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  If it recurred we would plan on doing the ablation and putting a pacemaker in at the same time  2  Hypertension well controlled continue agents per Dr Hesham Rudolph    3  Aortic stenosis moderate I explained that probably around age 80/80 he will need a trans catheter aortic valve replacement     4  Bradycardia asymptomatic    5  PVCs asymptomatic    6   Hyperlipidemia tolerating Crestor      Cardiac Testing:     ECHO:   Results for orders placed during the hospital encounter of 11/13/18   Echo complete with contrast if indicated    Narrative [address]  [address]  [place], [address]  [phone]    Transthoracic Echocardiogram  2D, M-mode, Doppler, and Color Doppler    Study date:  2018    Patient: Ban Campbell  MR number: [id number]  Account number: [de-identified]  : [id number]  Age: 80 years  Gender: Male  Status: Outpatient  Location: [facility]  Height: 70 in  Weight: 210 lb  BP: 144/ 82 mmHg    Indications: Murmur    Diagnoses: R01 1 - Cardiac murmur, unspecified    Sonographer:  [address]  Primary Physician:  Eulalio Rider MD  Referring Physician:  Qing Linn MD  Group:  [facility]  Interpreting Physician:  Qing Linn MD    SUMMARY    LEFT VENTRICLE:  Systolic function was normal  Ejection fraction was estimated to be 60 %  There were no regional wall motion abnormalities  Wall thickness was mildly increased  LEFT ATRIUM:  The atrium was mildly to moderately dilated  RIGHT ATRIUM:  The atrium was mildly dilated  MITRAL VALVE:  There was mild to moderate annular calcification  There was mild regurgitation  AORTIC VALVE:  The valve was trileaflet  Leaflets exhibited moderately increased thickness  There was mild to moderate stenosis  There was mild regurgitation  TRICUSPID VALVE:  There was mild regurgitation  HISTORY: PRIOR HISTORY: AV disease, anemia, palpitations, HLD, HTN, GERD, spinal stenosis    PROCEDURE: The study was performed in the [facility] [place]  This was a routine study  The transthoracic approach was used  The study included complete 2D imaging, M-mode, complete spectral Doppler, and color Doppler  Echocardiographic views were limited due to decreased penetration and lung interference  This was a technically difficult study  LEFT VENTRICLE: Size was normal  Systolic function was normal  Ejection fraction was estimated to be 60 %   There were no regional wall motion abnormalities  Wall thickness was mildly increased  DOPPLER: There was an increased relative  contribution of atrial contraction to ventricular filling  The deceleration time of the early transmitral flow velocity was increased  RIGHT VENTRICLE: The size was normal  Systolic function was normal  Wall thickness was normal     LEFT ATRIUM: The atrium was mildly to moderately dilated  RIGHT ATRIUM: The atrium was mildly dilated  MITRAL VALVE: There was mild to moderate annular calcification  DOPPLER: There was no evidence for stenosis  There was mild regurgitation  AORTIC VALVE: The valve was trileaflet  Leaflets exhibited moderately increased thickness  DOPPLER: There was mild to moderate stenosis  There was mild regurgitation  TRICUSPID VALVE: The valve structure was normal  There was normal leaflet separation  DOPPLER: The transtricuspid velocity was within the normal range  There was no evidence for stenosis  There was mild regurgitation  Pulmonary artery  systolic pressure was mildly increased  Estimated peak PA pressure was 34 mmHg  PULMONIC VALVE: Leaflets exhibited normal thickness, no calcification, and normal cuspal separation  DOPPLER: The transpulmonic velocity was within the normal range  There was no regurgitation  PERICARDIUM: There was no pericardial effusion  The pericardium was normal in appearance  AORTA: The root exhibited normal size  SYSTEMIC VEINS: IVC: The inferior vena cava was normal in size and course  Respirophasic changes were normal     SYSTEM MEASUREMENT TABLES    2D  Ao Diam: 3 5 cm  IVSd: 1 2 cm  LA Diam: 4 7 cm  LVEF MOD A4C: 68 3 %  LVIDd: 4 8 cm  LVIDs: 3 cm  LVPWd: 1 2 cm    CW  AV Env  Ti: 341 4 ms  AV VTI: 67 8 cm  AV Vmax: 2 8 m/s  AV Vmean: 2 m/s  AV meanP 9 mmHg  TR Vmax: 2 7 m/s  TR maxP 3 mmHg    PW  MARLY (VTI): 1 5 cm2  MARLY Vmax: 1 6 cm2  LVOT VTI: 25 5 cm  LVOT Vmax: 1 1 m/s  LVOT Vmean: 0 7 m/s  LVOT maxP 4 mmHg  LVOT meanPG: 2  2 mmHg    [facility]    Prepared and electronically signed by    June Vieira MD  Signed [id number] 18:03:54         CATH/STRESS TEST:   None     EKG (8-22-19): History of Present Illness     HPI/INTERVAL HISTORY: Domenic Recinos is a 80 y o  male with a 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), HTN, HLD who presents to EP office for follow up of recent SVT ablation  He is normally a patient of Dr Gina Márquez and Dr Krissy Hawk      8-22-19:  Patient presented to [facility] under direction of Dr Gina Márquez on 7-17-19 for EPS due to recurrent SVT  During his study he was 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 Dr Krissy Hawk and Dr Gina Márquez which was successfully ablated  He tolerated the procedure well with no post procedure complications  He was discharged home after 4 hours of bedrest in NSR  Since returning home patient has not had any symptoms suggestive of AV tay reentrant tachycardia  He states he feels great 12 point ROS negative for complaints today      Review of Systems  ROS as noted above, otherwise 12 point review of systems was performed and is negative         Historical Information   Social History     Socioeconomic History    Marital status: /Civil Union     Spouse name: Not on file    Number of children: Not on file    Years of education: Not on file    Highest education level: Not on file   Occupational History    Not on file   Social Needs    Financial resource strain: Not on file    Food insecurity:     Worry: Not on file     Inability: Not on file    Transportation needs:     Medical: Not on file     Non-medical: Not on file   Tobacco Use    Smoking status: Never Smoker    Smokeless tobacco: Never Used   Substance and Sexual Activity    Alcohol use: No     Comment: conflicting no and occasional wine per [employer]    Drug use: No    Sexual activity: Not on file   Lifestyle    Physical activity:     Days per week: Not on file     Minutes per session: Not on file    Stress: Not on file   Relationships    Social connections:     Talks on phone: Not on file     Gets together: Not on file     Attends Yazidism service: Not on file     Active member of club or organization: Not on file     Attends meetings of clubs or organizations: Not on file     Relationship status: Not on file    Intimate partner violence:     Fear of current or ex partner: Not on file     Emotionally abused: Not on file     Physically abused: Not on file     Forced sexual activity: Not on file   Other Topics Concern    Not on file   Social History Narrative    Not on file     Past Medical History:   Diagnosis Date    Atrial fibrillation ([facility] [address] )     Hearing loss     last assessed 11/8/17    Rheumatic fever     SVT (supraventricular tachycardia) ([facility] [address] )      Past Surgical History:   Procedure Laterality Date    APPENDECTOMY      BACK SURGERY      lower    CATARACT EXTRACTION      KNEE SURGERY Left     TONSILLECTOMY AND ADENOIDECTOMY       Social History     Substance and Sexual Activity   Alcohol Use No    Comment: conflicting no and occasional wine per [employer]     Social History     Substance and Sexual Activity   Drug Use No     Social History     Tobacco Use   Smoking Status Never Smoker   Smokeless Tobacco Never Used     Family History   Problem Relation Age of Onset   Keli Pearson Other Mother         old age   Keli Pearson Esophageal cancer Father    Keli Pearson Other Father         old age   Keli Pearson Alcohol abuse Son         alcoholism       Meds/Allergies       Current Outpatient Medications:     aspirin 81 MG tablet, Take 162 mg by mouth, Disp: , Rfl:     hydrochlorothiazide (HYDRODIURIL) 25 mg tablet, Take 0 5 tablets (12 5 mg total) by mouth daily, Disp: 90 tablet, Rfl: 0    Multiple Vitamin (MULTI-VITAMIN DAILY) TABS, Take by mouth, Disp: , Rfl:     omeprazole (PriLOSEC) 20 mg delayed release capsule, Take by mouth, Disp: , Rfl:     ramipril (ALTACE) 10 MG capsule, Take 1 capsule (10 mg total) by mouth daily, Disp: 90 capsule, Rfl: 1    rosuvastatin (CRESTOR) 5 mg tablet, Take 1 tablet (5 mg total) by mouth daily, Disp: 90 tablet, Rfl: 0    No Known Allergies    Objective   Vitals: Blood pressure 126/80, pulse (!) 53, height 5' 9" (1 753 m), weight 91 6 kg (202 lb)  Physical Exam    GEN: NAD, Alert and oriented, well appearing  HEENT:Head, neck, ears, oral pharynx: Mucus membranes moist, oral pharynx clear, nares clear  External ears normal  EYES: Pupils equal, sclera anicteric  NECK: No JVD  CARDIOVASCULAR: RRR, 3/6 ANGEL LUIS rub, gallops S1,S2 occasional ectopy  LUNGS: Clear To auscultation bilaterally no rales no rhonchi no wheezes  ABDOMEN: Soft, nondistended  EXTREMITIES/VASCULAR: No edema  PSYCH: Normal Affect  NEURO: Grossly intact, moving all extremiteis equal, face symetric  HEME: No bleeding, bruising, petechia  SKIN: No significant rashes        Labs:not applicable    Imaging: I have personally reviewed pertinent reports  Chilango Rich PA-C  8/22/2019 10:39 AM  Sign at close encounter  Electrophysiology Office Note    Cher Perish  1935  [id number]  HEART & VASCULAR [facility]  [address]        Assessment/Plan     1  PSVT (paroxysmal supraventricular tachycardia) (HCC)  POCT ECG     1  AVNRT   * patient presents to the EP office today for follow up regarding EPS w/ AVNRT RFA by Dr Jitendra Carter on 7-17-19  He was discharged home in NSR   * today patient is in sinus bradycardia with a HR of 53bpm, he is *** from this, not on AVNB agents       Cardiac Testing:     ECHO:   Results for orders placed during the hospital encounter of 11/13/18   Echo complete with contrast if indicated    Narrative [address]    [facility], [address]  [phone]    Transthoracic Echocardiogram  2D, M-mode, Doppler, and Color Doppler    Study date:  13-Nov-2018    Patient: Rohan Lyles BRUNA  MR number: [id number]  Account number: [de-identified]  : 1935  Age: 80 years  Gender: Male  Status: Outpatient  Location: [address] [place] [address]  Height: 70 in  Weight: 210 lb  BP: 144/ 82 mmHg    Indications: Murmur    Diagnoses: R01 1 - Cardiac murmur, unspecified    Sonographer:  [address]  Primary Physician:  Marilyn Cm MD  Referring Physician:  Clover Cobb MD  Group:  [facility]  Interpreting Physician:  Clover Cobb MD    SUMMARY    LEFT VENTRICLE:  Systolic function was normal  Ejection fraction was estimated to be 60 %  There were no regional wall motion abnormalities  Wall thickness was mildly increased  LEFT ATRIUM:  The atrium was mildly to moderately dilated  RIGHT ATRIUM:  The atrium was mildly dilated  MITRAL VALVE:  There was mild to moderate annular calcification  There was mild regurgitation  AORTIC VALVE:  The valve was trileaflet  Leaflets exhibited moderately increased thickness  There was mild to moderate stenosis  There was mild regurgitation  TRICUSPID VALVE:  There was mild regurgitation  HISTORY: PRIOR HISTORY: AV disease, anemia, palpitations, HLD, HTN, GERD, spinal stenosis    PROCEDURE: The study was performed in the [address] [place] [address]  This was a routine study  The transthoracic approach was used  The study included complete 2D imaging, M-mode, complete spectral Doppler, and color Doppler  Echocardiographic views were limited due to decreased penetration and lung interference  This was a technically difficult study  LEFT VENTRICLE: Size was normal  Systolic function was normal  Ejection fraction was estimated to be 60 %  There were no regional wall motion abnormalities  Wall thickness was mildly increased  DOPPLER: There was an increased relative  contribution of atrial contraction to ventricular filling   The deceleration time of the early transmitral flow velocity was increased  RIGHT VENTRICLE: The size was normal  Systolic function was normal  Wall thickness was normal     LEFT ATRIUM: The atrium was mildly to moderately dilated  RIGHT ATRIUM: The atrium was mildly dilated  MITRAL VALVE: There was mild to moderate annular calcification  DOPPLER: There was no evidence for stenosis  There was mild regurgitation  AORTIC VALVE: The valve was trileaflet  Leaflets exhibited moderately increased thickness  DOPPLER: There was mild to moderate stenosis  There was mild regurgitation  TRICUSPID VALVE: The valve structure was normal  There was normal leaflet separation  DOPPLER: The transtricuspid velocity was within the normal range  There was no evidence for stenosis  There was mild regurgitation  Pulmonary artery  systolic pressure was mildly increased  Estimated peak PA pressure was 34 mmHg  PULMONIC VALVE: Leaflets exhibited normal thickness, no calcification, and normal cuspal separation  DOPPLER: The transpulmonic velocity was within the normal range  There was no regurgitation  PERICARDIUM: There was no pericardial effusion  The pericardium was normal in appearance  AORTA: The root exhibited normal size  SYSTEMIC VEINS: IVC: The inferior vena cava was normal in size and course  Respirophasic changes were normal     SYSTEM MEASUREMENT TABLES    2D  Ao Diam: 3 5 cm  IVSd: 1 2 cm  LA Diam: 4 7 cm  LVEF MOD A4C: 68 3 %  LVIDd: 4 8 cm  LVIDs: 3 cm  LVPWd: 1 2 cm    CW  AV Env  Ti: 341 4 ms  AV VTI: 67 8 cm  AV Vmax: 2 8 m/s  AV Vmean: 2 m/s  AV meanP 9 mmHg  TR Vmax: 2 7 m/s  TR maxP 3 mmHg    PW  MARLY (VTI): 1 5 cm2  MARLY Vmax: 1 6 cm2  LVOT VTI: 25 5 cm  LVOT Vmax: 1 1 m/s  LVOT Vmean: 0 7 m/s  LVOT maxP 4 mmHg  LVOT meanP 2 mmHg    [employer] Accredited Echocardiography Laboratory    Prepared and electronically signed by    Newton Moss MD  Signed [id number] 18:03:54         CATH/STRESS TEST:   None     EKG (19): History of Present Illness     HPI/INTERVAL HISTORY: Yandel Polanco is a 80 y o  male with a 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), HTN, HLD who presents to EP office for follow up of recent SVT ablation  He is normally a patient of Dr Zohaib Barnes and Dr Rosamaria Hanson      8-22-19:  Patient presented to [facility] under direction of Dr Zohaib Barnes on 7-17-19 for EPS due to recurrent SVT  During his study he was 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 Dr Rosamaria Hanson which was successfully ablated  He tolerated the procedure well with no post procedure complications  He was discharged home after 4 hours of bedrest in NSR  Since returning home patient ***      Review of Systems  ROS as noted above, otherwise 12 point review of systems was performed and is negative         Historical Information   Social History     Socioeconomic History    Marital status: /Civil Union     Spouse name: Not on file    Number of children: Not on file    Years of education: Not on file    Highest education level: Not on file   Occupational History    Not on file   Social Needs    Financial resource strain: Not on file    Food insecurity:     Worry: Not on file     Inability: Not on file    Transportation needs:     Medical: Not on file     Non-medical: Not on file   Tobacco Use    Smoking status: Never Smoker    Smokeless tobacco: Never Used   Substance and Sexual Activity    Alcohol use: No     Comment: conflicting no and occasional wine per [employer]    Drug use: No    Sexual activity: Not on file   Lifestyle    Physical activity:     Days per week: Not on file     Minutes per session: Not on file    Stress: Not on file   Relationships    Social connections:     Talks on phone: Not on file     Gets together: Not on file     Attends Yazidi service: Not on file     Active member of club or organization: Not on file     Attends meetings of clubs or organizations: Not on file     Relationship status: Not on file    Intimate partner violence:     Fear of current or ex partner: Not on file     Emotionally abused: Not on file     Physically abused: Not on file     Forced sexual activity: Not on file   Other Topics Concern    Not on file   Social History Narrative    Not on file     Past Medical History:   Diagnosis Date    Atrial fibrillation ([address] )     Hearing loss     last assessed 11/8/17    Rheumatic fever     SVT (supraventricular tachycardia) (HCC)      Past Surgical History:   Procedure Laterality Date    APPENDECTOMY      BACK SURGERY      lower    CATARACT EXTRACTION      KNEE SURGERY Left     TONSILLECTOMY AND ADENOIDECTOMY       Social History     Substance and Sexual Activity   Alcohol Use No    Comment: conflicting no and occasional wine per [employer]     Social History     Substance and Sexual Activity   Drug Use No     Social History     Tobacco Use   Smoking Status Never Smoker   Smokeless Tobacco Never Used     Family History   Problem Relation Age of Onset    Other Mother         old age   Jennifer Quesada Esophageal cancer Father    Jennifer Quesada Other Father         old age   Jennifer Quesada Alcohol abuse Son         alcoholism       Meds/Allergies       Current Outpatient Medications:     aspirin 81 MG tablet, Take 162 mg by mouth, Disp: , Rfl:     hydrochlorothiazide (HYDRODIURIL) 25 mg tablet, Take 0 5 tablets (12 5 mg total) by mouth daily, Disp: 90 tablet, Rfl: 0    Multiple Vitamin (MULTI-VITAMIN DAILY) TABS, Take by mouth, Disp: , Rfl:     omeprazole (PriLOSEC) 20 mg delayed release capsule, Take by mouth, Disp: , Rfl:     ramipril (ALTACE) 10 MG capsule, Take 1 capsule (10 mg total) by mouth daily, Disp: 90 capsule, Rfl: 1    rosuvastatin (CRESTOR) 5 mg tablet, Take 1 tablet (5 mg total) by mouth daily, Disp: 90 tablet, Rfl: 0    No Known Allergies    Objective   Vitals: Blood pressure 126/80, pulse (!) 53, height 5' 9" (1 753 m), weight 91 6 kg (202 lb)          Physical Exam      Labs:not applicable    Imaging: I have personally reviewed pertinent reports

## 2019-10-02 ENCOUNTER — DOCUMENTATION (OUTPATIENT)
Dept: INTERNAL MEDICINE CLINIC | Facility: CLINIC | Age: 84
End: 2019-10-02

## 2019-11-15 DIAGNOSIS — I10 ESSENTIAL HYPERTENSION: ICD-10-CM

## 2019-11-15 RX ORDER — HYDROCHLOROTHIAZIDE 25 MG/1
12.5 TABLET ORAL DAILY
Qty: 90 TABLET | Refills: 0 | Status: SHIPPED | OUTPATIENT
Start: 2019-11-15 | End: 2020-01-14 | Stop reason: SDUPTHER

## 2019-12-03 ENCOUNTER — OFFICE VISIT (OUTPATIENT)
Dept: CARDIOLOGY CLINIC | Facility: CLINIC | Age: 84
End: 2019-12-03
Payer: MEDICARE

## 2019-12-03 VITALS
HEART RATE: 57 BPM | BODY MASS INDEX: 29.77 KG/M2 | SYSTOLIC BLOOD PRESSURE: 138 MMHG | DIASTOLIC BLOOD PRESSURE: 82 MMHG | WEIGHT: 201 LBS | HEIGHT: 69 IN

## 2019-12-03 DIAGNOSIS — I47.1 PSVT (PAROXYSMAL SUPRAVENTRICULAR TACHYCARDIA) (HCC): Primary | ICD-10-CM

## 2019-12-03 DIAGNOSIS — E78.2 MIXED HYPERLIPIDEMIA: ICD-10-CM

## 2019-12-03 DIAGNOSIS — I35.0 NONRHEUMATIC AORTIC VALVE STENOSIS: ICD-10-CM

## 2019-12-03 DIAGNOSIS — I10 ESSENTIAL HYPERTENSION: ICD-10-CM

## 2019-12-03 PROCEDURE — 99213 OFFICE O/P EST LOW 20 MIN: CPT | Performed by: INTERNAL MEDICINE

## 2019-12-03 RX ORDER — MAG HYDROX/ALUMINUM HYD/SIMETH 400-400-40
SUSPENSION, ORAL (FINAL DOSE FORM) ORAL 2 TIMES WEEKLY
COMMUNITY

## 2019-12-03 NOTE — PROGRESS NOTES
Cardiology Follow Up     Dears  1935  [id number]  [facility] CARDIOLOGY [place] [address] [address]  [facility] [id number] [phone]    Reason for visit: Patient here for FU PSVT s/p ablation July 2019, HTN, HLP and AS (mild to moderate on echo one year ago)    1  PSVT (paroxysmal supraventricular tachycardia) ([address] )     2  Essential hypertension     3  Nonrheumatic aortic valve stenosis     4  Mixed hyperlipidemia         Interval History:  Since the patient's last visit with me 6 months ago, he did have an ablation of SVT in July  He has had no further palpitations since then  He denies chest pain or shortness of breath  He denies lightheadedness   He does get some edema of the right ankle    Patient Active Problem List   Diagnosis    Anemia    Linda esophagus    Esophageal reflux    Mixed hyperlipidemia    Hypertension    Spinal stenosis of lumbar region    Spondylolisthesis    PSVT (paroxysmal supraventricular tachycardia) (HCC)    Palpitations    Systolic murmur    Nonrheumatic aortic valve stenosis    Pure hypercholesterolemia    PVC (premature ventricular contraction)     Past Medical History:   Diagnosis Date    Atrial fibrillation ([address] )     Hearing loss     last assessed 11/8/17    Rheumatic fever     [facility])      Social History     Socioeconomic History    Marital status: /Civil Union     Spouse name: Not on file    Number of children: Not on file    Years of education: Not on file    Highest education level: Not on file   Occupational History    Not on file   Social Needs    Financial resource strain: Not on file    Food insecurity:     Worry: Not on file     Inability: Not on file    Transportation needs:     Medical: Not on file     Non-medical: Not on file   Tobacco Use    Smoking status: Never Smoker    Smokeless tobacco: Never Used   Substance and Sexual Activity    Alcohol use: No     Comment: conflicting no and occasional wine per [employer]    Drug use: No    Sexual activity: Not on file   Lifestyle    Physical activity:     Days per week: Not on file     Minutes per session: Not on file    Stress: Not on file   Relationships    Social connections:     Talks on phone: Not on file     Gets together: Not on file     Attends Rastafarian service: Not on file     Active member of club or organization: Not on file     Attends meetings of clubs or organizations: Not on file     Relationship status: Not on file    Intimate partner violence:     Fear of current or ex partner: Not on file     Emotionally abused: Not on file     Physically abused: Not on file     Forced sexual activity: Not on file   Other Topics Concern    Not on file   Social History Narrative    Not on file      Family History   Problem Relation Age of Onset   Faust Other Mother         old age   Faust Esophageal cancer Father    Faust Other Father         old age   Faust Alcohol abuse Son         alcoholism     Past Surgical History:   Procedure Laterality Date    APPENDECTOMY      BACK SURGERY      lower    CATARACT EXTRACTION      KNEE SURGERY Left     TONSILLECTOMY AND ADENOIDECTOMY         Current Outpatient Medications:     aspirin 81 MG tablet, Take 162 mg by mouth, Disp: , Rfl:     Cholecalciferol (VITAMIN D3) 125 MCG (5000 UT) CAPS, Vitamin D3 125 mcg (5,000 unit) tablet  Take by oral route , Disp: , Rfl:     hydrochlorothiazide (HYDRODIURIL) 25 mg tablet, Take 0 5 tablets (12 5 mg total) by mouth daily, Disp: 90 tablet, Rfl: 0    Multiple Vitamin (MULTI-VITAMIN DAILY) TABS, Take by mouth, Disp: , Rfl:     omeprazole (PriLOSEC) 20 mg delayed release capsule, Take by mouth, Disp: , Rfl:     ramipril (ALTACE) 10 MG capsule, Take 1 capsule (10 mg total) by mouth daily, Disp: 90 capsule, Rfl: 1    rosuvastatin (CRESTOR) 5 mg tablet, Take 1 tablet (5 mg total) by mouth daily, Disp: 90 tablet, Rfl: 0  No Known Allergies    Review of Systems:  Review of Systems   Constitutional: Positive for fatigue  Negative for appetite change and unexpected weight change  Respiratory: Negative for cough, shortness of breath and wheezing  Cardiovascular: Positive for leg swelling (RLE)  Negative for chest pain and palpitations  Gastrointestinal: Negative for blood in stool, constipation and diarrhea  Genitourinary: Positive for frequency  Negative for hematuria  Musculoskeletal: Positive for back pain  Negative for arthralgias  Neurological: Negative for dizziness, light-headedness and headaches  Psychiatric/Behavioral: Negative for agitation, behavioral problems, confusion and decreased concentration  Physical Exam:  Vitals:    12/03/19 1034   BP: 138/82   Pulse: 57   Weight: 91 2 kg (201 lb)   Height: 5' 9" (1 753 m)       Physical Exam   Constitutional: He appears well-developed and well-nourished  No distress  HENT:   Head: Normocephalic and atraumatic  Mouth/Throat: Oropharynx is clear and moist  No oropharyngeal exudate  Eyes: Conjunctivae are normal  No scleral icterus  Neck: Neck supple  Decreased carotid pulses present  No JVD present  Carotid bruit is present (likely trans  murmur)  Thyromegaly (question left thyroid nodule) present  Cardiovascular: Regular rhythm  Bradycardia present  Exam reveals no gallop and no friction rub  Murmur heard  Crescendo decrescendo systolic (Basal) murmur is present with a grade of 3/6  No diastolic murmur is present  Pulses:       Dorsalis pedis pulses are 1+ on the right side, and 1+ on the left side  Posterior tibial pulses are 2+ on the right side, and 2+ on the left side  Pulmonary/Chest: Breath sounds normal  He has no wheezes  He has no rhonchi  He has no rales  Abdominal: Soft  He exhibits no mass  There is no hepatosplenomegaly  There is no tenderness  Musculoskeletal: He exhibits edema (trace to +1 edema R>LLE)  Discussion/Summary:  1  PSVT  Recent ablation in July  No recurrence  Will follow going forward  2  Hypertension  Well controlled on ramipril and hydrochlorothiazide  Continue same  3  Aortic stenosis  Mild-to-moderate on echo last year  Will follow going forward  Will order echo with follow-up visit in 9 months time  Patient warned to report symptoms of  MIXON or exertional chest discomfort which he does not have at this time  4  Mixed hyperlipidemia  LDL cholesterol 86 with HDL cholesterol of 93 on current dose of rosuvastatin    Continue same    Fu 9 month with echo      Adeline Patterson MD

## 2019-12-11 DIAGNOSIS — E78.5 HYPERLIPIDEMIA, UNSPECIFIED HYPERLIPIDEMIA TYPE: ICD-10-CM

## 2019-12-11 DIAGNOSIS — I10 HYPERTENSION, UNSPECIFIED TYPE: ICD-10-CM

## 2019-12-11 RX ORDER — ROSUVASTATIN CALCIUM 5 MG/1
5 TABLET, COATED ORAL DAILY
Qty: 90 TABLET | Refills: 0 | Status: SHIPPED | OUTPATIENT
Start: 2019-12-11 | End: 2020-01-14 | Stop reason: SDUPTHER

## 2019-12-11 RX ORDER — RAMIPRIL 10 MG/1
10 CAPSULE ORAL DAILY
Qty: 90 CAPSULE | Refills: 1 | Status: SHIPPED | OUTPATIENT
Start: 2019-12-11 | End: 2020-01-14 | Stop reason: SDUPTHER

## 2020-01-14 ENCOUNTER — OFFICE VISIT (OUTPATIENT)
Dept: INTERNAL MEDICINE CLINIC | Facility: CLINIC | Age: 85
End: 2020-01-14
Payer: MEDICARE

## 2020-01-14 VITALS
TEMPERATURE: 96.8 F | WEIGHT: 200 LBS | HEART RATE: 48 BPM | OXYGEN SATURATION: 97 % | BODY MASS INDEX: 29.62 KG/M2 | HEIGHT: 69 IN

## 2020-01-14 DIAGNOSIS — I47.1 PSVT (PAROXYSMAL SUPRAVENTRICULAR TACHYCARDIA) (HCC): ICD-10-CM

## 2020-01-14 DIAGNOSIS — I10 HYPERTENSION, UNSPECIFIED TYPE: ICD-10-CM

## 2020-01-14 DIAGNOSIS — I10 ESSENTIAL HYPERTENSION: Primary | ICD-10-CM

## 2020-01-14 DIAGNOSIS — E78.5 HYPERLIPIDEMIA, UNSPECIFIED HYPERLIPIDEMIA TYPE: ICD-10-CM

## 2020-01-14 DIAGNOSIS — D64.9 ANEMIA, UNSPECIFIED TYPE: ICD-10-CM

## 2020-01-14 PROCEDURE — 99214 OFFICE O/P EST MOD 30 MIN: CPT | Performed by: INTERNAL MEDICINE

## 2020-01-14 RX ORDER — OMEPRAZOLE 20 MG/1
20 CAPSULE, DELAYED RELEASE ORAL DAILY
Qty: 90 CAPSULE | Refills: 1 | Status: SHIPPED | OUTPATIENT
Start: 2020-01-14

## 2020-01-14 RX ORDER — RAMIPRIL 10 MG/1
10 CAPSULE ORAL DAILY
Qty: 90 CAPSULE | Refills: 1 | Status: SHIPPED | OUTPATIENT
Start: 2020-01-14 | End: 2020-12-04 | Stop reason: SDUPTHER

## 2020-01-14 RX ORDER — HYDROCHLOROTHIAZIDE 25 MG/1
12.5 TABLET ORAL DAILY
Qty: 90 TABLET | Refills: 0 | Status: SHIPPED | OUTPATIENT
Start: 2020-01-14 | End: 2020-10-23 | Stop reason: SDUPTHER

## 2020-01-14 RX ORDER — ROSUVASTATIN CALCIUM 5 MG/1
5 TABLET, COATED ORAL DAILY
Qty: 90 TABLET | Refills: 0 | Status: SHIPPED | OUTPATIENT
Start: 2020-01-14 | End: 2020-06-03 | Stop reason: SDUPTHER

## 2020-01-14 NOTE — PROGRESS NOTES
Assessment/Plan:     Diagnoses and all orders for this visit:    Essential hypertension  -     Comprehensive metabolic panel  -     CBC and differential; Future  -     TSH, 3rd generation with Free T4 reflex  -     Haptoglobin; Future  -     LD (LDH), Body Fluid; Future    Anemia, unspecified type  -     Comprehensive metabolic panel  -     CBC and differential; Future  -     TSH, 3rd generation with Free T4 reflex  -     Haptoglobin; Future  -     LD (LDH), Body Fluid; Future          Subjective:      Patient ID: Radha Sidhu is a 80 y o  male  HPI  Manuel's here today for visit he feels quite well continues under the care of Dr Jesenia Aguilar  his cardiologist he did have an ablation for S paroxysmal SVT which is which was successful this was done by Dr Ish Quan  He has a history of Linda's esophagus he will be seeing Dr Vic Oshea for anticipated upper GI endoscopy  We have been following his slightly low blood count for a while he is known to have severe aortic stenosis and may be a valve candidate in the near future  He does say he and his wife do exercises twice a week at a gym and is able to raise his heart rate to the 130s  The following portions of the patient's history were reviewed and updated as appropriate: allergies, current medications, past family history, past medical history, past social history, past surgical history and problem list     Review of Systems      Objective:      Pulse (!) 48   Temp (!) 96 8 °F (36 °C) (Tympanic)   Ht 5' 9" (1 753 m)   Wt 90 7 kg (200 lb)   SpO2 97%   BMI 29 53 kg/m²          Physical Exam  Hailee Orozco appears well in no distress his pressure is 140/80 his his pulse was in the mid 46s when I examined him he has a grade 3/6 harsh ejection murmur at the cardiac base consistent with aortic stenosis     His lungs are clear there is no significant edema the skin is warm and dry Manuel's quite stable set him up for CBC chemistries also throw and haptoglobin his last ferritin was satisfactory he will proceed with an EGD per Dr Ilya Richardson see him back in 6 months

## 2020-02-11 ENCOUNTER — APPOINTMENT (OUTPATIENT)
Dept: LAB | Facility: CLINIC | Age: 85
End: 2020-02-11
Payer: MEDICARE

## 2020-02-11 DIAGNOSIS — I10 ESSENTIAL HYPERTENSION: ICD-10-CM

## 2020-02-11 DIAGNOSIS — D64.9 ANEMIA, UNSPECIFIED TYPE: Primary | ICD-10-CM

## 2020-02-11 LAB
ALBUMIN SERPL BCP-MCNC: 3.6 G/DL (ref 3.5–5)
ALP SERPL-CCNC: 89 U/L (ref 46–116)
ALT SERPL W P-5'-P-CCNC: 22 U/L (ref 12–78)
ANION GAP SERPL CALCULATED.3IONS-SCNC: 5 MMOL/L (ref 4–13)
AST SERPL W P-5'-P-CCNC: 17 U/L (ref 5–45)
BASOPHILS # BLD AUTO: 0.08 THOUSANDS/ΜL (ref 0–0.1)
BASOPHILS NFR BLD AUTO: 1 % (ref 0–1)
BILIRUB SERPL-MCNC: 0.43 MG/DL (ref 0.2–1)
BUN SERPL-MCNC: 32 MG/DL (ref 5–25)
CALCIUM SERPL-MCNC: 9.8 MG/DL (ref 8.3–10.1)
CHLORIDE SERPL-SCNC: 109 MMOL/L (ref 100–108)
CO2 SERPL-SCNC: 25 MMOL/L (ref 21–32)
CREAT SERPL-MCNC: 1.73 MG/DL (ref 0.6–1.3)
EOSINOPHIL # BLD AUTO: 0.77 THOUSAND/ΜL (ref 0–0.61)
EOSINOPHIL NFR BLD AUTO: 10 % (ref 0–6)
ERYTHROCYTE [DISTWIDTH] IN BLOOD BY AUTOMATED COUNT: 13.5 % (ref 11.6–15.1)
GFR SERPL CREATININE-BSD FRML MDRD: 35 ML/MIN/1.73SQ M
GLUCOSE P FAST SERPL-MCNC: 98 MG/DL (ref 65–99)
HCT VFR BLD AUTO: 33.9 % (ref 36.5–49.3)
HGB BLD-MCNC: 10.8 G/DL (ref 12–17)
IMM GRANULOCYTES # BLD AUTO: 0.02 THOUSAND/UL (ref 0–0.2)
IMM GRANULOCYTES NFR BLD AUTO: 0 % (ref 0–2)
LDH SERPL-CCNC: 205 U/L (ref 81–234)
LYMPHOCYTES # BLD AUTO: 2.12 THOUSANDS/ΜL (ref 0.6–4.47)
LYMPHOCYTES NFR BLD AUTO: 26 % (ref 14–44)
MCH RBC QN AUTO: 29 PG (ref 26.8–34.3)
MCHC RBC AUTO-ENTMCNC: 31.9 G/DL (ref 31.4–37.4)
MCV RBC AUTO: 91 FL (ref 82–98)
MONOCYTES # BLD AUTO: 0.82 THOUSAND/ΜL (ref 0.17–1.22)
MONOCYTES NFR BLD AUTO: 10 % (ref 4–12)
NEUTROPHILS # BLD AUTO: 4.22 THOUSANDS/ΜL (ref 1.85–7.62)
NEUTS SEG NFR BLD AUTO: 53 % (ref 43–75)
NRBC BLD AUTO-RTO: 0 /100 WBCS
PLATELET # BLD AUTO: 290 THOUSANDS/UL (ref 149–390)
PMV BLD AUTO: 11.1 FL (ref 8.9–12.7)
POTASSIUM SERPL-SCNC: 4.2 MMOL/L (ref 3.5–5.3)
PROT SERPL-MCNC: 7.4 G/DL (ref 6.4–8.2)
RBC # BLD AUTO: 3.72 MILLION/UL (ref 3.88–5.62)
SODIUM SERPL-SCNC: 139 MMOL/L (ref 136–145)
TSH SERPL DL<=0.05 MIU/L-ACNC: 0.56 UIU/ML (ref 0.36–3.74)
WBC # BLD AUTO: 8.03 THOUSAND/UL (ref 4.31–10.16)

## 2020-02-11 PROCEDURE — 80053 COMPREHEN METABOLIC PANEL: CPT | Performed by: INTERNAL MEDICINE

## 2020-02-11 PROCEDURE — 84443 ASSAY THYROID STIM HORMONE: CPT | Performed by: INTERNAL MEDICINE

## 2020-02-11 PROCEDURE — 36415 COLL VENOUS BLD VENIPUNCTURE: CPT | Performed by: INTERNAL MEDICINE

## 2020-02-11 PROCEDURE — 83010 ASSAY OF HAPTOGLOBIN QUANT: CPT

## 2020-02-11 PROCEDURE — 85025 COMPLETE CBC W/AUTO DIFF WBC: CPT

## 2020-02-11 PROCEDURE — 83615 LACTATE (LD) (LDH) ENZYME: CPT

## 2020-02-12 ENCOUNTER — TELEPHONE (OUTPATIENT)
Dept: INTERNAL MEDICINE CLINIC | Facility: CLINIC | Age: 85
End: 2020-02-12

## 2020-02-12 LAB — HAPTOGLOB SERPL-MCNC: 146 MG/DL (ref 38–329)

## 2020-02-12 NOTE — TELEPHONE ENCOUNTER
----- Message from Boris Partida MD sent at 2/12/2020  9:07 AM EST -----  Creatinine a bit better    Slightly reduced hemoglobin may be secondary to chronic kidney disease

## 2020-03-18 ENCOUNTER — DOCUMENTATION (OUTPATIENT)
Dept: INTERNAL MEDICINE CLINIC | Facility: CLINIC | Age: 85
End: 2020-03-18

## 2020-03-18 NOTE — PROGRESS NOTES
Patient was in today with his wife for her follow up visit  During that time Dr Alvarez Even checked Mr Michael Brooks blood pressure, /80  Dr Alvarez Even request I document pt bp in chart

## 2020-06-03 DIAGNOSIS — E78.5 HYPERLIPIDEMIA, UNSPECIFIED HYPERLIPIDEMIA TYPE: ICD-10-CM

## 2020-06-03 RX ORDER — ROSUVASTATIN CALCIUM 5 MG/1
5 TABLET, COATED ORAL DAILY
Qty: 90 TABLET | Refills: 0 | Status: SHIPPED | OUTPATIENT
Start: 2020-06-03 | End: 2020-09-02

## 2020-08-26 ENCOUNTER — OFFICE VISIT (OUTPATIENT)
Dept: INTERNAL MEDICINE CLINIC | Facility: CLINIC | Age: 85
End: 2020-08-26
Payer: MEDICARE

## 2020-08-26 DIAGNOSIS — Z00.00 MEDICARE ANNUAL WELLNESS VISIT, SUBSEQUENT: ICD-10-CM

## 2020-08-26 DIAGNOSIS — D64.9 ANEMIA, UNSPECIFIED TYPE: ICD-10-CM

## 2020-08-26 DIAGNOSIS — I47.1 PSVT (PAROXYSMAL SUPRAVENTRICULAR TACHYCARDIA) (HCC): ICD-10-CM

## 2020-08-26 DIAGNOSIS — I10 ESSENTIAL HYPERTENSION: Primary | ICD-10-CM

## 2020-08-26 PROCEDURE — 1036F TOBACCO NON-USER: CPT | Performed by: INTERNAL MEDICINE

## 2020-08-26 PROCEDURE — 99214 OFFICE O/P EST MOD 30 MIN: CPT | Performed by: INTERNAL MEDICINE

## 2020-08-26 PROCEDURE — 1160F RVW MEDS BY RX/DR IN RCRD: CPT | Performed by: INTERNAL MEDICINE

## 2020-08-26 PROCEDURE — 1170F FXNL STATUS ASSESSED: CPT | Performed by: INTERNAL MEDICINE

## 2020-08-26 PROCEDURE — 3077F SYST BP >= 140 MM HG: CPT | Performed by: INTERNAL MEDICINE

## 2020-08-26 PROCEDURE — 3079F DIAST BP 80-89 MM HG: CPT | Performed by: INTERNAL MEDICINE

## 2020-08-26 PROCEDURE — 4040F PNEUMOC VAC/ADMIN/RCVD: CPT | Performed by: INTERNAL MEDICINE

## 2020-08-26 PROCEDURE — 1125F AMNT PAIN NOTED PAIN PRSNT: CPT | Performed by: INTERNAL MEDICINE

## 2020-08-26 PROCEDURE — G0438 PPPS, INITIAL VISIT: HCPCS | Performed by: INTERNAL MEDICINE

## 2020-08-26 NOTE — PATIENT INSTRUCTIONS

## 2020-08-26 NOTE — PROGRESS NOTES
Assessment and Plan:     Problem List Items Addressed This Visit        Cardiovascular and Mediastinum    Hypertension - Primary    Relevant Orders    CBC and differential    Comprehensive metabolic panel    TSH, 3rd generation    PSVT (paroxysmal supraventricular tachycardia) (HCC)       Other    Anemia    Relevant Orders    Ferritin      Other Visit Diagnoses     Medicare annual wellness visit, subsequent               Preventive health issues were discussed with patient, and age appropriate screening tests were ordered as noted in patient's After Visit Summary  Personalized health advice and appropriate referrals for health education or preventive services given if needed, as noted in patient's After Visit Summary       History of Present Illness:   Patient presents for Medicare Annual Wellness visit    Patient Care Team:  Susana Sahu MD as PCP - Jaron Cunha MD (Orthopedic Surgery)  Mihai Fernandez MD (Cardiology)     Problem List:     Patient Active Problem List   Diagnosis    Anemia    Linda esophagus    Esophageal reflux    Mixed hyperlipidemia    Hypertension    Spinal stenosis of lumbar region    Spondylolisthesis    PSVT (paroxysmal supraventricular tachycardia) (HCC)    Palpitations    Systolic murmur    Nonrheumatic aortic valve stenosis    Pure hypercholesterolemia    PVC (premature ventricular contraction)      Past Medical and Surgical History:     Past Medical History:   Diagnosis Date    Atrial fibrillation ([address] )     Hearing loss     last assessed 11/8/17    Rheumatic fever     SVT (supraventricular tachycardia) ([address] )      Past Surgical History:   Procedure Laterality Date    APPENDECTOMY      BACK SURGERY      lower    CATARACT EXTRACTION      KNEE SURGERY Left     TONSILLECTOMY AND ADENOIDECTOMY        Family History:     Family History   Problem Relation Age of Onset    Other Mother         old age   Ceferino Todd Esophageal cancer Father     Other Father old age   Rochelle Navas Alcohol abuse Son         alcoholism      Social History:        Social History     Socioeconomic History    Marital status: /Civil Union     Spouse name: None    Number of children: None    Years of education: None    Highest education level: None   Occupational History    None   Social Needs    Financial resource strain: None    Food insecurity     Worry: None     Inability: None    Transportation needs     Medical: None     Non-medical: None   Tobacco Use    Smoking status: Never Smoker    Smokeless tobacco: Never Used   Substance and Sexual Activity    Alcohol use: No     Comment: conflicting no and occasional wine per [employer]    Drug use: No    Sexual activity: None   Lifestyle    Physical activity     Days per week: None     Minutes per session: None    Stress: None   Relationships    Social connections     Talks on phone: None     Gets together: None     Attends Protestant service: None     Active member of club or organization: None     Attends meetings of clubs or organizations: None     Relationship status: None    Intimate partner violence     Fear of current or ex partner: None     Emotionally abused: None     Physically abused: None     Forced sexual activity: None   Other Topics Concern    None   Social History Narrative    None      Medications and Allergies:     Current Outpatient Medications   Medication Sig Dispense Refill    aspirin 81 MG tablet Take 162 mg by mouth      Cholecalciferol (VITAMIN D3) 125 MCG (5000 UT) CAPS Vitamin D3 125 mcg (5,000 unit) tablet   Take by oral route        hydrochlorothiazide (HYDRODIURIL) 25 mg tablet Take 0 5 tablets (12 5 mg total) by mouth daily 90 tablet 0    Multiple Vitamin (MULTI-VITAMIN DAILY) TABS Take by mouth      omeprazole (PriLOSEC) 20 mg delayed release capsule Take 1 capsule (20 mg total) by mouth daily 90 capsule 1    ramipril (ALTACE) 10 MG capsule Take 1 capsule (10 mg total) by mouth daily 90 capsule 1    rosuvastatin (CRESTOR) 5 mg tablet Take 1 tablet (5 mg total) by mouth daily 90 tablet 0     No current facility-administered medications for this visit  No Known Allergies   Immunizations:     Immunization History   Administered Date(s) Administered    Hep A, adult 02/01/2000, 08/15/2000    INFLUENZA 10/23/2009, 10/12/2010, 09/29/2011, 10/03/2012, 10/23/2013, 10/29/2014, 11/06/2015, 09/01/2016, 10/10/2016, 10/06/2017, 10/10/2018    Influenza Split High Dose Preservative Free IM 11/06/2015, 09/01/2016, 10/06/2017    Influenza TIV (IM) 11/15/2000, 10/26/2001, 10/28/2003, 12/01/2005, 11/01/2007, 10/03/2012, 10/23/2013    Pneumococcal Conjugate 13-Valent 11/21/2016    Tdap 09/18/2012    Zoster 09/01/2007    Zoster Vaccine Recombinant 02/22/2019, 05/03/2019    influenza, trivalent, adjuvanted 09/30/2019      Health Maintenance: There are no preventive care reminders to display for this patient  Topic Date Due    Pneumococcal Vaccine: 65+ Years (2 of 2 - PPSV23) 11/21/2017    Influenza Vaccine  07/01/2020      Medicare Health Risk Assessment:     /82   Pulse (!) 49   Temp (!) 96 9 °F (36 1 °C) (Skin)   Ht 5' 9" (1 753 m)   Wt 89 7 kg (197 lb 12 8 oz)   SpO2 99%   BMI 29 21 kg/m²      Damaris Chavis is here for his Subsequent Wellness visit  Health Risk Assessment:   Patient rates overall health as very good  Patient feels that their physical health rating is Same  Eyesight was rated as slightly worse  Hearing was rated as same  Patient feels that their emotional and mental health rating is Same  Pain experienced in the last 7 days has been none  Patient states that he has experienced no weight loss or gain in last 6 months  Depression Screening:   PHQ-2 Score: 0      Fall Risk Screening:    In the past year, patient has experienced: history of falling in past year    Number of falls: 1  Injured during fall?: Yes    Feels unsteady when standing or walking?: No    Worried about falling?: No      Home Safety:  Patient does not have trouble with stairs inside or outside of their home  Patient has working smoke alarms and has no working carbon monoxide detector  Nutrition:   Current diet is Regular  Medications:   Patient is currently taking over-the-counter supplements  OTC medications include: see medication list  Patient is able to manage medications  Activities of Daily Living (ADLs)/Instrumental Activities of Daily Living (IADLs):   Walk and transfer into and out of bed and chair?: Yes  Dress and groom yourself?: Yes    Bathe or shower yourself?: Yes    Feed yourself?  Yes  Do your laundry/housekeeping?: Yes  Manage your money, pay your bills and track your expenses?: Yes  Make your own meals?: Yes    Do your own shopping?: Yes    Previous Hospitalizations:   Any hospitalizations or ED visits within the last 12 months?: No    How many hospitalizations have you had in the last year?: 1-2    Advance Care Planning:   Living will: Yes    Advanced directive: Yes      Cognitive Screening:   Provider or family/friend/caregiver concerned regarding cognition?: No    PREVENTIVE SCREENINGS      Cardiovascular Screening:    General: Screening Not Indicated and History Lipid Disorder      Diabetes Screening:     General: Screening Current      Prostate Cancer Screening:    General: Screening Not Indicated      Lung Cancer Screening:     General: Screening Not Indicated      Noel Mata MD

## 2020-08-28 VITALS
HEIGHT: 69 IN | TEMPERATURE: 96.9 F | OXYGEN SATURATION: 99 % | HEART RATE: 49 BPM | WEIGHT: 197.8 LBS | DIASTOLIC BLOOD PRESSURE: 82 MMHG | SYSTOLIC BLOOD PRESSURE: 142 MMHG | BODY MASS INDEX: 29.3 KG/M2

## 2020-08-28 NOTE — PROGRESS NOTES
Assessment/Plan:     Diagnoses and all orders for this visit:    Essential hypertension  -     CBC and differential; Future  -     Comprehensive metabolic panel; Future  -     TSH, 3rd generation; Future    Medicare annual wellness visit, subsequent    Anemia, unspecified type  -     Ferritin; Future    PSVT (paroxysmal supraventricular tachycardia) ([facility])          Subjective:      Patient ID: Flo Fitzgerald is a 80 y o  male  HPI Erin Rodriguez is here today for visit accompanied by his wife  He generally feels well he and his wife have been observing strict COVID precautions  He has an appointment to see his cardiologist Dr Ricardo Cardona  in the near future he will have an echocardiogram done before the visit to assess the status of his aortic valve  He is also under the care of Dr Delfino freedman his gastroenterologist who was following Linda's esophagus he takes his medications correctly 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 but generally seems to be faring very nicely he has had a previous right total knee replacement     Review of systems is noncontributory is not had any chest pain shortness of breath or palpitations specially since his ablation          The following portions of the patient's history were reviewed and updated as appropriate: allergies, current medications, past family history, past medical history, past social history, past surgical history and problem list     Review of Systems      Objective:      /82   Pulse (!) 49   Temp (!) 96 9 °F (36 1 °C) (Skin)   Ht 5' 9" (1 753 m)   Wt 89 7 kg (197 lb 12 8 oz)   SpO2 99%   BMI 29 21 kg/m²    BP Readings from Last 3 Encounters:   08/26/20 142/82   12/03/19 138/82   08/22/19 126/80      Wt Readings from Last 3 Encounters:   08/26/20 89 7 kg (197 lb 12 8 oz)   01/14/20 90 7 kg (200 lb)   12/03/19 91 2 kg (201 lb)      BMI: Estimated body mass index is 29 21 kg/m² as calculated from the following:    Height as of this encounter: 5' 9" (1 753 m)  Weight as of this encounter: 89 7 kg (197 lb 12 8 oz)  BSA: Estimated body surface area is 2 06 meters squared as calculated from the following:    Height as of this encounter: 5' 9" (1 753 m)  Weight as of this encounter: 89 7 kg (197 lb 12 8 oz)  Physical Exam  On physical examination he appears well his pressure is 142/82 the carotids are quiet the lungs are clear has a grade 3/6 harsh ejection murmur at the cardiac base the radiates widely there is no thrill extremities show trace of edema patient is quite stable we reviewed his labs he has had some intermittent somewhat suppressed TSH is which will repeat at this point will set him up for variety of lab studies and certainly endorses echocardiogram and a visit with Dr Edwar Mattson  He is up-to-date with immunizations    Current Outpatient Medications:     aspirin 81 MG tablet, Take 162 mg by mouth, Disp: , Rfl:     Cholecalciferol (VITAMIN D3) 125 MCG (5000 UT) CAPS, Vitamin D3 125 mcg (5,000 unit) tablet  Take by oral route , Disp: , Rfl:     hydrochlorothiazide (HYDRODIURIL) 25 mg tablet, Take 0 5 tablets (12 5 mg total) by mouth daily, Disp: 90 tablet, Rfl: 0    Multiple Vitamin (MULTI-VITAMIN DAILY) TABS, Take by mouth, Disp: , Rfl:     omeprazole (PriLOSEC) 20 mg delayed release capsule, Take 1 capsule (20 mg total) by mouth daily, Disp: 90 capsule, Rfl: 1    ramipril (ALTACE) 10 MG capsule, Take 1 capsule (10 mg total) by mouth daily, Disp: 90 capsule, Rfl: 1    rosuvastatin (CRESTOR) 5 mg tablet, Take 1 tablet (5 mg total) by mouth daily, Disp: 90 tablet, Rfl: 0     This note was completed in part utilizing [employer] Software  Grammatical errors, random word insertions, spelling mistakes, and incomplete sentences can be an occasional consequence of this system secondary to software limitations, ambient noise, and hardware issues   If you have any question or concerns about the content, text, or information contained within the body of this dictation, please contact the provider for clarification

## 2020-09-01 ENCOUNTER — HOSPITAL ENCOUNTER (OUTPATIENT)
Dept: NON INVASIVE DIAGNOSTICS | Facility: HOSPITAL | Age: 85
Discharge: HOME | End: 2020-09-01
Payer: MEDICARE

## 2020-09-01 ENCOUNTER — OFFICE VISIT (OUTPATIENT)
Dept: CARDIOLOGY CLINIC | Facility: CLINIC | Age: 85
End: 2020-09-01
Payer: MEDICARE

## 2020-09-01 VITALS
SYSTOLIC BLOOD PRESSURE: 144 MMHG | HEIGHT: 69 IN | DIASTOLIC BLOOD PRESSURE: 78 MMHG | TEMPERATURE: 97.9 F | WEIGHT: 195 LBS | HEART RATE: 60 BPM | BODY MASS INDEX: 28.88 KG/M2

## 2020-09-01 DIAGNOSIS — E78.2 MIXED HYPERLIPIDEMIA: ICD-10-CM

## 2020-09-01 DIAGNOSIS — I35.0 NONRHEUMATIC AORTIC VALVE STENOSIS: ICD-10-CM

## 2020-09-01 DIAGNOSIS — I47.1 PSVT (PAROXYSMAL SUPRAVENTRICULAR TACHYCARDIA) (HCC): Primary | ICD-10-CM

## 2020-09-01 DIAGNOSIS — I10 ESSENTIAL HYPERTENSION: ICD-10-CM

## 2020-09-01 PROCEDURE — 93306 TTE W/DOPPLER COMPLETE: CPT

## 2020-09-01 PROCEDURE — 99214 OFFICE O/P EST MOD 30 MIN: CPT | Performed by: INTERNAL MEDICINE

## 2020-09-01 PROCEDURE — 93306 TTE W/DOPPLER COMPLETE: CPT | Performed by: INTERNAL MEDICINE

## 2020-09-01 NOTE — PROGRESS NOTES
Cardiology Follow Up    Palmer Carroll  1935  [id number]  [facility] CARDIOLOGY [place]  [address]  [facility] [address] [phone]    Reason for visit:  9 month follow-up for PSVT status post ablation in July of 2019, hypertension, hyperlipidemia calcific aortic valve stenosis      1  PSVT (paroxysmal supraventricular tachycardia) ([address] )     2  Nonrheumatic aortic valve stenosis     3  Essential hypertension     4   Mixed hyperlipidemia         Interval History:  Since the patient's last visit, he denies chest pain, shortness of breath, palpitations, edema or lightheadedness    Patient Active Problem List   Diagnosis    Anemia    Linda esophagus    Esophageal reflux    Mixed hyperlipidemia    Hypertension    Spinal stenosis of lumbar region    Spondylolisthesis    PSVT [facility]) [facility]    Palpitations    Systolic murmur    Nonrheumatic aortic valve stenosis    Pure hypercholesterolemia    PVC (premature ventricular contraction)     Past Medical History:   Diagnosis Date    Atrial fibrillation ([facility])     Hearing loss     last assessed 11/8/17    Rheumatic fever     SVT (supraventricular tachycardia) ([facility])      Social History     Socioeconomic History    Marital status: /Civil Union     Spouse name: Not on file    Number of children: Not on file    Years of education: Not on file    Highest education level: Not on file   Occupational History    Not on file   Social Needs    Financial resource strain: Not on file    Food insecurity     Worry: Not on file     Inability: Not on file    Transportation needs     Medical: Not on file     Non-medical: Not on file   Tobacco Use    Smoking status: Never Smoker    Smokeless tobacco: Never Used   Substance and Sexual Activity    Alcohol use: No     Comment: conflicting no and occasional wine per [employer]    Drug use: No    Sexual activity: Not on file   Lifestyle    Physical activity     Days per week: Not on file     Minutes per session: Not on file    Stress: Not on file   Relationships    Social connections     Talks on phone: Not on file     Gets together: Not on file     Attends Alevism service: Not on file     Active member of club or organization: Not on file     Attends meetings of clubs or organizations: Not on file     Relationship status: Not on file    Intimate partner violence     Fear of current or ex partner: Not on file     Emotionally abused: Not on file     Physically abused: Not on file     Forced sexual activity: Not on file   Other Topics Concern    Not on file   Social History Narrative    Not on file      Family History   Problem Relation Age of Onset   [facility] Other Mother         old age   [facility] Esophageal cancer Father    [facility] Other Father         old age   [facility] Alcohol abuse Son         alcoholism     Past Surgical History:   Procedure Laterality Date    APPENDECTOMY      BACK SURGERY      lower    CATARACT EXTRACTION      KNEE SURGERY Left     TONSILLECTOMY AND ADENOIDECTOMY         Current Outpatient Medications:     aspirin 81 MG tablet, Take 162 mg by mouth, Disp: , Rfl:     Cholecalciferol (VITAMIN D3) 125 MCG (5000 UT) CAPS, Vitamin D3 125 mcg (5,000 unit) tablet  Take by oral route , Disp: , Rfl:     hydrochlorothiazide (HYDRODIURIL) 25 mg tablet, Take 0 5 tablets (12 5 mg total) by mouth daily, Disp: 90 tablet, Rfl: 0    Multiple Vitamin (MULTI-VITAMIN DAILY) TABS, Take by mouth, Disp: , Rfl:     omeprazole (PriLOSEC) 20 mg delayed release capsule, Take 1 capsule (20 mg total) by mouth daily, Disp: 90 capsule, Rfl: 1    ramipril (ALTACE) 10 MG capsule, Take 1 capsule (10 mg total) by mouth daily, Disp: 90 capsule, Rfl: 1    rosuvastatin (CRESTOR) 5 mg tablet, Take 1 tablet (5 mg total) by mouth daily, Disp: 90 tablet, Rfl: 0  No Known Allergies    Review of Systems:  Review of Systems   Constitutional: Negative for activity change, appetite change, fatigue and unexpected weight change  Respiratory: Negative for cough, shortness of breath and wheezing  Cardiovascular: Negative for chest pain, palpitations and leg swelling  Gastrointestinal: Negative for abdominal pain, blood in stool, constipation and diarrhea  Genitourinary: Positive for frequency  Negative for dysuria, hematuria and urgency  Musculoskeletal: Positive for back pain  Negative for arthralgias and gait problem  Neurological: Negative for dizziness, syncope, speech difficulty, light-headedness and headaches  Psychiatric/Behavioral: Negative for agitation, behavioral problems, confusion and decreased concentration  Physical Exam:  Vitals:    09/01/20 1001   BP: 144/78   Pulse: 60   Temp: 97 9 °F (36 6 °C)   Weight: 88 5 kg (195 lb)   Height: 5' 9" (1 753 m)       Physical Exam  Constitutional:       General: He is not in acute distress  HENT:      Head: Normocephalic and atraumatic  Mouth/Throat:      Mouth: Mucous membranes are moist       Pharynx: No oropharyngeal exudate  Eyes:      General: No scleral icterus  Conjunctiva/sclera: Conjunctivae normal    Neck:      Musculoskeletal: Neck supple  Thyroid: No thyroid mass or thyromegaly  Vascular: Decreased carotid pulses  Carotid bruit (transmitted murmur) present  Cardiovascular:      Rate and Rhythm: Normal rate  Pulses:           Dorsalis pedis pulses are 1+ on the right side and 1+ on the left side  Posterior tibial pulses are 2+ on the right side and 2+ on the left side  Heart sounds: Murmur present  Crescendo  decrescendo  systolic (basal) murmur present with a grade of 3/6  No diastolic murmur  No friction rub  No gallop  Pulmonary:      Breath sounds: Normal breath sounds  No wheezing, rhonchi or rales  Abdominal:      Palpations: Abdomen is soft  There is no hepatomegaly, splenomegaly or mass  Tenderness:  There is no abdominal tenderness  Musculoskeletal:         General: Deformity (kyphosis) present  No swelling or tenderness  Skin:     General: Skin is warm and dry  Coloration: Skin is not jaundiced or pale  Findings: No erythema or rash  Neurological:      Mental Status: He is alert and oriented to person, place, and time  Sensory: No sensory deficit  Motor: No weakness  Gait: Gait normal    Psychiatric:         Mood and Affect: Mood normal          Behavior: Behavior normal          Thought Content: Thought content normal          Judgment: Judgment normal          Discussion/Summary:  1  PSVT  No recurrence since ablation last year  No specific therapy needed  2  Aortic stenosis  Likely moderate  While aortic valve area is 1 0 centimeters squared, his peak velocity across his aortic valve is 2 9 m/sec more in keeping with moderate AS  He does have mild-to-moderate aortic insufficiency  3  Hypertension  Blood pressure somewhat elevated systolic reading today  Will follow this going forward  Continue Ace inhibitor and thiazide diuretic  4  Mixed hyperlipidemia  On rosuvastatin    Have added a fasting lipid profile to his upcoming blood work and will communicate the results to him    Follow-up 9 months  Echo tentatively plan for 18 months      Cory Gandara MD

## 2020-09-02 DIAGNOSIS — E78.5 HYPERLIPIDEMIA, UNSPECIFIED HYPERLIPIDEMIA TYPE: ICD-10-CM

## 2020-09-02 RX ORDER — ROSUVASTATIN CALCIUM 5 MG/1
5 TABLET, COATED ORAL DAILY
Qty: 90 TABLET | Refills: 0 | Status: SHIPPED | OUTPATIENT
Start: 2020-09-02 | End: 2020-12-04 | Stop reason: SDUPTHER

## 2020-09-03 ENCOUNTER — APPOINTMENT (OUTPATIENT)
Dept: LAB | Facility: CLINIC | Age: 85
End: 2020-09-03
Payer: MEDICARE

## 2020-09-03 DIAGNOSIS — D64.9 ANEMIA, UNSPECIFIED TYPE: ICD-10-CM

## 2020-09-03 DIAGNOSIS — I10 ESSENTIAL HYPERTENSION: ICD-10-CM

## 2020-09-03 DIAGNOSIS — E78.2 MIXED HYPERLIPIDEMIA: ICD-10-CM

## 2020-09-03 LAB
ALBUMIN SERPL BCP-MCNC: 3.6 G/DL (ref 3.5–5)
ALP SERPL-CCNC: 97 U/L (ref 46–116)
ALT SERPL W P-5'-P-CCNC: 24 U/L (ref 12–78)
ANION GAP SERPL CALCULATED.3IONS-SCNC: 8 MMOL/L (ref 4–13)
AST SERPL W P-5'-P-CCNC: 16 U/L (ref 5–45)
BASOPHILS # BLD AUTO: 0.06 THOUSANDS/ΜL (ref 0–0.1)
BASOPHILS NFR BLD AUTO: 1 % (ref 0–1)
BILIRUB SERPL-MCNC: 0.41 MG/DL (ref 0.2–1)
BUN SERPL-MCNC: 39 MG/DL (ref 5–25)
CALCIUM SERPL-MCNC: 9.3 MG/DL (ref 8.3–10.1)
CHLORIDE SERPL-SCNC: 108 MMOL/L (ref 100–108)
CHOLEST SERPL-MCNC: 146 MG/DL (ref 50–200)
CO2 SERPL-SCNC: 23 MMOL/L (ref 21–32)
CREAT SERPL-MCNC: 1.79 MG/DL (ref 0.6–1.3)
EOSINOPHIL # BLD AUTO: 0.31 THOUSAND/ΜL (ref 0–0.61)
EOSINOPHIL NFR BLD AUTO: 4 % (ref 0–6)
ERYTHROCYTE [DISTWIDTH] IN BLOOD BY AUTOMATED COUNT: 13 % (ref 11.6–15.1)
FERRITIN SERPL-MCNC: 136 NG/ML (ref 8–388)
GFR SERPL CREATININE-BSD FRML MDRD: 34 ML/MIN/1.73SQ M
GLUCOSE P FAST SERPL-MCNC: 94 MG/DL (ref 65–99)
HCT VFR BLD AUTO: 35.5 % (ref 36.5–49.3)
HDLC SERPL-MCNC: 52 MG/DL
HGB BLD-MCNC: 11.2 G/DL (ref 12–17)
IMM GRANULOCYTES # BLD AUTO: 0.03 THOUSAND/UL (ref 0–0.2)
IMM GRANULOCYTES NFR BLD AUTO: 0 % (ref 0–2)
LDLC SERPL CALC-MCNC: 80 MG/DL (ref 0–100)
LYMPHOCYTES # BLD AUTO: 2.39 THOUSANDS/ΜL (ref 0.6–4.47)
LYMPHOCYTES NFR BLD AUTO: 29 % (ref 14–44)
MCH RBC QN AUTO: 28.8 PG (ref 26.8–34.3)
MCHC RBC AUTO-ENTMCNC: 31.5 G/DL (ref 31.4–37.4)
MCV RBC AUTO: 91 FL (ref 82–98)
MONOCYTES # BLD AUTO: 0.84 THOUSAND/ΜL (ref 0.17–1.22)
MONOCYTES NFR BLD AUTO: 10 % (ref 4–12)
NEUTROPHILS # BLD AUTO: 4.6 THOUSANDS/ΜL (ref 1.85–7.62)
NEUTS SEG NFR BLD AUTO: 56 % (ref 43–75)
NONHDLC SERPL-MCNC: 94 MG/DL
NRBC BLD AUTO-RTO: 0 /100 WBCS
PLATELET # BLD AUTO: 284 THOUSANDS/UL (ref 149–390)
PMV BLD AUTO: 11.3 FL (ref 8.9–12.7)
POTASSIUM SERPL-SCNC: 4.2 MMOL/L (ref 3.5–5.3)
PROT SERPL-MCNC: 7.2 G/DL (ref 6.4–8.2)
RBC # BLD AUTO: 3.89 MILLION/UL (ref 3.88–5.62)
SODIUM SERPL-SCNC: 139 MMOL/L (ref 136–145)
TRIGL SERPL-MCNC: 69 MG/DL
TSH SERPL DL<=0.05 MIU/L-ACNC: 0.29 UIU/ML (ref 0.36–3.74)
WBC # BLD AUTO: 8.23 THOUSAND/UL (ref 4.31–10.16)

## 2020-09-03 PROCEDURE — 84443 ASSAY THYROID STIM HORMONE: CPT

## 2020-09-03 PROCEDURE — 80053 COMPREHEN METABOLIC PANEL: CPT

## 2020-09-03 PROCEDURE — 85025 COMPLETE CBC W/AUTO DIFF WBC: CPT

## 2020-09-03 PROCEDURE — 80061 LIPID PANEL: CPT

## 2020-09-03 PROCEDURE — 36415 COLL VENOUS BLD VENIPUNCTURE: CPT

## 2020-09-03 PROCEDURE — 82728 ASSAY OF FERRITIN: CPT

## 2020-09-09 ENCOUNTER — TELEPHONE (OUTPATIENT)
Dept: INTERNAL MEDICINE CLINIC | Facility: CLINIC | Age: 85
End: 2020-09-09

## 2020-09-09 DIAGNOSIS — I49.3 PVC (PREMATURE VENTRICULAR CONTRACTION): Primary | ICD-10-CM

## 2020-09-09 DIAGNOSIS — R94.6 ABNORMAL RESULTS OF THYROID FUNCTION STUDIES: ICD-10-CM

## 2020-09-09 NOTE — TELEPHONE ENCOUNTER
----- Message from Noel Mata MD sent at 9/4/2020  5:13 PM EDT -----  Labs ok  May have minimal asymptomatic hyperthyroidism    Repeat tsh 6 mths

## 2020-10-23 DIAGNOSIS — I10 ESSENTIAL HYPERTENSION: ICD-10-CM

## 2020-10-23 RX ORDER — HYDROCHLOROTHIAZIDE 25 MG/1
12.5 TABLET ORAL DAILY
Qty: 90 TABLET | Refills: 0 | Status: SHIPPED | OUTPATIENT
Start: 2020-10-23 | End: 2021-05-03

## 2020-11-03 DIAGNOSIS — H91.93 BILATERAL HEARING LOSS, UNSPECIFIED HEARING LOSS TYPE: Primary | ICD-10-CM

## 2020-12-04 DIAGNOSIS — E78.5 HYPERLIPIDEMIA, UNSPECIFIED HYPERLIPIDEMIA TYPE: ICD-10-CM

## 2020-12-04 DIAGNOSIS — I10 HYPERTENSION, UNSPECIFIED TYPE: ICD-10-CM

## 2020-12-04 RX ORDER — ROSUVASTATIN CALCIUM 5 MG/1
5 TABLET, COATED ORAL DAILY
Qty: 90 TABLET | Refills: 1 | Status: SHIPPED | OUTPATIENT
Start: 2020-12-04 | End: 2021-06-02

## 2020-12-04 RX ORDER — RAMIPRIL 10 MG/1
10 CAPSULE ORAL DAILY
Qty: 90 CAPSULE | Refills: 1 | Status: SHIPPED | OUTPATIENT
Start: 2020-12-04 | End: 2021-06-02

## 2020-12-07 ENCOUNTER — APPOINTMENT (EMERGENCY)
Dept: CT IMAGING | Facility: HOSPITAL | Age: 85
End: 2020-12-07
Payer: MEDICARE

## 2020-12-07 ENCOUNTER — HOSPITAL ENCOUNTER (EMERGENCY)
Facility: HOSPITAL | Age: 85
Discharge: HOME | End: 2020-12-07
Attending: EMERGENCY MEDICINE
Payer: MEDICARE

## 2020-12-07 ENCOUNTER — OFFICE VISIT (OUTPATIENT)
Dept: URGENT CARE | Facility: CLINIC | Age: 85
End: 2020-12-07
Payer: MEDICARE

## 2020-12-07 VITALS
SYSTOLIC BLOOD PRESSURE: 170 MMHG | HEART RATE: 64 BPM | OXYGEN SATURATION: 97 % | RESPIRATION RATE: 18 BRPM | DIASTOLIC BLOOD PRESSURE: 76 MMHG | TEMPERATURE: 98.5 F

## 2020-12-07 VITALS
HEART RATE: 50 BPM | RESPIRATION RATE: 16 BRPM | TEMPERATURE: 98.2 F | SYSTOLIC BLOOD PRESSURE: 168 MMHG | OXYGEN SATURATION: 99 % | DIASTOLIC BLOOD PRESSURE: 77 MMHG
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
## 2020-12-07 DIAGNOSIS — S09.90XA INJURY OF HEAD, INITIAL ENCOUNTER: Primary | ICD-10-CM

## 2020-12-07 DIAGNOSIS — S00.81XA FACIAL ABRASION: Primary | ICD-10-CM

## 2020-12-07 PROCEDURE — 99284 EMERGENCY DEPT VISIT MOD MDM: CPT

## 2020-12-07 PROCEDURE — 99282 EMERGENCY DEPT VISIT SF MDM: CPT | Performed by: EMERGENCY MEDICINE

## 2020-12-07 PROCEDURE — G1004 CDSM NDSC: HCPCS

## 2020-12-07 PROCEDURE — G0463 HOSPITAL OUTPT CLINIC VISIT: HCPCS | Performed by: NURSE PRACTITIONER

## 2020-12-07 PROCEDURE — 72125 CT NECK SPINE W/O DYE: CPT

## 2020-12-07 PROCEDURE — 70450 CT HEAD/BRAIN W/O DYE: CPT

## 2020-12-07 PROCEDURE — 99213 OFFICE O/P EST LOW 20 MIN: CPT | Performed by: NURSE PRACTITIONER

## 2020-12-30 DIAGNOSIS — R39.198 DIFFICULTY URINATING: Primary | ICD-10-CM

## 2020-12-31 ENCOUNTER — APPOINTMENT (OUTPATIENT)
Dept: LAB | Facility: CLINIC | Age: 85
End: 2020-12-31
Payer: MEDICARE

## 2020-12-31 ENCOUNTER — TELEPHONE (OUTPATIENT)
Dept: UROLOGY | Facility: AMBULATORY SURGERY CENTER | Age: 85
End: 2020-12-31

## 2020-12-31 ENCOUNTER — OFFICE VISIT (OUTPATIENT)
Dept: UROLOGY | Facility: MEDICAL CENTER | Age: 85
End: 2020-12-31
Payer: MEDICARE

## 2020-12-31 VITALS
DIASTOLIC BLOOD PRESSURE: 82 MMHG | HEIGHT: 69 IN | SYSTOLIC BLOOD PRESSURE: 160 MMHG | BODY MASS INDEX: 29.03 KG/M2 | WEIGHT: 196 LBS

## 2020-12-31 DIAGNOSIS — N13.8 BPH WITH OBSTRUCTION/LOWER URINARY TRACT SYMPTOMS: ICD-10-CM

## 2020-12-31 DIAGNOSIS — N40.2 NODULAR PROSTATE: ICD-10-CM

## 2020-12-31 DIAGNOSIS — N40.1 BPH WITH OBSTRUCTION/LOWER URINARY TRACT SYMPTOMS: ICD-10-CM

## 2020-12-31 DIAGNOSIS — R39.12 WEAK URINARY STREAM: ICD-10-CM

## 2020-12-31 DIAGNOSIS — R39.198 DIFFICULTY URINATING: ICD-10-CM

## 2020-12-31 DIAGNOSIS — R97.20 ELEVATED PSA: ICD-10-CM

## 2020-12-31 DIAGNOSIS — R33.9 INCOMPLETE BLADDER EMPTYING: Primary | ICD-10-CM

## 2020-12-31 LAB
POST-VOID RESIDUAL VOLUME, ML POC: 206 ML
PSA SERPL-MCNC: 16.1 NG/ML (ref 0–4)
SL AMB  POCT GLUCOSE, UA: NORMAL
SL AMB LEUKOCYTE ESTERASE,UA: NORMAL
SL AMB POCT BILIRUBIN,UA: NORMAL
SL AMB POCT BLOOD,UA: NORMAL
SL AMB POCT CLARITY,UA: CLEAR
SL AMB POCT COLOR,UA: YELLOW
SL AMB POCT KETONES,UA: NORMAL
SL AMB POCT NITRITE,UA: NORMAL
SL AMB POCT PH,UA: 7
SL AMB POCT SPECIFIC GRAVITY,UA: 1.01
SL AMB POCT URINE PROTEIN: NORMAL
SL AMB POCT UROBILINOGEN: 0.2

## 2020-12-31 PROCEDURE — 51798 US URINE CAPACITY MEASURE: CPT | Performed by: UROLOGY

## 2020-12-31 PROCEDURE — 99204 OFFICE O/P NEW MOD 45 MIN: CPT | Performed by: UROLOGY

## 2020-12-31 PROCEDURE — 84153 ASSAY OF PSA TOTAL: CPT

## 2020-12-31 PROCEDURE — 81003 URINALYSIS AUTO W/O SCOPE: CPT | Performed by: UROLOGY

## 2020-12-31 RX ORDER — TAMSULOSIN HYDROCHLORIDE 0.4 MG/1
0.4 CAPSULE ORAL
Qty: 30 CAPSULE | Refills: 11 | Status: SHIPPED | OUTPATIENT
Start: 2020-12-31 | End: 2022-01-04 | Stop reason: SDUPTHER

## 2020-12-31 NOTE — TELEPHONE ENCOUNTER
Patient called in to have a new patient appointment  He has difficulty urinating  He wants Dr Blaine Gates  He said he is considering going to the ER  Appointments not till end of feb in [facility] ([facility]) or [facility]  Patient willing to go to [facility] too  Please advise patient  Reason for appointment/Complaint/Diagnosis : difficulty urinating    Insurance: Medicare and BC    History of Cancer? NT            CAUYTWZ requested/where? Outside testing/where? Location Preference for office visit?  [place]

## 2021-01-04 ENCOUNTER — TELEPHONE (OUTPATIENT)
Dept: UROLOGY | Facility: MEDICAL CENTER | Age: 86
End: 2021-01-04

## 2021-01-04 NOTE — TELEPHONE ENCOUNTER
I spoke with patient I gave him his PSA result  His PSA is 16 1  He was having voiding difficulties and he said the Flomax worked [employer] a charm"  I discussed with him the pros and cons of doing a biopsy  The pros of doing a biopsy are knowing specifically what we are dealing with in terms of grade of cancer-if he has a high-grade cancer we may wish to step in with not just Lupron but early Zytiga with Hematology Oncology  He has nodular prostate  The other option is to simply give him a the clinical diagnosis of prostate cancer and treat him if his PSA goes up much more empirically with androgen deprivation  He was open to what ever I suggest, so I suggested a biopsy and we will set this up to be done transperineally in the GI lab  The procedure as well as the risks of bleeding infection and urinary retention were explained he gives informed consent  He will stop his aspirin 5 days before

## 2021-01-05 ENCOUNTER — HOSPITAL ENCOUNTER (OUTPATIENT)
Dept: ULTRASOUND IMAGING | Facility: HOSPITAL | Age: 86
Discharge: HOME | End: 2021-01-05
Attending: UROLOGY
Payer: MEDICARE

## 2021-01-05 DIAGNOSIS — N40.1 BPH WITH OBSTRUCTION/LOWER URINARY TRACT SYMPTOMS: ICD-10-CM

## 2021-01-05 DIAGNOSIS — N13.8 BPH WITH OBSTRUCTION/LOWER URINARY TRACT SYMPTOMS: ICD-10-CM

## 2021-01-05 DIAGNOSIS — R33.9 INCOMPLETE BLADDER EMPTYING: ICD-10-CM

## 2021-01-05 DIAGNOSIS — R39.12 WEAK URINARY STREAM: ICD-10-CM

## 2021-01-05 PROCEDURE — 51798 US URINE CAPACITY MEASURE: CPT

## 2021-01-06 ENCOUNTER — TELEPHONE (OUTPATIENT)
Dept: UROLOGY | Facility: MEDICAL CENTER | Age: 86
End: 2021-01-06

## 2021-01-06 NOTE — TELEPHONE ENCOUNTER
I spoke to the patient and scheduled his biopsy with Dr Iris Goode at the [facility] on 3/16/2021     -instructions given verbally and mailed  -CBC, CMP, EKG 7-10 days prior  -MCR/CapBC - no auth required

## 2021-01-06 NOTE — TELEPHONE ENCOUNTER
----- Message from Shelli Soliz MD sent at 1/4/2021  2:24 PM EST -----  I called patient-he has transperineal prostate biopsy at the [place] [address] lab  Please set him up with me next time I am there and available to do the biopsy and he will need to stop his aspirin 5 days before  I will do H&P and consent on admit

## 2021-01-07 ENCOUNTER — TELEPHONE (OUTPATIENT)
Dept: UROLOGY | Facility: MEDICAL CENTER | Age: 86
End: 2021-01-07

## 2021-01-07 NOTE — RESULT ENCOUNTER NOTE
Please inform patient that the results are normal-he empties his bladder okay, the kidneys look normal and I am satisfied with results

## 2021-01-07 NOTE — TELEPHONE ENCOUNTER
----- Message from Antonio Odonnell MD sent at 1/7/2021  9:36 AM EST -----  Please inform patient that the results are normal-he empties his bladder okay, the kidneys look normal and I am satisfied with results

## 2021-01-19 ENCOUNTER — IMMUNIZATIONS (OUTPATIENT)
Dept: FAMILY MEDICINE CLINIC | Facility: HOSPITAL | Age: 86
End: 2021-01-19

## 2021-01-19 DIAGNOSIS — Z23 ENCOUNTER FOR IMMUNIZATION: Primary | ICD-10-CM

## 2021-01-19 PROCEDURE — 91301 SARS-COV-2 / COVID-19 MRNA VACCINE (MODERNA) 100 MCG: CPT

## 2021-01-19 PROCEDURE — 0011A SARS-COV-2 / COVID-19 MRNA VACCINE (MODERNA) 100 MCG: CPT

## 2021-02-15 ENCOUNTER — IMMUNIZATIONS (OUTPATIENT)
Dept: FAMILY MEDICINE CLINIC | Facility: HOSPITAL | Age: 86
End: 2021-02-15

## 2021-02-15 DIAGNOSIS — Z23 ENCOUNTER FOR IMMUNIZATION: Primary | ICD-10-CM

## 2021-02-15 PROCEDURE — 91301 SARS-COV-2 / COVID-19 MRNA VACCINE (MODERNA) 100 MCG: CPT

## 2021-02-15 PROCEDURE — 0012A SARS-COV-2 / COVID-19 MRNA VACCINE (MODERNA) 100 MCG: CPT

## 2021-02-27 ENCOUNTER — HOSPITAL ENCOUNTER (EMERGENCY)
Facility: HOSPITAL | Age: 86
Discharge: HOME | End: 2021-02-27
Attending: EMERGENCY MEDICINE
Payer: MEDICARE

## 2021-02-27 ENCOUNTER — APPOINTMENT (EMERGENCY)
Dept: RADIOLOGY | Facility: HOSPITAL | Age: 86
End: 2021-02-27
Payer: MEDICARE

## 2021-02-27 VITALS
DIASTOLIC BLOOD PRESSURE: 86 MMHG | WEIGHT: 203.26 LBS | OXYGEN SATURATION: 99 % | SYSTOLIC BLOOD PRESSURE: 180 MMHG | HEART RATE: 50 BPM | RESPIRATION RATE: 16 BRPM | TEMPERATURE: 97.7 F | BODY MASS INDEX: 30.02 KG/M2

## 2021-02-27 DIAGNOSIS — R07.89 ATYPICAL CHEST PAIN: Primary | ICD-10-CM

## 2021-02-27 DIAGNOSIS — D64.9 ANEMIA: ICD-10-CM

## 2021-02-27 DIAGNOSIS — N28.9 RENAL INSUFFICIENCY: ICD-10-CM

## 2021-02-27 LAB
ANION GAP SERPL CALCULATED.3IONS-SCNC: 8 MMOL/L (ref 4–13)
APTT PPP: 30 SECONDS (ref 23–37)
ATRIAL RATE: 65 BPM
ATRIAL RATE: 66 BPM
BASOPHILS # BLD AUTO: 0.08 THOUSANDS/ΜL (ref 0–0.1)
BASOPHILS NFR BLD AUTO: 1 % (ref 0–1)
BUN SERPL-MCNC: 37 MG/DL (ref 5–25)
CALCIUM SERPL-MCNC: 9.5 MG/DL (ref 8.3–10.1)
CHLORIDE SERPL-SCNC: 105 MMOL/L (ref 100–108)
CO2 SERPL-SCNC: 27 MMOL/L (ref 21–32)
CREAT SERPL-MCNC: 1.87 MG/DL (ref 0.6–1.3)
EOSINOPHIL # BLD AUTO: 0.85 THOUSAND/ΜL (ref 0–0.61)
EOSINOPHIL NFR BLD AUTO: 10 % (ref 0–6)
ERYTHROCYTE [DISTWIDTH] IN BLOOD BY AUTOMATED COUNT: 13.1 % (ref 11.6–15.1)
GFR SERPL CREATININE-BSD FRML MDRD: 32 ML/MIN/1.73SQ M
GLUCOSE SERPL-MCNC: 102 MG/DL (ref 65–140)
HCT VFR BLD AUTO: 32.5 % (ref 36.5–49.3)
HGB BLD-MCNC: 10.2 G/DL (ref 12–17)
IMM GRANULOCYTES # BLD AUTO: 0.03 THOUSAND/UL (ref 0–0.2)
IMM GRANULOCYTES NFR BLD AUTO: 0 % (ref 0–2)
INR PPP: 1.05 (ref 0.84–1.19)
LYMPHOCYTES # BLD AUTO: 2.05 THOUSANDS/ΜL (ref 0.6–4.47)
LYMPHOCYTES NFR BLD AUTO: 23 % (ref 14–44)
MAGNESIUM SERPL-MCNC: 2.1 MG/DL (ref 1.6–2.6)
MCH RBC QN AUTO: 28.7 PG (ref 26.8–34.3)
MCHC RBC AUTO-ENTMCNC: 31.4 G/DL (ref 31.4–37.4)
MCV RBC AUTO: 91 FL (ref 82–98)
MONOCYTES # BLD AUTO: 0.8 THOUSAND/ΜL (ref 0.17–1.22)
MONOCYTES NFR BLD AUTO: 9 % (ref 4–12)
NEUTROPHILS # BLD AUTO: 5.06 THOUSANDS/ΜL (ref 1.85–7.62)
NEUTS SEG NFR BLD AUTO: 57 % (ref 43–75)
NRBC BLD AUTO-RTO: 0 /100 WBCS
NT-PROBNP SERPL-MCNC: 1530 PG/ML
P AXIS: 67 DEGREES
P AXIS: 78 DEGREES
PLATELET # BLD AUTO: 257 THOUSANDS/UL (ref 149–390)
PMV BLD AUTO: 9.9 FL (ref 8.9–12.7)
POTASSIUM SERPL-SCNC: 4.6 MMOL/L (ref 3.5–5.3)
PR INTERVAL: 174 MS
PR INTERVAL: 176 MS
PROTHROMBIN TIME: 13.5 SECONDS (ref 11.6–14.5)
QRS AXIS: -12 DEGREES
QRS AXIS: -6 DEGREES
QRSD INTERVAL: 100 MS
QRSD INTERVAL: 96 MS
QT INTERVAL: 430 MS
QT INTERVAL: 434 MS
QTC INTERVAL: 447 MS
QTC INTERVAL: 454 MS
RBC # BLD AUTO: 3.56 MILLION/UL (ref 3.88–5.62)
SODIUM SERPL-SCNC: 140 MMOL/L (ref 136–145)
T WAVE AXIS: 56 DEGREES
T WAVE AXIS: 59 DEGREES
TROPONIN I SERPL-MCNC: 0.03 NG/ML
VENTRICULAR RATE: 65 BPM
VENTRICULAR RATE: 66 BPM
WBC # BLD AUTO: 8.87 THOUSAND/UL (ref 4.31–10.16)

## 2021-02-27 PROCEDURE — 83880 ASSAY OF NATRIURETIC PEPTIDE: CPT | Performed by: EMERGENCY MEDICINE

## 2021-02-27 PROCEDURE — 99285 EMERGENCY DEPT VISIT HI MDM: CPT | Performed by: EMERGENCY MEDICINE

## 2021-02-27 PROCEDURE — 85610 PROTHROMBIN TIME: CPT | Performed by: EMERGENCY MEDICINE

## 2021-02-27 PROCEDURE — 93010 ELECTROCARDIOGRAM REPORT: CPT

## 2021-02-27 PROCEDURE — 83735 ASSAY OF MAGNESIUM: CPT | Performed by: EMERGENCY MEDICINE

## 2021-02-27 PROCEDURE — 85730 THROMBOPLASTIN TIME PARTIAL: CPT | Performed by: EMERGENCY MEDICINE

## 2021-02-27 PROCEDURE — 84484 ASSAY OF TROPONIN QUANT: CPT | Performed by: EMERGENCY MEDICINE

## 2021-02-27 PROCEDURE — 36415 COLL VENOUS BLD VENIPUNCTURE: CPT | Performed by: EMERGENCY MEDICINE

## 2021-02-27 PROCEDURE — 93005 ELECTROCARDIOGRAM TRACING: CPT

## 2021-02-27 PROCEDURE — 80048 BASIC METABOLIC PNL TOTAL CA: CPT | Performed by: EMERGENCY MEDICINE

## 2021-02-27 PROCEDURE — 99284 EMERGENCY DEPT VISIT MOD MDM: CPT

## 2021-02-27 PROCEDURE — 85025 COMPLETE CBC W/AUTO DIFF WBC: CPT | Performed by: EMERGENCY MEDICINE

## 2021-02-27 PROCEDURE — 71045 X-RAY EXAM CHEST 1 VIEW: CPT

## 2021-02-27 NOTE — ED PROVIDER NOTES
History  Chief Complaint   Patient presents with    Medical Problem     Patient reports he has a "buzzing in chest" denies pain, reports "its like an alarm clock" Reports as a feeling  Denies SOB, cough or fevers  History provided by:  Patient   used: No    Medical Problem  Location:  Feels "buzzing" like from an alarm clock in the left side of the chest   It is not pressure it is not sharp  He is not short of breath  No fever  This woke him up at around 4:00 a m  He has no electronics in his body  Severity:  Moderate  Onset quality:  Sudden  Duration: Since about 4:00 a m  Or so  Timing:  Constant  Progression:  Unchanged  Chronicity:  New  Relieved by:  Nothing  Worsened by:  Nothing  Ineffective treatments:  None tried  Associated symptoms: no abdominal pain, no chest pain, no congestion, no cough, no fever, no nausea, no shortness of breath and no vomiting      Has noticed some leg swelling but not short of breath  Did eat out the other night and had [employer] onion soup and chicken pot pie  Prior to Admission Medications   Prescriptions Last Dose Informant Patient Reported? Taking?    Cholecalciferol (VITAMIN D3) 125 MCG (5000 UT) CAPS  Self Yes Yes   Si (two) times a week    Multiple Vitamin (MULTI-VITAMIN DAILY) TABS  Self Yes Yes   Sig: Take by mouth   aspirin 81 MG tablet 2021 at Unknown time Self Yes Yes   Sig: Take 162 mg by mouth   hydrochlorothiazide (HYDRODIURIL) 25 mg tablet   No Yes   Sig: Take 0 5 tablets (12 5 mg total) by mouth daily   omeprazole (PriLOSEC) 20 mg delayed release capsule  Self No Yes   Sig: Take 1 capsule (20 mg total) by mouth daily   ramipril (ALTACE) 10 MG capsule   No Yes   Sig: Take 1 capsule (10 mg total) by mouth daily   rosuvastatin (CRESTOR) 5 mg tablet   No Yes   Sig: Take 1 tablet (5 mg total) by mouth daily   tamsulosin (FLOMAX) 0 4 mg   No Yes   Sig: Take 1 capsule (0 4 mg total) by mouth daily with dinner Facility-Administered Medications: None       Past Medical History:   Diagnosis Date    Atrial fibrillation ([address] )     Hearing loss     last assessed 11/8/17    Rheumatic fever     Stenosis of aorta     [facility]        Past Surgical History:   Procedure Laterality Date    APPENDECTOMY      BACK SURGERY      lower    CATARACT EXTRACTION      KNEE SURGERY Left     TONSILLECTOMY AND ADENOIDECTOMY         Family History   Problem Relation Age of Onset    Other Mother         old age   [facility] Esophageal cancer Father    [facility] Other Father         old age   [facility] Alcohol abuse Son         alcoholism     I have reviewed and agree with the history as documented  E-Cigarette/Vaping     E-Cigarette/Vaping Substances     Social History     Tobacco Use    Smoking status: Never Smoker    Smokeless tobacco: Never Used   Substance Use Topics    Alcohol use: No     Comment: conflicting no and occasional wine per [employer]    Drug use: No       Review of Systems   Constitutional: Negative for diaphoresis and fever  HENT: Negative for congestion  Respiratory: Negative for cough, chest tightness and shortness of breath  Cardiovascular: Positive for leg swelling  Negative for chest pain  Gastrointestinal: Negative for abdominal pain, nausea and vomiting  Genitourinary: Negative for difficulty urinating  All other systems reviewed and are negative  Physical Exam  Physical Exam  Vitals signs and nursing note reviewed  Constitutional:       General: He is not in acute distress  Appearance: Normal appearance  He is well-developed  He is not ill-appearing, toxic-appearing or diaphoretic  HENT:      Head: Normocephalic and atraumatic  Right Ear: Hearing normal  No drainage or swelling  Left Ear: Hearing normal  No drainage or swelling  Eyes:      General: Lids are normal          Right eye: No discharge  Left eye: No discharge        Conjunctiva/sclera: Conjunctivae normal    Neck:      Musculoskeletal: Normal range of motion  Vascular: No JVD  Trachea: Trachea normal    Cardiovascular:      Rate and Rhythm: Normal rate and regular rhythm  Pulses: Normal pulses  Heart sounds: Normal heart sounds  No murmur  No friction rub  No gallop  Pulmonary:      Effort: Pulmonary effort is normal  No respiratory distress  Breath sounds: Normal breath sounds  No stridor  No wheezing or rales  Chest:      Chest wall: No tenderness  Abdominal:      Palpations: Abdomen is soft  Tenderness: There is no abdominal tenderness  There is no guarding or rebound  Musculoskeletal: Normal range of motion  General: No tenderness  Right lower leg: Edema present  Left lower leg: Edema present  Comments: Trace edema   Lymphadenopathy:      Cervical: No cervical adenopathy  Skin:     General: Skin is warm and dry  Coloration: Skin is not pale  Findings: No rash  Comments: No rash noted over this area, no obvious shingles  Neurological:      General: No focal deficit present  Mental Status: He is alert  GCS: GCS eye subscore is 4  GCS verbal subscore is 5  GCS motor subscore is 6  Sensory: No sensory deficit  Motor: No abnormal muscle tone  Psychiatric:         Mood and Affect: Mood normal          Speech: Speech normal          Behavior: Behavior is cooperative           Vital Signs  ED Triage Vitals [02/27/21 1140]   Temperature Pulse Respirations Blood Pressure SpO2   97 7 °F (36 5 °C) 68 18 (!) 186/85 99 %      Temp Source Heart Rate Source Patient Position - Orthostatic VS BP Location FiO2 (%)   Oral Monitor Lying Right arm --      Pain Score       No Pain           Vitals:    02/27/21 1140 02/27/21 1330   BP: (!) 186/85 (!) 180/86   Pulse: 68 (!) 50   Patient Position - Orthostatic VS: Lying          Visual Acuity      ED Medications  Medications - No data to display    Diagnostic Studies  Results Reviewed     Procedure Component Value Units Date/Time    Protime-INR [[id number]]  (Normal) Collected: 02/27/21 1307    Lab Status: Final result Specimen: Blood Updated: 02/27/21 1347     Protime 13 5 seconds      INR 1 05    APTT [[id number]]  (Normal) Collected: 02/27/21 1307    Lab Status: Final result Specimen: Blood Updated: 02/27/21 1347     PTT 30 seconds     Basic metabolic panel [[id number]]  (Abnormal) Collected: 02/27/21 1309    Lab Status: Final result Specimen: Blood Updated: 02/27/21 1346     Sodium 140 mmol/L      Potassium 4 6 mmol/L      Chloride 105 mmol/L      CO2 27 mmol/L      ANION GAP 8 mmol/L      BUN 37 mg/dL      Creatinine 1 87 mg/dL      Glucose 102 mg/dL      Calcium 9 5 mg/dL      eGFR 32 ml/min/1 73sq m     Narrative:      [place] guidelines for Chronic Kidney Disease (CKD):     Stage 1 with normal or high GFR (GFR > 90 mL/min/1 73 square meters)    Stage 2 Mild CKD (GFR = 60-89 mL/min/1 73 square meters)    Stage 3A Moderate CKD (GFR = 45-59 mL/min/1 73 square meters)    Stage 3B Moderate CKD (GFR = 30-44 mL/min/1 73 square meters)    Stage 4 Severe CKD (GFR = 15-29 mL/min/1 73 square meters)    Stage 5 End Stage CKD (GFR <15 mL/min/1 73 square meters)  Note: GFR calculation is accurate only with a steady state creatinine    Magnesium [[id number]]  (Normal) Collected: 02/27/21 1309    Lab Status: Final result Specimen: Blood Updated: 02/27/21 1346     Magnesium 2 1 mg/dL     NT-BNP PRO [[id number]]  (Abnormal) Collected: 02/27/21 1309    Lab Status: Final result Specimen: Blood Updated: 02/27/21 1346     NT-proBNP 1,530 pg/mL     Troponin I [[id number]]  (Normal) Collected: 02/27/21 1309    Lab Status: Final result Specimen: Blood Updated: 02/27/21 1343     Troponin I 0 03 ng/mL     CBC and differential [[id number]]  (Abnormal) Collected: 02/27/21 1309    Lab Status: Final result Specimen: Blood Updated: 02/27/21 1315     WBC 8 87 Thousand/uL      RBC 3 56 Million/uL      Hemoglobin 10 2 g/dL      Hematocrit 32 5 %      MCV 91 fL      MCH 28 7 pg      MCHC 31 4 g/dL      RDW 13 1 %      MPV 9 9 fL      Platelets 883 Thousands/uL      nRBC 0 /100 WBCs      Neutrophils Relative 57 %      Immat GRANS % 0 %      Lymphocytes Relative 23 %      Monocytes Relative 9 %      Eosinophils Relative 10 %      Basophils Relative 1 %      Neutrophils Absolute 5 06 Thousands/µL      Immature Grans Absolute 0 03 Thousand/uL      Lymphocytes Absolute 2 05 Thousands/µL      Monocytes Absolute 0 80 Thousand/µL      Eosinophils Absolute 0 85 Thousand/µL      Basophils Absolute 0 08 Thousands/µL                  XR chest 1 view portable   ED Interpretation by Abelardo Emerson MD (02/27 1333)   I have personally reviewed the x-ray and my findings are: no acute disease  Procedures  ECG 12 Lead Documentation Only    Date/Time: 2/27/2021 11:56 AM  Performed by: Abelardo Emerson MD  Authorized by: Abelardo Emerson MD     Indications / Diagnosis:  Buzzing in chest  ECG reviewed by me, the ED Provider: yes    Patient location:  ED  Interpretation:     Interpretation: non-specific    Rate:     ECG rate:  66    ECG rate assessment: normal    Rhythm:     Rhythm: sinus rhythm    Ectopy:     Ectopy: PAC    QRS:     QRS axis:  Normal    QRS intervals:  Normal  Conduction:     Conduction: normal    ST segments:     ST segments:  Normal  T waves:     T waves: normal               ED Course                                           MDM  Number of Diagnoses or Management Options  Anemia:   Atypical chest pain:   Renal insufficiency:   Diagnosis management comments: EKG unremarkable  Troponin negative  Informed about the anemia and the renal insufficiency  The BNP is a little elevated but there is no baseline to compare to  He does have a little trace edema I think from some dietary indiscretion he had the other day    His lungs are clear his x-ray does not look like he is fluid overloaded  His oxygen saturations normally not complaining of shortness of breath  He has an appointment with his primary care doctor on Tuesday  I suspect that maybe this could potentially be the start of shingles and told him to keep past look out for the rash  Amount and/or Complexity of Data Reviewed  Clinical lab tests: ordered and reviewed  Tests in the radiology section of CPT®: ordered and reviewed  Tests in the medicine section of CPT®: ordered and reviewed  Independent visualization of images, tracings, or specimens: yes        Disposition  Final diagnoses:   Atypical chest pain   Renal insufficiency   Anemia     Time reflects when diagnosis was documented in both MDM as applicable and the Disposition within this note     Time User Action Codes Description Comment    2/27/2021  1:49 PM [place] Piatt Add [R07 89] Atypical chest pain     2/27/2021  1:49 PM Sloan Piatt Add [N28 9] Renal insufficiency     2/27/2021  1:49 PM Sloan Piatt Add [D64 9] Anemia       ED Disposition     ED Disposition Condition Date/Time Comment    Discharge Stable Sat Feb 27, 2021  1:49 PM Radha Prudent discharge to home/self care              Follow-up Information     Follow up With Specialties Details Why Brock Lozano MD Internal Medicine Go on 3/2/2021 for your appointment [address]  [phone]            Current Discharge Medication List      CONTINUE these medications which have NOT CHANGED    Details   aspirin 81 MG tablet Take 162 mg by mouth      Cholecalciferol (VITAMIN D3) 125 MCG (5000 UT) CAPS 2 (two) times a week       hydrochlorothiazide (HYDRODIURIL) 25 mg tablet Take 0 5 tablets (12 5 mg total) by mouth daily  Qty: 90 tablet, Refills: 0    Associated Diagnoses: Essential hypertension      Multiple Vitamin (MULTI-VITAMIN DAILY) TABS Take by mouth      omeprazole (PriLOSEC) 20 mg delayed release capsule Take 1 capsule (20 mg total) by mouth daily  Qty: 90 capsule, Refills: 1    Associated Diagnoses: Anemia, unspecified type      ramipril (ALTACE) 10 MG capsule Take 1 capsule (10 mg total) by mouth daily  Qty: 90 capsule, Refills: 1    Associated Diagnoses: Hypertension, unspecified type      rosuvastatin (CRESTOR) 5 mg tablet Take 1 tablet (5 mg total) by mouth daily  Qty: 90 tablet, Refills: 1    Comments: Refill [id number]  Associated Diagnoses: Hyperlipidemia, unspecified hyperlipidemia type      tamsulosin (FLOMAX) 0 4 mg Take 1 capsule (0 4 mg total) by mouth daily with dinner  Qty: 30 capsule, Refills: 11    Associated Diagnoses: Incomplete bladder emptying; BPH with obstruction/lower urinary tract symptoms; Weak urinary stream           No discharge procedures on file      PDMP Review     None          ED Provider  Electronically Signed by           Kodi Odonnell MD  02/27/21 6143

## 2021-03-02 ENCOUNTER — OFFICE VISIT (OUTPATIENT)
Dept: INTERNAL MEDICINE CLINIC | Facility: CLINIC | Age: 86
End: 2021-03-02
Payer: MEDICARE

## 2021-03-02 DIAGNOSIS — D64.9 ANEMIA, UNSPECIFIED TYPE: Primary | ICD-10-CM

## 2021-03-02 DIAGNOSIS — I47.1 PSVT (PAROXYSMAL SUPRAVENTRICULAR TACHYCARDIA) (HCC): ICD-10-CM

## 2021-03-02 DIAGNOSIS — R97.20 ELEVATED PSA: ICD-10-CM

## 2021-03-02 PROCEDURE — 99214 OFFICE O/P EST MOD 30 MIN: CPT | Performed by: INTERNAL MEDICINE

## 2021-03-02 NOTE — PROGRESS NOTES
Assessment/Plan:     Diagnoses and all orders for this visit:    Anemia, unspecified type    PSVT (paroxysmal supraventricular tachycardia) (HCC)    Elevated PSA          Subjective:      Patient ID: Marisol Boykin is a 80 y o  male  HPI Yolanda Guy is here today for visit he was referred to urology because of difficulty voiding he was placed on Samsca losartan which helped however he was also sent for PSA which turned out to 16 1 a couple of years before was in the 2 range he is now scheduled for prostate biopsy he was in the emergency room a couple of days ago because of a feeling of buzzing in his chest the records that visit can be seen in the chart laboratory studies were done which can also be seen in the chart he had a proBNP level of about 1500  When he was child he did have rheumatic fever and since then he has had a noted heart murmur and we believe that he has at this point asymptomatic aortic valve disease  He will be seeing Dr Lavetta Opitz his cardiologist in the near future  We are are aware of her chronic mild anemia    The following portions of the patient's history were reviewed and updated as appropriate: allergies, current medications, past family history, past medical history, past social history, past surgical history and problem list     Review of Systems      Objective:      /80   Pulse (!) 52   Temp 97 7 °F (36 5 °C) (Skin)   Ht 5' 9" (1 753 m)   Wt 92 1 kg (203 lb)   SpO2 99%   BMI 29 98 kg/m²     BP Readings from Last 3 Encounters:   03/02/21 150/80   02/27/21 (!) 180/86   12/31/20 160/82      Wt Readings from Last 3 Encounters:   03/02/21 92 1 kg (203 lb)   02/27/21 92 2 kg (203 lb 4 2 oz)   12/31/20 88 9 kg (196 lb)      BMI: Estimated body mass index is 29 98 kg/m² as calculated from the following:    Height as of this encounter: 5' 9" (1 753 m)  Weight as of this encounter: 92 1 kg (203 lb)      BSA: Estimated body surface area is 2 08 meters squared as calculated from the following:    Height as of this encounter: 5' 9" (1 753 m)  Weight as of this encounter: 92 1 kg (203 lb)  Physical Exam   Stevenson Chika appears well he is certainly not in any distress his blood pressure also was about 150/80 he has got at least a grade 3/6 harsh systolic murmur easily audible over the precordium consistent with aortic valve disease his lungs are clear and there is no edema we reviewed his recent encounters with the healthcare system we will not make any changes in his medicines we certainly will is being encouraged him to go forth with his urologic evaluation however because of his advanced age a conservative approach with seem appropriate he also got the COVID-19 vaccine      Current Outpatient Medications:     aspirin 81 MG tablet, Take 162 mg by mouth, Disp: , Rfl:     Cholecalciferol (VITAMIN D3) 125 MCG (5000 UT) CAPS, 2 (two) times a week , Disp: , Rfl:     hydrochlorothiazide (HYDRODIURIL) 25 mg tablet, Take 0 5 tablets (12 5 mg total) by mouth daily, Disp: 90 tablet, Rfl: 0    Multiple Vitamin (MULTI-VITAMIN DAILY) TABS, Take by mouth, Disp: , Rfl:     omeprazole (PriLOSEC) 20 mg delayed release capsule, Take 1 capsule (20 mg total) by mouth daily, Disp: 90 capsule, Rfl: 1    ramipril (ALTACE) 10 MG capsule, Take 1 capsule (10 mg total) by mouth daily, Disp: 90 capsule, Rfl: 1    rosuvastatin (CRESTOR) 5 mg tablet, Take 1 tablet (5 mg total) by mouth daily, Disp: 90 tablet, Rfl: 1    tamsulosin (FLOMAX) 0 4 mg, Take 1 capsule (0 4 mg total) by mouth daily with dinner, Disp: 30 capsule, Rfl: 11    This note was completed in part utilizing [employer] Software  Grammatical errors, random word insertions, spelling mistakes, and incomplete sentences can be an occasional consequence of this system secondary to software limitations, ambient noise, and hardware issues   If you have any question or concerns about the content, text, or information contained within the body of this dictation, please contact the provider for clarification

## 2021-03-04 VITALS
BODY MASS INDEX: 30.07 KG/M2 | DIASTOLIC BLOOD PRESSURE: 80 MMHG | HEIGHT: 69 IN | HEART RATE: 52 BPM | TEMPERATURE: 97.7 F | OXYGEN SATURATION: 99 % | SYSTOLIC BLOOD PRESSURE: 150 MMHG | WEIGHT: 203 LBS

## 2021-03-11 ENCOUNTER — APPOINTMENT (OUTPATIENT)
Dept: LAB | Facility: CLINIC | Age: 86
End: 2021-03-11
Payer: MEDICARE

## 2021-03-11 DIAGNOSIS — I49.3 PVC (PREMATURE VENTRICULAR CONTRACTION): ICD-10-CM

## 2021-03-11 DIAGNOSIS — N40.2 NODULAR PROSTATE: ICD-10-CM

## 2021-03-11 DIAGNOSIS — R94.6 ABNORMAL RESULTS OF THYROID FUNCTION STUDIES: ICD-10-CM

## 2021-03-11 DIAGNOSIS — Z01.812 PRE-OPERATIVE LABORATORY EXAMINATION: ICD-10-CM

## 2021-03-11 DIAGNOSIS — R97.20 ELEVATED PSA: ICD-10-CM

## 2021-03-11 LAB
ALBUMIN SERPL BCP-MCNC: 3.9 G/DL (ref 3.5–5)
ALP SERPL-CCNC: 93 U/L (ref 46–116)
ALT SERPL W P-5'-P-CCNC: 24 U/L (ref 12–78)
ANION GAP SERPL CALCULATED.3IONS-SCNC: 5 MMOL/L (ref 4–13)
AST SERPL W P-5'-P-CCNC: 13 U/L (ref 5–45)
BASOPHILS # BLD AUTO: 0.1 THOUSANDS/ΜL (ref 0–0.1)
BASOPHILS NFR BLD AUTO: 1 % (ref 0–1)
BILIRUB SERPL-MCNC: 0.52 MG/DL (ref 0.2–1)
BILIRUB UR QL STRIP: NEGATIVE
BUN SERPL-MCNC: 39 MG/DL (ref 5–25)
CALCIUM SERPL-MCNC: 9.7 MG/DL (ref 8.3–10.1)
CHLORIDE SERPL-SCNC: 108 MMOL/L (ref 100–108)
CLARITY UR: CLEAR
CO2 SERPL-SCNC: 26 MMOL/L (ref 21–32)
COLOR UR: YELLOW
CREAT SERPL-MCNC: 1.95 MG/DL (ref 0.6–1.3)
EOSINOPHIL # BLD AUTO: 0.72 THOUSAND/ΜL (ref 0–0.61)
EOSINOPHIL NFR BLD AUTO: 9 % (ref 0–6)
ERYTHROCYTE [DISTWIDTH] IN BLOOD BY AUTOMATED COUNT: 13.5 % (ref 11.6–15.1)
GFR SERPL CREATININE-BSD FRML MDRD: 30 ML/MIN/1.73SQ M
GLUCOSE P FAST SERPL-MCNC: 99 MG/DL (ref 65–99)
GLUCOSE UR STRIP-MCNC: NEGATIVE MG/DL
HCT VFR BLD AUTO: 34.9 % (ref 36.5–49.3)
HGB BLD-MCNC: 11 G/DL (ref 12–17)
HGB UR QL STRIP.AUTO: NEGATIVE
IMM GRANULOCYTES # BLD AUTO: 0.02 THOUSAND/UL (ref 0–0.2)
IMM GRANULOCYTES NFR BLD AUTO: 0 % (ref 0–2)
KETONES UR STRIP-MCNC: NEGATIVE MG/DL
LEUKOCYTE ESTERASE UR QL STRIP: NEGATIVE
LYMPHOCYTES # BLD AUTO: 2.33 THOUSANDS/ΜL (ref 0.6–4.47)
LYMPHOCYTES NFR BLD AUTO: 27 % (ref 14–44)
MCH RBC QN AUTO: 28.9 PG (ref 26.8–34.3)
MCHC RBC AUTO-ENTMCNC: 31.5 G/DL (ref 31.4–37.4)
MCV RBC AUTO: 92 FL (ref 82–98)
MONOCYTES # BLD AUTO: 0.82 THOUSAND/ΜL (ref 0.17–1.22)
MONOCYTES NFR BLD AUTO: 10 % (ref 4–12)
NEUTROPHILS # BLD AUTO: 4.53 THOUSANDS/ΜL (ref 1.85–7.62)
NEUTS SEG NFR BLD AUTO: 53 % (ref 43–75)
NITRITE UR QL STRIP: NEGATIVE
NRBC BLD AUTO-RTO: 0 /100 WBCS
PH UR STRIP.AUTO: 6.5 [PH]
PLATELET # BLD AUTO: 265 THOUSANDS/UL (ref 149–390)
PMV BLD AUTO: 11.4 FL (ref 8.9–12.7)
POTASSIUM SERPL-SCNC: 4.4 MMOL/L (ref 3.5–5.3)
PROT SERPL-MCNC: 8 G/DL (ref 6.4–8.2)
PROT UR STRIP-MCNC: NEGATIVE MG/DL
RBC # BLD AUTO: 3.8 MILLION/UL (ref 3.88–5.62)
SODIUM SERPL-SCNC: 139 MMOL/L (ref 136–145)
SP GR UR STRIP.AUTO: 1.01 (ref 1–1.03)
TSH SERPL DL<=0.05 MIU/L-ACNC: 0.92 UIU/ML (ref 0.36–3.74)
UROBILINOGEN UR QL STRIP.AUTO: 0.2 E.U./DL
WBC # BLD AUTO: 8.52 THOUSAND/UL (ref 4.31–10.16)

## 2021-03-11 PROCEDURE — 84443 ASSAY THYROID STIM HORMONE: CPT

## 2021-03-11 PROCEDURE — 80053 COMPREHEN METABOLIC PANEL: CPT

## 2021-03-11 PROCEDURE — 81003 URINALYSIS AUTO W/O SCOPE: CPT | Performed by: UROLOGY

## 2021-03-11 PROCEDURE — 85025 COMPLETE CBC W/AUTO DIFF WBC: CPT

## 2021-03-11 PROCEDURE — 36415 COLL VENOUS BLD VENIPUNCTURE: CPT

## 2021-03-15 ENCOUNTER — TELEPHONE (OUTPATIENT)
Dept: INTERNAL MEDICINE CLINIC | Facility: CLINIC | Age: 86
End: 2021-03-15

## 2021-03-15 ENCOUNTER — ANESTHESIA EVENT (OUTPATIENT)
Dept: GASTROENTEROLOGY | Facility: HOSPITAL | Age: 86
End: 2021-03-15
Payer: MEDICARE

## 2021-03-15 NOTE — ANESTHESIA PREPROCEDURE EVALUATION
Procedure:  Transperineal prostate biopsy with biplanar transrectal ultrasound, using the perineal logic kit (N/A Abdomen)    Relevant Problems   ANESTHESIA (within normal limits)      CARDIO   (+) Atrial fibrillation (HCC) (s/p ablation)   (+) Hypertension   (+) Mixed hyperlipidemia   (+) Nonrheumatic aortic valve stenosis   (+) PSVT (paroxysmal supraventricular tachycardia) (HCC)   (+) PVC (premature ventricular contraction)   (+) Pure hypercholesterolemia   (+) Systolic murmur      ENDO (within normal limits)      GI/HEPATIC   (+) Esophageal reflux      /RENAL (within normal limits)      HEMATOLOGY   (+) Anemia      NEURO/PSYCH (within normal limits)      PULMONARY (within normal limits)      Other   (+) Linda esophagus   (+) Palpitations   (+) Spinal stenosis of lumbar region   (+) Spondylolisthesis      EKG 2021:  Sinus rhythm with sinus arrhythmia  Septal infarct (cited on or before 2021)  Abnormal ECG  When compared with ECG of 2021 11:50, (unconfirmed)  Premature supraventricular complexes are no longer Present    TTE 2020:  LEFT VENTRICLE:  Systolic function was normal  Ejection fraction was estimated to be 60 %  There were no regional wall motion abnormalities  Wall thickness was mildly increased      LEFT ATRIUM:  The atrium was mildly to moderately dilated      MITRAL VALVE:  There was mild to moderate annular calcification  There was mild regurgitation      AORTIC VALVE:  The valve was trileaflet  Leaflets exhibited moderately to markedly increased thickness  There was moderate stenosis  MARLY 1 0 cm2  AV meanP 5 mmHg  There was mild to moderate regurgitation      TRICUSPID VALVE:  There was mild regurgitation      AORTA:  The root exhibited mild dilatation        Lab Results   Component Value Date    WBC 8 52 2021    HGB 11 0 (L) 2021    HCT 34 9 (L) 2021    MCV 92 2021     2021     Lab Results   Component Value Date    SODIUM 139 03/11/2021    K 4 4 03/11/2021     03/11/2021    CO2 26 03/11/2021    BUN 39 (H) 03/11/2021    CREATININE 1 95 (H) 03/11/2021    GLUC 102 02/27/2021    CALCIUM 9 7 03/11/2021     Lab Results   Component Value Date    INR 1 05 02/27/2021    INR 1 13 07/17/2019    PROTIME 13 5 02/27/2021    PROTIME 14 1 07/17/2019     No results found for: HGBA1C       Physical Exam    Airway    Mallampati score: II  TM Distance: >3 FB  Neck ROM: full     Dental   No notable dental hx     Cardiovascular  Cardiovascular exam normal    Pulmonary  Pulmonary exam normal     Other Findings        Anesthesia Plan  ASA Score- 3     Anesthesia Type- IV sedation with anesthesia with ASA Monitors  Additional Monitors:   Airway Plan:           Plan Factors-Exercise tolerance (METS): >4 METS  Chart reviewed  EKG reviewed  Existing labs reviewed  Patient summary reviewed  Induction- intravenous  Postoperative Plan-     Informed Consent- Anesthetic plan and risks discussed with patient  I personally reviewed this patient with the CRNA  Discussed and agreed on the Anesthesia Plan with the CRNA  Parveen Cuellar

## 2021-03-16 ENCOUNTER — HOSPITAL ENCOUNTER (OUTPATIENT)
Facility: HOSPITAL | Age: 86
Setting detail: OUTPATIENT SURGERY
Discharge: HOME | End: 2021-03-16
Attending: UROLOGY | Admitting: UROLOGY
Payer: MEDICARE

## 2021-03-16 ENCOUNTER — ANESTHESIA (OUTPATIENT)
Dept: GASTROENTEROLOGY | Facility: HOSPITAL | Age: 86
End: 2021-03-16
Payer: MEDICARE

## 2021-03-16 VITALS
HEIGHT: 69 IN | DIASTOLIC BLOOD PRESSURE: 54 MMHG | TEMPERATURE: 97.8 F | BODY MASS INDEX: 28.88 KG/M2 | WEIGHT: 195 LBS | OXYGEN SATURATION: 96 % | SYSTOLIC BLOOD PRESSURE: 96 MMHG | HEART RATE: 79 BPM | RESPIRATION RATE: 18 BRPM

## 2021-03-16 DIAGNOSIS — R97.20 ELEVATED PSA: ICD-10-CM

## 2021-03-16 DIAGNOSIS — N40.2 NODULAR PROSTATE: ICD-10-CM

## 2021-03-16 PROCEDURE — NC001 PR NO CHARGE: Performed by: UROLOGY

## 2021-03-16 PROCEDURE — 88344 IMHCHEM/IMCYTCHM EA MLT ANTB: CPT | Performed by: PATHOLOGY

## 2021-03-16 PROCEDURE — G0416 PROSTATE BIOPSY, ANY MTHD: HCPCS | Performed by: PATHOLOGY

## 2021-03-16 PROCEDURE — 55700 PR BIOPSY OF PROSTATE,NEEDLE/PUNCH: CPT | Performed by: UROLOGY

## 2021-03-16 RX ORDER — LIDOCAINE HYDROCHLORIDE 10 MG/ML
INJECTION, SOLUTION EPIDURAL; INFILTRATION; INTRACAUDAL; PERINEURAL AS NEEDED
Status: DISCONTINUED | OUTPATIENT
Start: 2021-03-16 | End: 2021-03-16

## 2021-03-16 RX ORDER — LIDOCAINE HYDROCHLORIDE 20 MG/ML
INJECTION, SOLUTION EPIDURAL; INFILTRATION; INTRACAUDAL; PERINEURAL AS NEEDED
Status: DISCONTINUED | OUTPATIENT
Start: 2021-03-16 | End: 2021-03-16 | Stop reason: HOSPADM

## 2021-03-16 RX ORDER — PROPOFOL 10 MG/ML
INJECTION, EMULSION INTRAVENOUS CONTINUOUS PRN
Status: DISCONTINUED | OUTPATIENT
Start: 2021-03-16 | End: 2021-03-16

## 2021-03-16 RX ORDER — HYDROCODONE BITARTRATE AND ACETAMINOPHEN 5; 325 MG/1; MG/1
1 TABLET ORAL EVERY 6 HOURS PRN
Status: DISCONTINUED | OUTPATIENT
Start: 2021-03-16 | End: 2021-03-16 | Stop reason: HOSPADM

## 2021-03-16 RX ORDER — PROPOFOL 10 MG/ML
INJECTION, EMULSION INTRAVENOUS AS NEEDED
Status: DISCONTINUED | OUTPATIENT
Start: 2021-03-16 | End: 2021-03-16

## 2021-03-16 RX ORDER — SODIUM CHLORIDE 9 MG/ML
INJECTION, SOLUTION INTRAVENOUS CONTINUOUS PRN
Status: DISCONTINUED | OUTPATIENT
Start: 2021-03-16 | End: 2021-03-16

## 2021-03-16 RX ORDER — CEFAZOLIN SODIUM 2 G/50ML
2000 SOLUTION INTRAVENOUS ONCE
Status: COMPLETED | OUTPATIENT
Start: 2021-03-16 | End: 2021-03-16

## 2021-03-16 RX ORDER — EPHEDRINE SULFATE 50 MG/ML
INJECTION INTRAVENOUS AS NEEDED
Status: DISCONTINUED | OUTPATIENT
Start: 2021-03-16 | End: 2021-03-16

## 2021-03-16 RX ADMIN — CEFAZOLIN SODIUM 2000 MG: 2 SOLUTION INTRAVENOUS at 07:49

## 2021-03-16 RX ADMIN — SODIUM CHLORIDE: 0.9 INJECTION, SOLUTION INTRAVENOUS at 07:30

## 2021-03-16 RX ADMIN — PROPOFOL 100 MCG/KG/MIN: 10 INJECTION, EMULSION INTRAVENOUS at 08:11

## 2021-03-16 RX ADMIN — LIDOCAINE HYDROCHLORIDE 50 MG: 10 INJECTION, SOLUTION EPIDURAL; INFILTRATION; INTRACAUDAL; PERINEURAL at 08:11

## 2021-03-16 RX ADMIN — PROPOFOL 100 MG: 10 INJECTION, EMULSION INTRAVENOUS at 08:11

## 2021-03-16 RX ADMIN — EPHEDRINE SULFATE 10 MG: 50 INJECTION, SOLUTION INTRAVENOUS at 08:26

## 2021-03-16 NOTE — ANESTHESIA POSTPROCEDURE EVALUATION
Post-Op Assessment Note    CV Status:  Stable  Pain Score: 0    Pain management: adequate     Mental Status:  Alert and awake   Hydration Status:  Euvolemic   PONV Controlled:  Controlled   Airway Patency:  Patent      Post Op Vitals Reviewed: Yes      Staff: CRNA         No complications documented      BP 98/50 (03/16/21 0835)    Temp 97 8 °F (36 6 °C) (03/16/21 0835)    Pulse 72 (03/16/21 0835)   Resp 18 (03/16/21 0835)    SpO2 97 % (03/16/21 0835)

## 2021-03-16 NOTE — OP NOTE
OPERATIVE REPORT  PATIENT NAME: Madonna Jones    :  1935  MRN: [id number]  Pt Location: [facility]    SURGERY DATE: 3/16/2021    Surgeon(s) and Role:     Jared Horner MD - Primary    Preop Diagnosis:  Nodular prostate [N40 2]  Elevated PSA [R97 20]    Post-Op Diagnosis Codes:     * Nodular prostate [N40 2]     * Elevated PSA [R97 20]    Procedure(s) (LRB):  Transperineal prostate biopsy with biplanar transrectal ultrasound, using the perineal logic kit (N/A)    Specimen(s):  ID Type Source Tests Collected by Time Destination   1 : R ant med Tissue Prostate TISSUE EXAM Rhina Garrido MD 3/16/2021 2836    2 : R ant lat Tissue Prostate TISSUE EXAM Rhina Garrido MD 3/16/2021 0817    3 : L ant med Tissue Prostate TISSUE EXAM Rhina Garrido MD 3/16/2021 0817    4 : L ant lat Tissue Prostate TISSUE EXAM Rhina Garrido MD 3/16/2021 0817    5 : L post lat Tissue Prostate TISSUE EXAM Rhina Garrido MD 3/16/2021 0817    6 : L post med Tissue Prostate TISSUE EXAM Rhina Garrido MD 3/16/2021 0817    7 : L base Tissue Prostate TISSUE EXAM Rhina Garrido MD 3/16/2021 0817    8 : R pos lat Tissue Prostate TISSUE EXAM Rhina Garrido MD 3/16/2021 0817    9 : R post med Tissue Prostate TISSUE Talya Lozada MD 3/16/2021 0817    10 : R base Tissue Prostate TISSUE EXAM Rhina Garrido MD 3/16/2021 1724        Estimated Blood Loss:   Minimal    Drains:  External Urinary Catheter Large (Active)   Number of days: 608       Anesthesia Type:   IV SED/ON-Q    Operative Indications:  Nodular prostate [N40 2]  Elevated PSA [R97 20]      Operative Findings:  Hypoechoic regions left posterior lateral left posterior medial    Complications:   None    Procedure and Technique:  The patient is  is here for transperineal prostate biopsy guided by biplanar transrectal ultrasound using the Precision Point Perineologic kit  The procedure, as well as the risks of bleeding, infection, and urinary retention have been explained he gives informed consent  The patient was brought to the operating room and identified properly  IV sedation was induced, and he was placed in the dorsal lithotomy position  The perineum was shaved, a ChloraPrep scrub was used of the perineum and anal regions, and dried  A large Tegaderm was used to tape the scrotum in a cranial direction to keep it out of the way the perineum  A time-out was performed  Care was taken when placing the patient in the dorsal lithotomy position to make sure all pressure points were protected     I then prepared the biplanar ultrasound probe with ultrasound gel covering mostly the distal sensor, and covered it with a condom  The condom was secured with 2 rubber bands, and I placed the Precision Point device with a clamp over the midportion of the ultrasound once near the handle  The aperture and guide needle were also prepared to be used later, and I placed the transrectal ultrasound probe into the rectum and visualized the prostate in sagittal and transverse views  This was used with a split screen  volume measurements not done    The perineum was anesthetized with 2% xylocaine 10 cc both superficially and deep with a spinal needle on both sides of the median raphe  I then, starting with the right side, rotated the probe to the patient's right at a 45 degree angle and placed the entry guide needle  Starting on the right side, I took biopsies of the anterior medial zone, anterior lateral zone, in the second to the top aperture position     I performed the same procedure over on the left  I then switched the guide needle to the second to the lowest aperture setting, then under biplanar guidance I took 1 specimen each from the right and left posterior lateral regions, and took biopsies from the posterior medial and base on each side  This was done bilaterally  These were all peripheral zone biopsies  Extra biopsies were taken in zones where the initial biopsy was suboptimal tissue    Care was taken to try to include the entire length of the prostate as much as possible for complete coverage  Excellent prostate tissue cores were obtained, and specimens were sent to pathology  I withdrew the guide needle and I held pressure on the perineum for 1 minutes using a folded towel  The perineum was then cleansed with sterile water     I was present for the entire procedure and A qualified resident physician was not available    Patient Disposition:  PACU  and hemodynamically stable    SIGNATURE: Cielo Knowles MD  DATE: March 16, 2021  TIME: 8:30 AM

## 2021-03-16 NOTE — DISCHARGE INSTRUCTIONS
Call for fever greater than 101 5°, severe pain not relieved by pain medication, or inability to urinate  Expect to see blood in the urine, blood in the stool and blood in the semen   You may see swelling and bruising of the testicles and the space between the legs  Tomorrow you may resume all usual activities  No driving or operating machinery today  Take Tylenol or ibuprofen for pain/discomfort     Drink plenty of fluids  Restart your aspirin tomorrow

## 2021-03-16 NOTE — H&P
H&P Exam - Urology   Opal Whitley 1935 [id number]      Assessment/Plan     Assessment:  PSA 16 1  Plan:  TPPbx    History of Present Illness   HPI:  The patient presents for transperineal prostate bx  The risks of bleeding, infection, urinary retention and need for additional procedures has been explained and informed consent given  PMH/PSH reviewed    Review of systems:    General: No fever  CVS: No chest pain or new SOB  Abdomen: No diarrhea or blood in stool  : No new voiding difficulties  Neuro: No syncope/weakness/loss of sensation/paresis  Ophthalmologic: No new visual changes  Ortho: No new back/joint pains  Social History- as per previous notes  Family History: non-contributory    Meds/Allergies   PTA meds:   Prior to Admission Medications   Prescriptions Last Dose Informant Patient Reported? Taking?    Cholecalciferol (VITAMIN D3) 125 MCG (5000 UT) CAPS Past Week at Unknown time Self Yes Yes   Si (two) times a week    Multiple Vitamin (MULTI-VITAMIN DAILY) TABS Past Week at Unknown time Self Yes Yes   Sig: Take by mouth   aspirin 81 MG tablet  Self Yes No   Sig: Take 162 mg by mouth   hydrochlorothiazide (HYDRODIURIL) 25 mg tablet 3/16/2021 at Unknown time  No Yes   Sig: Take 0 5 tablets (12 5 mg total) by mouth daily   omeprazole (PriLOSEC) 20 mg delayed release capsule 3/15/2021 at Unknown time Self No Yes   Sig: Take 1 capsule (20 mg total) by mouth daily   ramipril (ALTACE) 10 MG capsule 3/16/2021 at Unknown time  No Yes   Sig: Take 1 capsule (10 mg total) by mouth daily   rosuvastatin (CRESTOR) 5 mg tablet 3/15/2021 at Unknown time  No Yes   Sig: Take 1 tablet (5 mg total) by mouth daily   tamsulosin (FLOMAX) 0 4 mg 3/15/2021 at Unknown time  No Yes   Sig: Take 1 capsule (0 4 mg total) by mouth daily with dinner      Facility-Administered Medications: None         Objective   Vitals: /66   Pulse 80   Temp 98 4 °F (36 9 °C) (Tympanic)   Resp 18   Ht 5' 9" (1 983 m)   Wt 88 5 kg (195 lb)   SpO2 100%   BMI 28 80 kg/m²     No intake/output data recorded  Physical Exam:    Awake, alert, in NAD  HEENT: Atraumatic, Normocephalic    Lungs: Normal Respiratory effort, no wheezes,stridor or rales  Abdomen: Nondistended  Extremiites: Normal ROM, no cyanosis/edema  Lab Results: I have personally reviewed pertinent reports  CBC: No results found for: WBC, HGB, HCT, MCV, PLT, ADJUSTEDWBC, MCH, MCHC, RDW, MPV, NRBC  CMP: No results found for: SODIUM, CL, CO2, ANIONGAP, BUN, CREATININE, GLUCOSE, CALCIUM, AST, ALT, ALKPHOS, PROT, BILITOT, EGFR  Urinalysis: No results found for: Shelda Chock, SPECGRAV, PHUR, LEUKOCYTESUR, NITRITE, PROTEINUA, GLUCOSEU, KETONESU, BILIRUBINUR, BLOODU  Urine Culture: No results found for: URINECX  Imaging: I have personally reviewed pertinent reports

## 2021-03-22 NOTE — PROGRESS NOTES
[facility]  [facility]  [address] [facility], [address] [place] [address]  [phone]  www  [facility]  org      NAME: Leona Higgins  AGE: 80 y o  SEX: male  : 1935   MRN: [id number]    DATE: 3/22/2021  TIME: 7:42 PM    Assessment and Plan:      BPH with obstruction with nocturia and incomplete bladder emptying which has somewhat resolved down to a PVR of 76 cc by last renal ultrasound  His kidneys were normal on ultrasound 2021  Negative prostate biopsy despite a PSA of 16 1 which seemed to be due all to inflammation  He will continue with Flomax and will start Proscar  Follow-up 6 months with another PSA  Chief Complaint   No chief complaint on file  History of Present Illness   Status post transperineal prostate biopsy by me 3/16/2021 which was completely benign  Just showed chronic inflammation  He has an abnormal YARIEL but no sign of cancer on the biopsy  Since he has been taking the Flomax he has been urinating well  Nocturia 2-3 times  Much less frequency during the daytime  No further need to double void  He feels that he is emptying his bladder better than he was  Previously had a PVR of 206 cc  We discussed Proscar which I think would reduce his risk of urinary retention and need for surgery in the future  He is willing to try this        The following portions of the patient's history were reviewed and updated as appropriate: allergies, current medications, past family history, past medical history, past social history, past surgical history and problem list   Past Medical History:   Diagnosis Date    Atrial fibrillation ([address] )     Hearing loss     last assessed 17    Rheumatic fever     Stenosis of aorta     SVT (supraventricular tachycardia) ([address] )      Past Surgical History:   Procedure Laterality Date    APPENDECTOMY      BACK SURGERY      lower    CATARACT EXTRACTION      KNEE SURGERY Left     OR BIOPSY OF PROSTATE,NEEDLE/PUNCH N/A 3/16/2021    Procedure: Transperineal prostate biopsy with biplanar transrectal ultrasound, using the perineal logic kit; Surgeon: Walter Lee MD;  [facility]: [facility];  Service: Urology    TONSILLECTOMY AND ADENOIDECTOMY       shoulder  Review of Systems   Review of Systems    Active Problem List     Patient Active Problem List   Diagnosis    Anemia    Linda esophagus    Esophageal reflux    Mixed hyperlipidemia    Hypertension    Spinal stenosis of lumbar region    Spondylolisthesis    PSVT (paroxysmal supraventricular tachycardia) (HCC)    Palpitations    Systolic murmur    Nonrheumatic aortic valve stenosis    Pure hypercholesterolemia    PVC (premature ventricular contraction)    Atrial fibrillation (HCC)       Objective   There were no vitals taken for this visit      Physical Exam        Current Medications     Current Outpatient Medications:     aspirin 81 MG tablet, Take 162 mg by mouth, Disp: , Rfl:     Cholecalciferol (VITAMIN D3) 125 MCG (5000 UT) CAPS, 2 (two) times a week , Disp: , Rfl:     hydrochlorothiazide (HYDRODIURIL) 25 mg tablet, Take 0 5 tablets (12 5 mg total) by mouth daily, Disp: 90 tablet, Rfl: 0    Multiple Vitamin (MULTI-VITAMIN DAILY) TABS, Take by mouth, Disp: , Rfl:     omeprazole (PriLOSEC) 20 mg delayed release capsule, Take 1 capsule (20 mg total) by mouth daily, Disp: 90 capsule, Rfl: 1    ramipril (ALTACE) 10 MG capsule, Take 1 capsule (10 mg total) by mouth daily, Disp: 90 capsule, Rfl: 1    rosuvastatin (CRESTOR) 5 mg tablet, Take 1 tablet (5 mg total) by mouth daily, Disp: 90 tablet, Rfl: 1    tamsulosin (FLOMAX) 0 4 mg, Take 1 capsule (0 4 mg total) by mouth daily with dinner, Disp: 30 capsule, Rfl: 11        Walter Lee MD

## 2021-03-26 ENCOUNTER — OFFICE VISIT (OUTPATIENT)
Dept: UROLOGY | Facility: MEDICAL CENTER | Age: 86
End: 2021-03-26
Payer: MEDICARE

## 2021-03-26 VITALS
DIASTOLIC BLOOD PRESSURE: 88 MMHG | WEIGHT: 195 LBS | SYSTOLIC BLOOD PRESSURE: 150 MMHG | HEIGHT: 69 IN | BODY MASS INDEX: 28.88 KG/M2

## 2021-03-26 DIAGNOSIS — N13.8 BPH WITH OBSTRUCTION/LOWER URINARY TRACT SYMPTOMS: ICD-10-CM

## 2021-03-26 DIAGNOSIS — R97.20 ELEVATED PSA: ICD-10-CM

## 2021-03-26 DIAGNOSIS — N40.1 BPH WITH OBSTRUCTION/LOWER URINARY TRACT SYMPTOMS: ICD-10-CM

## 2021-03-26 DIAGNOSIS — R33.9 INCOMPLETE BLADDER EMPTYING: Primary | ICD-10-CM

## 2021-03-26 PROCEDURE — 99213 OFFICE O/P EST LOW 20 MIN: CPT | Performed by: UROLOGY

## 2021-03-26 RX ORDER — FINASTERIDE 5 MG/1
5 TABLET, FILM COATED ORAL DAILY
Qty: 90 TABLET | Refills: 3 | Status: SHIPPED | OUTPATIENT
Start: 2021-03-26 | End: 2022-03-24 | Stop reason: SDUPTHER

## 2021-03-26 NOTE — LETTER
2021     Jesse Griffin MD  [address]  [address]    Patient: Jared Calix   YOB: 1935   Date of Visit: 3/26/2021       Dear Dr Karen Aldana: Thank you for referring Jacobo Milton to me for evaluation  Below are my notes for this consultation  If you have questions, please do not hesitate to call me  I look forward to following your patient along with you  Sincerely,        Roselyn Jamison MD        CC: No Recipients  Roselyn Jamison MD  2021  6:45 PM  [address] [place] [address] for Urology  [address], [address] [place] [phone]  www  [facility]  org      NAME: Jared Calix  AGE: 80 y o  SEX: male  : 1935   MRN: [id number]    DATE: 3/22/2021  TIME: 7:42 PM    Assessment and Plan:      BPH with obstruction with nocturia and incomplete bladder emptying which has somewhat resolved down to a PVR of 76 cc by last renal ultrasound  His kidneys were normal on ultrasound 2021  Negative prostate biopsy despite a PSA of 16 1 which seemed to be due all to inflammation  He will continue with Flomax and will start Proscar  Follow-up 6 months with another PSA  Chief Complaint   No chief complaint on file  History of Present Illness   Status post transperineal prostate biopsy by me 3/16/2021 which was completely benign  Just showed chronic inflammation  He has an abnormal YARIEL but no sign of cancer on the biopsy  Since he has been taking the Flomax he has been urinating well  Nocturia 2-3 times  Much less frequency during the daytime  No further need to double void  He feels that he is emptying his bladder better than he was  Previously had a PVR of 206 cc  We discussed Proscar which I think would reduce his risk of urinary retention and need for surgery in the future  He is willing to try this        The following portions of the patient's history were reviewed and updated as appropriate: allergies, current medications, past family history, past medical history, past social history, past surgical history and problem list   Past Medical History:   Diagnosis Date    Atrial fibrillation ([facility] )     Hearing loss     last assessed 11/8/17    Rheumatic fever     Stenosis of aorta     SVT (supraventricular tachycardia) ([facility] )      Past Surgical History:   Procedure Laterality Date    APPENDECTOMY      BACK SURGERY      lower    CATARACT EXTRACTION      KNEE SURGERY Left     CT BIOPSY OF PROSTATE,NEEDLE/PUNCH N/A 3/16/2021    Procedure: Transperineal prostate biopsy with biplanar transrectal ultrasound, using the perineal logic kit; Surgeon: Javier Alcazar MD;  Location: [facility];  Service: Urology    TONSILLECTOMY AND ADENOIDECTOMY       shoulder  Review of Systems   Review of Systems    Active Problem List     Patient Active Problem List   Diagnosis    Anemia    Linda esophagus    Esophageal reflux    Mixed hyperlipidemia    Hypertension    Spinal stenosis of lumbar region    Spondylolisthesis    PSVT (paroxysmal supraventricular tachycardia) (HCC)    Palpitations    Systolic murmur    Nonrheumatic aortic valve stenosis    Pure hypercholesterolemia    PVC (premature ventricular contraction)    Atrial fibrillation (HCC)       Objective   There were no vitals taken for this visit      Physical Exam        Current Medications     Current Outpatient Medications:     aspirin 81 MG tablet, Take 162 mg by mouth, Disp: , Rfl:     Cholecalciferol (VITAMIN D3) 125 MCG (5000 UT) CAPS, 2 (two) times a week , Disp: , Rfl:     hydrochlorothiazide (HYDRODIURIL) 25 mg tablet, Take 0 5 tablets (12 5 mg total) by mouth daily, Disp: 90 tablet, Rfl: 0    Multiple Vitamin (MULTI-VITAMIN DAILY) TABS, Take by mouth, Disp: , Rfl:     omeprazole (PriLOSEC) 20 mg delayed release capsule, Take 1 capsule (20 mg total) by mouth daily, Disp: 90 capsule, Rfl: 1    ramipril (ALTACE) 10 MG capsule, Take 1 capsule (10 mg total) by mouth daily, Disp: 90 capsule, Rfl: 1    rosuvastatin (CRESTOR) 5 mg tablet, Take 1 tablet (5 mg total) by mouth daily, Disp: 90 tablet, Rfl: 1    tamsulosin (FLOMAX) 0 4 mg, Take 1 capsule (0 4 mg total) by mouth daily with dinner, Disp: 30 capsule, Rfl: 11        Lisa Engel MD

## 2021-05-03 DIAGNOSIS — I10 ESSENTIAL HYPERTENSION: ICD-10-CM

## 2021-05-03 RX ORDER — HYDROCHLOROTHIAZIDE 25 MG/1
TABLET ORAL
Qty: 45 TABLET | Refills: 0 | Status: SHIPPED | OUTPATIENT
Start: 2021-05-03 | End: 2021-08-13 | Stop reason: HOSPADM

## 2021-06-01 ENCOUNTER — OFFICE VISIT (OUTPATIENT)
Dept: CARDIOLOGY CLINIC | Facility: CLINIC | Age: 86
End: 2021-06-01
Payer: MEDICARE

## 2021-06-01 VITALS
BODY MASS INDEX: 29.27 KG/M2 | SYSTOLIC BLOOD PRESSURE: 128 MMHG | WEIGHT: 197.6 LBS | DIASTOLIC BLOOD PRESSURE: 70 MMHG | RESPIRATION RATE: 16 BRPM | HEART RATE: 60 BPM | HEIGHT: 69 IN

## 2021-06-01 DIAGNOSIS — I12.9 HYPERTENSION WITH RENAL DISEASE: ICD-10-CM

## 2021-06-01 DIAGNOSIS — I47.1 PSVT (PAROXYSMAL SUPRAVENTRICULAR TACHYCARDIA) (HCC): Primary | ICD-10-CM

## 2021-06-01 DIAGNOSIS — I35.0 NONRHEUMATIC AORTIC VALVE STENOSIS: ICD-10-CM

## 2021-06-01 DIAGNOSIS — E78.2 MIXED HYPERLIPIDEMIA: ICD-10-CM

## 2021-06-01 PROCEDURE — 99213 OFFICE O/P EST LOW 20 MIN: CPT | Performed by: INTERNAL MEDICINE

## 2021-06-01 NOTE — PROGRESS NOTES
Cardiology Follow Up    [facility]  1935  [id number]  [facility] CARDIOLOGY [place] [address]  [facility] [id number] [phone]    Reason for visit:  9 month follow-up for PSVT status post ablation in July of 2019, hypertension, hyperlipidemia calcific aortic valve stenosis      1  PSVT (paroxysmal supraventricular tachycardia) ([address] )     2  Nonrheumatic aortic valve stenosis     3  Hypertension with renal disease     4  Mixed hyperlipidemia         Interval History:  Since the patient's last visit he denies chest pain, shortness of breath, edema or lightheadedness    He does get some brief palpitations at times but no sustained tachycardia type palpitations    Patient Active Problem List   Diagnosis    Anemia    Linda esophagus    Esophageal reflux    Mixed hyperlipidemia    Hypertension    Spinal stenosis of lumbar region    Spondylolisthesis    PSVT [facility]ricular tachycardia) ([facility])    Palpitations    Systolic murmur    Nonrheumatic aortic valve stenosis    Pure hypercholesterolemia    PVC (premature ventricular contraction)     Past Medical History:   Diagnosis Date    Atrial fibrillation ([address] )     Hearing loss     last assessed 11/8/17    Rheumatic fever     Stenosis of aorta     SVT (supraventricular tachycardia) ([facility])      Social History     Socioeconomic History    Marital status: /Civil Union     Spouse name: Not on file    Number of children: Not on file    Years of education: Not on file    Highest education level: Not on file   Occupational History    Not on file   Social Needs    Financial resource strain: Not on file    Food insecurity     Worry: Not on file     Inability: Not on file   [employer] needs     Medical: Not on file     Non-medical: Not on file   Tobacco Use    Smoking status: Never Smoker    Smokeless tobacco: Never Used   Substance and Sexual Activity    Alcohol use: No     Comment: conflicting no and occasional wine per Allscripts    Drug use: No    Sexual activity: Not on file   Lifestyle    Physical activity     Days per week: Not on file     Minutes per session: Not on file    Stress: Not on file   Relationships    Social connections     Talks on phone: Not on file     Gets together: Not on file     Attends Che[employer] service: Not on file     Active member of club or organization: Not on file     Attends meetings of clubs or organizations: Not on file     Relationship status: Not on file    Intimate partner violence     Fear of current or ex partner: Not on file     Emotionally abused: Not on file     Physically abused: Not on file     Forced sexual activity: Not on file   Other Topics Concern    Not on file   Social History Narrative    Not on file      Family History   Problem Relation Age of Onset    Other Mother         old age   Mavis Cousin Esophageal cancer Father    Mavis Cousin Other Father         old age   Mavis Cousin Alcohol abuse Son         alcoholism     Past Surgical History:   Procedure Laterality Date    APPENDECTOMY      BACK SURGERY      lower    CATARACT EXTRACTION      KNEE SURGERY Left     [place] BIOPSY OF PROSTATE,NEEDLE/PUNCH N/A 3/16/2021    Procedure: Transperineal prostate biopsy with biplanar transrectal ultrasound, using the perineal logic kit;   Surgeon: Javier Alcazar MD;  [facility]: [facility];  Service: Urology    TONSILLECTOMY AND ADENOIDECTOMY         Current Outpatient Medications:     aspirin 81 MG tablet, Take 162 mg by mouth, Disp: , Rfl:     Cholecalciferol (VITAMIN D3) 125 MCG (5000 UT) CAPS, 2 (two) times a week , Disp: , Rfl:     finasteride (PROSCAR) 5 mg tablet, Take 1 tablet (5 mg total) by mouth daily, Disp: 90 tablet, Rfl: 3    hydrochlorothiazide (HYDRODIURIL) 25 mg tablet, Take 1/2 tablet (12 5 mg total) by mouth daily, Disp: 45 tablet, Rfl: 0    Multiple Vitamin (MULTI-VITAMIN DAILY) TABS, Take by mouth, Disp: , Rfl:     omeprazole (PriLOSEC) 20 mg delayed release capsule, Take 1 capsule (20 mg total) by mouth daily, Disp: 90 capsule, Rfl: 1    ramipril (ALTACE) 10 MG capsule, Take 1 capsule (10 mg total) by mouth daily, Disp: 90 capsule, Rfl: 1    rosuvastatin (CRESTOR) 5 mg tablet, Take 1 tablet (5 mg total) by mouth daily, Disp: 90 tablet, Rfl: 1    tamsulosin (FLOMAX) 0 4 mg, Take 1 capsule (0 4 mg total) by mouth daily with dinner, Disp: 30 capsule, Rfl: 11  No Known Allergies    Review of Systems:  Review of Systems   Constitutional: Positive for fatigue  Negative for appetite change and unexpected weight change  Respiratory: Negative for cough, shortness of breath and wheezing  Cardiovascular: Positive for palpitations  Negative for chest pain and leg swelling  Gastrointestinal: Negative for abdominal pain, blood in stool, constipation and diarrhea  Genitourinary: Positive for frequency  Negative for dysuria, hematuria and urgency  Musculoskeletal: Positive for arthralgias and back pain  Neurological: Negative for dizziness, light-headedness and headaches  Physical Exam:  Vitals:    06/01/21 1051   BP: 128/70   Pulse: 60   Resp: 16   Weight: 89 6 kg (197 lb 9 6 oz)   Height: 5' 9" (1 753 m)       Physical Exam  Constitutional:       General: He is not in acute distress  Appearance: He is normal weight  He is not ill-appearing  HENT:      Head: Normocephalic and atraumatic  Mouth/Throat:      Mouth: Mucous membranes are moist       Pharynx: No oropharyngeal exudate or posterior oropharyngeal erythema  Eyes:      General: No scleral icterus  Conjunctiva/sclera: Conjunctivae normal    Neck:      Musculoskeletal: Neck supple  Thyroid: No thyroid mass or thyromegaly  Vascular: Decreased carotid pulses  Carotid bruit (transmitted murmur) present  No JVD  Cardiovascular:      Rate and Rhythm: Normal rate and regular rhythm        Pulses:           Dorsalis pedis pulses are 1+ on the right side and 1+ on the left side  Posterior tibial pulses are 2+ on the right side and 2+ on the left side  Heart sounds: Murmur present  Crescendo  decrescendo  systolic (basal) murmur present with a grade of 3/6  Diastolic murmur present  No friction rub  No gallop  Pulmonary:      Breath sounds: Normal breath sounds  No wheezing, rhonchi or rales  Abdominal:      Palpations: Abdomen is soft  There is no hepatomegaly, splenomegaly or mass  Tenderness: There is no abdominal tenderness  Musculoskeletal:         General: Swelling (1+ edema in the LEs) and deformity (kyphosis) present  Discussion/Summary:  1  PSVT-no recurrence since 2019 since ablation  Palpitations he has are likely related to premature beats  2  Aortic stenosis  Moderate on echo in September of last year  Murmur unchanged  Having no symptoms referable to this  Will see patient in 9 months time in order echo at that time  3  Hypertension with renal disease  Excellent control on ramipril 10 mg daily and hydrochlorothiazide 25 mg daily  4  Hyperlipidemia  Patient on rosuvastatin 5 mg daily    Most recent LDL cholesterol from last September 1980 with HDL cholesterol 52 and triglycerides of 86      Spring Oswald MD

## 2021-06-02 DIAGNOSIS — E78.5 HYPERLIPIDEMIA, UNSPECIFIED HYPERLIPIDEMIA TYPE: ICD-10-CM

## 2021-06-02 DIAGNOSIS — I10 HYPERTENSION, UNSPECIFIED TYPE: ICD-10-CM

## 2021-06-02 RX ORDER — ROSUVASTATIN CALCIUM 5 MG/1
5 TABLET, COATED ORAL DAILY
Qty: 90 TABLET | Refills: 0 | Status: SHIPPED | OUTPATIENT
Start: 2021-06-02 | End: 2021-09-07

## 2021-06-02 RX ORDER — RAMIPRIL 10 MG/1
10 CAPSULE ORAL DAILY
Qty: 90 CAPSULE | Refills: 0 | Status: SHIPPED | OUTPATIENT
Start: 2021-06-02 | End: 2021-08-13 | Stop reason: HOSPADM

## 2021-08-11 ENCOUNTER — APPOINTMENT (EMERGENCY)
Dept: RADIOLOGY | Facility: HOSPITAL | Age: 86
DRG: 291 | End: 2021-08-11
Payer: MEDICARE

## 2021-08-11 ENCOUNTER — HOSPITAL ENCOUNTER (INPATIENT)
Facility: HOSPITAL | Age: 86
LOS: 1 days | Discharge: HOME | DRG: 291 | End: 2021-08-13
Attending: EMERGENCY MEDICINE | Admitting: INTERNAL MEDICINE
Payer: MEDICARE

## 2021-08-11 ENCOUNTER — OFFICE VISIT (OUTPATIENT)
Dept: INTERNAL MEDICINE CLINIC | Facility: CLINIC | Age: 86
End: 2021-08-11
Payer: MEDICARE

## 2021-08-11 VITALS
SYSTOLIC BLOOD PRESSURE: 125 MMHG | TEMPERATURE: 97.5 F | HEART RATE: 79 BPM | HEIGHT: 69 IN | DIASTOLIC BLOOD PRESSURE: 71 MMHG | OXYGEN SATURATION: 96 % | WEIGHT: 196 LBS | BODY MASS INDEX: 29.03 KG/M2

## 2021-08-11 DIAGNOSIS — R05.9 COUGH: Primary | ICD-10-CM

## 2021-08-11 DIAGNOSIS — I50.9 ACUTE CONGESTIVE HEART FAILURE, UNSPECIFIED HEART FAILURE TYPE (HCC): Primary | ICD-10-CM

## 2021-08-11 DIAGNOSIS — J18.9 PNEUMONIA DUE TO INFECTIOUS ORGANISM, UNSPECIFIED LATERALITY, UNSPECIFIED PART OF LUNG: ICD-10-CM

## 2021-08-11 PROBLEM — R79.89 ELEVATED SERUM CREATININE: Status: ACTIVE | Noted: 2021-08-11

## 2021-08-11 PROBLEM — R06.02 SHORTNESS OF BREATH: Status: ACTIVE | Noted: 2021-08-11

## 2021-08-11 LAB
ALBUMIN SERPL BCP-MCNC: 3.3 G/DL (ref 3.5–5)
ALP SERPL-CCNC: 102 U/L (ref 46–116)
ALT SERPL W P-5'-P-CCNC: 28 U/L (ref 12–78)
ANION GAP SERPL CALCULATED.3IONS-SCNC: 11 MMOL/L (ref 4–13)
AST SERPL W P-5'-P-CCNC: 18 U/L (ref 5–45)
ATRIAL RATE: 55 BPM
BASOPHILS # BLD AUTO: 0.05 THOUSANDS/ΜL (ref 0–0.1)
BASOPHILS NFR BLD AUTO: 1 % (ref 0–1)
BILIRUB SERPL-MCNC: 0.24 MG/DL (ref 0.2–1)
BUN SERPL-MCNC: 37 MG/DL (ref 5–25)
CALCIUM ALBUM COR SERPL-MCNC: 10 MG/DL (ref 8.3–10.1)
CALCIUM SERPL-MCNC: 9.4 MG/DL (ref 8.3–10.1)
CHLORIDE SERPL-SCNC: 106 MMOL/L (ref 100–108)
CO2 SERPL-SCNC: 24 MMOL/L (ref 21–32)
CREAT SERPL-MCNC: 2.01 MG/DL (ref 0.6–1.3)
EOSINOPHIL # BLD AUTO: 1.08 THOUSAND/ΜL (ref 0–0.61)
EOSINOPHIL NFR BLD AUTO: 11 % (ref 0–6)
ERYTHROCYTE [DISTWIDTH] IN BLOOD BY AUTOMATED COUNT: 13 % (ref 11.6–15.1)
GFR SERPL CREATININE-BSD FRML MDRD: 29 ML/MIN/1.73SQ M
GLUCOSE SERPL-MCNC: 128 MG/DL (ref 65–140)
HCT VFR BLD AUTO: 31.9 % (ref 36.5–49.3)
HGB BLD-MCNC: 10.4 G/DL (ref 12–17)
IMM GRANULOCYTES # BLD AUTO: 0.03 THOUSAND/UL (ref 0–0.2)
IMM GRANULOCYTES NFR BLD AUTO: 0 % (ref 0–2)
LYMPHOCYTES # BLD AUTO: 1.86 THOUSANDS/ΜL (ref 0.6–4.47)
LYMPHOCYTES NFR BLD AUTO: 20 % (ref 14–44)
MCH RBC QN AUTO: 29.1 PG (ref 26.8–34.3)
MCHC RBC AUTO-ENTMCNC: 32.6 G/DL (ref 31.4–37.4)
MCV RBC AUTO: 89 FL (ref 82–98)
MONOCYTES # BLD AUTO: 0.98 THOUSAND/ΜL (ref 0.17–1.22)
MONOCYTES NFR BLD AUTO: 10 % (ref 4–12)
NEUTROPHILS # BLD AUTO: 5.53 THOUSANDS/ΜL (ref 1.85–7.62)
NEUTS SEG NFR BLD AUTO: 58 % (ref 43–75)
NRBC BLD AUTO-RTO: 0 /100 WBCS
NT-PROBNP SERPL-MCNC: 2942 PG/ML
P AXIS: 59 DEGREES
PLATELET # BLD AUTO: 278 THOUSANDS/UL (ref 149–390)
PMV BLD AUTO: 10 FL (ref 8.9–12.7)
POTASSIUM SERPL-SCNC: 4.3 MMOL/L (ref 3.5–5.3)
PR INTERVAL: 188 MS
PROT SERPL-MCNC: 7 G/DL (ref 6.4–8.2)
QRS AXIS: -27 DEGREES
QRSD INTERVAL: 100 MS
QT INTERVAL: 450 MS
QTC INTERVAL: 430 MS
RBC # BLD AUTO: 3.57 MILLION/UL (ref 3.88–5.62)
SARS-COV-2 RNA RESP QL NAA+PROBE: NEGATIVE
SODIUM SERPL-SCNC: 141 MMOL/L (ref 136–145)
T WAVE AXIS: 42 DEGREES
TROPONIN I SERPL-MCNC: 0.03 NG/ML
VENTRICULAR RATE: 55 BPM
WBC # BLD AUTO: 9.53 THOUSAND/UL (ref 4.31–10.16)

## 2021-08-11 PROCEDURE — 99214 OFFICE O/P EST MOD 30 MIN: CPT | Performed by: INTERNAL MEDICINE

## 2021-08-11 PROCEDURE — 36415 COLL VENOUS BLD VENIPUNCTURE: CPT | Performed by: EMERGENCY MEDICINE

## 2021-08-11 PROCEDURE — 93005 ELECTROCARDIOGRAM TRACING: CPT

## 2021-08-11 PROCEDURE — U0005 INFEC AGEN DETEC AMPLI PROBE: HCPCS | Performed by: EMERGENCY MEDICINE

## 2021-08-11 PROCEDURE — 99285 EMERGENCY DEPT VISIT HI MDM: CPT

## 2021-08-11 PROCEDURE — 99285 EMERGENCY DEPT VISIT HI MDM: CPT | Performed by: EMERGENCY MEDICINE

## 2021-08-11 PROCEDURE — U0003 INFECTIOUS AGENT DETECTION BY NUCLEIC ACID (DNA OR RNA); SEVERE ACUTE RESPIRATORY SYNDROME CORONAVIRUS 2 (SARS-COV-2) (CORONAVIRUS DISEASE [COVID-19]), AMPLIFIED PROBE TECHNIQUE, MAKING USE OF HIGH THROUGHPUT TECHNOLOGIES AS DESCRIBED BY CMS-2020-01-R: HCPCS | Performed by: EMERGENCY MEDICINE

## 2021-08-11 PROCEDURE — 71045 X-RAY EXAM CHEST 1 VIEW: CPT

## 2021-08-11 PROCEDURE — 85025 COMPLETE CBC W/AUTO DIFF WBC: CPT | Performed by: EMERGENCY MEDICINE

## 2021-08-11 PROCEDURE — 83880 ASSAY OF NATRIURETIC PEPTIDE: CPT | Performed by: EMERGENCY MEDICINE

## 2021-08-11 PROCEDURE — 84484 ASSAY OF TROPONIN QUANT: CPT | Performed by: EMERGENCY MEDICINE

## 2021-08-11 PROCEDURE — 93010 ELECTROCARDIOGRAM REPORT: CPT | Performed by: INTERNAL MEDICINE

## 2021-08-11 PROCEDURE — 80053 COMPREHEN METABOLIC PANEL: CPT | Performed by: EMERGENCY MEDICINE

## 2021-08-11 PROCEDURE — 99220 PR INITIAL OBSERVATION CARE/DAY 70 MINUTES: CPT | Performed by: PHYSICIAN ASSISTANT

## 2021-08-11 RX ORDER — DOCUSATE SODIUM 100 MG/1
100 CAPSULE, LIQUID FILLED ORAL 2 TIMES DAILY PRN
Status: DISCONTINUED | OUTPATIENT
Start: 2021-08-11 | End: 2021-08-13 | Stop reason: HOSPADM

## 2021-08-11 RX ORDER — HEPARIN SODIUM 5000 [USP'U]/ML
5000 INJECTION, SOLUTION INTRAVENOUS; SUBCUTANEOUS EVERY 8 HOURS SCHEDULED
Status: DISCONTINUED | OUTPATIENT
Start: 2021-08-11 | End: 2021-08-13 | Stop reason: HOSPADM

## 2021-08-11 RX ORDER — ONDANSETRON 2 MG/ML
4 INJECTION INTRAMUSCULAR; INTRAVENOUS EVERY 6 HOURS PRN
Status: DISCONTINUED | OUTPATIENT
Start: 2021-08-11 | End: 2021-08-13 | Stop reason: HOSPADM

## 2021-08-11 RX ORDER — ASPIRIN 81 MG/1
162 TABLET, CHEWABLE ORAL DAILY
Status: DISCONTINUED | OUTPATIENT
Start: 2021-08-11 | End: 2021-08-13 | Stop reason: HOSPADM

## 2021-08-11 RX ORDER — LISINOPRIL 20 MG/1
40 TABLET ORAL DAILY
Status: DISCONTINUED | OUTPATIENT
Start: 2021-08-11 | End: 2021-08-12

## 2021-08-11 RX ORDER — HYDROCHLOROTHIAZIDE 12.5 MG/1
12.5 TABLET ORAL DAILY
Status: DISCONTINUED | OUTPATIENT
Start: 2021-08-11 | End: 2021-08-12

## 2021-08-11 RX ORDER — FUROSEMIDE 10 MG/ML
40 INJECTION INTRAMUSCULAR; INTRAVENOUS ONCE
Status: COMPLETED | OUTPATIENT
Start: 2021-08-11 | End: 2021-08-11

## 2021-08-11 RX ORDER — PANTOPRAZOLE SODIUM 40 MG/1
40 TABLET, DELAYED RELEASE ORAL
Status: DISCONTINUED | OUTPATIENT
Start: 2021-08-11 | End: 2021-08-13 | Stop reason: HOSPADM

## 2021-08-11 RX ORDER — FINASTERIDE 5 MG/1
5 TABLET, FILM COATED ORAL DAILY
Status: DISCONTINUED | OUTPATIENT
Start: 2021-08-11 | End: 2021-08-13 | Stop reason: HOSPADM

## 2021-08-11 RX ORDER — TAMSULOSIN HYDROCHLORIDE 0.4 MG/1
0.4 CAPSULE ORAL
Status: DISCONTINUED | OUTPATIENT
Start: 2021-08-11 | End: 2021-08-13 | Stop reason: HOSPADM

## 2021-08-11 RX ORDER — PRAVASTATIN SODIUM 40 MG
40 TABLET ORAL
Status: DISCONTINUED | OUTPATIENT
Start: 2021-08-11 | End: 2021-08-13 | Stop reason: HOSPADM

## 2021-08-11 RX ORDER — ACETAMINOPHEN 325 MG/1
650 TABLET ORAL EVERY 6 HOURS PRN
Status: DISCONTINUED | OUTPATIENT
Start: 2021-08-11 | End: 2021-08-13 | Stop reason: HOSPADM

## 2021-08-11 RX ORDER — CALCIUM CARBONATE 200(500)MG
1000 TABLET,CHEWABLE ORAL DAILY PRN
Status: DISCONTINUED | OUTPATIENT
Start: 2021-08-11 | End: 2021-08-13 | Stop reason: HOSPADM

## 2021-08-11 RX ORDER — FUROSEMIDE 10 MG/ML
40 INJECTION INTRAMUSCULAR; INTRAVENOUS DAILY
Status: DISCONTINUED | OUTPATIENT
Start: 2021-08-12 | End: 2021-08-13

## 2021-08-11 RX ORDER — PANTOPRAZOLE SODIUM 40 MG/1
40 TABLET, DELAYED RELEASE ORAL
Status: DISCONTINUED | OUTPATIENT
Start: 2021-08-12 | End: 2021-08-11

## 2021-08-11 RX ADMIN — HEPARIN SODIUM 5000 UNITS: 5000 INJECTION INTRAVENOUS; SUBCUTANEOUS at 15:30

## 2021-08-11 RX ADMIN — FINASTERIDE 5 MG: 5 TABLET, FILM COATED ORAL at 15:28

## 2021-08-11 RX ADMIN — TAMSULOSIN HYDROCHLORIDE 0.4 MG: 0.4 CAPSULE ORAL at 18:10

## 2021-08-11 RX ADMIN — HEPARIN SODIUM 5000 UNITS: 5000 INJECTION INTRAVENOUS; SUBCUTANEOUS at 22:26

## 2021-08-11 RX ADMIN — FUROSEMIDE 40 MG: 10 INJECTION, SOLUTION INTRAVENOUS at 13:41

## 2021-08-11 RX ADMIN — PANTOPRAZOLE SODIUM 40 MG: 40 TABLET, DELAYED RELEASE ORAL at 22:25

## 2021-08-11 RX ADMIN — LISINOPRIL 40 MG: 20 TABLET ORAL at 15:28

## 2021-08-11 NOTE — PROGRESS NOTES
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 he wound up with a paroxysmal cough the cough is worse at night once he starts coughing he cannot stop and occasionally brings up small amounts of clear sputum he has not been aware of fever or chills he has not been aware of any alteration in taste or smell he has some steps in his house and finds that when he goes up the steps he is more breathless than usual he has experienced a 5-7 lb weight loss he has been under the care of Dr Newton Moss  his cardiologist because of aortic stenosis and a paroxysmal arrhythmia  He has some known chronic renal insufficiency  He has also been under the care of Urology and has been on Proscar and him tamsulosin for BPH    The following portions of the patient's history were reviewed and updated as appropriate: allergies, current medications, past family history, past medical history, past social history, past surgical history and problem list     Review of Systems      Objective:      /71 (BP Location: Left arm, Patient Position: Sitting, Cuff Size: Large)   Pulse 79   Temp 97 5 °F (36 4 °C) (Skin)   Ht 5' 9" (1 753 m)   Wt 88 9 kg (196 lb)   SpO2 96%   BMI 28 94 kg/m²     BP Readings from Last 3 Encounters:   08/11/21 125/71   06/01/21 128/70   03/26/21 150/88      Wt Readings from Last 3 Encounters:   08/11/21 88 9 kg (196 lb)   06/01/21 89 6 kg (197 lb 9 6 oz)   03/26/21 88 5 kg (195 lb)      BMI: Estimated body mass index is 28 94 kg/m² as calculated from the following:    Height as of this encounter: 5' 9" (1 753 m)      Weight as of this encounter: 88 9 kg (196 lb)  BSA: Estimated body surface area is 2 05 meters squared as calculated from the following:    Height as of this encounter: 5' 9" (1 753 m)  Weight as of this encounter: 88 9 kg (196 lb)  Physical Exam   Giovanni Marrero looks tired and not his usual animated self his blood pressure is 140/80 taken by me in the right arm supine the venous pressure appears to be normal at the 30-45 degrees percussion note is resonant throughout however there were myriad   a fine rales at the right base  His crit 3 grade 3/6 harsh ejection type murmur at the cardiac base consistent with aortic valve disease there is no significant peripheral edema based upon his history and physical examination I think we may be dealing with an atypical pneumonia  Will refer him to the hospital for further evaluation      Current Outpatient Medications:     aspirin 81 MG tablet, Take 162 mg by mouth, Disp: , Rfl:     Cholecalciferol (VITAMIN D3) 125 MCG (5000 UT) CAPS, 2 (two) times a week , Disp: , Rfl:     finasteride (PROSCAR) 5 mg tablet, Take 1 tablet (5 mg total) by mouth daily, Disp: 90 tablet, Rfl: 3    hydrochlorothiazide (HYDRODIURIL) 25 mg tablet, Take 1/2 tablet (12 5 mg total) by mouth daily, Disp: 45 tablet, Rfl: 0    Multiple Vitamin (MULTI-VITAMIN DAILY) TABS, Take by mouth, Disp: , Rfl:     omeprazole (PriLOSEC) 20 mg delayed release capsule, Take 1 capsule (20 mg total) by mouth daily, Disp: 90 capsule, Rfl: 1    ramipril (ALTACE) 10 MG capsule, Take 1 capsule (10 mg total) by mouth daily, Disp: 90 capsule, Rfl: 0    rosuvastatin (CRESTOR) 5 mg tablet, Take 1 tablet (5 mg total) by mouth daily, Disp: 90 tablet, Rfl: 0    tamsulosin (FLOMAX) 0 4 mg, Take 1 capsule (0 4 mg total) by mouth daily with dinner, Disp: 30 capsule, Rfl: 11    This note was completed in part utilizing [employer] Direct Software   Grammatical errors, random word insertions, spelling mistakes, and incomplete sentences can be an occasional consequence of this system secondary to software limitations, ambient noise, and hardware issues  If you have any question or concerns about the content, text, or information contained within the body of this dictation, please contact the provider for clarification

## 2021-08-11 NOTE — ASSESSMENT & PLAN NOTE
Patient with shortness of breath on exertion and associated dry cough x2 weeks  · Presented to his PCP with these complaints who sent him to the ED for further evaluation  · BNP elevated at 2942  · Reports 5-7 lb weight loss in the last month  · COVID negative  · No other clinical signs of fluid overload at this time  · Will obtain repeat echocardiogram, last in September 2020  · Status post 40 mg IV Lasix in the emergency department  · Will administer 40 mg IV Lasix on 08/12  · Daily weights, monitor I's and O's  · Follows with outpatient cardiologist, Dr Joe Bryson for aortic stenosis and paroxysmal SVT  · Suspect shortness of breath multifactorial in the setting of potential volume overload, and potential viral illness

## 2021-08-11 NOTE — H&P
[address] 1935, 80 y o  male MRN: [id number]  Unit/Bed#: [facility] Encounter: [id number]  Primary Care Provider: Cristal Quintana MD   Date and time admitted to hospital: 8/11/2021 11:09 AM    * Shortness of breath  Assessment & Plan  Patient with shortness of breath on exertion and associated dry cough x2 weeks  · Presented to his PCP with these complaints who sent him to the ED for further evaluation  · BNP elevated at 2942  · Reports 5-7 lb weight loss in the last month  · COVID negative  · No other clinical signs of fluid overload at this time  · Will obtain repeat echocardiogram, last in September 2020  · Status post 40 mg IV Lasix in the emergency department  · Will administer 40 mg IV Lasix on 08/12  · Daily weights, monitor I's and O's  · Follows with outpatient cardiologist, Dr Laura Gabriel for aortic stenosis and paroxysmal SVT  · Suspect shortness of breath multifactorial in the setting of potential volume overload, and potential viral illness    Elevated serum creatinine  Assessment & Plan  Baseline creatinine 1 9-2  · Creatinine today 2 01  · BMP in a m      Pure hypercholesterolemia  Assessment & Plan  · Patient maintained on Crestor 5 mg, switch to pravastatin while inpatient    Nonrheumatic aortic valve stenosis  Assessment & Plan  · Last evaluated by outpatient cardiologist on 06/01  · Moderate on echo in September of 2020    PSVT ([address]oxysmal supraventricular tachy[address]) ([facility] [address] [facility] )  Assessment & Plan  · Status post ablation 2019    Hypertension  Assessment & Plan  · BP is controlled, most recent 131/30  · Maintained on HCTZ 12 5 mg and ramipril 10 mg-change lisinopril 40 mg while inpatient  · Monitor per unit routine      VTE Prophylaxis: Enoxaparin (Lovenox)  / sequential compression device   Code Status:  Full code  POLST: There is no POLST form on file for this patient (pre-hospital)  Discussion with family:  Discussed plan of care with patient and patient's wife at bedside  Answered any questions  Anticipated Length of Stay:  Patient will be admitted on an Observation basis with an anticipated length of stay of  less than 2 midnights  Justification for Hospital Stay:  IV diuresis    Total Time for Visit, including Counseling / Coordination of Care: 60 minutes  Greater than 50% of this total time spent on direct patient counseling and coordination of care  Chief Complaint:   Shortness of breath, cough    History of Present Illness:    Richard Lazar is a 80 y o  male with past medical history of paroxysmal SVT status post ablation in 2019, aortic stenosis, hypertension, hyperlipidemia who presents with shortness of breath and cough x2 weeks  According to the patient, he reports increased shortness of breath especially with exertion and cough worsened in the evening over the last 2 weeks  He states that his shortness of breath has gotten worse however his cough has slightly improved  He states that he has actually noticed a decrease in his weight approximately 5 lb over the last month  He states that this is likely secondary to decreased p o  Intake because it is very hot outside  He denies any increase in salt intake or fluid intake  He denies any increase in lower extremity swelling  He denies any sick contacts, recent travel, recent surgeries  Denies any alcohol or tobacco use  Review of Systems:    Review of Systems   Constitutional: Negative for chills and fever  HENT: Negative for ear pain and sore throat  Eyes: Negative for pain and visual disturbance  Respiratory: Positive for cough and shortness of breath  Negative for wheezing  Cardiovascular: Negative for chest pain, palpitations and leg swelling  Gastrointestinal: Negative for abdominal pain, constipation, diarrhea and vomiting  Genitourinary: Negative for dysuria and hematuria  Musculoskeletal: Negative for arthralgias and back pain     Skin: Negative for color change and rash  Neurological: Negative for dizziness, seizures, syncope and weakness  Past Medical and Surgical History:     Past Medical History:   Diagnosis Date    Atrial fibrillation ([address] )     Hearing loss     last assessed 11/8/17    Rheumatic fever     Stenosis of aorta     SVT (supraventricular tachycardia) ([facility])        Past Surgical History:   Procedure Laterality Date    APPENDECTOMY      BACK SURGERY      lower    CATARACT EXTRACTION      KNEE SURGERY Left     [place] BIOPSY OF PROSTATE,NEEDLE/PUNCH N/A 3/16/2021    Procedure: Transperineal prostate biopsy with biplanar transrectal ultrasound, using the perineal logic kit; Surgeon: Benigno Macias MD;  [facility];  Service: Urology    TONSILLECTOMY AND ADENOIDECTOMY         Meds/Allergies:    Prior to Admission medications    Medication Sig Start Date End Date Taking? Authorizing Provider   aspirin 81 MG tablet Take 162 mg by mouth   Yes Historical Provider, MD   Cholecalciferol (VITAMIN D3) 125 MCG (5000 UT) CAPS 2 (two) times a week    Yes Historical Provider, MD   finasteride (PROSCAR) 5 mg tablet Take 1 tablet (5 mg total) by mouth daily 3/26/21  Yes Benigno Macias MD   hydrochlorothiazide (HYDRODIURIL) 25 mg tablet Take 1/2 tablet (12 5 mg total) by mouth daily 5/3/21  Yes Odell Gray MD   Multiple Vitamin (MULTI-VITAMIN DAILY) TABS Take by mouth   Yes Historical Provider, MD   omeprazole (PriLOSEC) 20 mg delayed release capsule Take 1 capsule (20 mg total) by mouth daily 1/14/20  Yes Odell Gray MD   ramipril (ALTACE) 10 MG capsule Take 1 capsule (10 mg total) by mouth daily 6/2/21  Yes Odell Gray MD   rosuvastatin (CRESTOR) 5 mg tablet Take 1 tablet (5 mg total) by mouth daily 6/2/21  Yes Odell Gray MD   tamsulosin [facility]) 0 4 mg Take 1 capsule (0 4 mg total) by mouth daily with dinner 12/31/20  Yes Benigno Macias MD     I have reviewed home medications with patient personally      Allergies: No Known Allergies    Social History:     Marital Status: /Civil Union   Occupation: retired pharmacist  Patient Pre-hospital Living Situation:  With   Patient Pre-hospital Level of Mobility:  Ambulatory  Patient Pre-hospital Diet Restrictions:  None  Substance Use History:   Social History     Substance and Sexual Activity   Alcohol Use No    Comment: conflicting no and occasional wine per Allscripts     Social History     Tobacco Use   Smoking Status Never Smoker   Smokeless Tobacco Never Used     Social History     Substance and Sexual Activity   Drug Use No       Family History:    Family History   Problem Relation Age of Onset    Other Mother         old age   Tony Palmer Esophageal cancer Father    Tony Palmer Other Father         old age   Tony Palmer Alcohol abuse Son         alcoholism       Physical Exam:     Vitals:   Blood Pressure: 131/70 (08/11/21 1310)  Pulse: 57 (08/11/21 1310)  Temperature: 98 5 °F (36 9 °C) (08/11/21 1114)  Temp Source: Oral (08/11/21 1114)  Respirations: 18 (08/11/21 1310)  Height: 5' 9" (175 3 cm) (08/11/21 1114)  Weight - Scale: 88 8 kg (195 lb 12 3 oz) (08/11/21 1114)  SpO2: 97 % (08/11/21 1130)    Physical Exam  Vitals and nursing note reviewed  Constitutional:       General: He is not in acute distress  Appearance: Normal appearance  He is well-developed and normal weight  He is not ill-appearing, toxic-appearing or diaphoretic  HENT:      Head: Normocephalic and atraumatic  Eyes:      General: No scleral icterus  Conjunctiva/sclera: Conjunctivae normal    Cardiovascular:      Rate and Rhythm: Normal rate and regular rhythm  Heart sounds: Murmur heard  No friction rub  No gallop  Pulmonary:      Effort: Pulmonary effort is normal  No respiratory distress  Breath sounds: Normal breath sounds  No stridor  No wheezing, rhonchi or rales  Chest:      Chest wall: No tenderness  Abdominal:      General: Abdomen is flat  Bowel sounds are normal  There is no distension  Palpations: Abdomen is soft  Tenderness: There is no abdominal tenderness  Musculoskeletal:      Cervical back: Neck supple  Right lower leg: No edema  Left lower leg: No edema  Skin:     General: Skin is warm and dry  Capillary Refill: Capillary refill takes less than 2 seconds  Coloration: Skin is not jaundiced or pale  Findings: No erythema  Neurological:      General: No focal deficit present  Mental Status: He is alert and oriented to person, place, and time  Mental status is at baseline  Additional Data:     Lab Results: I have personally reviewed pertinent reports  Results from last 7 days   Lab Units 08/11/21  1130   WBC Thousand/uL 9 53   HEMOGLOBIN g/dL 10 4*   HEMATOCRIT % 31 9*   PLATELETS Thousands/uL 278   NEUTROS PCT % 58   LYMPHS PCT % 20   MONOS PCT % 10   EOS PCT % 11*     Results from last 7 days   Lab Units 08/11/21  1130   SODIUM mmol/L 141   POTASSIUM mmol/L 4 3   CHLORIDE mmol/L 106   CO2 mmol/L 24   BUN mg/dL 37*   CREATININE mg/dL 2 01*   ANION GAP mmol/L 11   CALCIUM mg/dL 9 4   ALBUMIN g/dL 3 3*   TOTAL BILIRUBIN mg/dL 0 24   ALK PHOS U/L 102   ALT U/L 28   AST U/L 18   GLUCOSE RANDOM mg/dL 128                       Imaging: I have personally reviewed pertinent reports  XR chest portable   ED Interpretation by Erlinda Mac DO (08/11 1221)   Increased interstitial markings  No infiltrate      Final Result by Eunice Santos MD (08/11 1313)   No acute cardiopulmonary disease  Findings are stable               Workstation performed: [id number]             EKG, Pathology, and Other Studies Reviewed on Admission:   · EKG:  Sinus bradycardia, no acute signs of ischemia    Allscripts / Epic Records Reviewed: Yes     ** Please Note: This note has been constructed using a voice recognition system   **

## 2021-08-11 NOTE — ASSESSMENT & PLAN NOTE
· BP is controlled, most recent 131/30  · Maintained on HCTZ 12 5 mg and ramipril 10 mg-change lisinopril 40 mg while inpatient  · Monitor per unit routine

## 2021-08-11 NOTE — ED PROVIDER NOTES
History  Chief Complaint   Patient presents with    Shortness of Breath     Pt c/o SOB and cough for 2 weeks     54-year-old male with history of atrial fibrillation, aortic valve stenosis presents to the emergency department with a 2 week history of intermittent cough productive of white sputum  Patient states he also generally does not feel well with fatigue and weakness  He states he does have shortness of breath if he walks more than usual or goes up a flight of steps  He has no chest pain  No fevers or chills  No nausea or vomiting  No extremity swelling or weight gain  No known ill contacts or recent travel  He is vaccinated for COVID  He saw his family doctor today and was referred here for rule out pneumonia  History provided by:  Patient   used: No    Cough  Cough characteristics:  Productive  Sputum characteristics:  White  Severity:  Moderate  Onset quality:  Gradual  Duration:  2 weeks  Timing:  Intermittent  Progression:  Unchanged  Chronicity:  New  Smoker: no    Context: not animal exposure, not exposure to allergens, not fumes, not occupational exposure, not sick contacts, not smoke exposure, not upper respiratory infection, not weather changes and not with activity    Relieved by:  None tried  Worsened by:  Nothing  Ineffective treatments:  None tried  Associated symptoms: shortness of breath    Associated symptoms: no chest pain, no chills, no diaphoresis, no ear fullness, no ear pain, no eye discharge, no fever, no headaches, no myalgias, no rash, no rhinorrhea, no sinus congestion, no sore throat, no weight loss and no wheezing    Shortness of breath:     Severity:  Mild    Onset quality:  Gradual    Duration:  2 weeks    Timing:  Intermittent    Progression:  Unchanged  Risk factors: no chemical exposure, no recent infection and no recent travel        Prior to Admission Medications   Prescriptions Last Dose Informant Patient Reported? Taking?    Cholecalciferol (VITAMIN D3) 125 MCG (5000 UT) CAPS  Self Yes No   Si (two) times a week    Multiple Vitamin (MULTI-VITAMIN DAILY) TABS  Self Yes No   Sig: Take by mouth   aspirin 81 MG tablet  Self Yes No   Sig: Take 162 mg by mouth   finasteride (PROSCAR) 5 mg tablet  Self No No   Sig: Take 1 tablet (5 mg total) by mouth daily   hydrochlorothiazide (HYDRODIURIL) 25 mg tablet  Self No No   Sig: Take 1/2 tablet (12 5 mg total) by mouth daily   omeprazole (PriLOSEC) 20 mg delayed release capsule  Self No No   Sig: Take 1 capsule (20 mg total) by mouth daily   ramipril (ALTACE) 10 MG capsule   No No   Sig: Take 1 capsule (10 mg total) by mouth daily   rosuvastatin (CRESTOR) 5 mg tablet   No No   Sig: Take 1 tablet (5 mg total) by mouth daily   tamsulosin (FLOMAX) 0 4 mg  Self No No   Sig: Take 1 capsule (0 4 mg total) by mouth daily with dinner      Facility-Administered Medications: None       Past Medical History:   Diagnosis Date    Atrial fibrillation (HCC)     Hearing loss     last assessed 17    Rheumatic fever     Stenosis of aorta     SVT (supraventricular tachycardia) (HCC)        Past Surgical History:   Procedure Laterality Date    APPENDECTOMY      BACK SURGERY      lower    CATARACT EXTRACTION      KNEE SURGERY Left     MI BIOPSY OF PROSTATE,NEEDLE/PUNCH N/A 3/16/2021    Procedure: Transperineal prostate biopsy with biplanar transrectal ultrasound, using the perineal logic kit; Surgeon: Bertram Foss MD;  Location: [facility];  Service: Urology    TONSILLECTOMY AND ADENOIDECTOMY         Family History   Problem Relation Age of Onset    Other Mother         old age   Faith Silence Esophageal cancer Father    Faith Silence Other Father         old age   Faith Silence Alcohol abuse Son         alcoholism     I have reviewed and agree with the history as documented      E-Cigarette/Vaping     E-Cigarette/Vaping Substances     Social History     Tobacco Use    Smoking status: Never Smoker    Smokeless tobacco: Never Used   Substance Use Topics    Alcohol use: No     Comment: conflicting no and occasional wine per [employer]    Drug use: No       Review of Systems   Constitutional: Positive for activity change, appetite change and fatigue  Negative for chills, diaphoresis, fever, unexpected weight change and weight loss  HENT: Negative  Negative for ear pain, rhinorrhea and sore throat  Eyes: Negative  Negative for discharge  Respiratory: Positive for cough and shortness of breath  Negative for wheezing and stridor  Cardiovascular: Negative  Negative for chest pain  Gastrointestinal: Negative  Genitourinary: Negative  Musculoskeletal: Negative for myalgias and neck pain  Skin: Negative  Negative for rash  Allergic/Immunologic: Negative  Neurological: Positive for weakness  Negative for numbness and headaches  Hematological: Negative  Psychiatric/Behavioral: Negative  All other systems reviewed and are negative  Physical Exam  Physical Exam  Vitals and nursing note reviewed  Constitutional:       General: He is awake  He is not in acute distress  Appearance: Normal appearance  He is well-developed and normal weight  He is not ill-appearing, toxic-appearing or diaphoretic  HENT:      Head: Normocephalic and atraumatic  Right Ear: External ear normal       Left Ear: External ear normal       Nose: Nose normal       Mouth/Throat:      Mouth: Mucous membranes are moist    Eyes:      General: No scleral icterus  Conjunctiva/sclera: Conjunctivae normal       Pupils: Pupils are equal, round, and reactive to light  Neck:      Thyroid: No thyromegaly  Vascular: No JVD  Cardiovascular:      Rate and Rhythm: Normal rate and regular rhythm  Heart sounds: Murmur heard  Systolic murmur is present with a grade of 3/6  No gallop  Pulmonary:      Effort: Pulmonary effort is normal  No tachypnea, accessory muscle usage or respiratory distress  Breath sounds: No stridor   Examination of the right-lower field reveals rales  Rales present  No wheezing or rhonchi  Abdominal:      General: Bowel sounds are normal  There is no distension  Palpations: Abdomen is soft  There is no mass  Tenderness: There is no abdominal tenderness  Hernia: No hernia is present  Musculoskeletal:         General: No tenderness or deformity  Normal range of motion  Cervical back: Normal range of motion and neck supple  Right lower leg: No edema  Left lower leg: No edema  Lymphadenopathy:      Cervical: No cervical adenopathy  Skin:     General: Skin is warm and dry  Coloration: Skin is not jaundiced or pale  Findings: No bruising, erythema, lesion or rash  Neurological:      General: No focal deficit present  Mental Status: He is alert and oriented to person, place, and time  Motor: No weakness  Deep Tendon Reflexes: Reflexes are normal and symmetric  Psychiatric:         Mood and Affect: Mood normal          Behavior: Behavior is cooperative           Vital Signs  ED Triage Vitals [08/11/21 1114]   Temperature Pulse Respirations Blood Pressure SpO2   98 5 °F (36 9 °C) 75 18 164/77 98 %      Temp Source Heart Rate Source Patient Position - Orthostatic VS BP Location FiO2 (%)   Oral Monitor -- -- --      Pain Score       No Pain           Vitals:    08/11/21 1114   BP: 164/77   Pulse: 75         Visual Acuity      ED Medications  Medications - No data to display    Diagnostic Studies  Results Reviewed     Procedure Component Value Units Date/Time    Novel Coronavirus (Covid-19),PCR [facility] - 2 Hour Stat [[id number]] Collected: 08/11/21 1127    Lab Status: No result Specimen: Nares from Nasopharyngeal Swab     CBC and differential [[id number]]     Lab Status: No result Specimen: Blood     Comprehensive metabolic panel [[id number]]     Lab Status: No result Specimen: Blood     Troponin I [[id number]]     Lab Status: No result Specimen: Blood     NT-BNP PRO [[id number]]     Lab Status: No result Specimen: Blood                  XR chest 2 views    (Results Pending)              Procedures  ECG 12 Lead Documentation Only    Date/Time: 8/11/2021 11:35 AM  Performed by: Darron Gar DO  Authorized by: Darron Gar DO     Indications / Diagnosis:  Sob  ECG reviewed by me, the ED Provider: yes    Patient location:  ED  Interpretation:     Interpretation: normal    Rate:     ECG rate:  55    ECG rate assessment: bradycardic    Rhythm:     Rhythm: sinus bradycardia    Ectopy:     Ectopy: none    Conduction:     Conduction: normal    ST segments:     ST segments:  Normal  T waves:     T waves: normal               ED Course                                           MDM  Number of Diagnoses or Management Options  Diagnosis management comments: 80-year-old male presents with a 2 week history of intermittent cough productive of white sputum, generalized fatigue, weakness and occasional shortness of breath  No fevers or chills  He denies ill contacts or travel  He is vaccinated for COVID  He went to his family doctor today and was sent here for rule out pneumonia  On exam he is alert no acute distress  He is in no respiratory distress  Does have rales at the right base  Will do cardiac/infectious workup  Will rule out COVID  Rule out pneumonia/CHF       Amount and/or Complexity of Data Reviewed  Clinical lab tests: ordered and reviewed  Tests in the radiology section of CPT®: ordered and reviewed  Review and summarize past medical records: yes  Independent visualization of images, tracings, or specimens: yes        Disposition  Final diagnoses:   None     ED Disposition     None      Follow-up Information    None         Patient's Medications   Discharge Prescriptions    No medications on file     No discharge procedures on file      PDMP Review     None          ED Provider  Electronically Signed by           Darron Gar DO  08/11/21 1130

## 2021-08-12 ENCOUNTER — APPOINTMENT (OUTPATIENT)
Dept: NON INVASIVE DIAGNOSTICS | Facility: HOSPITAL | Age: 86
DRG: 291 | End: 2021-08-12
Payer: MEDICARE

## 2021-08-12 PROBLEM — N40.0 BPH (BENIGN PROSTATIC HYPERPLASIA): Status: ACTIVE | Noted: 2021-08-12

## 2021-08-12 PROBLEM — N28.9 MILD RENAL INSUFFICIENCY: Status: ACTIVE | Noted: 2021-08-11

## 2021-08-12 PROBLEM — I50.33 ACUTE ON CHRONIC DIASTOLIC (CONGESTIVE) HEART FAILURE (HCC): Status: ACTIVE | Noted: 2021-08-11

## 2021-08-12 LAB
ANION GAP SERPL CALCULATED.3IONS-SCNC: 13 MMOL/L (ref 4–13)
BASOPHILS # BLD AUTO: 0.06 THOUSANDS/ΜL (ref 0–0.1)
BASOPHILS NFR BLD AUTO: 1 % (ref 0–1)
BUN SERPL-MCNC: 45 MG/DL (ref 5–25)
CALCIUM SERPL-MCNC: 9.1 MG/DL (ref 8.3–10.1)
CHLORIDE SERPL-SCNC: 105 MMOL/L (ref 100–108)
CO2 SERPL-SCNC: 25 MMOL/L (ref 21–32)
CREAT SERPL-MCNC: 2.31 MG/DL (ref 0.6–1.3)
EOSINOPHIL # BLD AUTO: 1.42 THOUSAND/ΜL (ref 0–0.61)
EOSINOPHIL NFR BLD AUTO: 14 % (ref 0–6)
ERYTHROCYTE [DISTWIDTH] IN BLOOD BY AUTOMATED COUNT: 13 % (ref 11.6–15.1)
GFR SERPL CREATININE-BSD FRML MDRD: 25 ML/MIN/1.73SQ M
GLUCOSE P FAST SERPL-MCNC: 98 MG/DL (ref 65–99)
GLUCOSE SERPL-MCNC: 98 MG/DL (ref 65–140)
HCT VFR BLD AUTO: 31.1 % (ref 36.5–49.3)
HGB BLD-MCNC: 10 G/DL (ref 12–17)
IMM GRANULOCYTES # BLD AUTO: 0.03 THOUSAND/UL (ref 0–0.2)
IMM GRANULOCYTES NFR BLD AUTO: 0 % (ref 0–2)
LYMPHOCYTES # BLD AUTO: 2.26 THOUSANDS/ΜL (ref 0.6–4.47)
LYMPHOCYTES NFR BLD AUTO: 23 % (ref 14–44)
MCH RBC QN AUTO: 29 PG (ref 26.8–34.3)
MCHC RBC AUTO-ENTMCNC: 32.2 G/DL (ref 31.4–37.4)
MCV RBC AUTO: 90 FL (ref 82–98)
MONOCYTES # BLD AUTO: 1.15 THOUSAND/ΜL (ref 0.17–1.22)
MONOCYTES NFR BLD AUTO: 12 % (ref 4–12)
NEUTROPHILS # BLD AUTO: 4.95 THOUSANDS/ΜL (ref 1.85–7.62)
NEUTS SEG NFR BLD AUTO: 50 % (ref 43–75)
NRBC BLD AUTO-RTO: 0 /100 WBCS
PLATELET # BLD AUTO: 274 THOUSANDS/UL (ref 149–390)
PMV BLD AUTO: 11 FL (ref 8.9–12.7)
POTASSIUM SERPL-SCNC: 4.1 MMOL/L (ref 3.5–5.3)
RBC # BLD AUTO: 3.45 MILLION/UL (ref 3.88–5.62)
SODIUM SERPL-SCNC: 143 MMOL/L (ref 136–145)
WBC # BLD AUTO: 9.87 THOUSAND/UL (ref 4.31–10.16)

## 2021-08-12 PROCEDURE — 80048 BASIC METABOLIC PNL TOTAL CA: CPT | Performed by: PHYSICIAN ASSISTANT

## 2021-08-12 PROCEDURE — 99222 1ST HOSP IP/OBS MODERATE 55: CPT | Performed by: INTERNAL MEDICINE

## 2021-08-12 PROCEDURE — 93306 TTE W/DOPPLER COMPLETE: CPT | Performed by: INTERNAL MEDICINE

## 2021-08-12 PROCEDURE — 93306 TTE W/DOPPLER COMPLETE: CPT

## 2021-08-12 PROCEDURE — 99232 SBSQ HOSP IP/OBS MODERATE 35: CPT | Performed by: INTERNAL MEDICINE

## 2021-08-12 PROCEDURE — 85025 COMPLETE CBC W/AUTO DIFF WBC: CPT | Performed by: PHYSICIAN ASSISTANT

## 2021-08-12 RX ORDER — AMLODIPINE BESYLATE 5 MG/1
5 TABLET ORAL EVERY 6 HOURS PRN
Status: DISCONTINUED | OUTPATIENT
Start: 2021-08-12 | End: 2021-08-13

## 2021-08-12 RX ADMIN — TAMSULOSIN HYDROCHLORIDE 0.4 MG: 0.4 CAPSULE ORAL at 16:47

## 2021-08-12 RX ADMIN — HEPARIN SODIUM 5000 UNITS: 5000 INJECTION INTRAVENOUS; SUBCUTANEOUS at 21:41

## 2021-08-12 RX ADMIN — PANTOPRAZOLE SODIUM 40 MG: 40 TABLET, DELAYED RELEASE ORAL at 21:42

## 2021-08-12 RX ADMIN — FUROSEMIDE 40 MG: 10 INJECTION, SOLUTION INTRAVENOUS at 08:37

## 2021-08-12 RX ADMIN — HEPARIN SODIUM 5000 UNITS: 5000 INJECTION INTRAVENOUS; SUBCUTANEOUS at 14:31

## 2021-08-12 RX ADMIN — HYDROCHLOROTHIAZIDE 12.5 MG: 12.5 TABLET ORAL at 08:37

## 2021-08-12 RX ADMIN — ASPIRIN 81 MG CHEWABLE TABLET 162 MG: 81 TABLET CHEWABLE at 08:36

## 2021-08-12 RX ADMIN — PRAVASTATIN SODIUM 40 MG: 40 TABLET ORAL at 16:47

## 2021-08-12 RX ADMIN — LISINOPRIL 40 MG: 20 TABLET ORAL at 08:36

## 2021-08-12 RX ADMIN — FINASTERIDE 5 MG: 5 TABLET, FILM COATED ORAL at 08:37

## 2021-08-12 RX ADMIN — HEPARIN SODIUM 5000 UNITS: 5000 INJECTION INTRAVENOUS; SUBCUTANEOUS at 05:45

## 2021-08-12 NOTE — ASSESSMENT & PLAN NOTE
· Ramipril HCT currently on hold secondary to renal insufficiency and need for diuretics  · Cardiology has started amlodipine p r n  Larayne Paulan

## 2021-08-12 NOTE — ASSESSMENT & PLAN NOTE
· Shortness of breath appears secondary to acute/chronic diastolic CHF  · Currently much improved with IV furosemide  · No evidence of pneumonia  Possibility of bronchitis however symptoms are already improving  Will defer on antibiotics    · Cardiology consulted

## 2021-08-12 NOTE — PLAN OF CARE
Problem: Potential for Falls  Goal: Patient will remain free of falls  Description: INTERVENTIONS:  - Educate patient/family on patient safety including physical limitations  - Instruct patient to call for assistance with activity   - Consult OT/PT to assist with strengthening/mobility   - Keep Call bell within reach  - Keep bed low and locked with side rails adjusted as appropriate  - Keep care items and personal belongings within reach  - Initiate and maintain comfort rounds  - Make Fall Risk Sign visible to staff  - Offer Toileting every  Hours, in advance of need  - Initiate/Maintain alarm  - Obtain necessary fall risk management equipment:   - Apply yellow socks and bracelet for high fall risk patients  - Consider moving patient to room near nurses station  Outcome: Progressing     Problem: Nutrition/Hydration-ADULT  Goal: Nutrient/Hydration intake appropriate for improving, restoring or maintaining nutritional needs  Description: Monitor and assess patient's nutrition/hydration status for malnutrition  Collaborate with interdisciplinary team and initiate plan and interventions as ordered  Monitor patient's weight and dietary intake as ordered or per policy  Utilize nutrition screening tool and intervene as necessary  Determine patient's food preferences and provide high-protein, high-caloric foods as appropriate       INTERVENTIONS:  - Monitor oral intake, urinary output, labs, and treatment plans  - Assess nutrition and hydration status and recommend course of action  - Evaluate amount of meals eaten  - Assist patient with eating if necessary   - Allow adequate time for meals  - Recommend/ encourage appropriate diets, oral nutritional supplements, and vitamin/mineral supplements  - Order, calculate, and assess calorie counts as needed  - Recommend, monitor, and adjust tube feedings and TPN/PPN based on assessed needs  - Assess need for intravenous fluids  - Provide specific nutrition/hydration education as appropriate  - Include patient/family/caregiver in decisions related to nutrition  Outcome: Progressing     Problem: CARDIOVASCULAR - ADULT  Goal: Maintains optimal cardiac output and hemodynamic stability  Description: INTERVENTIONS:  - Monitor I/O, vital signs and rhythm  - Monitor for S/S and trends of decreased cardiac output  - Administer and titrate ordered vasoactive medications to optimize hemodynamic stability  - Assess quality of pulses, skin color and temperature  - Assess for signs of decreased coronary artery perfusion  - Instruct patient to report change in severity of symptoms  Outcome: Progressing  Goal: Absence of cardiac dysrhythmias or at baseline rhythm  Description: INTERVENTIONS:  - Continuous cardiac monitoring, vital signs, obtain 12 lead EKG if ordered  - Administer antiarrhythmic and heart rate control medications as ordered  - Monitor electrolytes and administer replacement therapy as ordered  Outcome: Progressing     Problem: RESPIRATORY - ADULT  Goal: Achieves optimal ventilation and oxygenation  Description: INTERVENTIONS:  - Assess for changes in respiratory status  - Assess for changes in mentation and behavior  - Position to facilitate oxygenation and minimize respiratory effort  - Oxygen administered by appropriate delivery if ordered  - Initiate smoking cessation education as indicated  - Encourage broncho-pulmonary hygiene including cough, deep breathe, Incentive Spirometry  - Assess the need for suctioning and aspirate as needed  - Assess and instruct to report SOB or any respiratory difficulty  - Respiratory Therapy support as indicated  Outcome: Progressing     Problem: METABOLIC, FLUID AND ELECTROLYTES - ADULT  Goal: Electrolytes maintained within normal limits  Description: INTERVENTIONS:  - Monitor labs and assess patient for signs and symptoms of electrolyte imbalances  - Administer electrolyte replacement as ordered  - Monitor response to electrolyte replacements, including repeat lab results as appropriate  - Instruct patient on fluid and nutrition as appropriate  Outcome: Progressing  Goal: Fluid balance maintained  Description: INTERVENTIONS:  - Monitor labs   - Monitor I/O and WT  - Instruct patient on fluid and nutrition as appropriate  - Assess for signs & symptoms of volume excess or deficit  Outcome: Progressing  Goal: Glucose maintained within target range  Description: INTERVENTIONS:  - Monitor Blood Glucose as ordered  - Assess for signs and symptoms of hyperglycemia and hypoglycemia  - Administer ordered medications to maintain glucose within target range  - Assess nutritional intake and initiate nutrition service referral as needed  Outcome: Progressing     Problem: PAIN - ADULT  Goal: Verbalizes/displays adequate comfort level or baseline comfort level  Description: Interventions:  - Encourage patient to monitor pain and request assistance  - Assess pain using appropriate pain scale  - Administer analgesics based on type and severity of pain and evaluate response  - Implement non-pharmacological measures as appropriate and evaluate response  - Consider cultural and social influences on pain and pain management  - Notify physician/advanced practitioner if interventions unsuccessful or patient reports new pain  Outcome: Progressing     Problem: INFECTION - ADULT  Goal: Absence or prevention of progression during hospitalization  Description: INTERVENTIONS:  - Assess and monitor for signs and symptoms of infection  - Monitor lab/diagnostic results  - Monitor all insertion sites, i e  indwelling lines, tubes, and drains  - Monitor endotracheal if appropriate and nasal secretions for changes in amount and color  - [place] appropriate cooling/warming therapies per order  - Administer medications as ordered  - Instruct and encourage patient and family to use good hand hygiene technique  - Identify and instruct in appropriate isolation precautions for identified infection/condition  Outcome: Progressing  Goal: Absence of fever/infection during neutropenic period  Description: INTERVENTIONS:  - Monitor WBC    Outcome: Progressing     Problem: SAFETY ADULT  Goal: Patient will remain free of falls  Description: INTERVENTIONS:  - Educate patient/family on patient safety including physical limitations  - Instruct patient to call for assistance with activity   - Consult OT/PT to assist with strengthening/mobility   - Keep Call bell within reach  - Keep bed low and locked with side rails adjusted as appropriate  - Keep care items and personal belongings within reach  - Initiate and maintain comfort rounds  - Make Fall Risk Sign visible to staff  - Offer Toileting every  Hours, in advance of need  - Initiate/Maintain alarm  - Obtain necessary fall risk management equipment:   - Apply yellow socks and bracelet for high fall risk patients  - Consider moving patient to room near nurses station  Outcome: Progressing  Goal: Maintain or return to baseline ADL function  Description: INTERVENTIONS:  -  Assess patient's ability to carry out ADLs; assess patient's baseline for ADL function and identify physical deficits which impact ability to perform ADLs (bathing, care of mouth/teeth, toileting, grooming, dressing, etc )  - Assess/evaluate cause of self-care deficits   - Assess range of motion  - Assess patient's mobility; develop plan if impaired  - Assess patient's need for assistive devices and provide as appropriate  - Encourage maximum independence but intervene and supervise when necessary  - Involve family in performance of ADLs  - Assess for home care needs following discharge   - Consider OT consult to assist with ADL evaluation and planning for discharge  - Provide patient education as appropriate  Outcome: Progressing  Goal: Maintains/Returns to pre admission functional level  Description: INTERVENTIONS:  - Perform BMAT or MOVE assessment daily    - Set and communicate daily mobility goal to care team and patient/family/caregiver  - Collaborate with rehabilitation services on mobility goals if consulted  - Perform Range of Motion  times a day  - Reposition patient every  hours  - Dangle patient  times a day  - Stand patient  times a day  - Ambulate patient  times a day  - Out of bed to chair  times a day   - Out of bed for meals times a day  - Out of bed for toileting  - Record patient progress and toleration of activity level   Outcome: Progressing     Problem: DISCHARGE PLANNING  Goal: Discharge to home or other facility with appropriate resources  Description: INTERVENTIONS:  - Identify barriers to discharge w/patient and caregiver  - Arrange for needed discharge resources and transportation as appropriate  - Identify discharge learning needs (meds, wound care, etc )  - Arrange for interpretive services to assist at discharge as needed  - Refer to Case Management Department for coordinating discharge planning if the patient needs post-hospital services based on physician/advanced practitioner order or complex needs related to functional status, cognitive ability, or social support system  Outcome: Progressing     Problem: Knowledge Deficit  Goal: Patient/family/caregiver demonstrates understanding of disease process, treatment plan, medications, and discharge instructions  Description: Complete learning assessment and assess knowledge base    Interventions:  - Provide teaching at level of understanding  - Provide teaching via preferred learning methods  Outcome: Progressing

## 2021-08-12 NOTE — CONSULTS
Consult - Cardiology   Ashish Cabral 80 y o  male MRN: [id number]  Unit/Bed#: E4 -01 Encounter: [id number]        Reason For Consult:  Congestive heart failure               Assessment:  Cough and dyspnea (POA):  Suspect some component of acute diastolic CHF contributing  Aortic valve stenosis:  Moderate per echo September 2020  Hypertension:  Presently with some increased values with SBP range 115-185   O/p Rx:  Ramipril 10 mg, HCTZ 25 mg  Dyslipidemia   Crestor 5 mg daily   FLP 9/3/2020: TChol 146,  Trig 69,  HDL 52,  LDL 80  Hx PSVT - s/p ablation (7/2019)    Other  CKD:  Baseline creatinine  1 7-2 0  Chronic anemia:  Hemoglobin typically mid 10s-low 11s; currently 10 0  BPH:  Proscar+tamsulosin  GERD, Linda's esophagus   Surveillance EGD 7/16/2021:  Esophageal mucosal changes consistent with status diagnosis of short-segment Linda's esophagus, nonerosive gastritis, normal duodenum (pathology report - Linda's mucosa/reflux pattern esophagitis  No dysplasia identified, intestinal metaplasia confirmed)    Discussion / Plan:  #  patient presented with cough and dyspnea as discussed with abnormal proBNP and some mild auscultatory pulmonary rales suggesting some component of CHF-likely diastolic  Now improved following Lasix - though with some associated rise in creatinine           · Repeat echocardiogram performed this morning with review forthcoming with particular eye towards progression of his aortic valvular disease which by exam probably remains moderate approaching severe  · Given improvement and rising creatinine will withhold additional IV diuretic at this point ~~> anticipate resumption of oral therapy tomorrow should his GFR remains stable  · Encouraged increased activity to assess improvement in symptoms/exercise tolerance  History Of Present Illness:  Ashish Cabral is an 80-year-old patient Kortney Smiley cardiologist Dr Maria Luisa Gonzalez    Cardiac problems are noted to include PSVT for which she underwent ablation in 2019, hypertension, dyslipidemia, and non rheumatic calcific aortic valve stenosis  This gentleman was last seen in our office in June of this year which time he reported no symptoms of chest pain, dyspnea, presyncope, edema or other signs of CHF  He admitted to some occasional palpitations but no feelings of sustained tachycardia  Yesterday this gentleman was to the office of his PCP, Dr Maik Martinez, a 1-2 week history of progressive cough which seems worse at night  He had had only a modest amount of clear expectorant without subjective fever or chills  He had also been experiencing some new effort dyspnea without obvious weight gain (possible loss), or lower extremity edema  He was observed to have unilateral rales at the right base with unremarkable vital signs with a recorded weight of 88 9 kg/196 lb  With concern for his signs and symptoms he was referred to the hospital for further evaluation  Presenting BUN and creatinine were respectively 37 and 2 01 which is not unlike his baseline  ProBNP was 2942 although chest x-ray on arrival reported the lungs as clear  Troponin was normal at 0 03  With further regard to recent symptoms the patient tells me that over the last 1-2 weeks he had developed a modest cough as discussed above  This seemed to occur infrequently throughout the day the patient also noting some occasional component of mild audible wheezing  In recent months because of some overall decreased appetite he has had a mild weight loss of 5-7 lb denying any gain in the last 1-2 weeks  He has also noticed no particular lower extremity edema  He denies any chest pain or perceived cardiac ectopy/tachycardia  As a somewhat sedentary lifestyle suggesting that ascending a flight of steps or carrying a trash bag to the end of his driveway as his most energetic activities    He can typically perform these without much difficulty though implies that in doing so he occasionally has some mild effort dyspnea without any recent worsening  Since arrival the patient was given IV dose of Lasix in the emergency department which was repeated this morning  With this he has noticed decrease in his cough and wheeze  He has ambulated in his room with comfort though he has had no greater level of activity  Past Medical History:        Past Medical History:   Diagnosis Date    Atrial fibrillation ([address] )     Hearing loss     last assessed 11/8/17    Rheumatic fever     Stenosis of aorta     SVT (supraventricular tachycardia) (HCC)       Past Surgical History:   Procedure Laterality Date    APPENDECTOMY      BACK SURGERY      lower    CATARACT EXTRACTION      KNEE SURGERY Left     NC BIOPSY OF PROSTATE,NEEDLE/PUNCH N/A 3/16/2021    Procedure: Transperineal prostate biopsy with biplanar transrectal ultrasound, using the perineal logic kit; Surgeon: Junior Bryant MD;  Location: [facility];  Service: Urology    TONSILLECTOMY AND ADENOIDECTOMY          Allergy:        No Known Allergies    Medications:       Prior to Admission medications    Medication Sig Start Date End Date Taking?  Authorizing Provider   aspirin 81 MG tablet Take 162 mg by mouth   Yes Historical Provider, MD   Cholecalciferol (VITAMIN D3) 125 MCG (5000 UT) CAPS 2 (two) times a week    Yes Historical Provider, MD   finasteride (PROSCAR) 5 mg tablet Take 1 tablet (5 mg total) by mouth daily 3/26/21  Yes Junior Bryant MD   hydrochlorothiazide (HYDRODIURIL) 25 mg tablet Take 1/2 tablet (12 5 mg total) by mouth daily 5/3/21  Yes Monica Lozada MD   Multiple Vitamin (MULTI-VITAMIN DAILY) TABS Take by mouth   Yes Historical Provider, MD   omeprazole (PriLOSEC) 20 mg delayed release capsule Take 1 capsule (20 mg total) by mouth daily 1/14/20  Yes Monica Lozada MD   ramipril (ALTACE) 10 MG capsule Take 1 capsule (10 mg total) by mouth daily 6/2/21  Yes Monica Lozada MD   rosuvastatin (CRESTOR) 5 mg tablet Take 1 tablet (5 mg total) by mouth daily 6/2/21  Yes Dylan Odonnell MD   tamsulosin [facility]) 0 4 mg Take 1 capsule (0 4 mg total) by mouth daily with dinner 12/31/20  Yes Russell Barron MD       Family History:     Family History   Problem Relation Age of Onset   Elba Garcia Other Mother         old age   Elba Garcia Esophageal cancer Father    Elba Garcia Other Father         old age   Elba Garcia Alcohol abuse Son         alcoholism        Social History:       Social History     Socioeconomic History    Marital status: /Civil Union     Spouse name: None    Number of children: None    Years of education: None    Highest education level: None   Occupational History    None   Tobacco Use    Smoking status: Never Smoker    Smokeless tobacco: Never Used   Substance and Sexual Activity    Alcohol use: No     Comment: conflicting no and occasional wine per [employer]    Drug use: No    Sexual activity: None   Other Topics Concern    None   Social History Narrative    None     Social Determinants of Health     Financial Resource Strain:     Difficulty of Paying Living Expenses:    Food Insecurity:     Worried About Running Out of Food in the Last Year:     Ran Out of Food in the Last Year:    Transportation Needs:     Lack of Transportation (Medical):      Lack of Transportation (Non-Medical):    Physical Activity:     Days of Exercise per Week:     Minutes of Exercise per Session:    Stress:     Feeling of Stress :    Social Connections:     Frequency of Communication with Friends and Family:     Frequency of Social Gatherings with Friends and Family:     Attends [employer] Services:     Active Member of Clubs or Organizations:     Attends Club or Organization Meetings:     Marital Status:    Intimate Partner Violence:     Fear of Current or Ex-Partner:     Emotionally Abused:     Physically Abused:     Sexually Abused:        ROS:  Symptoms per HPI  The remainder of the review of systems is negative    Exam:  General:  Alert, normally conversant, comfortable appearing  Head: Normocephalic, atraumatic  Eyes:  EOMI  Pupils - equal, round, reactive to accomodation  No icterus  Normal Conjunctiva  Oropharynx: Moist without lesion  Neck:  No gross bruit, JVD, thyromegaly, or lymphadenopathy  Heart:  Regular with controlled rate  Occasional premature ectopy  Prominent basilar ANGEL LUIS transmitted over the precordium into the neck with diminished but audible S2  Lungs:  Trace rales at the bases right greater than left  Abdomen:  Soft and nontender with normal bowel sounds  No organomegaly or mass  Lower Limbs:  No edema  Pulses[de-identified]  RLE - DP:  1-2+                 LLE - DP:  1-2+  Musculoskeletal: Independent movement of limbs observed, Formal ROM and strength eval not performed  Neurologic:    Oriented to: person, place, situation  Cranial Nerves: grossly intact - vision, smell, taste, and hearing were not tested  Motor function: grossly normal, symmetric   Sensation: Was not tested      Vitals:    08/12/21 0456 08/12/21 0545 08/12/21 0753 08/12/21 1037   BP:   160/75 116/68   BP Location:   Right arm Right arm   Pulse:   76 56   Resp:   18 18   Temp:   (!) 97 3 °F (36 3 °C) (!) 97 1 °F (36 2 °C)   TempSrc:   Temporal Temporal   SpO2:   96% 96%   Weight: 88 7 kg (195 lb 8 8 oz) 88 7 kg (195 lb 8 8 oz)     Height:               DATA:      ECG:   Sinus bradycardia - heart rate 55  Septal infarct (cited on or before 27-FEB-2021)  When compared with ECG of 27-FEB-2021 11:50,  No significant change was found  Confirmed by Jay aJmes ([id number]) on 8/11/2021 12:52:40 PM               -----------------------------------------------------------------------------------------------------------------------------------------------  Weights:     Wt Readings from Last 20 Encounters:   08/12/21 88 7 kg (195 lb 8 8 oz)   08/11/21 88 9 kg (196 lb)   06/01/21 89 6 kg (197 lb 9 6 oz)   03/26/21 88 5 kg (195 lb)   03/16/21 88 5 kg (195 lb)   03/02/21 92 1 kg (203 lb)   02/27/21 92 2 kg (203 lb 4 2 oz)   12/31/20 88 9 kg (196 lb)   09/01/20 88 5 kg (195 lb)   08/26/20 89 7 kg (197 lb 12 8 oz)   01/14/20 90 7 kg (200 lb)   12/03/19 91 2 kg (201 lb)   08/22/19 91 6 kg (202 lb)   08/12/19 90 2 kg (198 lb 12 8 oz)   07/17/19 90 7 kg (200 lb)   07/10/19 91 6 kg (202 lb)   06/21/19 92 1 kg (203 lb)   06/06/19 94 2 kg (207 lb 10 8 oz)   05/28/19 92 1 kg (203 lb)   05/14/19 93 4 kg (206 lb)   , Body mass index is 28 88 kg/m²           Lab Studies:    Results from last 7 days   Lab Units 08/11/21  1130   TROPONIN I ng/mL 0 03            Results from last 7 days   Lab Units 08/12/21  0502 08/11/21  1130   WBC Thousand/uL 9 87 9 53   HEMOGLOBIN g/dL 10 0* 10 4*   HEMATOCRIT % 31 1* 31 9*   PLATELETS Thousands/uL 274 278   ,   Results from last 7 days   Lab Units 08/12/21  0502 08/11/21  1130   POTASSIUM mmol/L 4 1 4 3   CHLORIDE mmol/L 105 106   CO2 mmol/L 25 24   BUN mg/dL 45* 37*   CREATININE mg/dL 2 31* 2 01*   CALCIUM mg/dL 9 1 9 4   ALK PHOS U/L  --  102   ALT U/L  --  28   AST U/L  --  18

## 2021-08-12 NOTE — ASSESSMENT & PLAN NOTE
· Mild renal insufficiency currently worsened with diuretics  · Continue furosemide for now    Holding ramipril/HCT    Results from last 7 days   Lab Units 08/12/21  0502 08/11/21  1130   BUN mg/dL 45* 37*   CREATININE mg/dL 2 31* 2 01*   EGFR ml/min/1 73sq m 25 29

## 2021-08-12 NOTE — PROGRESS NOTES
[facility]  Progress Note - Isidoro Lopez 1935, 80 y o  male MRN: [id number]  Unit/Bed#: E4 -01 Encounter: [id number]  Primary Care Provider: Nikita Shrewood MD   Date and time admitted to hospital: 8/11/2021 11:09 AM    * Acute on chronic diastolic (congestive) heart failure (HCC)  Assessment & Plan  · Shortness of breath appears secondary to acute/chronic diastolic CHF  · Currently much improved with IV furosemide  · No evidence of pneumonia  Possibility of bronchitis however symptoms are already improving  Will defer on antibiotics  · Cardiology consulted    BPH (benign prostatic hyperplasia)  Assessment & Plan  · Continue finasteride and tamsulosin    Mild renal insufficiency  Assessment & Plan  · Mild renal insufficiency currently worsened with diuretics  · Continue furosemide for now  Holding ramipril/HCT    Results from last 7 days   Lab Units 08/12/21  0502 08/11/21  1130   BUN mg/dL 45* 37*   CREATININE mg/dL 2 31* 2 01*   EGFR ml/min/1 73sq m 25 29       Pure hypercholesterolemia  Assessment & Plan  · Continue pravastatin    PSVT (paroxysmal supraventricular tachycardia) ([facility])  Assessment & Plan  · Status post ablation 1000    Hypertension  Assessment & Plan  · Ramipril HCT currently on hold secondary to renal insufficiency and need for diuretics  · Cardiology has started amlodipine p r n       Linda esophagus  Assessment & Plan  · Continue pantoprazole      VTE Pharmacologic Prophylaxis:   Moderate Risk (Score 3-4) - Pharmacological DVT Prophylaxis Ordered: Heparin  Patient Centered Rounds: I have performed bedside rounds with nursing staff today  Discussions with Specialists or Other Care Team Provider:     Education and Discussions with Family / Patient:  Son and wife at bedside    Time Spent for Care: 25 mins  More than 50% of total time spent on counseling and coordination of care as described above      Current Length of Stay: 0 day(s)  Current Patient Status: Inpatient   Certification Statement: The patient will require greater than two midnight inpatient hospitalization stay due to IV diuretics and mild renal insufficiency  Discharge Plan / Estimated Discharge Date: 24-48 hours    Code Status: Level 1 - Full Code      Subjective:   Patient seen and examined  Feeling much better today  Minimal shortness of breath if any    Objective:   Vitals: Blood pressure 110/61, pulse (!) 51, temperature (!) 96 4 °F (35 8 °C), temperature source Temporal, resp  rate 18, height 5' 9" (1 753 m), weight 88 7 kg (195 lb 8 8 oz), SpO2 97 %  Physical Exam  Vitals reviewed  Constitutional:       General: He is not in acute distress  Cardiovascular:      Rate and Rhythm: Regular rhythm  Heart sounds: Normal heart sounds  Pulmonary:      Effort: Pulmonary effort is normal       Breath sounds: Decreased breath sounds present  No wheezing  Abdominal:      General: Bowel sounds are normal       Palpations: Abdomen is soft  Tenderness: There is no abdominal tenderness  There is no rebound  Musculoskeletal:         General: No swelling or tenderness  Skin:     General: Skin is warm and dry  Neurological:      General: No focal deficit present  Mental Status: He is alert  Cranial Nerves: No cranial nerve deficit         Additional Data:   Labs:  Results from last 7 days   Lab Units 08/12/21  0502 08/11/21  1130   WBC Thousand/uL 9 87 9 53   HEMOGLOBIN g/dL 10 0* 10 4*   HEMATOCRIT % 31 1* 31 9*   MCV fL 90 89   PLATELETS Thousands/uL 274 278     Results from last 7 days   Lab Units 08/12/21  0502 08/11/21  1130   SODIUM mmol/L 143 141   POTASSIUM mmol/L 4 1 4 3   CHLORIDE mmol/L 105 106   CO2 mmol/L 25 24   ANION GAP mmol/L 13 11   BUN mg/dL 45* 37*   CREATININE mg/dL 2 31* 2 01*   CALCIUM mg/dL 9 1 9 4   ALBUMIN g/dL  --  3 3*   TOTAL BILIRUBIN mg/dL  --  0 24   ALK PHOS U/L  --  102   ALT U/L  --  28   AST U/L  --  18   EGFR ml/min/1 73sq m 25 29   GLUCOSE RANDOM mg/dL 98 128         Results from last 7 days   Lab Units 08/11/21  1130   TROPONIN I ng/mL 0 03     Results from last 7 days   Lab Units 08/11/21  1130   NT-PRO BNP pg/mL 2,942*                      * I Have Reviewed All Lab Data Listed Above  Cultures:         Results from last 7 days   Lab Units 08/11/21  1127   SARS-COV-2  Negative           Lines/Drains:  Invasive Devices     Peripheral Intravenous Line            Peripheral IV 08/11/21 Left Antecubital 1 day          Drain            External Urinary Catheter Large 757 days              Telemetry:   Telemetry Orders (From admission, onward)             48 Hour Telemetry Monitoring  Continuous x 48 hours     Question:  Reason for 48 Hour Telemetry  Answer:  Acute Decompensated CHF (continuous diuretic infusion or total diuretic dose > 200 mg daily, associated electrolyte derangement, ionotropic drip, history of ventricular arrhythmia, or new EF <35%)                  Imaging:  Imaging Reports Reviewed Today Include:   XR chest portable    Result Date: 8/11/2021  Impression: No acute cardiopulmonary disease   Findings are stable Workstation performed: [id number]     Scheduled Meds:  Current Facility-Administered Medications   Medication Dose Route Frequency Provider Last Rate    acetaminophen  650 mg Oral Q6H PRN Jasmina Su PA-C      amLODIPine  5 mg Oral Q6H PRN Rachel Dakin, MD      aspirin  162 mg Oral Daily Jes Issa      calcium carbonate  1,000 mg Oral Daily PRN Jasmina Su PA-C      docusate sodium  100 mg Oral BID PRN Jasmina Su PA-C      finasteride  5 mg Oral Daily Jasmina Su PA-C      furosemide  40 mg Intravenous Daily Jasmina Su PA-C      heparin (porcine)  5,000 Units Subcutaneous [address] Jes Arriaga      ondansetron  4 mg Intravenous Q6H PRN Jasmina Su PA-C      pantoprazole  40 mg Oral HS Jes Issa      pravastatin  40 mg Oral Daily With [facility] [place]      tamsulosin 0 4 mg Oral Daily With [address], DO Demetrius Mccabe [facility] Internal Medicine  Hospitalist    ** Please Note: This note has been constructed using a voice recognition system   **

## 2021-08-13 VITALS
TEMPERATURE: 96.6 F | RESPIRATION RATE: 20 BRPM | WEIGHT: 186.51 LBS | HEART RATE: 82 BPM | BODY MASS INDEX: 27.62 KG/M2 | HEIGHT: 69 IN | DIASTOLIC BLOOD PRESSURE: 75 MMHG | SYSTOLIC BLOOD PRESSURE: 110 MMHG | OXYGEN SATURATION: 95 %

## 2021-08-13 PROBLEM — N18.4 CKD (CHRONIC KIDNEY DISEASE) STAGE 4, GFR 15-29 ML/MIN (HCC): Status: ACTIVE | Noted: 2021-08-11

## 2021-08-13 LAB
ALBUMIN SERPL BCP-MCNC: 3.4 G/DL (ref 3.5–5)
ALP SERPL-CCNC: 95 U/L (ref 46–116)
ALT SERPL W P-5'-P-CCNC: 25 U/L (ref 12–78)
ANION GAP SERPL CALCULATED.3IONS-SCNC: 12 MMOL/L (ref 4–13)
AST SERPL W P-5'-P-CCNC: 17 U/L (ref 5–45)
BILIRUB SERPL-MCNC: 0.26 MG/DL (ref 0.2–1)
BUN SERPL-MCNC: 48 MG/DL (ref 5–25)
CALCIUM ALBUM COR SERPL-MCNC: 10.1 MG/DL (ref 8.3–10.1)
CALCIUM SERPL-MCNC: 9.6 MG/DL (ref 8.3–10.1)
CHLORIDE SERPL-SCNC: 104 MMOL/L (ref 100–108)
CO2 SERPL-SCNC: 28 MMOL/L (ref 21–32)
CREAT SERPL-MCNC: 2.35 MG/DL (ref 0.6–1.3)
GFR SERPL CREATININE-BSD FRML MDRD: 24 ML/MIN/1.73SQ M
GLUCOSE SERPL-MCNC: 107 MG/DL (ref 65–140)
POTASSIUM SERPL-SCNC: 4.2 MMOL/L (ref 3.5–5.3)
PROT SERPL-MCNC: 7.2 G/DL (ref 6.4–8.2)
SODIUM SERPL-SCNC: 144 MMOL/L (ref 136–145)

## 2021-08-13 PROCEDURE — 99239 HOSP IP/OBS DSCHRG MGMT >30: CPT | Performed by: INTERNAL MEDICINE

## 2021-08-13 PROCEDURE — 99232 SBSQ HOSP IP/OBS MODERATE 35: CPT | Performed by: INTERNAL MEDICINE

## 2021-08-13 PROCEDURE — 80053 COMPREHEN METABOLIC PANEL: CPT | Performed by: INTERNAL MEDICINE

## 2021-08-13 RX ADMIN — FINASTERIDE 5 MG: 5 TABLET, FILM COATED ORAL at 08:25

## 2021-08-13 RX ADMIN — HEPARIN SODIUM 5000 UNITS: 5000 INJECTION INTRAVENOUS; SUBCUTANEOUS at 06:02

## 2021-08-13 RX ADMIN — ASPIRIN 81 MG CHEWABLE TABLET 162 MG: 81 TABLET CHEWABLE at 08:25

## 2021-08-13 NOTE — ASSESSMENT & PLAN NOTE
· Shortness of breath appears secondary to acute/chronic diastolic CHF  · Much improved with IV furosemide  · No evidence of pneumonia  Possibility of bronchitis however symptoms are already improving  Will defer on antibiotics  · Cardiology consulted    Due to worsening renal function after diuretics recommendation was to hold further diuretics and antihypertensive until seen in follow-up with recheck blood work

## 2021-08-13 NOTE — ASSESSMENT & PLAN NOTE
· Patient was relatively hypo/normotensive during hospitalization    · Ramipril HCT remains on hold secondary to renal insufficiency   · Cardiology cardiology recommends holding all hypertensive medications until repeat blood work or available and seen in follow-up

## 2021-08-13 NOTE — ASSESSMENT & PLAN NOTE
· Mild renal insufficiency currently worsened with diuretics  · Was given IV furosemide but diuretics will be held for now    Holding ramipril/HCT until outpatient follow-up with repeat blood work with cardiology    Results from last 7 days   Lab Units 08/13/21  0615 08/12/21  0502 08/11/21  1130   BUN mg/dL 48* 45* 37*   CREATININE mg/dL 2 35* 2 31* 2 01*   EGFR ml/min/1 73sq m 24 25 29

## 2021-08-13 NOTE — PROGRESS NOTES
Progress Note - Cardiology   Domenic Recinos 80 y o  male MRN: [id number]  Unit/Bed#: E4 -01 Encounter: [id number]    Assessment:  1  Acute diastolic congestive heart failure, now compensated  2  Chronic kidney disease, stage IV  3  Moderately severe aortic stenosis  4  Hypertension presently controlled  5  Hyperlipidemia, controlled  6  Paroxysmal supraventricular tachycardia, status post ablation without recurrence   7  Linda's esophagus   10  Chronic anemia  11  Baseline creatinine 1 7-2 0    Plan:  1  Patient may be discharged today  2  Would discharge on no diuretics including no furosemide  3  Would discharge on no antihypertensive medications  4  Continue Finasteride, pantoprazole, pravastatin, tamsulosin and aspirin   5  Trying to arrange to see patient in 1 week on August 20 at 8:00 a m  Before, [facility]  6  Patient has a slip for a nonfasting BMP on August 19      Interval history:  Patient feels good and is anxious for discharge  His wife's birthday is on Sunday  Will follow him closely as an outpatient      PROBLEM LIST:    Patient Active Problem List    Diagnosis Date Noted    BPH (benign prostatic hyperplasia) 08/12/2021    Cough 08/11/2021    Mild renal insufficiency 08/11/2021    Acute on chronic diastolic (congestive) heart failure ([address] ) 08/11/2021    Nonrheumatic aortic valve stenosis 08/22/2019    Pure hypercholesterolemia 08/22/2019    PVC (premature ventricular contraction) 08/22/2019    Palpitations 23/51/6826    Systolic murmur 80/56/4047    PSVT (paroxysmal supraventricular tachycardia) ([address] ) 11/07/2018    Spondylolisthesis 06/30/2016    Anemia 05/26/2016    Spinal stenosis of lumbar region 05/02/2016    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    Esophageal reflux 02/19/2013    Mixed hyperlipidemia 02/19/2013    Hypertension 08/23/2012       Vitals: /75 (BP Location: Right arm)   Pulse 82   Temp (!) 96 6 °F (35 9 °C) (Temporal)   Resp 20   Ht 5' 9" (1 753 m) Wt 84 6 kg (186 lb 8 2 oz)   SpO2 95%   BMI 27 54 kg/m²   PREVIOUS WEIGHTS:   Wt Readings from Last 5 Encounters:   08/13/21 84 6 kg (186 lb 8 2 oz)   08/11/21 88 9 kg (196 lb)   06/01/21 89 6 kg (197 lb 9 6 oz)   03/26/21 88 5 kg (195 lb)   03/16/21 88 5 kg (195 lb)        Labs/Data:        Results from last 7 days   Lab Units 08/12/21  0502 08/11/21  1130   WBC Thousand/uL 9 87 9 53   HEMOGLOBIN g/dL 10 0* 10 4*   HEMATOCRIT % 31 1* 31 9*   MCV fL 90 89   MCH pg 29 0 29 1   MCHC g/dL 32 2 32 6   PLATELETS Thousands/uL 274 278     Results from last 7 days   Lab Units 08/13/21  0615 08/12/21  0502 08/11/21  1130   EGFR ml/min/1 73sq m 24 25 29   SODIUM mmol/L 144 143 141   POTASSIUM mmol/L 4 2 4 1 4 3   CHLORIDE mmol/L 104 105 106   CO2 mmol/L 28 25 24   BUN mg/dL 48* 45* 37*   CREATININE mg/dL 2 35* 2 31* 2 01*     Results from last 7 days   Lab Units 08/13/21  0615 08/12/21  0502 08/11/21  1130   CALCIUM mg/dL 9 6 9 1 9 4   AST U/L 17  --  18   ALT U/L 25  --  28   ALK PHOS U/L 95  --  102     Results from last 7 days   Lab Units 08/11/21  1130   TROPONIN I ng/mL 0 03     Lab Results   Component Value Date    NTBNP 2,942 (H) 08/11/2021    NTBNP 1,530 (H) 02/27/2021     Lab Results   Component Value Date    CHOLESTEROL 146 09/03/2020    TRIG 69 09/03/2020    HDL 52 09/03/2020    LDLCALC 80 09/03/2020              Current Facility-Administered Medications:     acetaminophen (TYLENOL) tablet 650 mg, 650 mg, Oral, Q6H PRN, Kyleigh Javed PA-C    aspirin chewable tablet 162 mg, 162 mg, Oral, Daily, Kyleigh Javed PA-C, 162 mg at 08/13/21 0825    calcium carbonate (TUMS) chewable tablet 1,000 mg, 1,000 mg, Oral, Daily PRN, Kyleigh Javed PA-C    docusate sodium (COLACE) capsule 100 mg, 100 mg, Oral, BID PRN, Kyleigh Javed PA-C    finasteride (PROSCAR) tablet 5 mg, 5 mg, Oral, Daily, Kyleigh Javed PA-C, 5 mg at 08/13/21 0825    heparin (porcine) subcutaneous injection 5,000 Units, 5,000 Units, Subcutaneous, Q8H [facility], Lv Rowe PA-C, 5,000 Units at 08/13/21 0602    ondansetron (ZOFRAN) injection 4 mg, 4 mg, Intravenous, Q6H PRN, Lv Rowe PA-C    pantoprazole (PROTONIX) EC tablet 40 mg, 40 mg, Oral, HS, Lv Rowe PA-C, 40 mg at 08/12/21 2142    pravastatin (PRAVACHOL) tablet 40 mg, 40 mg, Oral, Daily With MICHEAL Sauceda, 40 mg at 08/12/21 1647    tamsulosin (FLOMAX) capsule 0 4 mg, 0 4 mg, Oral, Daily With MICHEAL Sauceda, 0 4 mg at 08/12/21 1647  No Known Allergies      Intake/Output Summary (Last 24 hours) at 8/13/2021 1515  Last data filed at 8/13/2021 1300  Gross per 24 hour   Intake 960 ml   Output --   Net 960 ml       Invasive Devices     Peripheral Intravenous Line            Peripheral IV 08/11/21 Left Antecubital 2 days          Drain            External Urinary Catheter Large 758 days                Review of Systems   Constitutional: Negative for activity change  Respiratory: Negative for cough, chest tightness, shortness of breath and wheezing  Cardiovascular: Negative for chest pain, palpitations and leg swelling  Musculoskeletal: Negative for gait problem  Skin: Negative for color change  Neurological: Negative for dizziness, tremors, syncope, weakness, light-headedness and headaches  Psychiatric/Behavioral: Negative for agitation and confusion  Physical Exam  Vitals reviewed  Constitutional:       General: He is not in acute distress  Appearance: He is well-developed  HENT:      Head: Normocephalic and atraumatic  Neck:      Thyroid: No thyromegaly  Vascular: No carotid bruit or JVD  Trachea: No tracheal deviation  Cardiovascular:      Rate and Rhythm: Normal rate and regular rhythm  Pulses: Normal pulses  Heart sounds: Murmur heard  No friction rub  No gallop  Comments: Grade 3/6 mid to late peaking systolic ejection murmur heard at the base and radiating to the base of the neck    Pulmonary:      Effort: Pulmonary effort is normal  No respiratory distress  Breath sounds: Normal breath sounds  No wheezing, rhonchi or rales  Chest:      Chest wall: No tenderness  Abdominal:      General: Bowel sounds are normal  There is no distension  Palpations: Abdomen is soft  Tenderness: There is no abdominal tenderness  Musculoskeletal:      Cervical back: Normal range of motion and neck supple  Right lower leg: No edema  Left lower leg: No edema  Skin:     General: Skin is warm and dry  Neurological:      General: No focal deficit present  Mental Status: He is alert and oriented to person, place, and time  Gait: Gait normal    Psychiatric:         Mood and Affect: Mood normal          Behavior: Behavior normal          Thought Content: Thought content normal          Judgment: Judgment normal            --------------------------------------------------------------------------------  ECHO:   Results for orders placed during the hospital encounter of 21    Echo complete with contrast if indicated    [address], [address] [place] [address]  [phone]    Transthoracic Echocardiogram  2D, M-mode, Doppler, and Color Doppler    Study date:  12-Aug-2021    Patient: Lyla Araujo  MR number: [id number]  Account number: [de-identified]  : 1935  Age: 80 years  Gender: Male  Status: Outpatient  Location: Bedside  Height: 69 in  Weight: 194 7 lb  BP: 131/ 70 mmHg    Indications: Shortness of breath  Diagnoses: R06 02 - Shortness of breath    Sonographer:  STEVEN Hinton  Primary Physician:  Lorrie Jimenez MD  Referring Physician:  [facility], PASunithaC  Group:  [facility]  Interpreting Physician:  Kavita Estes MD    SUMMARY    LEFT VENTRICLE:  Normal left ventricular systolic function, EF 69%  Normal left ventricular cavity size  Mild concentric left ventricular hypertrophy  Normal left ventricular wall motion  Grade 2 left ventricular diastolic dysfunction  (pseudonormalization), E/a ratio 0 84  Elevated left ventricular filling pressures, average E/E' a ratio 17  16  Decreased left ventricular relaxation, average E' 5 cm/S    LEFT ATRIUM:  Mild left atrial enlargement  RIGHT ATRIUM:  Mild right atrial enlargement  MITRAL VALVE:  There was moderate annular calcification  There was trace regurgitation  AORTIC VALVE:  Heavily calcified tricuspid aortic valve consistent with moderate to severe aortic stenosis and mild aortic regurgitation  Aortic valve maximum velocity 3 4 m/S  Aortic valve mean gradient 31 mmHg  Aortic valve area 0 8-0 9 cm2  TRICUSPID VALVE:  There was mild regurgitation  PULMONARY ARTERIES:  Mild pulmonary hypertension  Pulmonary artery systolic pressure is estimated 38 mmHg  SUMMARY MEASUREMENTS  2D measurements:  Unspecified Anatomy:   %FS was 41 9 %  Ao Diam was 3 4 cm   EDV(Teich) was 68 ml   EF(Teich) was 73 5 %  ESV(Teich) was 18 1 ml  IVSd was 1 2 cm  LA Diam was 4 3 cm  LAAs A2C was 24 2 cm2  LAAs A4C was 23 7 cm2  LAESV A-L A2C was  86 9 ml  LAESV A-L A4C was 79 5 ml  LAESV Index (A-L) was 41 7 ml/m2  LAESV MOD A2C was 82 8 ml  LAESV MOD A4C was 77 ml  LAESV(A-L) was 85 ml  LAESV(MOD BP) was 81 6 ml  LAESVInd MOD BP was 40 ml/m2  LALs A2C was 5 7 cm  LALs A4C  was 6 cm  LVEDV MOD A4C was 125 4 ml  LVEF MOD A4C was 66 7 %  LVESV MOD A4C was 41 8 ml  LVIDd was 4 cm  LVIDs was 2 3 cm  LVLd A4C was 8 6 cm  LVLs A4C was 7 6 cm  LVOT Diam was 2 1 cm  LVPWd was 1 2 cm  RAAs A4C was 19 2 cm2  RAESV A-L was 61 ml  RAESV MOD was 58 6 ml  RALs was 5 2 cm  RVIDd was 3 7 cm  RWT was 0 6   SV MOD A4C was 83 6 ml   SV(Teich) was 50 ml   CW measurements:  Unspecified Anatomy:   AV Env  Ti was 338 ms  AV VTI was 83 1 cm  AV Vmax was 2 9 m/s  AV Vmean was 2 5 m/s  AV maxPG was 34 8 mmHg  AV meanPG was 26 4 mmHg  TR Vmax was 2 9 m/s   TR maxPG was 33 mmHg    MM measurements:  Unspecified Anatomy:   TAPSE was 2 2 cm  PW measurements:  Unspecified Anatomy:   MARLY (VTI) was 0 9 cm2  MARLY Vmax was 1 cm2  AVAI (VTI) was 0 cm2/m2  AVAI Vmax was 0 cm2/m2  DVI was 0 3   E' Avg was 0 1 m/s  E' Lat was 0 m/s  E' Sept was 0 1 m/s  E/E' Avg was 17 2   E/E' Lat was 20 2   E/E' Sept was 14 9   LVOT Env  Ti was 340 ms  LVOT VTI was 21 6 cm  LVOT Vmax was 0 9 m/s  LVOT Vmean was 0 6 m/s  LVOT maxPG was 3 3 mmHg  LVOT meanPG was 1 9 mmHg  LVSI Dopp was 35 1 ml/m2  LVSV Dopp was 71 6 ml   MV A Justen was 1  m/s  MV Dec [place] was 3 2 m/s2  MV DecT was 275 5 ms   MV E Justen was 0 9 m/s  MV E/A Ratio was 0 8   RV S' was 0 2 m/s  HISTORY: PRIOR HISTORY: Rheumatic fever  Risk factors: hypertension and medication-treated hypercholesterolemia  History of moderate stenosis of the aortic valve  PRIOR PROCEDURES: Arrhythmia ablation 2019 for SVT  PROCEDURE: The procedure was performed at the bedside  This was a routine study  The transthoracic approach was used  The study included complete 2D imaging, M-mode, complete spectral Doppler, and color Doppler  The heart rate was 62 bpm,  at the start of the study  Image quality was adequate  LEFT VENTRICLE: Normal left ventricular systolic function, EF 97%  Normal left ventricular cavity size  Mild concentric left ventricular hypertrophy  Normal left ventricular wall motion  Grade 2 left ventricular diastolic dysfunction  (pseudonormalization), E/a ratio 0 84  Elevated left ventricular filling pressures, average E/E' a ratio 17  16  Decreased left ventricular relaxation, average E' 5 cm/S    RIGHT VENTRICLE: The size was normal  Systolic function was normal  Wall thickness was normal     LEFT ATRIUM: Mild left atrial enlargement  RIGHT ATRIUM: Mild right atrial enlargement  MITRAL VALVE: There was moderate annular calcification  Valve structure was normal  There was normal leaflet separation   DOPPLER: The transmitral velocity was within the normal range  There was no evidence for stenosis  There was trace  regurgitation  AORTIC VALVE: Heavily calcified tricuspid aortic valve consistent with moderate to severe aortic stenosis and mild aortic regurgitation  Aortic valve maximum velocity 3 4 m/S  Aortic valve mean gradient 31 mmHg  Aortic valve area 0 8-0 9  cm2  TRICUSPID VALVE: The valve structure was normal  There was normal leaflet separation  DOPPLER: The transtricuspid velocity was within the normal range  There was no evidence for stenosis  There was mild regurgitation  PULMONIC VALVE: DOPPLER: The transpulmonic velocity was within the normal range  There was no regurgitation  PERICARDIUM: There was no pericardial effusion  The pericardium was normal in appearance  AORTA: The root exhibited normal size  PULMONARY ARTERY: Mild pulmonary hypertension  Pulmonary artery systolic pressure is estimated 38 mmHg  SYSTEM MEASUREMENT TABLES    2D  %FS: 41 9 %  Ao Diam: 3 4 cm  EDV(Teich): 68 ml  EF(Teich): 73 5 %  ESV(Teich): 18 1 ml  IVSd: 1 2 cm  LA Diam: 4 3 cm  LAAs A2C: 24 2 cm2  LAAs A4C: 23 7 cm2  LAESV A-L A2C: 86 9 ml  LAESV A-L A4C: 79 5 ml  LAESV Index (A-L): 41 7 ml/m2  LAESV MOD A2C: 82 8 ml  LAESV MOD A4C: 77 ml  LAESV(A-L): 85 ml  LAESV(MOD BP): 81 6 ml  LAESVInd MOD BP: 40 ml/m2  LALs A2C: 5 7 cm  LALs A4C: 6 cm  LVEDV MOD A4C: 125 4 ml  LVEF MOD A4C: 66 7 %  LVESV MOD A4C: 41 8 ml  LVIDd: 4 cm  LVIDs: 2 3 cm  LVLd A4C: 8 6 cm  LVLs A4C: 7 6 cm  LVOT Diam: 2 1 cm  LVPWd: 1 2 cm  RAAs A4C: 19 2 cm2  RAESV A-L: 61 ml  RAESV MOD: 58 6 ml  RALs: 5 2 cm  RVIDd: 3 7 cm  RWT: 0 6  SV MOD A4C: 83 6 ml  SV(Teich): 50 ml    CW  AV Env  Ti: 338 ms  AV VTI: 83 1 cm  AV Vmax: 2 9 m/s  AV Vmean: 2 5 m/s  AV maxP 8 mmHg  AV meanP 4 mmHg  TR Vmax: 2 9 m/s  TR maxP mmHg    MM  TAPSE: 2 2 cm    PW  MARLY (VTI): 0 9 cm2  MARLY Vmax: 1 cm2  AVAI (VTI): 0 cm2/m2  AVAI Vmax: 0 cm2/m2  DVI: 0 3  E' Av 1 m/s  E' Lat: 0 m/s  E' Sept: 0 1 m/s  E/E' Av 2  E/E' Lat: 20 2  E/E' Sept: 14 9  LVOT Env  Ti: 340 ms  LVOT VTI: 21 6 cm  LVOT Vmax: 0 9 m/s  LVOT Vmean: 0 6 m/s  LVOT maxPG: 3 3 mmHg  LVOT meanP 9 mmHg  LVSI Dopp: 35 1 ml/m2  LVSV Dopp: 71 6 ml  MV A Justen: 1 m/s  MV Dec [place]: 3 2 m/s2  MV DecT: 275 5 ms  MV E Justen: 0 9 m/s  MV E/A Ratio: 0 8  RV S': 0 2 m/s    [facility]    Prepared and electronically signed by    Gene Garcia MD  Signed 12-Aug-2021 16:10:06    No results found for this or any previous visit     --------------------------------------------------------------------------------  HOLTER  No results found for this or any previous visit  Results for orders placed during the hospital encounter of 18    Holter monitor - 48 hour    Narrative  48 hour Holter monitor  Predominant rhythm is sinus rhythm with an average heart rate of 61 beats per minute, minimum heart rate of 36 beats per minute and maximum heart rate of 125 beats per minute  There were rare to occasional ventricular ectopic beats noted which were predominantly isolated  There were 924 in 48 hours which comprised 0 5% of all beats  Rare couplets of ventricular ectopy occurred  No ventricular tachycardia occurred  There were occasional supraventricular ectopic beats noted which totaled 1775 in 48 hours  This comprised 1% of all beats  Rare to occasional couplets of atrial ectopy occurred  There were occasional nonsustained runs of supraventricular ectopy with the longest run containing 14 beats at a rate of 121 beats per minute and the fastest run 9 beats at a rate of 164 beats per minute  No heart block or pauses exceeding 1 8 seconds  No symptoms recorded by the patient    Mi Tierney MD      ======================================================     Imaging:   I have personally reviewed pertinent reports          Normal sinus rhythm with occasional PVCs, couplets and triplets

## 2021-08-13 NOTE — DISCHARGE INSTR - AVS FIRST PAGE
· Diastolic congestive heart failure    Hold further diuretics and ramipril/HCT until repeat blood work and seen in in office with cardiology

## 2021-08-13 NOTE — PLAN OF CARE
Problem: Potential for Falls  Goal: Patient will remain free of falls  Description: INTERVENTIONS:  - Educate patient/family on patient safety including physical limitations  - Instruct patient to call for assistance with activity   - Consult OT/PT to assist with strengthening/mobility   - Keep Call bell within reach  - Keep bed low and locked with side rails adjusted as appropriate  - Keep care items and personal belongings within reach  - Initiate and maintain comfort rounds  - Make Fall Risk Sign visible to staff  - Offer Toileting every  Hours, in advance of need  - Initiate/Maintain alarm  - Obtain necessary fall risk management equipment:   - Apply yellow socks and bracelet for high fall risk patients  - Consider moving patient to room near nurses station  Outcome: Progressing     Problem: Nutrition/Hydration-ADULT  Goal: Nutrient/Hydration intake appropriate for improving, restoring or maintaining nutritional needs  Description: Monitor and assess patient's nutrition/hydration status for malnutrition  Collaborate with interdisciplinary team and initiate plan and interventions as ordered  Monitor patient's weight and dietary intake as ordered or per policy  Utilize nutrition screening tool and intervene as necessary  Determine patient's food preferences and provide high-protein, high-caloric foods as appropriate       INTERVENTIONS:  - Monitor oral intake, urinary output, labs, and treatment plans  - Assess nutrition and hydration status and recommend course of action  - Evaluate amount of meals eaten  - Assist patient with eating if necessary   - Allow adequate time for meals  - Recommend/ encourage appropriate diets, oral nutritional supplements, and vitamin/mineral supplements  - Order, calculate, and assess calorie counts as needed  - Recommend, monitor, and adjust tube feedings and TPN/PPN based on assessed needs  - Assess need for intravenous fluids  - Provide specific nutrition/hydration education as appropriate  - Include patient/family/caregiver in decisions related to nutrition  Outcome: Progressing     Problem: CARDIOVASCULAR - ADULT  Goal: Maintains optimal cardiac output and hemodynamic stability  Description: INTERVENTIONS:  - Monitor I/O, vital signs and rhythm  - Monitor for S/S and trends of decreased cardiac output  - Administer and titrate ordered vasoactive medications to optimize hemodynamic stability  - Assess quality of pulses, skin color and temperature  - Assess for signs of decreased coronary artery perfusion  - Instruct patient to report change in severity of symptoms  Outcome: Progressing  Goal: Absence of cardiac dysrhythmias or at baseline rhythm  Description: INTERVENTIONS:  - Continuous cardiac monitoring, vital signs, obtain 12 lead EKG if ordered  - Administer antiarrhythmic and heart rate control medications as ordered  - Monitor electrolytes and administer replacement therapy as ordered  Outcome: Progressing     Problem: RESPIRATORY - ADULT  Goal: Achieves optimal ventilation and oxygenation  Description: INTERVENTIONS:  - Assess for changes in respiratory status  - Assess for changes in mentation and behavior  - Position to facilitate oxygenation and minimize respiratory effort  - Oxygen administered by appropriate delivery if ordered  - Initiate smoking cessation education as indicated  - Encourage broncho-pulmonary hygiene including cough, deep breathe, Incentive Spirometry  - Assess the need for suctioning and aspirate as needed  - Assess and instruct to report SOB or any respiratory difficulty  - Respiratory Therapy support as indicated  Outcome: Progressing     Problem: METABOLIC, FLUID AND ELECTROLYTES - ADULT  Goal: Electrolytes maintained within normal limits  Description: INTERVENTIONS:  - Monitor labs and assess patient for signs and symptoms of electrolyte imbalances  - Administer electrolyte replacement as ordered  - Monitor response to electrolyte replacements, including repeat lab results as appropriate  - Instruct patient on fluid and nutrition as appropriate  Outcome: Progressing  Goal: Fluid balance maintained  Description: INTERVENTIONS:  - Monitor labs   - Monitor I/O and WT  - Instruct patient on fluid and nutrition as appropriate  - Assess for signs & symptoms of volume excess or deficit  Outcome: Progressing  Goal: Glucose maintained within target range  Description: INTERVENTIONS:  - Monitor Blood Glucose as ordered  - Assess for signs and symptoms of hyperglycemia and hypoglycemia  - Administer ordered medications to maintain glucose within target range  - Assess nutritional intake and initiate nutrition service referral as needed  Outcome: Progressing     Problem: PAIN - ADULT  Goal: Verbalizes/displays adequate comfort level or baseline comfort level  Description: Interventions:  - Encourage patient to monitor pain and request assistance  - Assess pain using appropriate pain scale  - Administer analgesics based on type and severity of pain and evaluate response  - Implement non-pharmacological measures as appropriate and evaluate response  - Consider cultural and social influences on pain and pain management  - Notify physician/advanced practitioner if interventions unsuccessful or patient reports new pain  Outcome: Progressing     Problem: INFECTION - ADULT  Goal: Absence or prevention of progression during hospitalization  Description: INTERVENTIONS:  - Assess and monitor for signs and symptoms of infection  - Monitor lab/diagnostic results  - Monitor all insertion sites, i e  indwelling lines, tubes, and drains  - Monitor endotracheal if appropriate and nasal secretions for changes in amount and color  - [place] appropriate cooling/warming therapies per order  - Administer medications as ordered  - Instruct and encourage patient and family to use good hand hygiene technique  - Identify and instruct in appropriate isolation precautions for identified infection/condition  Outcome: Progressing  Goal: Absence of fever/infection during neutropenic period  Description: INTERVENTIONS:  - Monitor WBC    Outcome: Progressing     Problem: SAFETY ADULT  Goal: Patient will remain free of falls  Description: INTERVENTIONS:  - Educate patient/family on patient safety including physical limitations  - Instruct patient to call for assistance with activity   - Consult OT/PT to assist with strengthening/mobility   - Keep Call bell within reach  - Keep bed low and locked with side rails adjusted as appropriate  - Keep care items and personal belongings within reach  - Initiate and maintain comfort rounds  - Make Fall Risk Sign visible to staff  - Offer Toileting every  Hours, in advance of need  - Initiate/Maintain alarm  - Obtain necessary fall risk management equipment:   - Apply yellow socks and bracelet for high fall risk patients  - Consider moving patient to room near nurses station  Outcome: Progressing  Goal: Maintain or return to baseline ADL function  Description: INTERVENTIONS:  -  Assess patient's ability to carry out ADLs; assess patient's baseline for ADL function and identify physical deficits which impact ability to perform ADLs (bathing, care of mouth/teeth, toileting, grooming, dressing, etc )  - Assess/evaluate cause of self-care deficits   - Assess range of motion  - Assess patient's mobility; develop plan if impaired  - Assess patient's need for assistive devices and provide as appropriate  - Encourage maximum independence but intervene and supervise when necessary  - Involve family in performance of ADLs  - Assess for home care needs following discharge   - Consider OT consult to assist with ADL evaluation and planning for discharge  - Provide patient education as appropriate  Outcome: Progressing  Goal: Maintains/Returns to pre admission functional level  Description: INTERVENTIONS:  - Perform BMAT or MOVE assessment daily    - Set and communicate daily mobility goal to care team and patient/family/caregiver  - Collaborate with rehabilitation services on mobility goals if consulted  - Perform Range of Motion  times a day  - Reposition patient every  hours  - Dangle patient  times a day  - Stand patient  times a day  - Ambulate patient  times a day  - Out of bed to chair  times a day   - Out of bed for meals times a day  - Out of bed for toileting  - Record patient progress and toleration of activity level   Outcome: Progressing     Problem: DISCHARGE PLANNING  Goal: Discharge to home or other facility with appropriate resources  Description: INTERVENTIONS:  - Identify barriers to discharge w/patient and caregiver  - Arrange for needed discharge resources and transportation as appropriate  - Identify discharge learning needs (meds, wound care, etc )  - Arrange for interpretive services to assist at discharge as needed  - Refer to Case Management Department for coordinating discharge planning if the patient needs post-hospital services based on physician/advanced practitioner order or complex needs related to functional status, cognitive ability, or social support system  Outcome: Progressing     Problem: Knowledge Deficit  Goal: Patient/family/caregiver demonstrates understanding of disease process, treatment plan, medications, and discharge instructions  Description: Complete learning assessment and assess knowledge base    Interventions:  - Provide teaching at level of understanding  - Provide teaching via preferred learning methods  Outcome: Progressing

## 2021-08-13 NOTE — DISCHARGE SUMMARY
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 CKD and hypertension presents at the suggestion of his PCP for worsening cough and shortness of breath x2 weeks  Symptoms were suggestive of bronchitis and chest x-ray was negative in the ED  He was started on IV furosemide upon admission and respiratory symptoms continued to improve  He was seen in consultation by cardiology the following day  The patient did receive two doses of furosemide 40 mg IV  Further diuretics and hypertensive medications were held due to worsening renal dysfunction  His respiratory status has returned back to baseline and he is no longer short of breath  His ramipril HCT and any diuretics have been held at time of discharge until repeat blood work next week and follow up with cardiology  Case was also discussed with cardiology as well spouse at bedside  Please see problem list listed below  DISCHARGE DIAGNOSES  * Acute on chronic diastolic (congestive) heart failure (HCC)  Assessment & Plan  · Shortness of breath appears secondary to acute/chronic diastolic CHF  · Much improved with IV furosemide  · No evidence of pneumonia  Possibility of bronchitis however symptoms are already improving  Will defer on antibiotics  · Cardiology consulted    Due to worsening renal function after diuretics recommendation was to hold further diuretics and antihypertensive until seen in follow-up with recheck blood work    BPH (benign prostatic hyperplasia)  Assessment & Plan  · Continue finasteride and tamsulosin    CKD (chronic kidney disease) stage 4, GFR 15-29 ml/min ([facility])  Assessment & Plan  · Mild renal insufficiency currently worsened with diuretics  · Was given IV furosemide but diuretics will be held for now  Holding ramipril/HCT until outpatient follow-up with repeat blood work with cardiology    Results from last 7 days   Lab Units 08/13/21  0615 08/12/21  0502 08/11/21  1130   BUN mg/dL 48* 45* 37*   CREATININE mg/dL 2 35* 2 31* 2 01*   EGFR ml/min/1 73sq m 24 25 29       Pure hypercholesterolemia  Assessment & Plan  · Continue rosuvastatin    Nonrheumatic aortic valve stenosis  Assessment & Plan  · Follows with Dr Damien Weinberg as outpatient    PSVT (paroxysmal supraventricular tachycardia) ([facility] [address] )  Assessment & Plan  · Status post ablation     Hypertension  Assessment & Plan  · Patient was relatively hypo/normotensive during hospitalization  · Ramipril HCT remains on hold secondary to renal insufficiency   · Cardiology cardiology recommends holding all hypertensive medications until repeat blood work or available and seen in follow-up    Linda esophagus  Assessment & Plan  · Continue omeprazole    CONSULTING PROVIDERS   IP CONSULT TO CARDIOLOGY    PROCEDURES PERFORMED  Echo complete with contrast if indicated    Result Date: 8/12/2021  Interpreting Physician:  Jose Disla MD   SUMMARY LEFT VENTRICLE: Normal left ventricular systolic function, EF 11%  Normal left ventricular cavity size  Mild concentric left ventricular hypertrophy  Normal left ventricular wall motion  Grade 2 left ventricular diastolic dysfunction (pseudonormalization), E/a ratio 0 84  Elevated left ventricular filling pressures, average E/E' a ratio 17  16  Decreased left ventricular relaxation, average E' 5 cm/S LEFT ATRIUM: Mild left atrial enlargement  RIGHT ATRIUM: Mild right atrial enlargement   MITRAL VALVE: There was moderate annular calcification  There was trace regurgitation  AORTIC VALVE: Heavily calcified tricuspid aortic valve consistent with moderate to severe aortic stenosis and mild aortic regurgitation  Aortic valve maximum velocity 3 4 m/S  Aortic valve mean gradient 31 mmHg  Aortic valve area 0 8-0 9 cm2  TRICUSPID VALVE: There was mild regurgitation  PULMONARY ARTERIES: Mild pulmonary hypertension  Pulmonary artery systolic pressure is estimated 38 mmHg  RADIOLOGY RESULTS  XR chest portable    Result Date: 8/11/2021  Impression: No acute cardiopulmonary disease  Findings are stable Workstation performed: [id number]       LABS  Results from last 7 days   Lab Units 08/12/21  0502 08/11/21  1130   WBC Thousand/uL 9 87 9 53   HEMOGLOBIN g/dL 10 0* 10 4*   HEMATOCRIT % 31 1* 31 9*   MCV fL 90 89   PLATELETS Thousands/uL 274 278     Results from last 7 days   Lab Units 08/13/21  0615 08/12/21  0502 08/11/21  1130   SODIUM mmol/L 144 143 141   POTASSIUM mmol/L 4 2 4 1 4 3   CHLORIDE mmol/L 104 105 106   CO2 mmol/L 28 25 24   BUN mg/dL 48* 45* 37*   CREATININE mg/dL 2 35* 2 31* 2 01*   CALCIUM mg/dL 9 6 9 1 9 4   ALBUMIN g/dL 3 4*  --  3 3*   TOTAL BILIRUBIN mg/dL 0 26  --  0 24   ALK PHOS U/L 95  --  102   ALT U/L 25  --  28   AST U/L 17  --  18   EGFR ml/min/1 73sq m 24 25 29   GLUCOSE RANDOM mg/dL 107 98 128     Results from last 7 days   Lab Units 08/11/21  1130   TROPONIN I ng/mL 0 03     Results from last 7 days   Lab Units 08/11/21  1130   NT-PRO BNP pg/mL 2,942*                Results from last 7 days   Lab Units 08/11/21  1127   SARS-COV-2  Negative         DISCHARGE DAY VISIT AND PHYSICAL EXAM:  Subjective:  Patient seen examined  Spouse at bedside  Feeling much better and back at baseline respiratory status      Vitals:   Blood Pressure: 110/75 (08/13/21 1122)  Pulse: 82 (08/13/21 1122)  Temperature: (!) 96 6 °F (35 9 °C) (08/13/21 1122)  Temp Source: Temporal (08/13/21 1122)  Respirations: 20 (08/13/21 1122)  Height: 5' 9" (175 3 cm) (08/11/21 1114)  Weight - Scale: 84 6 kg (186 lb 8 2 oz) (08/13/21 0600)  SpO2: 95 % (08/13/21 1122)    Physical Exam  Vitals reviewed  Constitutional:       General: He is not in acute distress  HENT:      Head: Atraumatic  Cardiovascular:      Rate and Rhythm: Regular rhythm  Heart sounds: Murmur heard  Pulmonary:      Effort: Pulmonary effort is normal       Breath sounds: Decreased breath sounds present  No wheezing  Abdominal:      General: Bowel sounds are normal       Palpations: Abdomen is soft  Tenderness: There is no abdominal tenderness  There is no rebound  Musculoskeletal:         General: No swelling or tenderness  Skin:     General: Skin is warm and dry  Neurological:      General: No focal deficit present  Mental Status: He is alert and oriented to person, place, and time  Cranial Nerves: No cranial nerve deficit  Psychiatric:         Mood and Affect: Mood normal        Planned Re-admission: No  Discharge Disposition: Home/Self Care    Test Results Pending at Discharge:   Incidental findings:     Medications   · Discharge Medication List: See after visit summary for reconciled discharge medications  Diet restrictions:  Regular diet   Activity restrictions: No strenuous activity  Discharge Condition: stable    Outpatient Follow-Up and Discharge Instructions  See after visit summary section titled Discharge Instructions for information provided to patient and family  Code Status: Level 1 - Full Code  Discharge Statement   I spent 35 minutes discharging the patient  This time was spent on the day of discharge  Greater than 50% of total time was spent with the patient and / or family counseling and / or coordination of care  ** Please Note: This note has been constructed using a voice recognition system   **

## 2021-08-17 ENCOUNTER — PATIENT OUTREACH (OUTPATIENT)
Dept: CASE MANAGEMENT | Facility: HOSPITAL | Age: 86
End: 2021-08-17

## 2021-08-17 ENCOUNTER — TRANSITIONAL CARE MANAGEMENT (OUTPATIENT)
Dept: INTERNAL MEDICINE CLINIC | Facility: CLINIC | Age: 86
End: 2021-08-17

## 2021-08-17 DIAGNOSIS — I50.33 ACUTE ON CHRONIC DIASTOLIC (CONGESTIVE) HEART FAILURE (HCC): Primary | ICD-10-CM

## 2021-08-17 NOTE — PHYSICIAN ADVISOR
Current patient class: Inpatient  The patient is currently on Hospital Day: 3 at [address] [place] [address]      The patient was admitted to the hospital at  4:53 PM on 8/12/21 for the following diagnosis:  SOB (shortness of breath) [R06 02]  Acute congestive heart failure, unspecified heart failure type ([address] ) [I50 9]       There is documentation in the medical record of an expected length of stay of at least 2 midnights  The patient is therefore expected to satisfy the 2 midnight benchmark and given the 2 midnight presumption is appropriate for INPATIENT ADMISSION  Given this expectation of a satisfying stay, CMS instructs us that the patient is most often appropriate for inpatient admission under part A provided medical necessity is documented in the chart  After review of the relevant documentation, labs, vital signs and test results, the patient is appropriate for INPATIENT ADMISSION  Admission to the hospital as an inpatient is a complex decision making process which requires the practitioner to consider the patients presenting complaint, history and physical examination and all relevant testing  With this in mind, in this case, the patient was deemed appropriate for INPATIENT ADMISSION  After review of the documentation and testing available at the time of the admission I concur with this clinical determination of medical necessity  Rationale is as follows: The patient is a 80 yrs old Male who presented to the ED at 8/11/2021 11:09 AM with a chief complaint of Shortness of Breath (Pt c/o SOB and cough for 2 weeks)  Pt with a past medical history of BPH CKD and hypertension presented for worsening cough and shortness of breath x2 weeks  [address] 2942  He was started on IV furosemide upon admission and respiratory symptoms continued to improve  ECHO showed normal ef and diastolic dysfunction  He was seen in consultation by cardiology the following day   He received furosemide 40 mg IV x 2  Cr up to 2 35 from prior 1 95 3/11/21  Further diuretics and hypertensive medications were held due to worsening renal dysfunction  He improved symptomatically and was discharged home  The patients vitals on arrival were   ED Triage Vitals   Temperature Pulse Respirations Blood Pressure SpO2   08/11/21 1114 08/11/21 1114 08/11/21 1114 08/11/21 1114 08/11/21 1114   98 5 °F (36 9 °C) 75 18 164/77 98 %      Temp Source Heart Rate Source Patient Position - Orthostatic VS BP Location FiO2 (%)   08/11/21 1114 08/11/21 1114 08/11/21 1130 08/11/21 1130 --   Oral Monitor Lying Right arm       Pain Score       08/11/21 1114       No Pain           Past Medical History:   Diagnosis Date    Atrial fibrillation ([address] )     Hearing loss     last assessed 11/8/17    Rheumatic fever     Stenosis of aorta     SVT (supraventricular tachycardia) (HCC)      Past Surgical History:   Procedure Laterality Date    APPENDECTOMY      BACK SURGERY      lower    CATARACT EXTRACTION      KNEE SURGERY Left     IN BIOPSY OF PROSTATE,NEEDLE/PUNCH N/A 3/16/2021    Procedure: Transperineal prostate biopsy with biplanar transrectal ultrasound, using the perineal logic kit; Surgeon: Klaus Kiran MD;  Location: [facility];  Service: Urology    TONSILLECTOMY AND ADENOIDECTOMY             Consults have been placed to:   IP CONSULT TO CARDIOLOGY    Vitals:    08/13/21 0337 08/13/21 0600 08/13/21 0747 08/13/21 1122   BP: 100/60  116/68 110/75   BP Location: Right arm  Left arm Right arm   Pulse: 67  75 82   Resp: 20  20 20   Temp: (!) 97 1 °F (36 2 °C)  (!) 95 9 °F (35 5 °C) (!) 96 6 °F (35 9 °C)   TempSrc: Temporal  Temporal Temporal   SpO2: 94%  95% 95%   Weight:  84 6 kg (186 lb 8 2 oz)     Height:           Most recent labs:    No results for input(s): WBC, HGB, HCT, PLT, K, NA, CALCIUM, BUN, CREATININE, LIPASE, AMYLASE, INR, TROPONINI, CKTOTAL, AST, ALT, ALKPHOS, BILITOT in the last 72 hours      Scheduled Meds:  Continuous Infusions:No current facility-administered medications for this encounter  PRN Meds:      Surgical procedures (if appropriate):

## 2021-08-17 NOTE — PROGRESS NOTES
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

## 2021-08-19 ENCOUNTER — APPOINTMENT (OUTPATIENT)
Dept: LAB | Facility: CLINIC | Age: 86
End: 2021-08-19
Payer: MEDICARE

## 2021-08-19 DIAGNOSIS — I50.32 CHRONIC DIASTOLIC CONGESTIVE HEART FAILURE (HCC): ICD-10-CM

## 2021-08-19 LAB
ANION GAP SERPL CALCULATED.3IONS-SCNC: 5 MMOL/L (ref 4–13)
BUN SERPL-MCNC: 42 MG/DL (ref 5–25)
CALCIUM SERPL-MCNC: 9.9 MG/DL (ref 8.3–10.1)
CHLORIDE SERPL-SCNC: 103 MMOL/L (ref 100–108)
CO2 SERPL-SCNC: 26 MMOL/L (ref 21–32)
CREAT SERPL-MCNC: 2.05 MG/DL (ref 0.6–1.3)
GFR SERPL CREATININE-BSD FRML MDRD: 29 ML/MIN/1.73SQ M
GLUCOSE SERPL-MCNC: 106 MG/DL (ref 65–140)
POTASSIUM SERPL-SCNC: 4.6 MMOL/L (ref 3.5–5.3)
SODIUM SERPL-SCNC: 134 MMOL/L (ref 136–145)

## 2021-08-19 PROCEDURE — 36415 COLL VENOUS BLD VENIPUNCTURE: CPT

## 2021-08-19 PROCEDURE — 80048 BASIC METABOLIC PNL TOTAL CA: CPT

## 2021-08-20 ENCOUNTER — OFFICE VISIT (OUTPATIENT)
Dept: CARDIOLOGY CLINIC | Facility: CLINIC | Age: 86
End: 2021-08-20
Payer: MEDICARE

## 2021-08-20 VITALS
HEIGHT: 69 IN | WEIGHT: 187.8 LBS | OXYGEN SATURATION: 96 % | HEART RATE: 70 BPM | DIASTOLIC BLOOD PRESSURE: 80 MMHG | BODY MASS INDEX: 27.81 KG/M2 | SYSTOLIC BLOOD PRESSURE: 134 MMHG

## 2021-08-20 DIAGNOSIS — I10 ESSENTIAL HYPERTENSION: ICD-10-CM

## 2021-08-20 DIAGNOSIS — I35.0 NONRHEUMATIC AORTIC VALVE STENOSIS: ICD-10-CM

## 2021-08-20 DIAGNOSIS — E78.00 PURE HYPERCHOLESTEROLEMIA: ICD-10-CM

## 2021-08-20 DIAGNOSIS — Z98.890 HISTORY OF RADIOFREQUENCY ABLATION PROCEDURE FOR CARDIAC ARRHYTHMIA: ICD-10-CM

## 2021-08-20 DIAGNOSIS — I50.32 CHRONIC DIASTOLIC (CONGESTIVE) HEART FAILURE (HCC): Primary | ICD-10-CM

## 2021-08-20 DIAGNOSIS — I47.1 PSVT (PAROXYSMAL SUPRAVENTRICULAR TACHYCARDIA) (HCC): ICD-10-CM

## 2021-08-20 DIAGNOSIS — N18.4 CKD (CHRONIC KIDNEY DISEASE) STAGE 4, GFR 15-29 ML/MIN (HCC): ICD-10-CM

## 2021-08-20 PROCEDURE — 99214 OFFICE O/P EST MOD 30 MIN: CPT | Performed by: INTERNAL MEDICINE

## 2021-08-20 PROCEDURE — 1123F ACP DISCUSS/DSCN MKR DOCD: CPT | Performed by: INTERNAL MEDICINE

## 2021-08-20 NOTE — PROGRESS NOTES
[facility]  [address]   [address]  [address]  Phone#  [phone]   Fax#  [phone]  *-*-*-*-*-*-*-*-*-*-*-*-*-*-*-*-*-*-*-*-*-*-*-*-*-*-*-*-*-*-*-*-*-*-*-*-*-*-*-*-*-*-*-*-*-*-*-*-*-*-*-*-*-*                                   Cardiology Follow Up      ENCOUNTER DATE: 21 8:01 AM  PATIENT NAME: Mayco Sr   : 1935    MRN: [id number]  AGE:85 y o  SEX: male  Jose A Gonzalez MD     PRIMARY CARE PHYSICIAN: Ruthie Steinberg MD    ACTIVE DIAGNOSIS THIS VISIT  1  Chronic diastolic (congestive) heart failure ([address] )     2  Nonrheumatic aortic valve stenosis     3  Pure hypercholesterolemia     4  PSVT (paroxysmal supraventricular tachycardia) ([address] )     5  Essential hypertension     6  CKD (chronic kidney disease) stage 4, GFR 15-29 ml/min ([facility])     7  History of radiofrequency ablation  for SVT       ACTIVE PROBLEM LIST  Patient Active Problem List   Diagnosis    Anemia    Linda esophagus    Esophageal reflux    Hypertension    Spinal stenosis of lumbar region    Spondylolisthesis    PSVT (paroxysmal supraventricular tachycardia) ([facility])    Palpitations    Systolic murmur    Nonrheumatic aortic valve stenosis    Pure hypercholesterolemia    PVC (premature ventricular contraction)    Cough    CKD (chronic kidney disease) stage 4, GFR 15-29 ml/min (HCC)    Chronic diastolic (congestive) heart failure (HCC)    BPH (benign prostatic hyperplasia)    History of radiofrequency ablation  for SVT       CARDIOLOGY SPECIALTY COMMENTS  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 Sr 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 BPH, CKD stage IV,  primary hypercholesterolemia, hypertension, SVT, status post SVT ablation presents at the suggestion of his PCP for worsening cough and shortness of breath x2 weeks  Symptoms were suggestive of bronchitis and chest x-ray was negative in the ED    He was started on IV furosemide upon admission and respiratory symptoms continued to improve  He was seen in consultation by cardiology the following day  The patient did receive two doses of furosemide 40 mg IV  Further diuretics and hypertensive medications were held due to worsening renal dysfunction  His respiratory status has returned back to baseline and he is no longer short of breath  His ramipril HCT and any diuretics have been held at time of discharge until repeat blood work next week and follow up with cardiology  INTERVAL HISTORY:      Patient recently hospitalized with diastolic congestive heart failure and renal insufficiency  His weight has been stable to slightly down since discharge  He is working on getting use to a low-sodium diet  His kidney function has improved with creatinine 2 35 -> 2 05  His sodium has dropped from 144 -> 134  He feels good  Checked his blood pressure cuff which was reading 166 over 89 compared to when I took it and got 138/80    DISCUSSION/PLAN:        1  Reduced total fluid volume daily to be slightly less than present intake so that sodium does not continue to drop  2  Continue with present medications, patient maintaining weight without a diuretic an want to avoid an Ace inhibitor if possible with renal dysfunction  3   Return on 09/15/2021 at 4:40 p m   4  Obtain nonfasting BMP on 09/13/2021    Lab Studies:    Lab Results   Component Value Date    CHOLESTEROL 146 09/03/2020    CHOLESTEROL 148 08/06/2019    CHOLESTEROL 155 07/20/2017     Lab Results   Component Value Date    TRIG 69 09/03/2020    TRIG 96 08/06/2019    TRIG 57 07/20/2017     Lab Results   Component Value Date    HDL 52 09/03/2020    HDL 43 08/06/2019    HDL 53 07/20/2017     Lab Results   Component Value Date    LDLCALC 80 09/03/2020    LDLCALC 86 08/06/2019    LDLCALC 91 07/20/2017         Lab Results   Component Value Date    NTBNP 2,942 (H) 08/11/2021    NTBNP 1,530 (H) 02/27/2021       Lab Results   Component Value Date    EGFR 29 08/19/2021    EGFR 24 08/13/2021    EGFR 25 08/12/2021    SODIUM 134 (L) 08/19/2021    SODIUM 144 08/13/2021    SODIUM 143 08/12/2021    K 4 6 08/19/2021    K 4 2 08/13/2021    K 4 1 08/12/2021     08/19/2021     08/13/2021     08/12/2021    CO2 26 08/19/2021    CO2 28 08/13/2021    CO2 25 08/12/2021    ANIONGAP 7 05/15/2015    ANIONGAP 8 05/09/2014    BUN 42 (H) 08/19/2021    BUN 48 (H) 08/13/2021    BUN 45 (H) 08/12/2021    CREATININE 2 05 (H) 08/19/2021    CREATININE 2 35 (H) 08/13/2021    CREATININE 2 31 (H) 08/12/2021       Lab Results   Component Value Date    WBC 9 87 08/12/2021    WBC 9 53 08/11/2021    WBC 8 52 03/11/2021    HGB 10 0 (L) 08/12/2021    HGB 10 4 (L) 08/11/2021    HGB 11 0 (L) 03/11/2021    HCT 31 1 (L) 08/12/2021    HCT 31 9 (L) 08/11/2021    HCT 34 9 (L) 03/11/2021    MCV 90 08/12/2021    MCV 89 08/11/2021    MCV 92 03/11/2021    MCH 29 0 08/12/2021    MCH 29 1 08/11/2021    MCH 28 9 03/11/2021    MCHC 32 2 08/12/2021    MCHC 32 6 08/11/2021    MCHC 31 5 03/11/2021     08/12/2021     08/11/2021     03/11/2021      Lab Results   Component Value Date    GLUCOSE 100 05/15/2015    GLUCOSE 97 05/09/2014    CALCIUM 9 9 08/19/2021    CALCIUM 9 6 08/13/2021    CALCIUM 9 1 08/12/2021    AST 17 08/13/2021    AST 18 08/11/2021    AST 13 03/11/2021    ALT 25 08/13/2021    ALT 28 08/11/2021    ALT 24 03/11/2021    ALKPHOS 95 08/13/2021    ALKPHOS 102 08/11/2021    ALKPHOS 93 03/11/2021    PROT 7 4 05/15/2015    PROT 7 4 05/09/2014    BILITOT 0 40 05/15/2015    BILITOT 0 4 05/09/2014    MG 2 1 02/27/2021    MG 1 9 06/06/2019    MG 2 0 11/01/2018     Lab Results   Component Value Date    TROPONINI 0 03 08/11/2021    TROPONINI 0 03 02/27/2021    TROPONINI 0 03 06/06/2019       Lab Results   Component Value Date    FERRITIN 136 09/03/2020    IRON 60 (L) 01/10/2017    TIBC 279 01/10/2017     No results found for this visit on 08/20/21        Current Outpatient Medications:     aspirin 81 MG tablet, Take 162 mg by mouth, Disp: , Rfl:     Cholecalciferol (VITAMIN D3) 125 MCG (5000 UT) CAPS, 2 (two) times a week , Disp: , Rfl:     finasteride (PROSCAR) 5 mg tablet, Take 1 tablet (5 mg total) by mouth daily, Disp: 90 tablet, Rfl: 3    Multiple Vitamin (MULTI-VITAMIN DAILY) TABS, Take by mouth, Disp: , Rfl:     omeprazole (PriLOSEC) 20 mg delayed release capsule, Take 1 capsule (20 mg total) by mouth daily, Disp: 90 capsule, Rfl: 1    rosuvastatin (CRESTOR) 5 mg tablet, Take 1 tablet (5 mg total) by mouth daily, Disp: 90 tablet, Rfl: 0    tamsulosin (FLOMAX) 0 4 mg, Take 1 capsule (0 4 mg total) by mouth daily with dinner, Disp: 30 capsule, Rfl: 11  No Known Allergies    Past Medical History:   Diagnosis Date    Atrial fibrillation (HCC)     Hearing loss     last assessed 11/8/17    Rheumatic fever     Stenosis of aorta     SVT (supraventricular tachycardia) (HCC)      Social History     Socioeconomic History    Marital status: /Civil Union     Spouse name: Not on file    Number of children: Not on file    Years of education: Not on file    Highest education level: Not on file   Occupational History    Not on file   Tobacco Use    Smoking status: Never Smoker    Smokeless tobacco: Never Used   Substance and Sexual Activity    Alcohol use: No     Comment: conflicting no and occasional wine per [employer]    Drug use: No    Sexual activity: Not on file   Other Topics Concern    Not on file   Social History Narrative    Not on file     Social Determinants of Health     Financial Resource Strain:     Difficulty of Paying Living Expenses:    Food Insecurity:     Worried About Running Out of Food in the Last Year:     Ran Out of Food in the Last Year:    Transportation Needs:     Lack of Transportation (Medical):      Lack of Transportation (Non-Medical):    Physical Activity:     Days of Exercise per Week:     Minutes of Exercise per Session:    Stress:     Feeling of Stress :    Social Connections:     Frequency of Communication with Friends and Family:     Frequency of Social Gatherings with Friends and Family:     Attends Faith Services:     Active Member of Clubs or Organizations:     Attends Club or Organization Meetings:     Marital Status:    Intimate Partner Violence:     Fear of Current or Ex-Partner:     Emotionally Abused:     Physically Abused:     Sexually Abused:       Family History   Problem Relation Age of Onset    Other Mother         old age   [facility] Esophageal cancer Father    [facility] Other Father         old age   [facility] Alcohol abuse Son         alcoholism     Past Surgical History:   Procedure Laterality Date    APPENDECTOMY      BACK SURGERY      lower    CATARACT EXTRACTION      KNEE SURGERY Left     WA BIOPSY OF PROSTATE,NEEDLE/PUNCH N/A 3/16/2021    Procedure: Transperineal prostate biopsy with biplanar transrectal ultrasound, using the perineal logic kit; Surgeon: Karolyn Gross MD;  Location: [facility];  Service: Urology    TONSILLECTOMY AND ADENOIDECTOMY         Review of Systems:  Review of Systems   Constitutional: Negative for activity change  Respiratory: Negative for cough, chest tightness, shortness of breath and wheezing  Cardiovascular: Negative for chest pain, palpitations and leg swelling  Musculoskeletal: Negative for gait problem  Skin: Negative for color change  Neurological: Negative for dizziness, tremors, syncope, weakness, light-headedness and headaches  Psychiatric/Behavioral: Negative for agitation and confusion         Physical Exam:  Ht 5' 9" (1 753 m)   Wt 85 2 kg (187 lb 12 8 oz)   BMI 27 73 kg/m²     PREVIOUS WEIGHTS:   Wt Readings from Last 10 Encounters:   08/20/21 85 2 kg (187 lb 12 8 oz)   08/13/21 84 6 kg (186 lb 8 2 oz)   08/11/21 88 9 kg (196 lb)   06/01/21 89 6 kg (197 lb 9 6 oz)   03/26/21 88 5 kg (195 lb)   03/16/21 88 5 kg (195 lb)   03/02/21 92 1 kg (203 lb)   02/27/21 92 2 kg (203 lb 4 2 oz) 20 88 9 kg (196 lb)   20 88 5 kg (195 lb)      Physical Exam  Vitals reviewed  Constitutional:       General: He is not in acute distress  Appearance: He is well-developed  HENT:      Head: Normocephalic and atraumatic  Neck:      Thyroid: No thyromegaly  Vascular: No carotid bruit or JVD  Trachea: No tracheal deviation  Cardiovascular:      Rate and Rhythm: Normal rate and regular rhythm  Pulses: Normal pulses  Heart sounds: Normal heart sounds  No murmur heard  No friction rub  No gallop  Pulmonary:      Effort: Pulmonary effort is normal  No respiratory distress  Breath sounds: Normal breath sounds  No wheezing, rhonchi or rales  Chest:      Chest wall: No tenderness  Abdominal:      General: Bowel sounds are normal  There is no distension  Palpations: Abdomen is soft  Tenderness: There is no abdominal tenderness  Musculoskeletal:      Cervical back: Normal range of motion and neck supple  Right lower leg: No edema  Left lower leg: No edema  Skin:     General: Skin is warm and dry  Neurological:      General: No focal deficit present  Mental Status: He is alert and oriented to person, place, and time  Gait: Gait normal    Psychiatric:         Mood and Affect: Mood normal          Behavior: Behavior normal          Thought Content: Thought content normal          Judgment: Judgment normal        --------------------------------------------------------------------------------  ECHO:   Results for orders placed during the hospital encounter of 21    Echo complete with contrast if indicated    Narrative  [address] [place], [place] [address] [address] [facility]  [phone]    Transthoracic Echocardiogram  2D, M-mode, Doppler, and Color Doppler    Study date:  12-Aug-2021    Patient: Jack Camejo  MR number: [id number]  Account number: [de-identified]  : [id number]  Age: 80 years  Gender: Male  Status: Outpatient  Location: Bedside  Height: 69 in  Weight: 194 7 lb  BP: 131/ 70 mmHg    Indications: Shortness of breath  Diagnoses: R06 02 - Shortness of breath    Sonographer:  STEVEN Zhou  Primary Physician:  Marielena Elizabeth MD  Referring Physician:  Shelli Augustin PA-C  Group:  [facility]  Interpreting Physician:  Martínez Barnett MD    SUMMARY    LEFT VENTRICLE:  Normal left ventricular systolic function, EF 39%  Normal left ventricular cavity size  Mild concentric left ventricular hypertrophy  Normal left ventricular wall motion  Grade 2 left ventricular diastolic dysfunction  (pseudonormalization), E/a ratio 0 84  Elevated left ventricular filling pressures, average E/E' a ratio 17  16  Decreased left ventricular relaxation, average E' 5 cm/S    LEFT ATRIUM:  Mild left atrial enlargement  RIGHT ATRIUM:  Mild right atrial enlargement  MITRAL VALVE:  There was moderate annular calcification  There was trace regurgitation  AORTIC VALVE:  Heavily calcified tricuspid aortic valve consistent with moderate to severe aortic stenosis and mild aortic regurgitation  Aortic valve maximum velocity 3 4 m/S  Aortic valve mean gradient 31 mmHg  Aortic valve area 0 8-0 9 cm2  TRICUSPID VALVE:  There was mild regurgitation  PULMONARY ARTERIES:  Mild pulmonary hypertension  Pulmonary artery systolic pressure is estimated 38 mmHg  SUMMARY MEASUREMENTS  2D measurements:  Unspecified Anatomy:   %FS was 41 9 %  Ao Diam was 3 4 cm   EDV(Teich) was 68 ml   EF(Teich) was 73 5 %  ESV(Teich) was 18 1 ml  IVSd was 1 2 cm  LA Diam was 4 3 cm  LAAs A2C was 24 2 cm2  LAAs A4C was 23 7 cm2  LAESV A-L A2C was  86 9 ml  LAESV A-L A4C was 79 5 ml  LAESV Index (A-L) was 41 7 ml/m2  LAESV MOD A2C was 82 8 ml  LAESV MOD A4C was 77 ml  LAESV(A-L) was 85 ml  LAESV(MOD BP) was 81 6 ml  LAESVInd MOD BP was 40 ml/m2  LALs A2C was 5 7 cm  LALs A4C  was 6 cm    LVEDV MOD A4C was 125 4 ml  LVEF MOD A4C was 66 7 %  LVESV MOD A4C was 41 8 ml  LVIDd was 4 cm  LVIDs was 2 3 cm  LVLd A4C was 8 6 cm  LVLs A4C was 7 6 cm  LVOT Diam was 2 1 cm  LVPWd was 1 2 cm  RAAs A4C was 19 2 cm2  RAESV A-L was 61 ml  RAESV MOD was 58 6 ml  RALs was 5 2 cm  RVIDd was 3 7 cm  RWT was 0 6   SV MOD A4C was 83 6 ml   SV(Teich) was 50 ml   CW measurements:  Unspecified Anatomy:   AV Env  Ti was 338 ms  AV VTI was 83 1 cm  AV Vmax was 2 9 m/s  AV Vmean was 2 5 m/s  AV maxPG was 34 8 mmHg  AV meanPG was 26 4 mmHg  TR Vmax was 2 9 m/s   TR maxPG was 33 mmHg  MM measurements:  Unspecified Anatomy:   TAPSE was 2 2 cm  PW measurements:  Unspecified Anatomy:   MARLY (VTI) was 0 9 cm2  MARLY Vmax was 1 cm2  AVAI (VTI) was 0 cm2/m2  AVAI Vmax was 0 cm2/m2  DVI was 0 3   E' Avg was 0 1 m/s  E' Lat was 0 m/s  E' Sept was 0 1 m/s  E/E' Avg was 17 2   E/E' Lat was 20 2   E/E' Sept was 14 9   LVOT Env  Ti was 340 ms  LVOT VTI was 21 6 cm  LVOT Vmax was 0 9 m/s  LVOT Vmean was 0 6 m/s  LVOT maxPG was 3 3 mmHg  LVOT meanPG was 1 9 mmHg  LVSI Dopp was 35 1 ml/m2  LVSV Dopp was 71 6 ml   MV A Justen was 1  m/s  MV Dec [place] was 3 2 m/s2  MV DecT was 275 5 ms   MV E Justen was 0 9 m/s  MV E/A Ratio was 0 8   RV S' was 0 2 m/s  HISTORY: PRIOR HISTORY: Rheumatic fever  Risk factors: hypertension and medication-treated hypercholesterolemia  History of moderate stenosis of the aortic valve  PRIOR PROCEDURES: Arrhythmia ablation 2019 for SVT  PROCEDURE: The procedure was performed at the bedside  This was a routine study  The transthoracic approach was used  The study included complete 2D imaging, M-mode, complete spectral Doppler, and color Doppler  The heart rate was 62 bpm,  at the start of the study  Image quality was adequate  LEFT VENTRICLE: Normal left ventricular systolic function, EF 86%  Normal left ventricular cavity size  Mild concentric left ventricular hypertrophy   Normal left ventricular wall motion  Grade 2 left ventricular diastolic dysfunction  (pseudonormalization), E/a ratio 0 84  Elevated left ventricular filling pressures, average E/E' a ratio 17  16  Decreased left ventricular relaxation, average E' 5 cm/S    RIGHT VENTRICLE: The size was normal  Systolic function was normal  Wall thickness was normal     LEFT ATRIUM: Mild left atrial enlargement  RIGHT ATRIUM: Mild right atrial enlargement  MITRAL VALVE: There was moderate annular calcification  Valve structure was normal  There was normal leaflet separation  DOPPLER: The transmitral velocity was within the normal range  There was no evidence for stenosis  There was trace  regurgitation  AORTIC VALVE: Heavily calcified tricuspid aortic valve consistent with moderate to severe aortic stenosis and mild aortic regurgitation  Aortic valve maximum velocity 3 4 m/S  Aortic valve mean gradient 31 mmHg  Aortic valve area 0 8-0 9  cm2  TRICUSPID VALVE: The valve structure was normal  There was normal leaflet separation  DOPPLER: The transtricuspid velocity was within the normal range  There was no evidence for stenosis  There was mild regurgitation  PULMONIC VALVE: DOPPLER: The transpulmonic velocity was within the normal range  There was no regurgitation  PERICARDIUM: There was no pericardial effusion  The pericardium was normal in appearance  AORTA: The root exhibited normal size  PULMONARY ARTERY: Mild pulmonary hypertension  Pulmonary artery systolic pressure is estimated 38 mmHg      SYSTEM MEASUREMENT TABLES    2D  %FS: 41 9 %  Ao Diam: 3 4 cm  EDV(Teich): 68 ml  EF(Teich): 73 5 %  ESV(Teich): 18 1 ml  IVSd: 1 2 cm  LA Diam: 4 3 cm  LAAs A2C: 24 2 cm2  LAAs A4C: 23 7 cm2  LAESV A-L A2C: 86 9 ml  LAESV A-L A4C: 79 5 ml  LAESV Index (A-L): 41 7 ml/m2  LAESV MOD A2C: 82 8 ml  LAESV MOD A4C: 77 ml  LAESV(A-L): 85 ml  LAESV(MOD BP): 81 6 ml  LAESVInd MOD BP: 40 ml/m2  LALs A2C: 5 7 cm  LALs A4C: 6 cm  LVEDV MOD A4C: 125 4 ml  LVEF MOD A4C: 66 7 %  LVESV MOD A4C: 41 8 ml  LVIDd: 4 cm  LVIDs: 2 3 cm  LVLd A4C: 8 6 cm  LVLs A4C: 7 6 cm  LVOT Diam: 2 1 cm  LVPWd: 1 2 cm  RAAs A4C: 19 2 cm2  RAESV A-L: 61 ml  RAESV MOD: 58 6 ml  RALs: 5 2 cm  RVIDd: 3 7 cm  RWT: 0 6  SV MOD A4C: 83 6 ml  SV(Teich): 50 ml    CW  AV Env  Ti: 338 ms  AV VTI: 83 1 cm  AV Vmax: 2 9 m/s  AV Vmean: 2 5 m/s  AV maxP 8 mmHg  AV meanP 4 mmHg  TR Vmax: 2 9 m/s  TR maxP mmHg    MM  TAPSE: 2 2 cm    PW  MARLY (VTI): 0 9 cm2  MARLY Vmax: 1 cm2  AVAI (VTI): 0 cm2/m2  AVAI Vmax: 0 cm2/m2  DVI: 0 3  E' Av 1 m/s  E' Lat: 0 m/s  E' Sept: 0 1 m/s  E/E' Av 2  E/E' Lat: 20 2  E/E' Sept: 14 9  LVOT Env  Ti: 340 ms  LVOT VTI: 21 6 cm  LVOT Vmax: 0 9 m/s  LVOT Vmean: 0 6 m/s  LVOT maxPG: 3 3 mmHg  LVOT meanP 9 mmHg  LVSI Dopp: 35 1 ml/m2  LVSV Dopp: 71 6 ml  MV A Justen: 1 m/s  MV Dec [place]: 3 2 m/s2  MV DecT: 275 5 ms  MV E Justen: 0 9 m/s  MV E/A Ratio: 0 8  RV S': 0 2 m/s    [facility]    Prepared and electronically signed by    Aguila Morris MD  Signed 12-Aug-2021 16:10:06    No results found for this or any previous visit     --------------------------------------------------------------------------------  HOLTER  Results for orders placed during the hospital encounter of 18    Holter monitor - 48 hour    Narrative  48 hour Holter monitor  Predominant rhythm is sinus rhythm with an average heart rate of 61 beats per minute, minimum heart rate of 36 beats per minute and maximum heart rate of 125 beats per minute  There were rare to occasional ventricular ectopic beats noted which were predominantly isolated  There were 924 in 48 hours which comprised 0 5% of all beats  Rare couplets of ventricular ectopy occurred  No ventricular tachycardia occurred  There were occasional supraventricular ectopic beats noted which totaled 1775 in 48 hours  This comprised 1% of all beats    Rare to occasional couplets of atrial ectopy occurred  There were occasional nonsustained runs of supraventricular ectopy with the longest run containing 14 beats at a rate of 121 beats per minute and the fastest run 9 beats at a rate of 164 beats per minute  No heart block or pauses exceeding 1 8 seconds  No symptoms recorded by the patient    Clover Cobb MD  ======================================================  Imaging:   I have personally reviewed pertinent reports  Portions of the record may have been created with voice recognition software  Occasional wrong word or "sound a like" substitutions may have occurred due to the inherent limitations of voice recognition software  Read the chart carefully and recognize, using context, where substitutions have occurred      SIGNATURES:   Alexandra Interiano MD

## 2021-08-24 ENCOUNTER — OFFICE VISIT (OUTPATIENT)
Dept: INTERNAL MEDICINE CLINIC | Facility: CLINIC | Age: 86
End: 2021-08-24
Payer: MEDICARE

## 2021-08-24 ENCOUNTER — PATIENT OUTREACH (OUTPATIENT)
Dept: CASE MANAGEMENT | Facility: HOSPITAL | Age: 86
End: 2021-08-24

## 2021-08-24 VITALS
SYSTOLIC BLOOD PRESSURE: 162 MMHG | BODY MASS INDEX: 27.96 KG/M2 | HEIGHT: 69 IN | WEIGHT: 188.8 LBS | DIASTOLIC BLOOD PRESSURE: 87 MMHG | HEART RATE: 71 BPM | TEMPERATURE: 97.9 F

## 2021-08-24 DIAGNOSIS — I50.32 CHRONIC DIASTOLIC (CONGESTIVE) HEART FAILURE (HCC): ICD-10-CM

## 2021-08-24 DIAGNOSIS — I50.9 ACUTE CONGESTIVE HEART FAILURE, UNSPECIFIED HEART FAILURE TYPE (HCC): Primary | ICD-10-CM

## 2021-08-24 DIAGNOSIS — I10 HYPERTENSION, UNSPECIFIED TYPE: ICD-10-CM

## 2021-08-24 PROCEDURE — 99495 TRANSJ CARE MGMT MOD F2F 14D: CPT | Performed by: INTERNAL MEDICINE

## 2021-08-24 RX ORDER — FUROSEMIDE 20 MG/1
20 TABLET ORAL DAILY
Qty: 30 TABLET | Refills: 1 | Status: SHIPPED | OUTPATIENT
Start: 2021-08-24 | End: 2021-12-28

## 2021-08-24 NOTE — PROGRESS NOTES
BMI Counseling: Body mass index is 27 88 kg/m²  The BMI is above normal  Exercise recommendations include exercising 3-5 times per week  No pharmacotherapy was ordered  Patient referred to PCP due to patient being overweight

## 2021-08-24 NOTE — PROGRESS NOTES
Heart Failure Outpatient Care Manager: Call received from the patient regarding information on low sodium diet  This information was sent via postal mail but not yet received  The information was then sent via e-mail as patient is going on vacation  The patient has seen both his PCP and Cardiologist and is cutting back on his fluid intake a bit per Cardiology because of a low Na  The patient is continuing to be engaged in self-care and if confident with self-management  He welcomes continued outreach

## 2021-08-24 NOTE — PROGRESS NOTES
Assessment/Plan:     Diagnoses and all orders for this visit:    Acute congestive heart failure, unspecified heart failure type (HCC)  -     furosemide (LASIX) 20 mg tablet; Take 1 tablet (20 mg total) by mouth daily    Hypertension, unspecified type    Chronic diastolic (congestive) heart failure (HCC)          Subjective:      Patient ID: Acey Goldmann is a 80 y o  male  NELSON Hardy is here today for a post hospital visit accompanied by his wife we referred him to the hospital when he was here the last time because of shortness of breath and significant rales he was felt to have congestive heart failure an echocardiogram was done which and now shows moderate to severe aortic stenosis as well as some other valvular abnormalities he was given some Lasix had a bump in his creatinine the ramipril and hydrochlorothiazide were discontinued and he has simply been on a salt restricted diet he feels that he has lost about 8 lb and his breathing and cough have abated and he feels much better  The following portions of the patient's history were reviewed and updated as appropriate: allergies, current medications, past family history, past medical history, past social history, past surgical history and problem list     Review of Systems      Objective:      /87 (BP Location: Left arm, Patient Position: Sitting, Cuff Size: Standard)   Pulse 71   Temp 97 9 °F (36 6 °C) (Skin)   Ht 5' 9" (1 753 m)   Wt 85 6 kg (188 lb 12 8 oz)   BMI 27 88 kg/m²     BP Readings from Last 3 Encounters:   08/24/21 162/87   08/20/21 134/80   08/13/21 110/75      Wt Readings from Last 3 Encounters:   08/24/21 85 6 kg (188 lb 12 8 oz)   08/20/21 85 2 kg (187 lb 12 8 oz)   08/13/21 84 6 kg (186 lb 8 2 oz)      BMI: Estimated body mass index is 27 88 kg/m² as calculated from the following:    Height as of this encounter: 5' 9" (1 753 m)  Weight as of this encounter: 85 6 kg (188 lb 12 8 oz)      BSA: Estimated body surface area is 2 02 meters squared as calculated from the following:    Height as of this encounter: 5' 9" (1 753 m)  Weight as of this encounter: 85 6 kg (188 lb 12 8 oz)  Physical Exam    He is much more comfortable he is in no distress his pressure is 140/80 he continues to have a grade 3 at least over 6 harsh ejection murmur at the cardiac base rating a to the neck there are just a few by basilar rales certainly much less than before his venous pressure is appears to be normal there is no peripheral edema I also went over with him the results of a CT scan of the neck that he had done a number of months ago that suggested and enlargement of the thyroid is Abdirahman Height is now 80years old he has got significant aortic stenosis and heart failure and he is not interested in any further investigations into his thyroid says regardless of all would be found he would not want anything to be done will give her prescription for some Lasix 20 mg to keep on hand in case he does become breathless at night will see him back in a few weeks he has lab studies appropriately ordered will be seen by Dr Ed Fonseca his cardiologist in follow-up as well we also discussed TAVR    TCM Call (since 7/24/2021)     Date and time call was made  8/17/2021  1:55 PM    Hospital care reviewed  Records not available    Patient was hospitialized at  [facility]        Date of Admission  08/11/21    Date of discharge  08/13/21    Diagnosis  CHF    Disposition  Home    Were the patients medications reviewed and updated  No    Current Symptoms  None      TCM Call (since 7/24/2021)     Should patient be enrolled in anticoag monitoring? No    Scheduled for follow up? Yes    Did you obtain your prescribed medications  Yes    Is transportation to your appointment needed  No    I have advised the patient to call PCP with any new or worsening symptoms  [facility] [address] [place] [address] or [facility] other    Support System  Family;  Spouse    The type of support provided  Physical; Emotional    Do you have social support  Yes, as much as I need    Counseling  Patient          Current Outpatient Medications:     aspirin 81 MG tablet, Take 162 mg by mouth, Disp: , Rfl:     Cholecalciferol (VITAMIN D3) 125 MCG (5000 UT) CAPS, 2 (two) times a week , Disp: , Rfl:     finasteride (PROSCAR) 5 mg tablet, Take 1 tablet (5 mg total) by mouth daily, Disp: 90 tablet, Rfl: 3    Multiple Vitamin (MULTI-VITAMIN DAILY) TABS, Take by mouth, Disp: , Rfl:     omeprazole (PriLOSEC) 20 mg delayed release capsule, Take 1 capsule (20 mg total) by mouth daily, Disp: 90 capsule, Rfl: 1    rosuvastatin (CRESTOR) 5 mg tablet, Take 1 tablet (5 mg total) by mouth daily, Disp: 90 tablet, Rfl: 0    tamsulosin (FLOMAX) 0 4 mg, Take 1 capsule (0 4 mg total) by mouth daily with dinner, Disp: 30 capsule, Rfl: 11    furosemide (LASIX) 20 mg tablet, Take 1 tablet (20 mg total) by mouth daily, Disp: 30 tablet, Rfl: 1    This note was completed in part utilizing [employer] Software  Grammatical errors, random word insertions, spelling mistakes, and incomplete sentences can be an occasional consequence of this system secondary to software limitations, ambient noise, and hardware issues  If you have any question or concerns about the content, text, or information contained within the body of this dictation, please contact the provider for clarification

## 2021-09-07 ENCOUNTER — PATIENT OUTREACH (OUTPATIENT)
Dept: CASE MANAGEMENT | Facility: HOSPITAL | Age: 86
End: 2021-09-07

## 2021-09-07 DIAGNOSIS — E78.5 HYPERLIPIDEMIA, UNSPECIFIED HYPERLIPIDEMIA TYPE: ICD-10-CM

## 2021-09-07 RX ORDER — ROSUVASTATIN CALCIUM 5 MG/1
5 TABLET, COATED ORAL DAILY
Qty: 90 TABLET | Refills: 0 | Status: SHIPPED | OUTPATIENT
Start: 2021-09-07 | End: 2021-11-30

## 2021-09-07 NOTE — PROGRESS NOTES
Heart Failure Outpatient Care Manager: Follow up phone call reveals the patient continues to be very engaged in self-care and is confident with self-management if any signs or symptoms of heart failure surface  The patient is feeling well and stated his blood pressure has improved after losing 10 pounds and remaining cognizant of a low sodium diet

## 2021-09-13 ENCOUNTER — APPOINTMENT (OUTPATIENT)
Dept: LAB | Facility: CLINIC | Age: 86
End: 2021-09-13
Payer: MEDICARE

## 2021-09-13 DIAGNOSIS — I50.32 CHRONIC DIASTOLIC (CONGESTIVE) HEART FAILURE (HCC): ICD-10-CM

## 2021-09-13 DIAGNOSIS — R97.20 ELEVATED PSA: ICD-10-CM

## 2021-09-13 LAB
ANION GAP SERPL CALCULATED.3IONS-SCNC: 7 MMOL/L (ref 4–13)
BUN SERPL-MCNC: 35 MG/DL (ref 5–25)
CALCIUM SERPL-MCNC: 9.6 MG/DL (ref 8.3–10.1)
CHLORIDE SERPL-SCNC: 107 MMOL/L (ref 100–108)
CO2 SERPL-SCNC: 24 MMOL/L (ref 21–32)
CREAT SERPL-MCNC: 2.13 MG/DL (ref 0.6–1.3)
GFR SERPL CREATININE-BSD FRML MDRD: 27 ML/MIN/1.73SQ M
GLUCOSE SERPL-MCNC: 96 MG/DL (ref 65–140)
POTASSIUM SERPL-SCNC: 4.4 MMOL/L (ref 3.5–5.3)
PSA SERPL-MCNC: 1.1 NG/ML (ref 0–4)
SODIUM SERPL-SCNC: 138 MMOL/L (ref 136–145)

## 2021-09-13 PROCEDURE — 36415 COLL VENOUS BLD VENIPUNCTURE: CPT

## 2021-09-13 PROCEDURE — 80048 BASIC METABOLIC PNL TOTAL CA: CPT

## 2021-09-13 PROCEDURE — 84153 ASSAY OF PSA TOTAL: CPT

## 2021-09-15 ENCOUNTER — OFFICE VISIT (OUTPATIENT)
Dept: CARDIOLOGY CLINIC | Facility: CLINIC | Age: 86
End: 2021-09-15
Payer: MEDICARE

## 2021-09-15 VITALS
WEIGHT: 188 LBS | SYSTOLIC BLOOD PRESSURE: 150 MMHG | BODY MASS INDEX: 27.76 KG/M2 | HEART RATE: 80 BPM | DIASTOLIC BLOOD PRESSURE: 80 MMHG

## 2021-09-15 DIAGNOSIS — I49.3 PVC (PREMATURE VENTRICULAR CONTRACTION): ICD-10-CM

## 2021-09-15 DIAGNOSIS — N18.4 CKD (CHRONIC KIDNEY DISEASE) STAGE 4, GFR 15-29 ML/MIN (HCC): ICD-10-CM

## 2021-09-15 DIAGNOSIS — D64.9 ANEMIA, UNSPECIFIED TYPE: ICD-10-CM

## 2021-09-15 DIAGNOSIS — I35.0 NONRHEUMATIC AORTIC VALVE STENOSIS: Chronic | ICD-10-CM

## 2021-09-15 DIAGNOSIS — I10 ESSENTIAL HYPERTENSION: ICD-10-CM

## 2021-09-15 DIAGNOSIS — E78.00 PURE HYPERCHOLESTEROLEMIA: ICD-10-CM

## 2021-09-15 DIAGNOSIS — Z98.890 HISTORY OF RADIOFREQUENCY ABLATION PROCEDURE FOR CARDIAC ARRHYTHMIA: ICD-10-CM

## 2021-09-15 DIAGNOSIS — I47.1 PSVT (PAROXYSMAL SUPRAVENTRICULAR TACHYCARDIA) (HCC): ICD-10-CM

## 2021-09-15 DIAGNOSIS — I50.32 CHRONIC DIASTOLIC (CONGESTIVE) HEART FAILURE (HCC): Primary | ICD-10-CM

## 2021-09-15 PROCEDURE — 99214 OFFICE O/P EST MOD 30 MIN: CPT | Performed by: INTERNAL MEDICINE

## 2021-09-15 RX ORDER — METOPROLOL SUCCINATE 25 MG/1
25 TABLET, EXTENDED RELEASE ORAL DAILY
Qty: 30 TABLET | Refills: 5 | Status: SHIPPED | OUTPATIENT
Start: 2021-09-15 | End: 2022-03-02 | Stop reason: SDUPTHER

## 2021-09-15 NOTE — PROGRESS NOTES
[facility]  [id number] [facility]   [address]  [address]  Phone#  [phone]   Fax#  [phone]  *-*-*-*-*-*-*-*-*-*-*-*-*-*-*-*-*-*-*-*-*-*-*-*-*-*-*-*-*-*-*-*-*-*-*-*-*-*-*-*-*-*-*-*-*-*-*-*-*-*-*-*-*-*                                   Cardiology Follow Up      ENCOUNTER DATE: 09/15/21 4:45 PM  PATIENT NAME: Genevieve Lester   : 1935    MRN: [id number]  AGE:85 y o  SEX: male  [address] Mauro MD     PRIMARY CARE PHYSICIAN: Odilon Shipman MD    ACTIVE DIAGNOSIS THIS VISIT  1  Chronic diastolic (congestive) heart failure ([address] )     2  Nonrheumatic aortic valve stenosis     3  PSVT (paroxysmal supraventricular tachycardia) ([address] )     4  Pure hypercholesterolemia     5  PVC (premature ventricular contraction)     6  Essential hypertension     7  Anemia, unspecified type     8  CKD (chronic kidney disease) stage 4, GFR 15-29 ml/min ([facility])     9  History of radiofrequency ablation  for SVT       ACTIVE PROBLEM LIST  Patient Active Problem List   Diagnosis    Anemia    Linda esophagus    Esophageal reflux    Hypertension    Spinal stenosis of lumbar region    Spondylolisthesis    PSVT (paroxysmal supraventricular tachycardia) (HCC)    Palpitations    Systolic murmur    Nonrheumatic aortic valve stenosis    Pure hypercholesterolemia    PVC (premature ventricular contraction)    Cough    CKD (chronic kidney disease) stage 4, GFR 15-29 ml/min (HCC)    Chronic diastolic (congestive) heart failure (HCC)    BPH (benign prostatic hyperplasia)    History of radiofrequency ablation  for SVT       CARDIOLOGY SPECIALTY COMMENTS  Genevieve Lester 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 BPH, CKD stage IV,  primary hypercholesterolemia, hypertension, SVT, status post SVT ablation presents at the suggestion of his PCP for worsening cough and shortness of breath x2 weeks    Symptoms were suggestive of bronchitis and chest x-ray was negative in the ED  He was started on IV furosemide upon admission and respiratory symptoms continued to improve  He was seen in consultation by cardiology the following day  The patient did receive two doses of furosemide 40 mg IV  Further diuretics and hypertensive medications were held due to worsening renal dysfunction  His respiratory status has returned back to baseline and he is no longer short of breath  His ramipril HCT and any diuretics have been held at time of discharge until repeat blood work next week and follow up with cardiology  INTERVAL HISTORY:         patient has no complaints  He denies shortness of breath or leg edema  His weight has been stable and has been unchanged over the last 3 weeks  He denies other cardiac symptoms  His creatinine has been stable but is not improved  He is off diuretics  Dr Kassidy Moncada gave him furosemide 20 mg p r n  for emergencies  He has not taken any  DISCUSSION/PLAN:           1  Continue present medication  Avoid taking furosemide unless really necessary  2 Return in 6 weeks  3 CBC and BMP prior to return     4  Consider NT-BNP pro in the future    Lab Studies:    Lab Results   Component Value Date    CHOLESTEROL 146 09/03/2020    CHOLESTEROL 148 08/06/2019    CHOLESTEROL 155 07/20/2017     Lab Results   Component Value Date    TRIG 69 09/03/2020    TRIG 96 08/06/2019    TRIG 57 07/20/2017     Lab Results   Component Value Date    HDL 52 09/03/2020    HDL 43 08/06/2019    HDL 53 07/20/2017     Lab Results   Component Value Date    LDLCALC 80 09/03/2020    LDLCALC 86 08/06/2019    LDLCALC 91 07/20/2017       Lab Results   Component Value Date    NTBNP 2,942 (H) 08/11/2021    NTBNP 1,530 (H) 02/27/2021       Lab Results   Component Value Date    EGFR 27 09/13/2021    EGFR 29 08/19/2021    EGFR 24 08/13/2021    SODIUM 138 09/13/2021    SODIUM 134 (L) 08/19/2021    SODIUM 144 08/13/2021    K 4 4 09/13/2021    K 4 6 08/19/2021    K 4 2 08/13/2021     09/13/2021     08/19/2021     08/13/2021    CO2 24 09/13/2021    CO2 26 08/19/2021    CO2 28 08/13/2021    ANIONGAP 7 05/15/2015    ANIONGAP 8 05/09/2014    BUN 35 (H) 09/13/2021    BUN 42 (H) 08/19/2021    BUN 48 (H) 08/13/2021    CREATININE 2 13 (H) 09/13/2021    CREATININE 2 05 (H) 08/19/2021    CREATININE 2 35 (H) 08/13/2021     Lab Results   Component Value Date    WBC 9 87 08/12/2021    WBC 9 53 08/11/2021    WBC 8 52 03/11/2021    HGB 10 0 (L) 08/12/2021    HGB 10 4 (L) 08/11/2021    HGB 11 0 (L) 03/11/2021    HCT 31 1 (L) 08/12/2021    HCT 31 9 (L) 08/11/2021    HCT 34 9 (L) 03/11/2021    MCV 90 08/12/2021    MCV 89 08/11/2021    MCV 92 03/11/2021    MCH 29 0 08/12/2021    MCH 29 1 08/11/2021    MCH 28 9 03/11/2021    MCHC 32 2 08/12/2021    MCHC 32 6 08/11/2021    MCHC 31 5 03/11/2021     08/12/2021     08/11/2021     03/11/2021      Lab Results   Component Value Date    GLUCOSE 100 05/15/2015    GLUCOSE 97 05/09/2014    CALCIUM 9 6 09/13/2021    CALCIUM 9 9 08/19/2021    CALCIUM 9 6 08/13/2021    AST 17 08/13/2021    AST 18 08/11/2021    AST 13 03/11/2021    ALT 25 08/13/2021    ALT 28 08/11/2021    ALT 24 03/11/2021    ALKPHOS 95 08/13/2021    ALKPHOS 102 08/11/2021    ALKPHOS 93 03/11/2021    PROT 7 4 05/15/2015    PROT 7 4 05/09/2014    BILITOT 0 40 05/15/2015    BILITOT 0 4 05/09/2014    MG 2 1 02/27/2021    MG 1 9 06/06/2019    MG 2 0 11/01/2018       Lab Results   Component Value Date    TROPONINI 0 03 08/11/2021    TROPONINI 0 03 02/27/2021    TROPONINI 0 03 06/06/2019       Lab Results   Component Value Date    FERRITIN 136 09/03/2020    IRON 60 (L) 01/10/2017    TIBC 279 01/10/2017     No results found for this visit on 09/15/21        Current Outpatient Medications:     aspirin 81 MG tablet, Take 162 mg by mouth, Disp: , Rfl:     Cholecalciferol (VITAMIN D3) 125 MCG (5000 UT) CAPS, 2 (two) times a week , Disp: , Rfl:     finasteride (PROSCAR) 5 mg tablet, Take 1 tablet (5 mg total) by mouth daily, Disp: 90 tablet, Rfl: 3    furosemide (LASIX) 20 mg tablet, Take 1 tablet (20 mg total) by mouth daily (Patient taking differently: Take 20 mg by mouth as needed ), Disp: 30 tablet, Rfl: 1    Multiple Vitamin (MULTI-VITAMIN DAILY) TABS, Take by mouth, Disp: , Rfl:     omeprazole (PriLOSEC) 20 mg delayed release capsule, Take 1 capsule (20 mg total) by mouth daily, Disp: 90 capsule, Rfl: 1    rosuvastatin (CRESTOR) 5 mg tablet, Take 1 tablet (5 mg total) by mouth daily, Disp: 90 tablet, Rfl: 0    tamsulosin (FLOMAX) 0 4 mg, Take 1 capsule (0 4 mg total) by mouth daily with dinner, Disp: 30 capsule, Rfl: 11  No Known Allergies    Past Medical History:   Diagnosis Date    Atrial fibrillation (HCC)     Hearing loss     last assessed 11/8/17    Rheumatic fever     Stenosis of aorta     SVT (supraventricular tachycardia) (HCC)      Social History     Socioeconomic History    Marital status: /Civil Union     Spouse name: Not on file    Number of children: Not on file    Years of education: Not on file    Highest education level: Not on file   Occupational History    Not on file   Tobacco Use    Smoking status: Never Smoker    Smokeless tobacco: Never Used   Substance and Sexual Activity    Alcohol use: No     Comment: conflicting no and occasional wine per [employer]    Drug use: No    Sexual activity: Not on file   Other Topics Concern    Not on file   Social History Narrative    Not on file     Social Determinants of Health     Financial Resource Strain:     Difficulty of Paying Living Expenses:    Food Insecurity:     Worried About Running Out of Food in the Last Year:     Ran Out of Food in the Last Year:    Transportation Needs:     Lack of Transportation (Medical):      Lack of Transportation (Non-Medical):    Physical Activity:     Days of Exercise per Week:     Minutes of Exercise per Session:    Stress:     Feeling of Stress :    Social Connections:     Frequency of Communication with Friends and Family:     Frequency of Social Gatherings with Friends and Family:     Attends Hoahaoism Services:     Active Member of Clubs or Organizations:     Attends Club or Organization Meetings:     Marital Status:    Intimate Partner Violence:     Fear of Current or Ex-Partner:     Emotionally Abused:     Physically Abused:     Sexually Abused:       Family History   Problem Relation Age of Onset    Other Mother         old age   Shireen Ospina Esophageal cancer Father    Shireen Ospina Other Father         old age   Shireen Ospina Alcohol abuse Son         alcoholism     Past Surgical History:   Procedure Laterality Date    APPENDECTOMY      BACK SURGERY      lower    CATARACT EXTRACTION      KNEE SURGERY Left     AK BIOPSY OF PROSTATE,NEEDLE/PUNCH N/A 3/16/2021    Procedure: Transperineal prostate biopsy with biplanar transrectal ultrasound, using the perineal logic kit; Surgeon: Adan Beasley MD;  Location: [facility];  Service: Urology    TONSILLECTOMY AND ADENOIDECTOMY         PREVIOUS WEIGHTS:   Wt Readings from Last 10 Encounters:   09/15/21 85 3 kg (188 lb)   08/24/21 85 6 kg (188 lb 12 8 oz)   08/20/21 85 2 kg (187 lb 12 8 oz)   08/13/21 84 6 kg (186 lb 8 2 oz)   08/11/21 88 9 kg (196 lb)   06/01/21 89 6 kg (197 lb 9 6 oz)   03/26/21 88 5 kg (195 lb)   03/16/21 88 5 kg (195 lb)   03/02/21 92 1 kg (203 lb)   02/27/21 92 2 kg (203 lb 4 2 oz)        Review of Systems:  Review of Systems   Constitutional: Negative for activity change  Respiratory: Negative for cough, chest tightness, shortness of breath and wheezing  Cardiovascular: Negative for chest pain, palpitations and leg swelling  Musculoskeletal: Negative for gait problem  Skin: Negative for color change  Neurological: Negative for dizziness, tremors, syncope, weakness, light-headedness and headaches  Psychiatric/Behavioral: Negative for agitation and confusion         Physical Exam:  /80 (BP Location: Right arm, Patient Position: Sitting, Cuff Size: Adult)   Pulse 80   Wt 85 3 kg (188 lb)   BMI 27 76 kg/m²     Physical Exam  Vitals reviewed  Constitutional:       General: He is not in acute distress  Appearance: He is well-developed  HENT:      Head: Normocephalic and atraumatic  Neck:      Thyroid: No thyromegaly  Vascular: No carotid bruit or JVD  Trachea: No tracheal deviation  Cardiovascular:      Rate and Rhythm: Normal rate and regular rhythm  Pulses: Normal pulses  Heart sounds: Murmur heard  No friction rub  No gallop  Comments: Grade 2/6 mid peaking systolic ejection murmur  Pulmonary:      Effort: Pulmonary effort is normal  No respiratory distress  Breath sounds: Normal breath sounds  No wheezing, rhonchi or rales  Chest:      Chest wall: No tenderness  Musculoskeletal:      Cervical back: Normal range of motion and neck supple  Right lower leg: No edema  Left lower leg: No edema  Skin:     General: Skin is warm and dry  Neurological:      General: No focal deficit present  Mental Status: He is alert and oriented to person, place, and time  Gait: Gait normal    Psychiatric:         Mood and Affect: Mood normal          Behavior: Behavior normal          Thought Content: Thought content normal          Judgment: Judgment normal        ======================================================  Imaging:   I have personally reviewed pertinent reports  Portions of the record may have been created with voice recognition software  Occasional wrong word or "sound a like" substitutions may have occurred due to the inherent limitations of voice recognition software  Read the chart carefully and recognize, using context, where substitutions have occurred      SIGNATURES:   Chirag Zuniga MD

## 2021-09-21 ENCOUNTER — PATIENT OUTREACH (OUTPATIENT)
Dept: CASE MANAGEMENT | Facility: HOSPITAL | Age: 86
End: 2021-09-21

## 2021-09-21 NOTE — PROGRESS NOTES
Heart Failure Outpatient Care Manager: Follow up phone call to the patient who is without signs or symptoms of heart failure  He is engaged in self-care and management and is following up with his healthcare providers per medical regimen

## 2021-10-06 ENCOUNTER — PATIENT OUTREACH (OUTPATIENT)
Dept: CASE MANAGEMENT | Facility: HOSPITAL | Age: 86
End: 2021-10-06

## 2021-10-22 ENCOUNTER — APPOINTMENT (OUTPATIENT)
Dept: LAB | Facility: CLINIC | Age: 86
End: 2021-10-22
Payer: MEDICARE

## 2021-10-22 DIAGNOSIS — I50.32 CHRONIC DIASTOLIC (CONGESTIVE) HEART FAILURE (HCC): ICD-10-CM

## 2021-10-22 LAB
ANION GAP SERPL CALCULATED.3IONS-SCNC: 4 MMOL/L (ref 4–13)
BASOPHILS # BLD AUTO: 0.07 THOUSANDS/ΜL (ref 0–0.1)
BASOPHILS NFR BLD AUTO: 1 % (ref 0–1)
BUN SERPL-MCNC: 29 MG/DL (ref 5–25)
CALCIUM SERPL-MCNC: 9.5 MG/DL (ref 8.3–10.1)
CHLORIDE SERPL-SCNC: 107 MMOL/L (ref 100–108)
CO2 SERPL-SCNC: 27 MMOL/L (ref 21–32)
CREAT SERPL-MCNC: 2.09 MG/DL (ref 0.6–1.3)
EOSINOPHIL # BLD AUTO: 0.42 THOUSAND/ΜL (ref 0–0.61)
EOSINOPHIL NFR BLD AUTO: 5 % (ref 0–6)
ERYTHROCYTE [DISTWIDTH] IN BLOOD BY AUTOMATED COUNT: 13.2 % (ref 11.6–15.1)
GFR SERPL CREATININE-BSD FRML MDRD: 28 ML/MIN/1.73SQ M
GLUCOSE P FAST SERPL-MCNC: 118 MG/DL (ref 65–99)
HCT VFR BLD AUTO: 35.6 % (ref 36.5–49.3)
HGB BLD-MCNC: 11.3 G/DL (ref 12–17)
IMM GRANULOCYTES # BLD AUTO: 0.03 THOUSAND/UL (ref 0–0.2)
IMM GRANULOCYTES NFR BLD AUTO: 0 % (ref 0–2)
LYMPHOCYTES # BLD AUTO: 2.18 THOUSANDS/ΜL (ref 0.6–4.47)
LYMPHOCYTES NFR BLD AUTO: 25 % (ref 14–44)
MCH RBC QN AUTO: 28.8 PG (ref 26.8–34.3)
MCHC RBC AUTO-ENTMCNC: 31.7 G/DL (ref 31.4–37.4)
MCV RBC AUTO: 91 FL (ref 82–98)
MONOCYTES # BLD AUTO: 0.75 THOUSAND/ΜL (ref 0.17–1.22)
MONOCYTES NFR BLD AUTO: 9 % (ref 4–12)
NEUTROPHILS # BLD AUTO: 5.13 THOUSANDS/ΜL (ref 1.85–7.62)
NEUTS SEG NFR BLD AUTO: 60 % (ref 43–75)
NRBC BLD AUTO-RTO: 0 /100 WBCS
PLATELET # BLD AUTO: 254 THOUSANDS/UL (ref 149–390)
PMV BLD AUTO: 11.3 FL (ref 8.9–12.7)
POTASSIUM SERPL-SCNC: 4.3 MMOL/L (ref 3.5–5.3)
RBC # BLD AUTO: 3.93 MILLION/UL (ref 3.88–5.62)
SODIUM SERPL-SCNC: 138 MMOL/L (ref 136–145)
WBC # BLD AUTO: 8.58 THOUSAND/UL (ref 4.31–10.16)

## 2021-10-22 PROCEDURE — 80048 BASIC METABOLIC PNL TOTAL CA: CPT

## 2021-10-22 PROCEDURE — 36415 COLL VENOUS BLD VENIPUNCTURE: CPT

## 2021-10-22 PROCEDURE — 85025 COMPLETE CBC W/AUTO DIFF WBC: CPT

## 2021-10-26 ENCOUNTER — OFFICE VISIT (OUTPATIENT)
Dept: CARDIOLOGY CLINIC | Facility: CLINIC | Age: 86
End: 2021-10-26
Payer: MEDICARE

## 2021-10-26 ENCOUNTER — PATIENT OUTREACH (OUTPATIENT)
Dept: CASE MANAGEMENT | Facility: HOSPITAL | Age: 86
End: 2021-10-26

## 2021-10-26 VITALS
HEART RATE: 67 BPM | WEIGHT: 189.8 LBS | BODY MASS INDEX: 28.11 KG/M2 | HEIGHT: 69 IN | DIASTOLIC BLOOD PRESSURE: 72 MMHG | SYSTOLIC BLOOD PRESSURE: 120 MMHG

## 2021-10-26 DIAGNOSIS — I35.0 NONRHEUMATIC AORTIC VALVE STENOSIS: Chronic | ICD-10-CM

## 2021-10-26 DIAGNOSIS — Z98.890 HISTORY OF RADIOFREQUENCY ABLATION PROCEDURE FOR CARDIAC ARRHYTHMIA: ICD-10-CM

## 2021-10-26 DIAGNOSIS — I47.1 PSVT (PAROXYSMAL SUPRAVENTRICULAR TACHYCARDIA) (HCC): ICD-10-CM

## 2021-10-26 DIAGNOSIS — I10 PRIMARY HYPERTENSION: ICD-10-CM

## 2021-10-26 DIAGNOSIS — E78.00 PURE HYPERCHOLESTEROLEMIA: ICD-10-CM

## 2021-10-26 DIAGNOSIS — N18.4 CKD (CHRONIC KIDNEY DISEASE) STAGE 4, GFR 15-29 ML/MIN (HCC): ICD-10-CM

## 2021-10-26 DIAGNOSIS — I50.32 CHRONIC DIASTOLIC (CONGESTIVE) HEART FAILURE (HCC): Primary | ICD-10-CM

## 2021-10-26 DIAGNOSIS — I49.3 PVC (PREMATURE VENTRICULAR CONTRACTION): ICD-10-CM

## 2021-10-26 DIAGNOSIS — D64.9 ANEMIA, UNSPECIFIED TYPE: ICD-10-CM

## 2021-10-26 PROCEDURE — 99214 OFFICE O/P EST MOD 30 MIN: CPT | Performed by: INTERNAL MEDICINE

## 2021-10-30 ENCOUNTER — APPOINTMENT (EMERGENCY)
Dept: CT IMAGING | Facility: HOSPITAL | Age: 86
DRG: 378 | End: 2021-10-30
Payer: MEDICARE

## 2021-10-30 ENCOUNTER — HOSPITAL ENCOUNTER (INPATIENT)
Facility: HOSPITAL | Age: 86
LOS: 1 days | Discharge: HOME | DRG: 378 | End: 2021-11-01
Attending: EMERGENCY MEDICINE | Admitting: HOSPITALIST
Payer: MEDICARE

## 2021-10-30 DIAGNOSIS — K22.70 BARRETT'S ESOPHAGUS WITHOUT DYSPLASIA: ICD-10-CM

## 2021-10-30 DIAGNOSIS — R91.1 LUNG NODULE: ICD-10-CM

## 2021-10-30 DIAGNOSIS — D64.9 ANEMIA, UNSPECIFIED TYPE: ICD-10-CM

## 2021-10-30 DIAGNOSIS — K62.5 RECTAL BLEEDING: Primary | ICD-10-CM

## 2021-10-30 DIAGNOSIS — K57.90 DIVERTICULOSIS: ICD-10-CM

## 2021-10-30 DIAGNOSIS — K92.1 HEMATOCHEZIA: ICD-10-CM

## 2021-10-30 DIAGNOSIS — N40.0 ENLARGED PROSTATE: ICD-10-CM

## 2021-10-30 DIAGNOSIS — K92.1 MELENA: ICD-10-CM

## 2021-10-30 LAB
ABO GROUP BLD: NORMAL
ABO GROUP BLD: NORMAL
ALBUMIN SERPL BCP-MCNC: 3.4 G/DL (ref 3.5–5)
ALP SERPL-CCNC: 79 U/L (ref 46–116)
ALT SERPL W P-5'-P-CCNC: 24 U/L (ref 12–78)
ANION GAP SERPL CALCULATED.3IONS-SCNC: 9 MMOL/L (ref 4–13)
APTT PPP: 30 SECONDS (ref 23–37)
AST SERPL W P-5'-P-CCNC: 14 U/L (ref 5–45)
BASOPHILS # BLD AUTO: 0.07 THOUSANDS/ΜL (ref 0–0.1)
BASOPHILS NFR BLD AUTO: 1 % (ref 0–1)
BILIRUB SERPL-MCNC: 0.4 MG/DL (ref 0.2–1)
BILIRUB UR QL STRIP: NEGATIVE
BLD GP AB SCN SERPL QL: NEGATIVE
BUN SERPL-MCNC: 34 MG/DL (ref 5–25)
CALCIUM ALBUM COR SERPL-MCNC: 9.8 MG/DL (ref 8.3–10.1)
CALCIUM SERPL-MCNC: 9.3 MG/DL (ref 8.3–10.1)
CHLORIDE SERPL-SCNC: 105 MMOL/L (ref 100–108)
CLARITY UR: CLEAR
CO2 SERPL-SCNC: 26 MMOL/L (ref 21–32)
COLOR UR: YELLOW
CREAT SERPL-MCNC: 2.23 MG/DL (ref 0.6–1.3)
EOSINOPHIL # BLD AUTO: 1.04 THOUSAND/ΜL (ref 0–0.61)
EOSINOPHIL NFR BLD AUTO: 12 % (ref 0–6)
ERYTHROCYTE [DISTWIDTH] IN BLOOD BY AUTOMATED COUNT: 13.1 % (ref 11.6–15.1)
GFR SERPL CREATININE-BSD FRML MDRD: 26 ML/MIN/1.73SQ M
GLUCOSE SERPL-MCNC: 107 MG/DL (ref 65–140)
GLUCOSE UR STRIP-MCNC: NEGATIVE MG/DL
HCT VFR BLD AUTO: 31.7 % (ref 36.5–49.3)
HCT VFR BLD AUTO: 33.5 % (ref 36.5–49.3)
HGB BLD-MCNC: 10.5 G/DL (ref 12–17)
HGB BLD-MCNC: 11.1 G/DL (ref 12–17)
HGB UR QL STRIP.AUTO: NEGATIVE
IMM GRANULOCYTES # BLD AUTO: 0.04 THOUSAND/UL (ref 0–0.2)
IMM GRANULOCYTES NFR BLD AUTO: 0 % (ref 0–2)
INR PPP: 1.05 (ref 0.84–1.19)
KETONES UR STRIP-MCNC: NEGATIVE MG/DL
LEUKOCYTE ESTERASE UR QL STRIP: NEGATIVE
LYMPHOCYTES # BLD AUTO: 1.75 THOUSANDS/ΜL (ref 0.6–4.47)
LYMPHOCYTES NFR BLD AUTO: 20 % (ref 14–44)
MCH RBC QN AUTO: 30 PG (ref 26.8–34.3)
MCHC RBC AUTO-ENTMCNC: 33.1 G/DL (ref 31.4–37.4)
MCV RBC AUTO: 91 FL (ref 82–98)
MONOCYTES # BLD AUTO: 0.7 THOUSAND/ΜL (ref 0.17–1.22)
MONOCYTES NFR BLD AUTO: 8 % (ref 4–12)
NEUTROPHILS # BLD AUTO: 5.39 THOUSANDS/ΜL (ref 1.85–7.62)
NEUTS SEG NFR BLD AUTO: 59 % (ref 43–75)
NITRITE UR QL STRIP: NEGATIVE
NRBC BLD AUTO-RTO: 0 /100 WBCS
PH UR STRIP.AUTO: 7 [PH] (ref 4.5–8)
PLATELET # BLD AUTO: 247 THOUSANDS/UL (ref 149–390)
PMV BLD AUTO: 10.3 FL (ref 8.9–12.7)
POTASSIUM SERPL-SCNC: 3.9 MMOL/L (ref 3.5–5.3)
PROT SERPL-MCNC: 6.7 G/DL (ref 6.4–8.2)
PROT UR STRIP-MCNC: NEGATIVE MG/DL
PROTHROMBIN TIME: 13.5 SECONDS (ref 11.6–14.5)
RBC # BLD AUTO: 3.5 MILLION/UL (ref 3.88–5.62)
RH BLD: NEGATIVE
RH BLD: NEGATIVE
SODIUM SERPL-SCNC: 140 MMOL/L (ref 136–145)
SP GR UR STRIP.AUTO: 1.02 (ref 1–1.03)
SPECIMEN EXPIRATION DATE: NORMAL
UROBILINOGEN UR QL STRIP.AUTO: 0.2 E.U./DL
WBC # BLD AUTO: 8.99 THOUSAND/UL (ref 4.31–10.16)

## 2021-10-30 PROCEDURE — 81003 URINALYSIS AUTO W/O SCOPE: CPT

## 2021-10-30 PROCEDURE — 85610 PROTHROMBIN TIME: CPT | Performed by: PHYSICIAN ASSISTANT

## 2021-10-30 PROCEDURE — 74176 CT ABD & PELVIS W/O CONTRAST: CPT

## 2021-10-30 PROCEDURE — 86901 BLOOD TYPING SEROLOGIC RH(D): CPT | Performed by: PHYSICIAN ASSISTANT

## 2021-10-30 PROCEDURE — 86900 BLOOD TYPING SEROLOGIC ABO: CPT | Performed by: PHYSICIAN ASSISTANT

## 2021-10-30 PROCEDURE — 80053 COMPREHEN METABOLIC PANEL: CPT | Performed by: PHYSICIAN ASSISTANT

## 2021-10-30 PROCEDURE — 85730 THROMBOPLASTIN TIME PARTIAL: CPT | Performed by: PHYSICIAN ASSISTANT

## 2021-10-30 PROCEDURE — 99205 OFFICE O/P NEW HI 60 MIN: CPT | Performed by: INTERNAL MEDICINE

## 2021-10-30 PROCEDURE — 99285 EMERGENCY DEPT VISIT HI MDM: CPT

## 2021-10-30 PROCEDURE — 85014 HEMATOCRIT: CPT | Performed by: FAMILY MEDICINE

## 2021-10-30 PROCEDURE — 99220 PR INITIAL OBSERVATION CARE/DAY 70 MINUTES: CPT | Performed by: FAMILY MEDICINE

## 2021-10-30 PROCEDURE — 36415 COLL VENOUS BLD VENIPUNCTURE: CPT | Performed by: PHYSICIAN ASSISTANT

## 2021-10-30 PROCEDURE — 86850 RBC ANTIBODY SCREEN: CPT | Performed by: PHYSICIAN ASSISTANT

## 2021-10-30 PROCEDURE — 85025 COMPLETE CBC W/AUTO DIFF WBC: CPT | Performed by: PHYSICIAN ASSISTANT

## 2021-10-30 PROCEDURE — 99285 EMERGENCY DEPT VISIT HI MDM: CPT | Performed by: PHYSICIAN ASSISTANT

## 2021-10-30 PROCEDURE — 85018 HEMOGLOBIN: CPT | Performed by: FAMILY MEDICINE

## 2021-10-30 RX ORDER — ONDANSETRON 2 MG/ML
4 INJECTION INTRAMUSCULAR; INTRAVENOUS EVERY 6 HOURS PRN
Status: DISCONTINUED | OUTPATIENT
Start: 2021-10-30 | End: 2021-11-01 | Stop reason: HOSPADM

## 2021-10-30 RX ORDER — ASPIRIN 81 MG/1
81 TABLET, CHEWABLE ORAL DAILY
Status: DISCONTINUED | OUTPATIENT
Start: 2021-10-30 | End: 2021-10-31

## 2021-10-30 RX ORDER — PANTOPRAZOLE SODIUM 40 MG/1
40 TABLET, DELAYED RELEASE ORAL EVERY EVENING
Status: DISCONTINUED | OUTPATIENT
Start: 2021-10-30 | End: 2021-10-31

## 2021-10-30 RX ORDER — TAMSULOSIN HYDROCHLORIDE 0.4 MG/1
0.4 CAPSULE ORAL
Status: DISCONTINUED | OUTPATIENT
Start: 2021-10-30 | End: 2021-11-01 | Stop reason: HOSPADM

## 2021-10-30 RX ORDER — ACETAMINOPHEN 325 MG/1
650 TABLET ORAL EVERY 6 HOURS PRN
Status: DISCONTINUED | OUTPATIENT
Start: 2021-10-30 | End: 2021-11-01 | Stop reason: HOSPADM

## 2021-10-30 RX ORDER — METOPROLOL SUCCINATE 25 MG/1
25 TABLET, EXTENDED RELEASE ORAL DAILY
Status: DISCONTINUED | OUTPATIENT
Start: 2021-10-30 | End: 2021-11-01 | Stop reason: HOSPADM

## 2021-10-30 RX ORDER — PANTOPRAZOLE SODIUM 40 MG/1
40 TABLET, DELAYED RELEASE ORAL
Status: DISCONTINUED | OUTPATIENT
Start: 2021-10-30 | End: 2021-10-30

## 2021-10-30 RX ORDER — FINASTERIDE 5 MG/1
5 TABLET, FILM COATED ORAL DAILY
Status: DISCONTINUED | OUTPATIENT
Start: 2021-10-30 | End: 2021-11-01 | Stop reason: HOSPADM

## 2021-10-30 RX ORDER — ATORVASTATIN CALCIUM 40 MG/1
40 TABLET, FILM COATED ORAL
Status: DISCONTINUED | OUTPATIENT
Start: 2021-10-30 | End: 2021-11-01 | Stop reason: HOSPADM

## 2021-10-30 RX ORDER — PANTOPRAZOLE SODIUM 40 MG/1
40 TABLET, DELAYED RELEASE ORAL EVERY EVENING
Status: DISCONTINUED | OUTPATIENT
Start: 2021-10-31 | End: 2021-10-30

## 2021-10-30 RX ADMIN — TAMSULOSIN HYDROCHLORIDE 0.4 MG: 0.4 CAPSULE ORAL at 17:41

## 2021-10-30 RX ADMIN — ATORVASTATIN CALCIUM 40 MG: 40 TABLET, FILM COATED ORAL at 17:41

## 2021-10-30 RX ADMIN — PANTOPRAZOLE SODIUM 40 MG: 40 TABLET, DELAYED RELEASE ORAL at 21:28

## 2021-10-31 LAB
ALBUMIN SERPL BCP-MCNC: 3.1 G/DL (ref 3.5–5)
ALP SERPL-CCNC: 70 U/L (ref 46–116)
ALT SERPL W P-5'-P-CCNC: 22 U/L (ref 12–78)
ANION GAP SERPL CALCULATED.3IONS-SCNC: 10 MMOL/L (ref 4–13)
AST SERPL W P-5'-P-CCNC: 15 U/L (ref 5–45)
BASOPHILS # BLD AUTO: 0.09 THOUSANDS/ΜL (ref 0–0.1)
BASOPHILS NFR BLD AUTO: 1 % (ref 0–1)
BILIRUB SERPL-MCNC: 0.37 MG/DL (ref 0.2–1)
BUN SERPL-MCNC: 36 MG/DL (ref 5–25)
CALCIUM ALBUM COR SERPL-MCNC: 9.7 MG/DL (ref 8.3–10.1)
CALCIUM SERPL-MCNC: 9 MG/DL (ref 8.3–10.1)
CHLORIDE SERPL-SCNC: 106 MMOL/L (ref 100–108)
CO2 SERPL-SCNC: 25 MMOL/L (ref 21–32)
CREAT SERPL-MCNC: 2.08 MG/DL (ref 0.6–1.3)
EOSINOPHIL # BLD AUTO: 1.18 THOUSAND/ΜL (ref 0–0.61)
EOSINOPHIL NFR BLD AUTO: 13 % (ref 0–6)
ERYTHROCYTE [DISTWIDTH] IN BLOOD BY AUTOMATED COUNT: 13.2 % (ref 11.6–15.1)
GFR SERPL CREATININE-BSD FRML MDRD: 28 ML/MIN/1.73SQ M
GLUCOSE P FAST SERPL-MCNC: 93 MG/DL (ref 65–99)
GLUCOSE SERPL-MCNC: 93 MG/DL (ref 65–140)
HCT VFR BLD AUTO: 30.2 % (ref 36.5–49.3)
HCT VFR BLD AUTO: 31.6 % (ref 36.5–49.3)
HCT VFR BLD AUTO: 34.3 % (ref 36.5–49.3)
HGB BLD-MCNC: 10.2 G/DL (ref 12–17)
HGB BLD-MCNC: 11.1 G/DL (ref 12–17)
HGB BLD-MCNC: 9.7 G/DL (ref 12–17)
IMM GRANULOCYTES # BLD AUTO: 0.02 THOUSAND/UL (ref 0–0.2)
IMM GRANULOCYTES NFR BLD AUTO: 0 % (ref 0–2)
LYMPHOCYTES # BLD AUTO: 2.8 THOUSANDS/ΜL (ref 0.6–4.47)
LYMPHOCYTES NFR BLD AUTO: 30 % (ref 14–44)
MAGNESIUM SERPL-MCNC: 2.1 MG/DL (ref 1.6–2.6)
MCH RBC QN AUTO: 29.4 PG (ref 26.8–34.3)
MCHC RBC AUTO-ENTMCNC: 32.3 G/DL (ref 31.4–37.4)
MCV RBC AUTO: 91 FL (ref 82–98)
MONOCYTES # BLD AUTO: 0.81 THOUSAND/ΜL (ref 0.17–1.22)
MONOCYTES NFR BLD AUTO: 9 % (ref 4–12)
NEUTROPHILS # BLD AUTO: 4.44 THOUSANDS/ΜL (ref 1.85–7.62)
NEUTS SEG NFR BLD AUTO: 47 % (ref 43–75)
NRBC BLD AUTO-RTO: 0 /100 WBCS
PLATELET # BLD AUTO: 246 THOUSANDS/UL (ref 149–390)
PMV BLD AUTO: 10.8 FL (ref 8.9–12.7)
POTASSIUM SERPL-SCNC: 3.9 MMOL/L (ref 3.5–5.3)
PROT SERPL-MCNC: 6.4 G/DL (ref 6.4–8.2)
RBC # BLD AUTO: 3.47 MILLION/UL (ref 3.88–5.62)
SODIUM SERPL-SCNC: 141 MMOL/L (ref 136–145)
WBC # BLD AUTO: 9.34 THOUSAND/UL (ref 4.31–10.16)

## 2021-10-31 PROCEDURE — 83735 ASSAY OF MAGNESIUM: CPT | Performed by: FAMILY MEDICINE

## 2021-10-31 PROCEDURE — 80053 COMPREHEN METABOLIC PANEL: CPT | Performed by: FAMILY MEDICINE

## 2021-10-31 PROCEDURE — 85014 HEMATOCRIT: CPT | Performed by: FAMILY MEDICINE

## 2021-10-31 PROCEDURE — 85018 HEMOGLOBIN: CPT | Performed by: FAMILY MEDICINE

## 2021-10-31 PROCEDURE — 99225 PR SBSQ OBSERVATION CARE/DAY 25 MINUTES: CPT | Performed by: INTERNAL MEDICINE

## 2021-10-31 PROCEDURE — 85025 COMPLETE CBC W/AUTO DIFF WBC: CPT | Performed by: FAMILY MEDICINE

## 2021-10-31 PROCEDURE — 99213 OFFICE O/P EST LOW 20 MIN: CPT | Performed by: INTERNAL MEDICINE

## 2021-10-31 RX ORDER — POLYETHYLENE GLYCOL 3350, SODIUM CHLORIDE, SODIUM BICARBONATE, POTASSIUM CHLORIDE 420; 11.2; 5.72; 1.48 G/4L; G/4L; G/4L; G/4L
4000 POWDER, FOR SOLUTION ORAL ONCE
Status: COMPLETED | OUTPATIENT
Start: 2021-10-31 | End: 2021-10-31

## 2021-10-31 RX ORDER — PANTOPRAZOLE SODIUM 40 MG/1
40 TABLET, DELAYED RELEASE ORAL
Status: DISCONTINUED | OUTPATIENT
Start: 2021-10-31 | End: 2021-11-01 | Stop reason: HOSPADM

## 2021-10-31 RX ADMIN — POLYETHYLENE GLYCOL 3350, SODIUM CHLORIDE, SODIUM BICARBONATE AND POTASSIUM CHLORIDE 4000 ML: 420; 5.72; 11.2; 1.48 POWDER, FOR SOLUTION ORAL at 15:06

## 2021-10-31 RX ADMIN — METOPROLOL SUCCINATE 25 MG: 25 TABLET, EXTENDED RELEASE ORAL at 08:00

## 2021-10-31 RX ADMIN — BISACODYL 10 MG: 5 TABLET, COATED ORAL at 14:09

## 2021-10-31 RX ADMIN — TAMSULOSIN HYDROCHLORIDE 0.4 MG: 0.4 CAPSULE ORAL at 16:00

## 2021-10-31 RX ADMIN — PANTOPRAZOLE SODIUM 40 MG: 40 TABLET, DELAYED RELEASE ORAL at 21:28

## 2021-10-31 RX ADMIN — ATORVASTATIN CALCIUM 40 MG: 40 TABLET, FILM COATED ORAL at 16:00

## 2021-10-31 RX ADMIN — FINASTERIDE 5 MG: 5 TABLET, FILM COATED ORAL at 08:00

## 2021-10-31 RX ADMIN — B-COMPLEX W/ C & FOLIC ACID TAB 1 TABLET: TAB at 08:00

## 2021-11-01 ENCOUNTER — APPOINTMENT (INPATIENT)
Dept: GASTROENTEROLOGY | Facility: HOSPITAL | Age: 86
DRG: 378 | End: 2021-11-01
Payer: MEDICARE

## 2021-11-01 ENCOUNTER — ANESTHESIA (INPATIENT)
Dept: GASTROENTEROLOGY | Facility: HOSPITAL | Age: 86
DRG: 378 | End: 2021-11-01
Payer: MEDICARE

## 2021-11-01 ENCOUNTER — ANESTHESIA EVENT (INPATIENT)
Dept: GASTROENTEROLOGY | Facility: HOSPITAL | Age: 86
DRG: 378 | End: 2021-11-01
Payer: MEDICARE

## 2021-11-01 VITALS
WEIGHT: 179.01 LBS | BODY MASS INDEX: 26.44 KG/M2 | TEMPERATURE: 97.2 F | OXYGEN SATURATION: 98 % | DIASTOLIC BLOOD PRESSURE: 74 MMHG | HEART RATE: 54 BPM | SYSTOLIC BLOOD PRESSURE: 157 MMHG | RESPIRATION RATE: 16 BRPM

## 2021-11-01 PROBLEM — I35.0 AORTIC STENOSIS, SEVERE: Chronic | Status: ACTIVE | Noted: 2021-11-01

## 2021-11-01 PROBLEM — R91.8 PULMONARY NODULES: Status: ACTIVE | Noted: 2021-11-01

## 2021-11-01 LAB
ANION GAP SERPL CALCULATED.3IONS-SCNC: 12 MMOL/L (ref 4–13)
BUN SERPL-MCNC: 30 MG/DL (ref 5–25)
CALCIUM SERPL-MCNC: 8.9 MG/DL (ref 8.3–10.1)
CHLORIDE SERPL-SCNC: 107 MMOL/L (ref 100–108)
CO2 SERPL-SCNC: 25 MMOL/L (ref 21–32)
CREAT SERPL-MCNC: 1.8 MG/DL (ref 0.6–1.3)
GFR SERPL CREATININE-BSD FRML MDRD: 34 ML/MIN/1.73SQ M
GLUCOSE P FAST SERPL-MCNC: 91 MG/DL (ref 65–99)
GLUCOSE SERPL-MCNC: 91 MG/DL (ref 65–140)
HCT VFR BLD AUTO: 32.1 % (ref 36.5–49.3)
HCT VFR BLD AUTO: 32.2 % (ref 36.5–49.3)
HGB BLD-MCNC: 10.6 G/DL (ref 12–17)
HGB BLD-MCNC: 10.7 G/DL (ref 12–17)
POTASSIUM SERPL-SCNC: 4 MMOL/L (ref 3.5–5.3)
SODIUM SERPL-SCNC: 144 MMOL/L (ref 136–145)

## 2021-11-01 PROCEDURE — 80048 BASIC METABOLIC PNL TOTAL CA: CPT | Performed by: INTERNAL MEDICINE

## 2021-11-01 PROCEDURE — 0DJD8ZZ INSPECTION OF LOWER INTESTINAL TRACT, VIA NATURAL OR ARTIFICIAL OPENING ENDOSCOPIC: ICD-10-PCS | Performed by: INTERNAL MEDICINE

## 2021-11-01 PROCEDURE — 45378 DIAGNOSTIC COLONOSCOPY: CPT | Performed by: INTERNAL MEDICINE

## 2021-11-01 PROCEDURE — 85014 HEMATOCRIT: CPT | Performed by: FAMILY MEDICINE

## 2021-11-01 PROCEDURE — RECHECK: Performed by: PHYSICIAN ASSISTANT

## 2021-11-01 PROCEDURE — 85018 HEMOGLOBIN: CPT | Performed by: FAMILY MEDICINE

## 2021-11-01 PROCEDURE — 99239 HOSP IP/OBS DSCHRG MGMT >30: CPT | Performed by: PHYSICIAN ASSISTANT

## 2021-11-01 PROCEDURE — 43235 EGD DIAGNOSTIC BRUSH WASH: CPT | Performed by: INTERNAL MEDICINE

## 2021-11-01 PROCEDURE — 0DJ08ZZ INSPECTION OF UPPER INTESTINAL TRACT, VIA NATURAL OR ARTIFICIAL OPENING ENDOSCOPIC: ICD-10-PCS | Performed by: INTERNAL MEDICINE

## 2021-11-01 RX ORDER — ONDANSETRON 2 MG/ML
4 INJECTION INTRAMUSCULAR; INTRAVENOUS ONCE AS NEEDED
Status: DISCONTINUED | OUTPATIENT
Start: 2021-11-01 | End: 2021-11-01 | Stop reason: HOSPADM

## 2021-11-01 RX ORDER — PROPOFOL 10 MG/ML
INJECTION, EMULSION INTRAVENOUS AS NEEDED
Status: DISCONTINUED | OUTPATIENT
Start: 2021-11-01 | End: 2021-11-01

## 2021-11-01 RX ORDER — EPHEDRINE SULFATE 50 MG/ML
INJECTION INTRAVENOUS AS NEEDED
Status: DISCONTINUED | OUTPATIENT
Start: 2021-11-01 | End: 2021-11-01

## 2021-11-01 RX ORDER — SODIUM CHLORIDE 9 MG/ML
50 INJECTION, SOLUTION INTRAVENOUS CONTINUOUS
Status: DISCONTINUED | OUTPATIENT
Start: 2021-11-01 | End: 2021-11-01

## 2021-11-01 RX ORDER — SODIUM CHLORIDE 9 MG/ML
INJECTION, SOLUTION INTRAVENOUS CONTINUOUS PRN
Status: DISCONTINUED | OUTPATIENT
Start: 2021-11-01 | End: 2021-11-01

## 2021-11-01 RX ADMIN — METOPROLOL SUCCINATE 25 MG: 25 TABLET, EXTENDED RELEASE ORAL at 08:35

## 2021-11-01 RX ADMIN — B-COMPLEX W/ C & FOLIC ACID TAB 1 TABLET: TAB at 08:35

## 2021-11-01 RX ADMIN — PROPOFOL 30 MG: 10 INJECTION, EMULSION INTRAVENOUS at 14:17

## 2021-11-01 RX ADMIN — PROPOFOL 30 MG: 10 INJECTION, EMULSION INTRAVENOUS at 14:09

## 2021-11-01 RX ADMIN — SODIUM CHLORIDE 50 ML/HR: 0.9 INJECTION, SOLUTION INTRAVENOUS at 10:41

## 2021-11-01 RX ADMIN — FINASTERIDE 5 MG: 5 TABLET, FILM COATED ORAL at 08:35

## 2021-11-01 RX ADMIN — EPHEDRINE SULFATE 5 MG: 50 INJECTION, SOLUTION INTRAVENOUS at 14:20

## 2021-11-01 RX ADMIN — SODIUM CHLORIDE: 0.9 INJECTION, SOLUTION INTRAVENOUS at 14:00

## 2021-11-01 RX ADMIN — PROPOFOL 30 MG: 10 INJECTION, EMULSION INTRAVENOUS at 14:19

## 2021-11-01 RX ADMIN — PANTOPRAZOLE SODIUM 40 MG: 40 TABLET, DELAYED RELEASE ORAL at 08:35

## 2021-11-01 RX ADMIN — PROPOFOL 30 MG: 10 INJECTION, EMULSION INTRAVENOUS at 14:11

## 2021-11-01 RX ADMIN — PROPOFOL 50 MG: 10 INJECTION, EMULSION INTRAVENOUS at 14:08

## 2021-11-02 ENCOUNTER — PATIENT OUTREACH (OUTPATIENT)
Dept: CASE MANAGEMENT | Facility: HOSPITAL | Age: 86
End: 2021-11-02

## 2021-11-03 ENCOUNTER — TRANSITIONAL CARE MANAGEMENT (OUTPATIENT)
Dept: INTERNAL MEDICINE CLINIC | Facility: CLINIC | Age: 86
End: 2021-11-03

## 2021-11-10 ENCOUNTER — HOSPITAL ENCOUNTER (INPATIENT)
Facility: HOSPITAL | Age: 86
LOS: 1 days | Discharge: HOME | DRG: 194 | End: 2021-11-11
Attending: EMERGENCY MEDICINE | Admitting: INTERNAL MEDICINE
Payer: MEDICARE

## 2021-11-10 ENCOUNTER — APPOINTMENT (INPATIENT)
Dept: NON INVASIVE DIAGNOSTICS | Facility: HOSPITAL | Age: 86
DRG: 194 | End: 2021-11-10
Payer: MEDICARE

## 2021-11-10 ENCOUNTER — APPOINTMENT (INPATIENT)
Dept: CT IMAGING | Facility: HOSPITAL | Age: 86
DRG: 194 | End: 2021-11-10
Payer: MEDICARE

## 2021-11-10 ENCOUNTER — APPOINTMENT (EMERGENCY)
Dept: RADIOLOGY | Facility: HOSPITAL | Age: 86
DRG: 194 | End: 2021-11-10
Payer: MEDICARE

## 2021-11-10 DIAGNOSIS — R93.89 ABNORMAL CHEST CT: ICD-10-CM

## 2021-11-10 DIAGNOSIS — N18.4 CKD (CHRONIC KIDNEY DISEASE) STAGE 4, GFR 15-29 ML/MIN (HCC): ICD-10-CM

## 2021-11-10 DIAGNOSIS — R79.89 ELEVATED D-DIMER: ICD-10-CM

## 2021-11-10 DIAGNOSIS — J90 PLEURAL EFFUSION: ICD-10-CM

## 2021-11-10 DIAGNOSIS — J18.9 PNEUMONIA: ICD-10-CM

## 2021-11-10 DIAGNOSIS — N28.9 RENAL INSUFFICIENCY: ICD-10-CM

## 2021-11-10 DIAGNOSIS — R04.2 HEMOPTYSIS: Primary | ICD-10-CM

## 2021-11-10 DIAGNOSIS — R77.8 TROPONIN LEVEL ELEVATED: ICD-10-CM

## 2021-11-10 DIAGNOSIS — D64.9 ANEMIA: ICD-10-CM

## 2021-11-10 DIAGNOSIS — I50.32 CHRONIC DIASTOLIC (CONGESTIVE) HEART FAILURE (HCC): ICD-10-CM

## 2021-11-10 LAB
2HR DELTA HS TROPONIN: 0 NG/L
4HR DELTA HS TROPONIN: 0 NG/L
ABO GROUP BLD: NORMAL
ALBUMIN SERPL BCP-MCNC: 3 G/DL (ref 3.5–5)
ALP SERPL-CCNC: 79 U/L (ref 46–116)
ALT SERPL W P-5'-P-CCNC: 19 U/L (ref 12–78)
ANION GAP SERPL CALCULATED.3IONS-SCNC: 8 MMOL/L (ref 4–13)
APTT PPP: 34 SECONDS (ref 23–37)
AST SERPL W P-5'-P-CCNC: 11 U/L (ref 5–45)
ATRIAL RATE: 66 BPM
ATRIAL RATE: 70 BPM
BASOPHILS # BLD AUTO: 0.07 THOUSANDS/ΜL (ref 0–0.1)
BASOPHILS NFR BLD AUTO: 1 % (ref 0–1)
BILIRUB SERPL-MCNC: 0.49 MG/DL (ref 0.2–1)
BLD GP AB SCN SERPL QL: NEGATIVE
BUN SERPL-MCNC: 28 MG/DL (ref 5–25)
CALCIUM ALBUM COR SERPL-MCNC: 9.9 MG/DL (ref 8.3–10.1)
CALCIUM SERPL-MCNC: 9.1 MG/DL (ref 8.3–10.1)
CARDIAC TROPONIN I PNL SERPL HS: 56 NG/L
CHLORIDE SERPL-SCNC: 105 MMOL/L (ref 100–108)
CO2 SERPL-SCNC: 28 MMOL/L (ref 21–32)
CREAT SERPL-MCNC: 1.87 MG/DL (ref 0.6–1.3)
D DIMER PPP FEU-MCNC: 1.48 UG/ML FEU
EOSINOPHIL # BLD AUTO: 0.42 THOUSAND/ΜL (ref 0–0.61)
EOSINOPHIL NFR BLD AUTO: 4 % (ref 0–6)
ERYTHROCYTE [DISTWIDTH] IN BLOOD BY AUTOMATED COUNT: 12.9 % (ref 11.6–15.1)
FLUAV RNA RESP QL NAA+PROBE: NEGATIVE
FLUBV RNA RESP QL NAA+PROBE: NEGATIVE
GFR SERPL CREATININE-BSD FRML MDRD: 32 ML/MIN/1.73SQ M
GLUCOSE SERPL-MCNC: 117 MG/DL (ref 65–140)
HCT VFR BLD AUTO: 30.7 % (ref 36.5–49.3)
HGB BLD-MCNC: 10.3 G/DL (ref 12–17)
IMM GRANULOCYTES # BLD AUTO: 0.04 THOUSAND/UL (ref 0–0.2)
IMM GRANULOCYTES NFR BLD AUTO: 0 % (ref 0–2)
INR PPP: 1.14 (ref 0.84–1.19)
LACTATE SERPL-SCNC: 0.7 MMOL/L (ref 0.5–2)
LYMPHOCYTES # BLD AUTO: 1.44 THOUSANDS/ΜL (ref 0.6–4.47)
LYMPHOCYTES NFR BLD AUTO: 12 % (ref 14–44)
MCH RBC QN AUTO: 30.3 PG (ref 26.8–34.3)
MCHC RBC AUTO-ENTMCNC: 33.6 G/DL (ref 31.4–37.4)
MCV RBC AUTO: 90 FL (ref 82–98)
MONOCYTES # BLD AUTO: 1.55 THOUSAND/ΜL (ref 0.17–1.22)
MONOCYTES NFR BLD AUTO: 13 % (ref 4–12)
NEUTROPHILS # BLD AUTO: 8.46 THOUSANDS/ΜL (ref 1.85–7.62)
NEUTS SEG NFR BLD AUTO: 70 % (ref 43–75)
NRBC BLD AUTO-RTO: 0 /100 WBCS
NT-PROBNP SERPL-MCNC: 5024 PG/ML
P AXIS: 60 DEGREES
P AXIS: 72 DEGREES
PLATELET # BLD AUTO: 250 THOUSANDS/UL (ref 149–390)
PMV BLD AUTO: 11.1 FL (ref 8.9–12.7)
POTASSIUM SERPL-SCNC: 4.2 MMOL/L (ref 3.5–5.3)
PR INTERVAL: 168 MS
PR INTERVAL: 170 MS
PROT SERPL-MCNC: 7 G/DL (ref 6.4–8.2)
PROTHROMBIN TIME: 14.4 SECONDS (ref 11.6–14.5)
QRS AXIS: -44 DEGREES
QRS AXIS: -49 DEGREES
QRSD INTERVAL: 100 MS
QRSD INTERVAL: 96 MS
QT INTERVAL: 416 MS
QT INTERVAL: 420 MS
QTC INTERVAL: 436 MS
QTC INTERVAL: 453 MS
RBC # BLD AUTO: 3.4 MILLION/UL (ref 3.88–5.62)
RH BLD: NEGATIVE
RSV RNA RESP QL NAA+PROBE: NEGATIVE
SARS-COV-2 RNA RESP QL NAA+PROBE: NEGATIVE
SODIUM SERPL-SCNC: 141 MMOL/L (ref 136–145)
SPECIMEN EXPIRATION DATE: NORMAL
T WAVE AXIS: 30 DEGREES
T WAVE AXIS: 50 DEGREES
VENTRICULAR RATE: 66 BPM
VENTRICULAR RATE: 70 BPM
WBC # BLD AUTO: 11.98 THOUSAND/UL (ref 4.31–10.16)

## 2021-11-10 PROCEDURE — 99285 EMERGENCY DEPT VISIT HI MDM: CPT

## 2021-11-10 PROCEDURE — 99291 CRITICAL CARE FIRST HOUR: CPT | Performed by: EMERGENCY MEDICINE

## 2021-11-10 PROCEDURE — 36415 COLL VENOUS BLD VENIPUNCTURE: CPT | Performed by: EMERGENCY MEDICINE

## 2021-11-10 PROCEDURE — 86850 RBC ANTIBODY SCREEN: CPT | Performed by: EMERGENCY MEDICINE

## 2021-11-10 PROCEDURE — G1004 CDSM NDSC: HCPCS

## 2021-11-10 PROCEDURE — 85610 PROTHROMBIN TIME: CPT | Performed by: EMERGENCY MEDICINE

## 2021-11-10 PROCEDURE — 86901 BLOOD TYPING SEROLOGIC RH(D): CPT | Performed by: EMERGENCY MEDICINE

## 2021-11-10 PROCEDURE — 93970 EXTREMITY STUDY: CPT | Performed by: SURGERY

## 2021-11-10 PROCEDURE — 86900 BLOOD TYPING SEROLOGIC ABO: CPT | Performed by: EMERGENCY MEDICINE

## 2021-11-10 PROCEDURE — 71045 X-RAY EXAM CHEST 1 VIEW: CPT

## 2021-11-10 PROCEDURE — 83880 ASSAY OF NATRIURETIC PEPTIDE: CPT | Performed by: EMERGENCY MEDICINE

## 2021-11-10 PROCEDURE — 0241U HB NFCT DS VIR RESP RNA 4 TRGT: CPT | Performed by: EMERGENCY MEDICINE

## 2021-11-10 PROCEDURE — 85025 COMPLETE CBC W/AUTO DIFF WBC: CPT | Performed by: EMERGENCY MEDICINE

## 2021-11-10 PROCEDURE — 93970 EXTREMITY STUDY: CPT

## 2021-11-10 PROCEDURE — 87205 SMEAR GRAM STAIN: CPT | Performed by: INTERNAL MEDICINE

## 2021-11-10 PROCEDURE — 93010 ELECTROCARDIOGRAM REPORT: CPT | Performed by: INTERNAL MEDICINE

## 2021-11-10 PROCEDURE — 99223 1ST HOSP IP/OBS HIGH 75: CPT | Performed by: INTERNAL MEDICINE

## 2021-11-10 PROCEDURE — 96374 THER/PROPH/DIAG INJ IV PUSH: CPT

## 2021-11-10 PROCEDURE — 93005 ELECTROCARDIOGRAM TRACING: CPT

## 2021-11-10 PROCEDURE — 87449 NOS EACH ORGANISM AG IA: CPT | Performed by: INTERNAL MEDICINE

## 2021-11-10 PROCEDURE — 85379 FIBRIN DEGRADATION QUANT: CPT | Performed by: EMERGENCY MEDICINE

## 2021-11-10 PROCEDURE — 83605 ASSAY OF LACTIC ACID: CPT | Performed by: EMERGENCY MEDICINE

## 2021-11-10 PROCEDURE — 87070 CULTURE OTHR SPECIMN AEROBIC: CPT | Performed by: INTERNAL MEDICINE

## 2021-11-10 PROCEDURE — 80053 COMPREHEN METABOLIC PANEL: CPT | Performed by: EMERGENCY MEDICINE

## 2021-11-10 PROCEDURE — 85730 THROMBOPLASTIN TIME PARTIAL: CPT | Performed by: EMERGENCY MEDICINE

## 2021-11-10 PROCEDURE — 87040 BLOOD CULTURE FOR BACTERIA: CPT | Performed by: EMERGENCY MEDICINE

## 2021-11-10 PROCEDURE — 99222 1ST HOSP IP/OBS MODERATE 55: CPT | Performed by: INTERNAL MEDICINE

## 2021-11-10 PROCEDURE — 71250 CT THORAX DX C-: CPT

## 2021-11-10 PROCEDURE — 84484 ASSAY OF TROPONIN QUANT: CPT | Performed by: EMERGENCY MEDICINE

## 2021-11-10 RX ORDER — PRAVASTATIN SODIUM 40 MG
40 TABLET ORAL
Status: DISCONTINUED | OUTPATIENT
Start: 2021-11-10 | End: 2021-11-11 | Stop reason: HOSPADM

## 2021-11-10 RX ORDER — METOPROLOL SUCCINATE 25 MG/1
25 TABLET, EXTENDED RELEASE ORAL DAILY
Status: DISCONTINUED | OUTPATIENT
Start: 2021-11-10 | End: 2021-11-11 | Stop reason: HOSPADM

## 2021-11-10 RX ORDER — ACETAMINOPHEN 325 MG/1
650 TABLET ORAL EVERY 6 HOURS PRN
Status: DISCONTINUED | OUTPATIENT
Start: 2021-11-10 | End: 2021-11-11 | Stop reason: HOSPADM

## 2021-11-10 RX ORDER — GUAIFENESIN 600 MG
600 TABLET, EXTENDED RELEASE 12 HR ORAL 2 TIMES DAILY
Status: DISCONTINUED | OUTPATIENT
Start: 2021-11-10 | End: 2021-11-11 | Stop reason: HOSPADM

## 2021-11-10 RX ORDER — METRONIDAZOLE 500 MG/1
500 TABLET ORAL EVERY 8 HOURS SCHEDULED
Status: DISCONTINUED | OUTPATIENT
Start: 2021-11-10 | End: 2021-11-11 | Stop reason: HOSPADM

## 2021-11-10 RX ORDER — PANTOPRAZOLE SODIUM 40 MG/1
40 TABLET, DELAYED RELEASE ORAL
Status: DISCONTINUED | OUTPATIENT
Start: 2021-11-10 | End: 2021-11-11 | Stop reason: HOSPADM

## 2021-11-10 RX ORDER — ONDANSETRON 2 MG/ML
4 INJECTION INTRAMUSCULAR; INTRAVENOUS EVERY 6 HOURS PRN
Status: DISCONTINUED | OUTPATIENT
Start: 2021-11-10 | End: 2021-11-11 | Stop reason: HOSPADM

## 2021-11-10 RX ORDER — FINASTERIDE 5 MG/1
5 TABLET, FILM COATED ORAL DAILY
Status: DISCONTINUED | OUTPATIENT
Start: 2021-11-10 | End: 2021-11-11 | Stop reason: HOSPADM

## 2021-11-10 RX ORDER — PANTOPRAZOLE SODIUM 40 MG/1
40 TABLET, DELAYED RELEASE ORAL
Status: DISCONTINUED | OUTPATIENT
Start: 2021-11-11 | End: 2021-11-10

## 2021-11-10 RX ORDER — TAMSULOSIN HYDROCHLORIDE 0.4 MG/1
0.4 CAPSULE ORAL
Status: DISCONTINUED | OUTPATIENT
Start: 2021-11-10 | End: 2021-11-11 | Stop reason: HOSPADM

## 2021-11-10 RX ORDER — SODIUM CHLORIDE, SODIUM LACTATE, POTASSIUM CHLORIDE, CALCIUM CHLORIDE 600; 310; 30; 20 MG/100ML; MG/100ML; MG/100ML; MG/100ML
125 INJECTION, SOLUTION INTRAVENOUS CONTINUOUS
Status: DISCONTINUED | OUTPATIENT
Start: 2021-11-10 | End: 2021-11-10

## 2021-11-10 RX ADMIN — PANTOPRAZOLE SODIUM 40 MG: 40 TABLET, DELAYED RELEASE ORAL at 22:24

## 2021-11-10 RX ADMIN — METRONIDAZOLE 500 MG: 500 TABLET ORAL at 16:22

## 2021-11-10 RX ADMIN — TAMSULOSIN HYDROCHLORIDE 0.4 MG: 0.4 CAPSULE ORAL at 16:23

## 2021-11-10 RX ADMIN — METRONIDAZOLE 500 MG: 500 TABLET ORAL at 21:38

## 2021-11-10 RX ADMIN — METOPROLOL SUCCINATE 25 MG: 25 TABLET, EXTENDED RELEASE ORAL at 16:23

## 2021-11-10 RX ADMIN — SODIUM CHLORIDE, SODIUM LACTATE, POTASSIUM CHLORIDE, AND CALCIUM CHLORIDE 500 ML: .6; .31; .03; .02 INJECTION, SOLUTION INTRAVENOUS at 09:55

## 2021-11-10 RX ADMIN — FINASTERIDE 5 MG: 5 TABLET, FILM COATED ORAL at 16:22

## 2021-11-10 RX ADMIN — CEFTRIAXONE SODIUM 1000 MG: 10 INJECTION, POWDER, FOR SOLUTION INTRAVENOUS at 17:17

## 2021-11-10 RX ADMIN — PRAVASTATIN SODIUM 40 MG: 40 TABLET ORAL at 16:23

## 2021-11-10 RX ADMIN — CEFEPIME HYDROCHLORIDE 2000 MG: 2 INJECTION, POWDER, FOR SOLUTION INTRAVENOUS at 08:56

## 2021-11-10 RX ADMIN — GUAIFENESIN 600 MG: 600 TABLET ORAL at 17:20

## 2021-11-11 ENCOUNTER — APPOINTMENT (INPATIENT)
Dept: NON INVASIVE DIAGNOSTICS | Facility: HOSPITAL | Age: 86
DRG: 194 | End: 2021-11-11
Payer: MEDICARE

## 2021-11-11 VITALS
HEART RATE: 65 BPM | WEIGHT: 182 LBS | DIASTOLIC BLOOD PRESSURE: 56 MMHG | OXYGEN SATURATION: 93 % | HEIGHT: 69 IN | SYSTOLIC BLOOD PRESSURE: 128 MMHG | BODY MASS INDEX: 26.96 KG/M2 | TEMPERATURE: 98.2 F | RESPIRATION RATE: 16 BRPM

## 2021-11-11 LAB
ANION GAP SERPL CALCULATED.3IONS-SCNC: 11 MMOL/L (ref 4–13)
BASOPHILS # BLD AUTO: 0.06 THOUSANDS/ΜL (ref 0–0.1)
BASOPHILS NFR BLD AUTO: 1 % (ref 0–1)
BUN SERPL-MCNC: 33 MG/DL (ref 5–25)
CALCIUM SERPL-MCNC: 8.6 MG/DL (ref 8.3–10.1)
CHLORIDE SERPL-SCNC: 105 MMOL/L (ref 100–108)
CO2 SERPL-SCNC: 24 MMOL/L (ref 21–32)
CREAT SERPL-MCNC: 2.31 MG/DL (ref 0.6–1.3)
EOSINOPHIL # BLD AUTO: 0.4 THOUSAND/ΜL (ref 0–0.61)
EOSINOPHIL NFR BLD AUTO: 4 % (ref 0–6)
ERYTHROCYTE [DISTWIDTH] IN BLOOD BY AUTOMATED COUNT: 13.2 % (ref 11.6–15.1)
GFR SERPL CREATININE-BSD FRML MDRD: 25 ML/MIN/1.73SQ M
GLUCOSE SERPL-MCNC: 93 MG/DL (ref 65–140)
HCT VFR BLD AUTO: 27.4 % (ref 36.5–49.3)
HGB BLD-MCNC: 8.8 G/DL (ref 12–17)
IMM GRANULOCYTES # BLD AUTO: 0.03 THOUSAND/UL (ref 0–0.2)
IMM GRANULOCYTES NFR BLD AUTO: 0 % (ref 0–2)
L PNEUMO1 AG UR QL IA.RAPID: NEGATIVE
LYMPHOCYTES # BLD AUTO: 2.17 THOUSANDS/ΜL (ref 0.6–4.47)
LYMPHOCYTES NFR BLD AUTO: 20 % (ref 14–44)
MCH RBC QN AUTO: 29.1 PG (ref 26.8–34.3)
MCHC RBC AUTO-ENTMCNC: 32.1 G/DL (ref 31.4–37.4)
MCV RBC AUTO: 91 FL (ref 82–98)
MONOCYTES # BLD AUTO: 1.59 THOUSAND/ΜL (ref 0.17–1.22)
MONOCYTES NFR BLD AUTO: 14 % (ref 4–12)
NEUTROPHILS # BLD AUTO: 6.87 THOUSANDS/ΜL (ref 1.85–7.62)
NEUTS SEG NFR BLD AUTO: 61 % (ref 43–75)
NRBC BLD AUTO-RTO: 0 /100 WBCS
PLATELET # BLD AUTO: 239 THOUSANDS/UL (ref 149–390)
PMV BLD AUTO: 11.8 FL (ref 8.9–12.7)
POTASSIUM SERPL-SCNC: 4 MMOL/L (ref 3.5–5.3)
PROCALCITONIN SERPL-MCNC: <0.05 NG/ML
RBC # BLD AUTO: 3.02 MILLION/UL (ref 3.88–5.62)
S PNEUM AG UR QL: NEGATIVE
SODIUM SERPL-SCNC: 140 MMOL/L (ref 136–145)
WBC # BLD AUTO: 11.12 THOUSAND/UL (ref 4.31–10.16)

## 2021-11-11 PROCEDURE — 80048 BASIC METABOLIC PNL TOTAL CA: CPT | Performed by: INTERNAL MEDICINE

## 2021-11-11 PROCEDURE — 84145 PROCALCITONIN (PCT): CPT | Performed by: INTERNAL MEDICINE

## 2021-11-11 PROCEDURE — 93325 DOPPLER ECHO COLOR FLOW MAPG: CPT

## 2021-11-11 PROCEDURE — 99239 HOSP IP/OBS DSCHRG MGMT >30: CPT | Performed by: INTERNAL MEDICINE

## 2021-11-11 PROCEDURE — 99232 SBSQ HOSP IP/OBS MODERATE 35: CPT | Performed by: NURSE PRACTITIONER

## 2021-11-11 PROCEDURE — 85025 COMPLETE CBC W/AUTO DIFF WBC: CPT | Performed by: INTERNAL MEDICINE

## 2021-11-11 PROCEDURE — 93321 DOPPLER ECHO F-UP/LMTD STD: CPT | Performed by: INTERNAL MEDICINE

## 2021-11-11 PROCEDURE — 93321 DOPPLER ECHO F-UP/LMTD STD: CPT

## 2021-11-11 PROCEDURE — 99232 SBSQ HOSP IP/OBS MODERATE 35: CPT | Performed by: INTERNAL MEDICINE

## 2021-11-11 PROCEDURE — 93308 TTE F-UP OR LMTD: CPT | Performed by: INTERNAL MEDICINE

## 2021-11-11 PROCEDURE — 93325 DOPPLER ECHO COLOR FLOW MAPG: CPT | Performed by: INTERNAL MEDICINE

## 2021-11-11 PROCEDURE — 93308 TTE F-UP OR LMTD: CPT

## 2021-11-11 PROCEDURE — 97165 OT EVAL LOW COMPLEX 30 MIN: CPT

## 2021-11-11 PROCEDURE — 97163 PT EVAL HIGH COMPLEX 45 MIN: CPT

## 2021-11-11 RX ORDER — AMOXICILLIN AND CLAVULANATE POTASSIUM 875; 125 MG/1; MG/1
1 TABLET, FILM COATED ORAL EVERY 12 HOURS SCHEDULED
Qty: 14 TABLET | Refills: 0 | Status: SHIPPED | OUTPATIENT
Start: 2021-11-11 | End: 2021-11-18

## 2021-11-11 RX ADMIN — METRONIDAZOLE 500 MG: 500 TABLET ORAL at 05:40

## 2021-11-11 RX ADMIN — METOPROLOL SUCCINATE 25 MG: 25 TABLET, EXTENDED RELEASE ORAL at 09:18

## 2021-11-11 RX ADMIN — FINASTERIDE 5 MG: 5 TABLET, FILM COATED ORAL at 09:18

## 2021-11-11 RX ADMIN — GUAIFENESIN 600 MG: 600 TABLET ORAL at 09:18

## 2021-11-12 ENCOUNTER — TRANSITIONAL CARE MANAGEMENT (OUTPATIENT)
Dept: INTERNAL MEDICINE CLINIC | Facility: CLINIC | Age: 86
End: 2021-11-12

## 2021-11-12 LAB
AORTIC VALVE MEAN VELOCITY: 22.7 M/S
APICAL FOUR CHAMBER EJECTION FRACTION: 64 %
AV AREA BY CONTINUOUS VTI: 1.3 CM2
AV AREA PEAK VELOCITY: 1.2 CM2
AV LVOT MEAN GRADIENT: 2 MMHG
AV LVOT PEAK GRADIENT: 4 MMHG
AV MEAN GRADIENT: 23 MMHG
AV PEAK GRADIENT: 37 MMHG
AV VALVE AREA: 1.25 CM2
AV VELOCITY RATIO: 0.35
BACTERIA SPT RESP CULT: ABNORMAL
DOP CALC AO PEAK VEL: 3 M/S
DOP CALC AO VTI: 75.86 CM
DOP CALC LVOT AREA: 3.46 CM2
DOP CALC LVOT DIAMETER: 2.1 CM
DOP CALC LVOT PEAK VEL VTI: 27.43 CM
DOP CALC LVOT PEAK VEL: 1.06 M/S
DOP CALC LVOT STROKE INDEX: 48.2 ML/M2
DOP CALC LVOT STROKE VOLUME: 94.96 CM3
GRAM STN SPEC: ABNORMAL
IVC: 2.2 MM
LAAS-AP2: 27 CM2
LAAS-AP4: 21.6 CM2
MV E'TISSUE VEL-SEP: 7 CM/S
PA SYSTOLIC PRESSURE: 49 MMHG
RIGHT ATRIAL 2D VOLUME: 35 ML
RIGHT ATRIUM AREA SYSTOLE A4C: 15.1 CM2
RIGHT VENTRICLE ID DIMENSION: 3.8 CM
SL CV ECHO AV MEAN VELOCITY RETROGRADE: 2.3 M/S
SL CV LV EF: 64
TRICUSPID ANNULAR PLANE SYSTOLIC EXCURSION: 2.6 CM
TRICUSPID VALVE PEAK REGURGITATION VELOCITY: 3.12 M/S
TRICUSPID VALVE S': 54 CM/S
TV PEAK GRADIENT: 39 MMHG

## 2021-11-15 ENCOUNTER — OFFICE VISIT (OUTPATIENT)
Dept: INTERNAL MEDICINE CLINIC | Facility: CLINIC | Age: 86
End: 2021-11-15
Payer: MEDICARE

## 2021-11-15 VITALS
WEIGHT: 184.8 LBS | DIASTOLIC BLOOD PRESSURE: 68 MMHG | TEMPERATURE: 97.1 F | OXYGEN SATURATION: 97 % | HEART RATE: 63 BPM | SYSTOLIC BLOOD PRESSURE: 134 MMHG | RESPIRATION RATE: 16 BRPM | BODY MASS INDEX: 27.37 KG/M2 | HEIGHT: 69 IN

## 2021-11-15 DIAGNOSIS — D64.9 ANEMIA, UNSPECIFIED TYPE: Primary | ICD-10-CM

## 2021-11-15 LAB
BACTERIA BLD CULT: NORMAL
BACTERIA BLD CULT: NORMAL

## 2021-11-15 PROCEDURE — 99214 OFFICE O/P EST MOD 30 MIN: CPT | Performed by: INTERNAL MEDICINE

## 2021-11-17 ENCOUNTER — APPOINTMENT (OUTPATIENT)
Dept: LAB | Facility: CLINIC | Age: 86
End: 2021-11-17
Payer: MEDICARE

## 2021-11-17 DIAGNOSIS — D64.9 ANEMIA, UNSPECIFIED TYPE: ICD-10-CM

## 2021-11-17 LAB
ALBUMIN SERPL BCP-MCNC: 3.1 G/DL (ref 3.5–5)
ALP SERPL-CCNC: 84 U/L (ref 46–116)
ALT SERPL W P-5'-P-CCNC: 25 U/L (ref 12–78)
ANION GAP SERPL CALCULATED.3IONS-SCNC: 5 MMOL/L (ref 4–13)
AST SERPL W P-5'-P-CCNC: 21 U/L (ref 5–45)
BASOPHILS # BLD AUTO: 0.07 THOUSANDS/ΜL (ref 0–0.1)
BASOPHILS NFR BLD AUTO: 1 % (ref 0–1)
BILIRUB SERPL-MCNC: 0.31 MG/DL (ref 0.2–1)
BUN SERPL-MCNC: 35 MG/DL (ref 5–25)
CALCIUM ALBUM COR SERPL-MCNC: 10.5 MG/DL (ref 8.3–10.1)
CALCIUM SERPL-MCNC: 9.8 MG/DL (ref 8.3–10.1)
CHLORIDE SERPL-SCNC: 105 MMOL/L (ref 100–108)
CO2 SERPL-SCNC: 27 MMOL/L (ref 21–32)
CREAT SERPL-MCNC: 1.98 MG/DL (ref 0.6–1.3)
EOSINOPHIL # BLD AUTO: 0.64 THOUSAND/ΜL (ref 0–0.61)
EOSINOPHIL NFR BLD AUTO: 7 % (ref 0–6)
ERYTHROCYTE [DISTWIDTH] IN BLOOD BY AUTOMATED COUNT: 12.9 % (ref 11.6–15.1)
FERRITIN SERPL-MCNC: 240 NG/ML (ref 8–388)
GFR SERPL CREATININE-BSD FRML MDRD: 30 ML/MIN/1.73SQ M
GLUCOSE SERPL-MCNC: 116 MG/DL (ref 65–140)
HCT VFR BLD AUTO: 31.7 % (ref 36.5–49.3)
HGB BLD-MCNC: 10.2 G/DL (ref 12–17)
IMM GRANULOCYTES # BLD AUTO: 0.04 THOUSAND/UL (ref 0–0.2)
IMM GRANULOCYTES NFR BLD AUTO: 0 % (ref 0–2)
IRON SATN MFR SERPL: 27 % (ref 20–50)
IRON SERPL-MCNC: 52 UG/DL (ref 65–175)
LYMPHOCYTES # BLD AUTO: 2.11 THOUSANDS/ΜL (ref 0.6–4.47)
LYMPHOCYTES NFR BLD AUTO: 23 % (ref 14–44)
MCH RBC QN AUTO: 28.8 PG (ref 26.8–34.3)
MCHC RBC AUTO-ENTMCNC: 32.2 G/DL (ref 31.4–37.4)
MCV RBC AUTO: 90 FL (ref 82–98)
MONOCYTES # BLD AUTO: 0.8 THOUSAND/ΜL (ref 0.17–1.22)
MONOCYTES NFR BLD AUTO: 9 % (ref 4–12)
NEUTROPHILS # BLD AUTO: 5.36 THOUSANDS/ΜL (ref 1.85–7.62)
NEUTS SEG NFR BLD AUTO: 60 % (ref 43–75)
NRBC BLD AUTO-RTO: 0 /100 WBCS
PLATELET # BLD AUTO: 391 THOUSANDS/UL (ref 149–390)
PMV BLD AUTO: 10.7 FL (ref 8.9–12.7)
POTASSIUM SERPL-SCNC: 3.7 MMOL/L (ref 3.5–5.3)
PROT SERPL-MCNC: 7.6 G/DL (ref 6.4–8.2)
RBC # BLD AUTO: 3.54 MILLION/UL (ref 3.88–5.62)
SODIUM SERPL-SCNC: 137 MMOL/L (ref 136–145)
TIBC SERPL-MCNC: 194 UG/DL (ref 250–450)
WBC # BLD AUTO: 9.02 THOUSAND/UL (ref 4.31–10.16)

## 2021-11-17 PROCEDURE — 85025 COMPLETE CBC W/AUTO DIFF WBC: CPT

## 2021-11-17 PROCEDURE — 36415 COLL VENOUS BLD VENIPUNCTURE: CPT

## 2021-11-17 PROCEDURE — 83550 IRON BINDING TEST: CPT

## 2021-11-17 PROCEDURE — 83540 ASSAY OF IRON: CPT

## 2021-11-17 PROCEDURE — 82728 ASSAY OF FERRITIN: CPT

## 2021-11-17 PROCEDURE — 80053 COMPREHEN METABOLIC PANEL: CPT

## 2021-11-17 PROCEDURE — 83010 ASSAY OF HAPTOGLOBIN QUANT: CPT

## 2021-11-18 ENCOUNTER — APPOINTMENT (OUTPATIENT)
Dept: LAB | Facility: CLINIC | Age: 86
End: 2021-11-18
Payer: MEDICARE

## 2021-11-18 DIAGNOSIS — N18.4 CKD (CHRONIC KIDNEY DISEASE) STAGE 4, GFR 15-29 ML/MIN (HCC): ICD-10-CM

## 2021-11-18 DIAGNOSIS — I50.32 CHRONIC DIASTOLIC (CONGESTIVE) HEART FAILURE (HCC): ICD-10-CM

## 2021-11-18 LAB
ANION GAP SERPL CALCULATED.3IONS-SCNC: 6 MMOL/L (ref 4–13)
BUN SERPL-MCNC: 32 MG/DL (ref 5–25)
CALCIUM SERPL-MCNC: 9.6 MG/DL (ref 8.3–10.1)
CHLORIDE SERPL-SCNC: 105 MMOL/L (ref 100–108)
CO2 SERPL-SCNC: 26 MMOL/L (ref 21–32)
CREAT SERPL-MCNC: 1.79 MG/DL (ref 0.6–1.3)
GFR SERPL CREATININE-BSD FRML MDRD: 34 ML/MIN/1.73SQ M
GLUCOSE P FAST SERPL-MCNC: 105 MG/DL (ref 65–99)
HAPTOGLOB SERPL-MCNC: 232 MG/DL (ref 38–329)
POTASSIUM SERPL-SCNC: 3.6 MMOL/L (ref 3.5–5.3)
SODIUM SERPL-SCNC: 137 MMOL/L (ref 136–145)

## 2021-11-18 PROCEDURE — 36415 COLL VENOUS BLD VENIPUNCTURE: CPT

## 2021-11-18 PROCEDURE — 80048 BASIC METABOLIC PNL TOTAL CA: CPT

## 2021-11-19 ENCOUNTER — OFFICE VISIT (OUTPATIENT)
Dept: CARDIOLOGY CLINIC | Facility: CLINIC | Age: 86
End: 2021-11-19
Payer: MEDICARE

## 2021-11-19 ENCOUNTER — TELEPHONE (OUTPATIENT)
Dept: INTERNAL MEDICINE CLINIC | Facility: CLINIC | Age: 86
End: 2021-11-19

## 2021-11-19 VITALS
BODY MASS INDEX: 27.16 KG/M2 | HEART RATE: 60 BPM | DIASTOLIC BLOOD PRESSURE: 78 MMHG | SYSTOLIC BLOOD PRESSURE: 138 MMHG | HEIGHT: 69 IN | WEIGHT: 183.4 LBS | RESPIRATION RATE: 16 BRPM

## 2021-11-19 DIAGNOSIS — I49.3 PVC (PREMATURE VENTRICULAR CONTRACTION): ICD-10-CM

## 2021-11-19 DIAGNOSIS — Z98.890 HISTORY OF RADIOFREQUENCY ABLATION PROCEDURE FOR CARDIAC ARRHYTHMIA: ICD-10-CM

## 2021-11-19 DIAGNOSIS — I35.0 AORTIC STENOSIS, SEVERE: Primary | Chronic | ICD-10-CM

## 2021-11-19 DIAGNOSIS — D64.9 CHRONIC ANEMIA: ICD-10-CM

## 2021-11-19 DIAGNOSIS — E78.00 PURE HYPERCHOLESTEROLEMIA: ICD-10-CM

## 2021-11-19 DIAGNOSIS — I10 PRIMARY HYPERTENSION: ICD-10-CM

## 2021-11-19 DIAGNOSIS — N18.4 CKD (CHRONIC KIDNEY DISEASE) STAGE 4, GFR 15-29 ML/MIN (HCC): ICD-10-CM

## 2021-11-19 DIAGNOSIS — I50.32 CHRONIC DIASTOLIC (CONGESTIVE) HEART FAILURE (HCC): ICD-10-CM

## 2021-11-19 PROCEDURE — 99214 OFFICE O/P EST MOD 30 MIN: CPT | Performed by: INTERNAL MEDICINE

## 2021-11-22 ENCOUNTER — HOSPITAL ENCOUNTER (OUTPATIENT)
Dept: NON INVASIVE DIAGNOSTICS | Facility: HOSPITAL | Age: 86
Discharge: HOME | End: 2021-11-22
Payer: MEDICARE

## 2021-11-22 VITALS
BODY MASS INDEX: 27.11 KG/M2 | HEART RATE: 63 BPM | DIASTOLIC BLOOD PRESSURE: 72 MMHG | SYSTOLIC BLOOD PRESSURE: 156 MMHG | WEIGHT: 183 LBS | HEIGHT: 69 IN

## 2021-11-22 DIAGNOSIS — I35.0 AORTIC STENOSIS, SEVERE: Chronic | ICD-10-CM

## 2021-11-22 LAB
AV MEAN GRADIENT: 34.5 MMHG
AV PEAK GRADIENT: 50 MMHG
AV REGURGITATION PRESSURE HALF TIME: 374 MS
DOP CALC AO PEAK VEL: 3.6 M/S
SL CV ECHO AV MEAN VELOCITY RETROGRADE: 2.8 M/S
SL CV LV EF: 54

## 2021-11-22 PROCEDURE — 93308 TTE F-UP OR LMTD: CPT

## 2021-11-22 PROCEDURE — 93321 DOPPLER ECHO F-UP/LMTD STD: CPT | Performed by: INTERNAL MEDICINE

## 2021-11-22 PROCEDURE — 93321 DOPPLER ECHO F-UP/LMTD STD: CPT

## 2021-11-22 PROCEDURE — 93325 DOPPLER ECHO COLOR FLOW MAPG: CPT | Performed by: INTERNAL MEDICINE

## 2021-11-22 PROCEDURE — 93308 TTE F-UP OR LMTD: CPT | Performed by: INTERNAL MEDICINE

## 2021-11-22 PROCEDURE — 93325 DOPPLER ECHO COLOR FLOW MAPG: CPT

## 2021-11-29 ENCOUNTER — OFFICE VISIT (OUTPATIENT)
Dept: UROLOGY | Facility: MEDICAL CENTER | Age: 86
End: 2021-11-29
Payer: MEDICARE

## 2021-11-29 VITALS
SYSTOLIC BLOOD PRESSURE: 158 MMHG | BODY MASS INDEX: 27.62 KG/M2 | WEIGHT: 187 LBS | HEART RATE: 70 BPM | DIASTOLIC BLOOD PRESSURE: 82 MMHG

## 2021-11-29 DIAGNOSIS — R33.9 INCOMPLETE BLADDER EMPTYING: Primary | ICD-10-CM

## 2021-11-29 LAB
POST-VOID RESIDUAL VOLUME, ML POC: 0 ML
SL AMB  POCT GLUCOSE, UA: NORMAL
SL AMB LEUKOCYTE ESTERASE,UA: NORMAL
SL AMB POCT BILIRUBIN,UA: NORMAL
SL AMB POCT BLOOD,UA: NORMAL
SL AMB POCT CLARITY,UA: CLEAR
SL AMB POCT COLOR,UA: YELLOW
SL AMB POCT KETONES,UA: NORMAL
SL AMB POCT NITRITE,UA: NORMAL
SL AMB POCT PH,UA: 6
SL AMB POCT SPECIFIC GRAVITY,UA: 1.02
SL AMB POCT URINE PROTEIN: NORMAL
SL AMB POCT UROBILINOGEN: 0.2

## 2021-11-29 PROCEDURE — 99213 OFFICE O/P EST LOW 20 MIN: CPT | Performed by: UROLOGY

## 2021-11-29 PROCEDURE — 81002 URINALYSIS NONAUTO W/O SCOPE: CPT | Performed by: UROLOGY

## 2021-11-29 PROCEDURE — 51798 US URINE CAPACITY MEASURE: CPT | Performed by: UROLOGY

## 2021-11-30 DIAGNOSIS — E78.5 HYPERLIPIDEMIA, UNSPECIFIED HYPERLIPIDEMIA TYPE: ICD-10-CM

## 2021-11-30 RX ORDER — ROSUVASTATIN CALCIUM 5 MG/1
5 TABLET, COATED ORAL DAILY
Qty: 90 TABLET | Refills: 0 | Status: SHIPPED | OUTPATIENT
Start: 2021-11-30 | End: 2022-03-08

## 2021-12-12 ENCOUNTER — HOSPITAL ENCOUNTER (OUTPATIENT)
Dept: CT IMAGING | Facility: HOSPITAL | Age: 86
Discharge: HOME | End: 2021-12-12
Payer: MEDICARE

## 2021-12-12 DIAGNOSIS — R93.89 ABNORMAL CHEST CT: ICD-10-CM

## 2021-12-12 PROCEDURE — G1004 CDSM NDSC: HCPCS

## 2021-12-12 PROCEDURE — 71250 CT THORAX DX C-: CPT

## 2021-12-16 ENCOUNTER — OFFICE VISIT (OUTPATIENT)
Dept: PULMONOLOGY | Facility: CLINIC | Age: 86
End: 2021-12-16
Payer: MEDICARE

## 2021-12-16 VITALS
BODY MASS INDEX: 27.55 KG/M2 | WEIGHT: 186 LBS | DIASTOLIC BLOOD PRESSURE: 70 MMHG | SYSTOLIC BLOOD PRESSURE: 140 MMHG | HEART RATE: 76 BPM | HEIGHT: 69 IN | OXYGEN SATURATION: 98 % | RESPIRATION RATE: 16 BRPM

## 2021-12-16 DIAGNOSIS — R91.8 PULMONARY NODULES: ICD-10-CM

## 2021-12-16 DIAGNOSIS — J18.9 PNEUMONIA OF RIGHT LOWER LOBE DUE TO INFECTIOUS ORGANISM: Primary | ICD-10-CM

## 2021-12-16 DIAGNOSIS — F17.211 CIGARETTE NICOTINE DEPENDENCE IN REMISSION: ICD-10-CM

## 2021-12-16 PROCEDURE — 99214 OFFICE O/P EST MOD 30 MIN: CPT | Performed by: INTERNAL MEDICINE

## 2022-01-04 DIAGNOSIS — R33.9 INCOMPLETE BLADDER EMPTYING: ICD-10-CM

## 2022-01-04 DIAGNOSIS — R39.12 WEAK URINARY STREAM: ICD-10-CM

## 2022-01-04 DIAGNOSIS — N13.8 BPH WITH OBSTRUCTION/LOWER URINARY TRACT SYMPTOMS: ICD-10-CM

## 2022-01-04 DIAGNOSIS — N40.1 BPH WITH OBSTRUCTION/LOWER URINARY TRACT SYMPTOMS: ICD-10-CM

## 2022-01-04 RX ORDER — TAMSULOSIN HYDROCHLORIDE 0.4 MG/1
0.4 CAPSULE ORAL
Qty: 30 CAPSULE | Refills: 11 | Status: SHIPPED | OUTPATIENT
Start: 2022-01-04

## 2022-01-14 ENCOUNTER — APPOINTMENT (OUTPATIENT)
Dept: LAB | Facility: CLINIC | Age: 87
End: 2022-01-14
Payer: MEDICARE

## 2022-01-14 DIAGNOSIS — N18.4 CKD (CHRONIC KIDNEY DISEASE) STAGE 4, GFR 15-29 ML/MIN (HCC): ICD-10-CM

## 2022-01-14 LAB
ANION GAP SERPL CALCULATED.3IONS-SCNC: 5 MMOL/L (ref 4–13)
BUN SERPL-MCNC: 40 MG/DL (ref 5–25)
CALCIUM SERPL-MCNC: 9.7 MG/DL (ref 8.3–10.1)
CHLORIDE SERPL-SCNC: 105 MMOL/L (ref 100–108)
CO2 SERPL-SCNC: 28 MMOL/L (ref 21–32)
CREAT SERPL-MCNC: 2.05 MG/DL (ref 0.6–1.3)
GFR SERPL CREATININE-BSD FRML MDRD: 28 ML/MIN/1.73SQ M
GLUCOSE P FAST SERPL-MCNC: 93 MG/DL (ref 65–99)
POTASSIUM SERPL-SCNC: 4.1 MMOL/L (ref 3.5–5.3)
SODIUM SERPL-SCNC: 138 MMOL/L (ref 136–145)

## 2022-01-14 PROCEDURE — 80048 BASIC METABOLIC PNL TOTAL CA: CPT

## 2022-01-14 PROCEDURE — 36415 COLL VENOUS BLD VENIPUNCTURE: CPT

## 2022-01-31 ENCOUNTER — OFFICE VISIT (OUTPATIENT)
Dept: CARDIOLOGY CLINIC | Facility: CLINIC | Age: 87
End: 2022-01-31
Payer: MEDICARE

## 2022-01-31 ENCOUNTER — TELEPHONE (OUTPATIENT)
Dept: NEPHROLOGY | Facility: CLINIC | Age: 87
End: 2022-01-31

## 2022-01-31 VITALS
SYSTOLIC BLOOD PRESSURE: 136 MMHG | HEIGHT: 69 IN | HEART RATE: 56 BPM | DIASTOLIC BLOOD PRESSURE: 72 MMHG | WEIGHT: 183 LBS | BODY MASS INDEX: 27.11 KG/M2 | RESPIRATION RATE: 16 BRPM

## 2022-01-31 DIAGNOSIS — Z98.890 HISTORY OF RADIOFREQUENCY ABLATION PROCEDURE FOR CARDIAC ARRHYTHMIA: ICD-10-CM

## 2022-01-31 DIAGNOSIS — I49.3 PVC (PREMATURE VENTRICULAR CONTRACTION): ICD-10-CM

## 2022-01-31 DIAGNOSIS — I10 PRIMARY HYPERTENSION: ICD-10-CM

## 2022-01-31 DIAGNOSIS — I35.0 AORTIC STENOSIS, SEVERE: Primary | Chronic | ICD-10-CM

## 2022-01-31 DIAGNOSIS — I47.1 PSVT (PAROXYSMAL SUPRAVENTRICULAR TACHYCARDIA) (HCC): ICD-10-CM

## 2022-01-31 DIAGNOSIS — E78.00 PURE HYPERCHOLESTEROLEMIA: ICD-10-CM

## 2022-01-31 DIAGNOSIS — N18.4 CKD (CHRONIC KIDNEY DISEASE) STAGE 4, GFR 15-29 ML/MIN (HCC): ICD-10-CM

## 2022-01-31 DIAGNOSIS — D64.9 CHRONIC ANEMIA: ICD-10-CM

## 2022-01-31 DIAGNOSIS — I50.32 CHRONIC DIASTOLIC (CONGESTIVE) HEART FAILURE (HCC): ICD-10-CM

## 2022-01-31 PROCEDURE — 99214 OFFICE O/P EST MOD 30 MIN: CPT | Performed by: INTERNAL MEDICINE

## 2022-01-31 NOTE — PROGRESS NOTES
[facility]  [id number] [facility]   [phone]  [phone]  Phone#  [phone]   Fax#  [phone]  *-*-*-*-*-*-*-*-*-*-*-*-*-*-*-*-*-*-*-*-*-*-*-*-*-*-*-*-*-*-*-*-*-*-*-*-*-*-*-*-*-*-*-*-*-*-*-*-*-*-*-*-*-*                                   Cardiology Follow Up      ENCOUNTER DATE: 22 12:57 PM  PATIENT NAME: Terence Singh   : 1935    MRN: [id number]  AGE:86 y o  SEX: male  [address] John Mauro MD     PRIMARY CARE PHYSICIAN: Manuel Crystal MD    ACTIVE DIAGNOSIS THIS VISIT  1  Aortic stenosis, severe     2  PSVT (paroxysmal supraventricular tachycardia) ([address] )     3  PVC (premature ventricular contraction)     4  Chronic diastolic (congestive) heart failure ([address] )     5  Pure hypercholesterolemia     6  Primary hypertension     7  CKD (chronic kidney disease) stage 4, GFR 15-29 ml/min (HCC)     8  History of radiofrequency ablation  for SVT     9   Chronic anemia       ACTIVE PROBLEM LIST  Patient Active Problem List   Diagnosis    Chronic anemia    Linda esophagus    Esophageal reflux    Hypertension    Spinal stenosis of lumbar region    Spondylolisthesis    PSVT (paroxysmal supraventricular tachycardia) (HCC)    Palpitations    Systolic murmur    Nonrheumatic aortic valve stenosis    Pure hypercholesterolemia    PVC (premature ventricular contraction)    Cough    CKD (chronic kidney disease) stage 4, GFR 15-29 ml/min (HCC)    Chronic diastolic (congestive) heart failure (HCC)    BPH (benign prostatic hyperplasia)    History of radiofrequency ablation  for SVT    Hematochezia    Pulmonary nodules    Aortic stenosis, severe    Hemoptysis    Elevated d-dimer    Pneumonia    Elevated troponin    Cigarette nicotine dependence in remission       CARDIOLOGY SPECIALTY COMMENTS  Terence Singh 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 BPH, CKD stage IV, primary hypercholesterolemia, hypertension, SVT, status post SVT ablation presents at the suggestion of his PCP for worsening cough and shortness of breath x2 weeks  Symptoms were suggestive of bronchitis and chest x-ray was negative in the ED  He was started on IV furosemide upon admission and respiratory symptoms continued to improve  He was seen in consultation by cardiology the following day  The patient did receive two doses of furosemide 40 mg IV  Further diuretics and hypertensive medications were held due to worsening renal dysfunction  His respiratory status has returned back to baseline and he is no longer short of breath  His ramipril HCT and any diuretics have been held at time of discharge until repeat blood work next week and follow up with cardiology  11/10-11/11/2021 hospitalized [facility] with hemoptysis and right lower lobe pneumonia    11/22/2021 ECHOCARDIOGRAM LIMITED:   Interpretation Summary  · Left Ventricle: Left ventricular cavity size is normal  The left ventricular ejection fraction is 54%  Systolic function is normal  Wall motion is normal  There is no concentric hypertrophy  Diastolic function is normal   · Aortic Valve: The aortic valve is trileaflet  The leaflets are severely calcified  There is severely reduced mobility  There is mild to moderate regurgitation  The aortic valve regurgitant jet pressure half time was 374 MS  There is moderate to severe stenosis  The aortic valve mean gradient is 34 5 mmHg  The valve area is 0 8 cm2  The left ventricular stroke index is 41 1 mL/m2  By my calculation, the dimensionless index was 0 27 which is close to severe (severe less than 0 25)    INTERVAL HISTORY:         patient with severe aortic stenosis, chronic diastolic congestive heart failure and chronic kidney disease stage 4 returns  He states that his pulses often in the 40s but he does not feel dizzy or lightheaded  He does find that he can fall asleep easily when he sits down    He denies chest discomfort, shortness of breath or lightheadedness  His creatinine is slightly higher at 2 05 but has been as high as 2 31  Whenever I have try to reduce his diuretics, he has developed shortness of breath, leg edema or both  He is very careful about his salt / sodium  I feel most comfortable with leaving things alone at the present medications     he does have significant aortic stenosis  His left ventricular stroke index is above the limit  Which is considered low-flow aortic stenosis and the same is true of his dimensionless index although they both are approaching a low-flow state  Since his gradient is only 34 5 mmHg, and the above index is are above the low-flow state, I feel it best before undertaking aortic valve replacement since there is a high likelihood that he will end up on dialysis  DISCUSSION/PLAN:            1  Continue present medications   2  Return in 3 months   3  BMP prior to return visit   4  Referral to see Nephrology for evaluation and to follow along when it is time for aortic valve replacement by TAVR   5   EKG on return    Lab Studies:    Lab Results   Component Value Date    CHOLESTEROL 146 09/03/2020    CHOLESTEROL 148 08/06/2019    CHOLESTEROL 155 07/20/2017     Lab Results   Component Value Date    TRIG 69 09/03/2020    TRIG 96 08/06/2019    TRIG 57 07/20/2017     Lab Results   Component Value Date    HDL 52 09/03/2020    HDL 43 08/06/2019    HDL 53 07/20/2017     Lab Results   Component Value Date    LDLCALC 80 09/03/2020    LDLCALC 86 08/06/2019    LDLCALC 91 07/20/2017         Lab Results   Component Value Date    NTBNP 5,024 (H) 11/10/2021    NTBNP 2,942 (H) 08/11/2021    NTBNP 1,530 (H) 02/27/2021     Lab Results   Component Value Date    EGFR 28 01/14/2022    EGFR 34 11/18/2021    EGFR 30 11/17/2021    SODIUM 138 01/14/2022    SODIUM 137 11/18/2021    SODIUM 137 11/17/2021    K 4 1 01/14/2022    K 3 6 11/18/2021    K 3 7 11/17/2021     01/14/2022     11/18/2021     11/17/2021    CO2 28 01/14/2022    CO2 26 11/18/2021    CO2 27 11/17/2021    ANIONGAP 7 05/15/2015    ANIONGAP 8 05/09/2014    BUN 40 (H) 01/14/2022    BUN 32 (H) 11/18/2021    BUN 35 (H) 11/17/2021    CREATININE 2 05 (H) 01/14/2022    CREATININE 1 79 (H) 11/18/2021    CREATININE 1 98 (H) 11/17/2021     Lab Results   Component Value Date    WBC 9 02 11/17/2021    WBC 11 12 (H) 11/11/2021    WBC 11 98 (H) 11/10/2021    HGB 10 2 (L) 11/17/2021    HGB 8 8 (L) 11/11/2021    HGB 10 3 (L) 11/10/2021    HCT 31 7 (L) 11/17/2021    HCT 27 4 (L) 11/11/2021    HCT 30 7 (L) 11/10/2021    MCV 90 11/17/2021    MCV 91 11/11/2021    MCV 90 11/10/2021    MCH 28 8 11/17/2021    MCH 29 1 11/11/2021    MCH 30 3 11/10/2021    MCHC 32 2 11/17/2021    MCHC 32 1 11/11/2021    MCHC 33 6 11/10/2021     (H) 11/17/2021     11/11/2021     11/10/2021      Lab Results   Component Value Date    GLUCOSE 100 05/15/2015    GLUCOSE 97 05/09/2014    CALCIUM 9 7 01/14/2022    CALCIUM 9 6 11/18/2021    CALCIUM 9 8 11/17/2021    AST 21 11/17/2021    AST 11 11/10/2021    AST 15 10/31/2021    ALT 25 11/17/2021    ALT 19 11/10/2021    ALT 22 10/31/2021    ALKPHOS 84 11/17/2021    ALKPHOS 79 11/10/2021    ALKPHOS 70 10/31/2021    PROT 7 4 05/15/2015    PROT 7 4 05/09/2014    BILITOT 0 40 05/15/2015    BILITOT 0 4 05/09/2014    MG 2 1 10/31/2021    MG 2 1 02/27/2021    MG 1 9 06/06/2019       Lab Results   Component Value Date    TROPONINI 0 03 08/11/2021    TROPONINI 0 03 02/27/2021    TROPONINI 0 03 06/06/2019     Lab Results   Component Value Date    DDIMER 1 48 (H) 11/10/2021       Lab Results   Component Value Date    FERRITIN 240 11/17/2021    IRON 52 (L) 11/17/2021    TIBC 194 (L) 11/17/2021     No results found for this visit on 01/31/22        Current Outpatient Medications:     aspirin 81 MG tablet, Take 162 mg by mouth, Disp: , Rfl:     Cholecalciferol (VITAMIN D3) 125 MCG (5000 UT) CAPS, 2 (two) times a week , Disp: , Rfl:     finasteride (PROSCAR) 5 mg tablet, Take 1 tablet (5 mg total) by mouth daily, Disp: 90 tablet, Rfl: 3    furosemide (LASIX) 20 mg tablet, Take 1 tablet (20 mg total) by mouth daily, Disp: 30 tablet, Rfl: 2    metoprolol succinate (TOPROL-XL) 25 mg 24 hr tablet, Take 1 tablet (25 mg total) by mouth daily, Disp: 30 tablet, Rfl: 5    Multiple Vitamin (MULTI-VITAMIN DAILY) TABS, Take by mouth, Disp: , Rfl:     omeprazole (PriLOSEC) 20 mg delayed release capsule, Take 1 capsule (20 mg total) by mouth daily, Disp: 90 capsule, Rfl: 1    rosuvastatin (CRESTOR) 5 mg tablet, Take 1 tablet (5 mg total) by mouth daily, Disp: 90 tablet, Rfl: 0    tamsulosin (FLOMAX) 0 4 mg, Take 1 capsule (0 4 mg total) by mouth daily with dinner, Disp: 30 capsule, Rfl: 11  No Known Allergies    Past Medical History:   Diagnosis Date    Aortic stenosis, severe 2021    Atrial fibrillation (HCC)     Colon polyp     GERD (gastroesophageal reflux disease)     Hearing loss     last assessed 17    Hypertension     Irregular heart beat     Rheumatic fever     Stenosis of aorta     SVT (supraventricular tachycardia) (HCC)      Social History     Socioeconomic History    Marital status: /Civil Union     Spouse name: Not on file    Number of children: Not on file    Years of education: Not on file    Highest education level: Not on file   Occupational History    Not on file   Tobacco Use    Smoking status: Former Smoker     Packs/day: 1 00     Years: 16 00     Pack years: 16 00     Types: Cigarettes     Start date:      Quit date:      Years since quittin 1    Smokeless tobacco: Never Used   Vaping Use    Vaping Use: Never used   Substance and Sexual Activity    Alcohol use: Yes     Comment: occ    Drug use: No    Sexual activity: Not on file   Other Topics Concern    Not on file   Social History Narrative    Not on file     Social Determinants of Health     Financial Resource Strain: Not on file   Food Insecurity: Not on file   Transportation Needs: Not on file   Physical Activity: Not on file   Stress: Not on file   Social Connections: Not on file   Intimate Partner Violence: Not on file   Housing Stability: Not on file      Family History   Problem Relation Age of Onset    Other Mother         old age   Ceferino Todd Esophageal cancer Father    Ceferino Todd Other Father         old age   Ceferino Todd Alcohol abuse Son         alcoholism     Past Surgical History:   Procedure Laterality Date    APPENDECTOMY      BACK SURGERY      lower    CARDIAC SURGERY      ablation    CATARACT EXTRACTION      JOINT REPLACEMENT Left     knee    KNEE SURGERY Left     [place] BIOPSY OF PROSTATE,NEEDLE/PUNCH N/A 3/16/2021    Procedure: Transperineal prostate biopsy with biplanar transrectal ultrasound, using the perineal logic kit; Surgeon: Antoinette Sandoval MD;  Location: [facility];  Service: Urology    TONSILLECTOMY AND ADENOIDECTOMY         PREVIOUS WEIGHTS:   Wt Readings from Last 10 Encounters:   01/31/22 83 kg (183 lb)   12/16/21 84 4 kg (186 lb)   11/29/21 84 8 kg (187 lb)   11/22/21 83 kg (183 lb)   11/19/21 83 2 kg (183 lb 6 4 oz)   11/15/21 83 8 kg (184 lb 12 8 oz)   11/11/21 82 6 kg (182 lb)   11/01/21 81 2 kg (179 lb 0 2 oz)   10/26/21 86 1 kg (189 lb 12 8 oz)   09/15/21 85 3 kg (188 lb)        Review of Systems:  Review of Systems   Constitutional: Negative for activity change  Respiratory: Negative for cough, chest tightness, shortness of breath and wheezing  Cardiovascular: Negative for chest pain, palpitations and leg swelling  Musculoskeletal: Negative for gait problem  Skin: Negative for color change  Neurological: Negative for dizziness, tremors, syncope, weakness, light-headedness and headaches  Psychiatric/Behavioral: Negative for agitation and confusion  Physical Exam:  /72   Pulse 56   Resp 16   Ht 5' 9" (1 753 m)   Wt 83 kg (183 lb)   BMI 27 02 kg/m²     Physical Exam  Vitals reviewed     Constitutional: General: He is not in acute distress  Appearance: He is well-developed  HENT:      Head: Normocephalic and atraumatic  Neck:      Thyroid: No thyromegaly  Vascular: No carotid bruit or JVD  Trachea: No tracheal deviation  Cardiovascular:      Rate and Rhythm: Normal rate and regular rhythm  Pulses: Normal pulses  Heart sounds: Murmur heard  No friction rub  No gallop  Comments:   Grade 3/6 late peaking systolic ejection murmur radiating to the base of the neck  Pulmonary:      Effort: Pulmonary effort is normal  No respiratory distress  Breath sounds: Normal breath sounds  No wheezing, rhonchi or rales  Chest:      Chest wall: No tenderness  Musculoskeletal:      Cervical back: Normal range of motion and neck supple  Right lower leg: No edema  Left lower leg: No edema  Skin:     General: Skin is warm and dry  Neurological:      General: No focal deficit present  Mental Status: He is alert and oriented to person, place, and time  Psychiatric:         Mood and Affect: Mood normal          Behavior: Behavior normal          Thought Content: Thought content normal          Judgment: Judgment normal        ======================================================  Imaging:   I have personally reviewed pertinent reports  Portions of the record may have been created with voice recognition software  Occasional wrong word or "sound a like" substitutions may have occurred due to the inherent limitations of voice recognition software  Read the chart carefully and recognize, using context, where substitutions have occurred      SIGNATURES:   Darshana Monroy MD

## 2022-01-31 NOTE — TELEPHONE ENCOUNTER
New Patient Intake Form   Patient Details   Bear Terry     1935     [id number]     Appointment Information   Who is calling to schedule? If not patient, what is callers name? Physician Office    Referring Provider  Waylon Rojo   Referring Provider Number [phone]   Reason for Appt (Diagnosis)  severe aortic stenosis, ckd 4    Is patient aware of why they are being referred? Yes     Does Patient have labs done at [place] [address]? If not, where do they go? yes / [facility] only   Has patient had labs / urine work done? List date of most recent lab / urine work  yes   Has patient had a BMP & CBC done in the past 2 years? If so, list the date yes    Has patient been hospitalized recently? If yes, list name and location of hospital they were in no    Has patient been seen by a Nephrologist before? If yes, list name, location and phone number no    Has the patient had imaging done? If so, list the most recent date and type of imaging no    Does the patient has a stone analysis report if history of kidney stones? no    Appointment Details   Is there a referral on file? yes   Appointment Date 03/24/2022   Location [facility]    Miscellaneous   All records in epic

## 2022-03-02 DIAGNOSIS — I50.32 CHRONIC DIASTOLIC (CONGESTIVE) HEART FAILURE (HCC): ICD-10-CM

## 2022-03-02 RX ORDER — METOPROLOL SUCCINATE 25 MG/1
25 TABLET, EXTENDED RELEASE ORAL DAILY
Qty: 90 TABLET | Refills: 3 | Status: SHIPPED | OUTPATIENT
Start: 2022-03-02

## 2022-03-08 DIAGNOSIS — E78.5 HYPERLIPIDEMIA, UNSPECIFIED HYPERLIPIDEMIA TYPE: ICD-10-CM

## 2022-03-08 RX ORDER — ROSUVASTATIN CALCIUM 5 MG/1
5 TABLET, COATED ORAL DAILY
Qty: 90 TABLET | Refills: 0 | Status: SHIPPED | OUTPATIENT
Start: 2022-03-08 | End: 2022-06-16 | Stop reason: SDUPTHER

## 2022-03-23 DIAGNOSIS — I50.9 ACUTE CONGESTIVE HEART FAILURE, UNSPECIFIED HEART FAILURE TYPE (HCC): ICD-10-CM

## 2022-03-23 RX ORDER — FUROSEMIDE 20 MG/1
20 TABLET ORAL DAILY
Qty: 30 TABLET | Refills: 2 | Status: SHIPPED | OUTPATIENT
Start: 2022-03-23 | End: 2022-06-22 | Stop reason: SDUPTHER

## 2022-03-24 ENCOUNTER — CONSULT (OUTPATIENT)
Dept: NEPHROLOGY | Facility: CLINIC | Age: 87
End: 2022-03-24
Payer: MEDICARE

## 2022-03-24 VITALS
WEIGHT: 183 LBS | SYSTOLIC BLOOD PRESSURE: 139 MMHG | HEIGHT: 69 IN | BODY MASS INDEX: 27.11 KG/M2 | DIASTOLIC BLOOD PRESSURE: 79 MMHG

## 2022-03-24 DIAGNOSIS — N40.0 BENIGN PROSTATIC HYPERPLASIA WITHOUT LOWER URINARY TRACT SYMPTOMS: ICD-10-CM

## 2022-03-24 DIAGNOSIS — I10 PRIMARY HYPERTENSION: ICD-10-CM

## 2022-03-24 DIAGNOSIS — R33.9 INCOMPLETE BLADDER EMPTYING: ICD-10-CM

## 2022-03-24 DIAGNOSIS — N40.1 BPH WITH OBSTRUCTION/LOWER URINARY TRACT SYMPTOMS: ICD-10-CM

## 2022-03-24 DIAGNOSIS — I35.0 AORTIC STENOSIS, SEVERE: Chronic | ICD-10-CM

## 2022-03-24 DIAGNOSIS — I50.32 CHRONIC DIASTOLIC (CONGESTIVE) HEART FAILURE (HCC): ICD-10-CM

## 2022-03-24 DIAGNOSIS — N18.4 CKD (CHRONIC KIDNEY DISEASE) STAGE 4, GFR 15-29 ML/MIN (HCC): Primary | ICD-10-CM

## 2022-03-24 DIAGNOSIS — N13.8 BPH WITH OBSTRUCTION/LOWER URINARY TRACT SYMPTOMS: ICD-10-CM

## 2022-03-24 PROCEDURE — 99204 OFFICE O/P NEW MOD 45 MIN: CPT | Performed by: INTERNAL MEDICINE

## 2022-03-24 RX ORDER — FINASTERIDE 5 MG/1
5 TABLET, FILM COATED ORAL DAILY
Qty: 90 TABLET | Refills: 1 | Status: SHIPPED | OUTPATIENT
Start: 2022-03-24

## 2022-03-24 NOTE — PATIENT INSTRUCTIONS
As we discussed in the office visit, you have moderate to severe chronic kidney disease for several years  I would like you to go for repeat blood and urine test, will contact you with the results  I would like to see you back in the office in 4 months  Continue close follow-up with your primary care doctor and your cardiologist  Remember to follow low-salt diet and 2 g of salt in a day  Avoid NSAIDs (no ibuprofen, Motrin, Advil, Aleve, naproxen)  Okay to take Tylenol or paracetamol or acetaminophen as needed for pain    No changes in your medications at this moment

## 2022-03-24 NOTE — PROGRESS NOTES
OFFICE CONSULT - Nephrology   Jazzminerafy Arciniega 80 y o  male MRN: [id number]        ASSESSMENT and PLAN:  Waylon Barrera was seen today for consult  Diagnoses and all orders for this visit:    CKD (chronic kidney disease) stage 4, GFR 15-29 ml/min ([facility] [address] )  -     Ambulatory Referral to Nephrology  -     CBC; Future  -     Renal function panel; Future  -     PTH, intact; Future  -     Urinalysis with microscopic; Future  -     Protein / creatinine ratio, urine; Future    Aortic stenosis, severe  -     Ambulatory Referral to Nephrology    Chronic diastolic (congestive) heart failure (HCC)    Benign prostatic hyperplasia without lower urinary tract symptoms    Primary hypertension        This is an [de-identified]year old gentleman with history of chronic diastolic heart failure, severe aortic stenosis, chronic kidney disease stage IIIB/4 with previous creatinine fluctuating around 1 7-2 1 back since 2017, hypertension, hyperlipidemia who presents to the office referred by his cardiologist for evaluation of chronic kidney disease    1  Chronic kidney disease stage IIIB/4 creatinine around 1 7-2 1 back since 2017  Most recent creatinine 2 0 that remains stable  Chronic kidney disease multifactorial in the setting of hypertension, hyperlipidemia, diastolic heart failure and severe aortic stenosis possible cardiorenal syndrome as well as age-related nephron loss  I would like to follow repeat blood and urine test with urinalysis and UPC for stratification  Discussed about importance to follow low-salt diet  Avoid NSAIDs  I would like to see him back in the office in 4 months  2  Chronic diastolic heart failure, severe aortic stenosis, hypertension  Blood pressure when recheck stable  Continue current regimen  Follow low-salt diet  Continue close follow-up with Cardiology, follow echo, plan for possible TAVR evaluation in the future? 3  Anemia, follow repeat CBC, suspected component of chronic kidney disease      4  Mineral bone disease, will check phosphorus and PTH with the upcoming labs  5  Hyperlipidemia, on statins  6  History of BPH, on Flomax, follow with Urology      Patient Instructions   As we discussed in the office visit, you have moderate to severe chronic kidney disease for several years  I would like you to go for repeat blood and urine test, will contact you with the results  I would like to see you back in the office in 4 months  Continue close follow-up with your primary care doctor and your cardiologist  Remember to follow low-salt diet and 2 g of salt in a day  Avoid NSAIDs (no ibuprofen, Motrin, Advil, Aleve, naproxen)  Okay to take Tylenol or paracetamol or acetaminophen as needed for pain  No changes in your medications at this moment        HPI:  Palmer Carroll is a 80 y o male who was referred by [facility] for evaluation of Consult (CKD 4)    This is a patient history hypertension, chronic diastolic heart failure, severe aortic stenosis, chronic kidney disease stage IIIB/4, BPH, hyperlipidemia, who presents to the office for evaluation of chronic kidney disease  Patient presents to the office with his wife  Today in general patient is doing well, denies significant complaints, he remains physically active, denies any chest pain, denies significant dyspnea on exertion, he has some mild lower extremity edema but it is well controlled  Denies any abdominal pain, no recent nausea, no vomiting, no diarrhea or constipation though he complains of flatulence  Denies dysuria, no gross hematuria, noted weak urine stream, has nocturia twice a night  Denies NSAID use  Denies family history of kidney disease  Quit smoking 50 years ago  I personally spent over half of a total 45 minutes face to face with the patient in counseling and discussion and/or coordination of care as described above  ROS: All the systems were reviewed and were negative except as documented on the HPI      Allergies: Patient has no known allergies  Medications:   Current Outpatient Medications:     aspirin 81 MG tablet, Take 81 mg by mouth daily  , Disp: , Rfl:     Cholecalciferol (VITAMIN D3) 125 MCG (5000 UT) CAPS, 2 (two) times a week , Disp: , Rfl:     finasteride (PROSCAR) 5 mg tablet, Take 1 tablet (5 mg total) by mouth daily, Disp: 90 tablet, Rfl: 3    furosemide (LASIX) 20 mg tablet, Take 1 tablet (20 mg total) by mouth daily, Disp: 30 tablet, Rfl: 2    metoprolol succinate (TOPROL-XL) 25 mg 24 hr tablet, Take 1 tablet (25 mg total) by mouth daily, Disp: 90 tablet, Rfl: 3    Multiple Vitamin (MULTI-VITAMIN DAILY) TABS, Take by mouth, Disp: , Rfl:     omeprazole (PriLOSEC) 20 mg delayed release capsule, Take 1 capsule (20 mg total) by mouth daily, Disp: 90 capsule, Rfl: 1    rosuvastatin (CRESTOR) 5 mg tablet, Take 1 tablet (5 mg total) by mouth daily, Disp: 90 tablet, Rfl: 0    tamsulosin (FLOMAX) 0 4 mg, Take 1 capsule (0 4 mg total) by mouth daily with dinner, Disp: 30 capsule, Rfl: 11    Past Medical History:   Diagnosis Date    Aortic stenosis, severe 11/1/2021    Atrial fibrillation (HCC)     Colon polyp     GERD (gastroesophageal reflux disease)     Hearing loss     last assessed 11/8/17    Hypertension     Irregular heart beat     Rheumatic fever     Stenosis of aorta     SVT (supraventricular tachycardia) (HCC)      Past Surgical History:   Procedure Laterality Date    APPENDECTOMY      BACK SURGERY      lower    CARDIAC SURGERY      ablation    CATARACT EXTRACTION      JOINT REPLACEMENT Left     knee    KNEE SURGERY Left     [place] BIOPSY OF PROSTATE,NEEDLE/PUNCH N/A 3/16/2021    Procedure: Transperineal prostate biopsy with biplanar transrectal ultrasound, using the perineal logic kit;   Surgeon: Shaun Espinal MD;  Location[facility];  Service: Urology    TONSILLECTOMY AND ADENOIDECTOMY       Family History   Problem Relation Age of Onset    Other Mother         old age   [facility] Esophageal cancer Father    Ileana Marquez Other Father         old age   Ileana Marquez Alcohol abuse Son         alcoholism      reports that he quit smoking about 53 years ago  His smoking use included cigarettes  He started smoking about 69 years ago  He has a 16 00 pack-year smoking history  He has never used smokeless tobacco  He reports current alcohol use  He reports that he does not use drugs  Physical Exam:   Vitals:    03/24/22 0859   BP: 146/92   BP Location: Right arm   Patient Position: Sitting   Cuff Size: Adult   Weight: 83 kg (183 lb)   Height: 5' 9" (1 753 m)     Body mass index is 27 02 kg/m²  General: conscious, cooperative, in not acute distress  Eyes: conjunctivae pink, anicteric sclerae  ENT: lips and mucous membranes moist  Neck: supple, no JVD  Chest: clear breath sounds bilateral, no crackles, ronchus or wheezings  CVS: distinct S1 & S2, normal rate, regular rhythm, positive murmur in aortic area  Abdomen: soft, non-tender, non-distended, normoactive bowel sounds  Back: no CVA tenderness  Extremities: no significant edema of both legs  Skin: no rash  Neuro: awake, alert, oriented      Lab Results:   Results for orders placed or performed in visit on 31/90/61   Basic metabolic panel   Result Value Ref Range    Sodium 138 136 - 145 mmol/L    Potassium 4 1 3 5 - 5 3 mmol/L    Chloride 105 100 - 108 mmol/L    CO2 28 21 - 32 mmol/L    ANION GAP 5 4 - 13 mmol/L    BUN 40 (H) 5 - 25 mg/dL    Creatinine 2 05 (H) 0 60 - 1 30 mg/dL    Glucose, Fasting 93 65 - 99 mg/dL    Calcium 9 7 8 3 - 10 1 mg/dL    eGFR 28 ml/min/1 73sq m             Portions of the record may have been created with voice recognition software  Occasional wrong word or "sound a like" substitutions may have occurred due to the inherent limitations of voice recognition software  Read the chart carefully and recognize, using context, where substitutions have occurred  If you have any questions, please contact the dictating provider

## 2022-04-08 ENCOUNTER — APPOINTMENT (OUTPATIENT)
Dept: LAB | Facility: CLINIC | Age: 87
End: 2022-04-08
Payer: MEDICARE

## 2022-04-08 DIAGNOSIS — N18.4 CKD (CHRONIC KIDNEY DISEASE) STAGE 4, GFR 15-29 ML/MIN (HCC): ICD-10-CM

## 2022-04-08 LAB
ALBUMIN SERPL BCP-MCNC: 3.7 G/DL (ref 3.5–5)
ANION GAP SERPL CALCULATED.3IONS-SCNC: 6 MMOL/L (ref 4–13)
BACTERIA UR QL AUTO: ABNORMAL /HPF
BILIRUB UR QL STRIP: NEGATIVE
BUN SERPL-MCNC: 42 MG/DL (ref 5–25)
CALCIUM SERPL-MCNC: 9.9 MG/DL (ref 8.3–10.1)
CHLORIDE SERPL-SCNC: 106 MMOL/L (ref 100–108)
CLARITY UR: CLEAR
CO2 SERPL-SCNC: 26 MMOL/L (ref 21–32)
COLOR UR: ABNORMAL
CREAT SERPL-MCNC: 2.23 MG/DL (ref 0.6–1.3)
CREAT UR-MCNC: 64.1 MG/DL
ERYTHROCYTE [DISTWIDTH] IN BLOOD BY AUTOMATED COUNT: 13.8 % (ref 11.6–15.1)
GFR SERPL CREATININE-BSD FRML MDRD: 25 ML/MIN/1.73SQ M
GLUCOSE P FAST SERPL-MCNC: 103 MG/DL (ref 65–99)
GLUCOSE UR STRIP-MCNC: NEGATIVE MG/DL
HCT VFR BLD AUTO: 32.2 % (ref 36.5–49.3)
HGB BLD-MCNC: 10.4 G/DL (ref 12–17)
HGB UR QL STRIP.AUTO: NEGATIVE
HYALINE CASTS #/AREA URNS LPF: ABNORMAL /LPF
KETONES UR STRIP-MCNC: NEGATIVE MG/DL
LEUKOCYTE ESTERASE UR QL STRIP: NEGATIVE
MCH RBC QN AUTO: 28.7 PG (ref 26.8–34.3)
MCHC RBC AUTO-ENTMCNC: 32.3 G/DL (ref 31.4–37.4)
MCV RBC AUTO: 89 FL (ref 82–98)
NITRITE UR QL STRIP: NEGATIVE
NON-SQ EPI CELLS URNS QL MICRO: ABNORMAL /HPF
PH UR STRIP.AUTO: 6 [PH]
PHOSPHATE SERPL-MCNC: 3.2 MG/DL (ref 2.3–4.1)
PLATELET # BLD AUTO: 237 THOUSANDS/UL (ref 149–390)
PMV BLD AUTO: 11.5 FL (ref 8.9–12.7)
POTASSIUM SERPL-SCNC: 4.1 MMOL/L (ref 3.5–5.3)
PROT UR STRIP-MCNC: NEGATIVE MG/DL
PROT UR-MCNC: 12 MG/DL
PROT/CREAT UR: 0.19 MG/G{CREAT} (ref 0–0.1)
PTH-INTACT SERPL-MCNC: 174.2 PG/ML (ref 18.4–80.1)
RBC # BLD AUTO: 3.63 MILLION/UL (ref 3.88–5.62)
RBC #/AREA URNS AUTO: ABNORMAL /HPF
SODIUM SERPL-SCNC: 138 MMOL/L (ref 136–145)
SP GR UR STRIP.AUTO: 1.01 (ref 1–1.03)
UROBILINOGEN UR STRIP-ACNC: <2 MG/DL
WBC # BLD AUTO: 9.18 THOUSAND/UL (ref 4.31–10.16)
WBC #/AREA URNS AUTO: ABNORMAL /HPF

## 2022-04-08 PROCEDURE — 83970 ASSAY OF PARATHORMONE: CPT

## 2022-04-08 PROCEDURE — 81001 URINALYSIS AUTO W/SCOPE: CPT

## 2022-04-08 PROCEDURE — 84156 ASSAY OF PROTEIN URINE: CPT

## 2022-04-08 PROCEDURE — 36415 COLL VENOUS BLD VENIPUNCTURE: CPT

## 2022-04-08 PROCEDURE — 82570 ASSAY OF URINE CREATININE: CPT

## 2022-04-08 PROCEDURE — 80069 RENAL FUNCTION PANEL: CPT

## 2022-04-08 PROCEDURE — 85027 COMPLETE CBC AUTOMATED: CPT

## 2022-04-19 ENCOUNTER — TELEPHONE (OUTPATIENT)
Dept: OTHER | Facility: HOSPITAL | Age: 87
End: 2022-04-19

## 2022-04-19 DIAGNOSIS — N18.4 CKD (CHRONIC KIDNEY DISEASE) STAGE 4, GFR 15-29 ML/MIN (HCC): Primary | ICD-10-CM

## 2022-04-19 DIAGNOSIS — N25.81 SECONDARY HYPERPARATHYROIDISM OF RENAL ORIGIN (HCC): ICD-10-CM

## 2022-04-19 NOTE — TELEPHONE ENCOUNTER
Recent blood test reviewed, creatinine at 2 2 overall stable compared with previous readings, electrolytes within normal limits, chronic anemia with a hemoglobin low but stable at 10 4, urinalysis without any rbc's, no wbc's, no significant proteinuria  Noted PTH elevated suspected secondary to advanced chronic kidney disease  I have called and spoke with patient regarding recent blood test, plan to follow-up in 4 months as instructed before with repeat labs including vitamin-D level given elevated PTH  Orders placed  Please mail orders to patient and make sure he is on recall list for 4 month follow-up with me    Thanks,        I cc patient's PCP to keep him updated

## 2022-04-27 ENCOUNTER — OFFICE VISIT (OUTPATIENT)
Dept: CARDIOLOGY CLINIC | Facility: CLINIC | Age: 87
End: 2022-04-27
Payer: MEDICARE

## 2022-04-27 VITALS
BODY MASS INDEX: 27.11 KG/M2 | WEIGHT: 183.6 LBS | DIASTOLIC BLOOD PRESSURE: 72 MMHG | HEART RATE: 53 BPM | SYSTOLIC BLOOD PRESSURE: 140 MMHG

## 2022-04-27 DIAGNOSIS — E78.00 PURE HYPERCHOLESTEROLEMIA: ICD-10-CM

## 2022-04-27 DIAGNOSIS — F17.211 CIGARETTE NICOTINE DEPENDENCE IN REMISSION: ICD-10-CM

## 2022-04-27 DIAGNOSIS — Z98.890 HISTORY OF RADIOFREQUENCY ABLATION PROCEDURE FOR CARDIAC ARRHYTHMIA: ICD-10-CM

## 2022-04-27 DIAGNOSIS — I50.32 CHRONIC DIASTOLIC (CONGESTIVE) HEART FAILURE (HCC): ICD-10-CM

## 2022-04-27 DIAGNOSIS — D64.9 CHRONIC ANEMIA: ICD-10-CM

## 2022-04-27 DIAGNOSIS — I47.1 PSVT (PAROXYSMAL SUPRAVENTRICULAR TACHYCARDIA) (HCC): ICD-10-CM

## 2022-04-27 DIAGNOSIS — I10 PRIMARY HYPERTENSION: ICD-10-CM

## 2022-04-27 DIAGNOSIS — I49.3 PVC (PREMATURE VENTRICULAR CONTRACTION): ICD-10-CM

## 2022-04-27 DIAGNOSIS — I35.0 NONRHEUMATIC AORTIC VALVE STENOSIS: Primary | Chronic | ICD-10-CM

## 2022-04-27 DIAGNOSIS — N18.4 CKD (CHRONIC KIDNEY DISEASE) STAGE 4, GFR 15-29 ML/MIN (HCC): ICD-10-CM

## 2022-04-27 PROCEDURE — 99215 OFFICE O/P EST HI 40 MIN: CPT | Performed by: INTERNAL MEDICINE

## 2022-04-27 NOTE — PROGRESS NOTES
[facility]  [address] [place] [address]  Phone#  [phone]   Fax#  [phone]  *-*-*-*-*-*-*-*-*-*-*-*-*-*-*-*-*-*-*-*-*-*-*-*-*-*-*-*-*-*-*-*-*-*-*-*-*-*-*-*-*-*-*-*-*-*-*-*-*-*-*-*-*-*                                   Cardiology Follow Up      ENCOUNTER DATE: 22 10:26 AM  PATIENT NAME: Nicky Amado   : 1935    MRN: [id number]  AGE:86 y o  SEX: male  1919 John Mauro MD     PRIMARY CARE PHYSICIAN: Margaret Rosa MD    ACTIVE DIAGNOSIS THIS VISIT  1  Nonrheumatic aortic valve stenosis     2  PSVT (paroxysmal supraventricular tachycardia) ([address] )     3  PVC (premature ventricular contraction)     4  Chronic diastolic (congestive) heart failure ([address] )     5  Pure hypercholesterolemia     6  Chronic anemia     7  Primary hypertension     8  CKD (chronic kidney disease) stage 4, GFR 15-29 ml/min (HCC)     9  History of radiofrequency ablation  for SVT     10   Cigarette nicotine dependence in remission       ACTIVE PROBLEM LIST  Patient Active Problem List   Diagnosis    Chronic anemia    Linda esophagus    Esophageal reflux    Hypertension    Spinal stenosis of lumbar region    Spondylolisthesis    PSVT (paroxysmal supraventricular tachycardia) (HCC)    Palpitations    Systolic murmur    Nonrheumatic aortic valve stenosis    Pure hypercholesterolemia    PVC (premature ventricular contraction)    Cough    CKD (chronic kidney disease) stage 4, GFR 15-29 ml/min (HCC)    Chronic diastolic (congestive) heart failure (HCC)    BPH (benign prostatic hyperplasia)    History of radiofrequency ablation  for SVT    Hematochezia    Pulmonary nodules    Aortic stenosis, severe    Hemoptysis    Elevated d-dimer    Pneumonia    Elevated troponin    Cigarette nicotine dependence in remission       CARDIOLOGY SPECIALTY COMMENTS  Nicky Amado 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 BPH, CKD stage IV, primary hypercholesterolemia, hypertension, SVT, status post SVT ablation presents at the suggestion of his PCP for worsening cough and shortness of breath x2 weeks  Symptoms were suggestive of bronchitis and chest x-ray was negative in the ED  He was started on IV furosemide upon admission and respiratory symptoms continued to improve  He was seen in consultation by cardiology the following day  The patient did receive two doses of furosemide 40 mg IV  Further diuretics and hypertensive medications were held due to worsening renal dysfunction  His respiratory status has returned back to baseline and he is no longer short of breath  His ramipril HCT and any diuretics have been held at time of discharge until repeat blood work next week and follow up with cardiology  11/10-11/11/2021 hospitalized Fred with hemoptysis and right lower lobe pneumonia    11/22/2021 ECHOCARDIOGRAM LIMITED:   Interpretation Summary  · Left Ventricle: Left ventricular cavity size is normal  The left ventricular ejection fraction is 54%  Systolic function is normal  Wall motion is normal  There is no concentric hypertrophy  Diastolic function is normal   · Aortic Valve: The aortic valve is trileaflet  The leaflets are severely calcified  There is severely reduced mobility  There is mild to moderate regurgitation  The aortic valve regurgitant jet pressure half time was 374 MS  There is moderate to severe stenosis  The aortic valve mean gradient is 34 5 mmHg  The valve area is 0 8 cm2  The left ventricular stroke index is 41 1 mL/m2  By my calculation, the dimensionless index was 0 27 which is close to severe (severe less than 0 25)    INTERVAL HISTORY:        Patient presently on furosemide 20 mg daily  His congestive heart failure is controlled on that medication but he still has trace to 1+ pretibial edema  His edema has been stable  When attempt was made to reduce his furosemide to 20 mg every other day, he decompensated  His creatinine has increased from 1 79 in November 20 21-2 23 in April 2022  There appears no solution to this to keep him out of heart failure  His case has been reviewed by a nephrologist who agrees  There will be a significant risk of TAVR are evaluation for placing patient on hemodialysis  Unless patient becomes significantly significant or needs hemodialysis irregardless, TAVR are evaluation should be postponed      DISCUSSION/PLAN:          · Continue present medications  · Appreciate Nephrology's help  · Return in 3 months  Lab Studies:    Lab Results   Component Value Date    CHOLESTEROL 146 09/03/2020    CHOLESTEROL 148 08/06/2019    CHOLESTEROL 155 07/20/2017     Lab Results   Component Value Date    TRIG 69 09/03/2020    TRIG 96 08/06/2019    TRIG 57 07/20/2017     Lab Results   Component Value Date    HDL 52 09/03/2020    HDL 43 08/06/2019    HDL 53 07/20/2017     Lab Results   Component Value Date    LDLCALC 80 09/03/2020    LDLCALC 86 08/06/2019    LDLCALC 91 07/20/2017     Lab Results   Component Value Date    NTBNP 5,024 (H) 11/10/2021    NTBNP 2,942 (H) 08/11/2021    NTBNP 1,530 (H) 02/27/2021       Lab Results   Component Value Date    EGFR 25 04/08/2022    EGFR 28 01/14/2022    EGFR 34 11/18/2021    SODIUM 138 04/08/2022    SODIUM 138 01/14/2022    SODIUM 137 11/18/2021    K 4 1 04/08/2022    K 4 1 01/14/2022    K 3 6 11/18/2021     04/08/2022     01/14/2022     11/18/2021    CO2 26 04/08/2022    CO2 28 01/14/2022    CO2 26 11/18/2021    ANIONGAP 7 05/15/2015    ANIONGAP 8 05/09/2014    BUN 42 (H) 04/08/2022    BUN 40 (H) 01/14/2022    BUN 32 (H) 11/18/2021    CREATININE 2 23 (H) 04/08/2022    CREATININE 2 05 (H) 01/14/2022    CREATININE 1 79 (H) 11/18/2021     Lab Results   Component Value Date    WBC 9 18 04/08/2022    WBC 9 02 11/17/2021    WBC 11 12 (H) 11/11/2021    HGB 10 4 (L) 04/08/2022    HGB 10 2 (L) 11/17/2021    HGB 8 8 (L) 11/11/2021    HCT 32 2 (L) 04/08/2022 HCT 31 7 (L) 11/17/2021    HCT 27 4 (L) 11/11/2021    MCV 89 04/08/2022    MCV 90 11/17/2021    MCV 91 11/11/2021    MCH 28 7 04/08/2022    MCH 28 8 11/17/2021    MCH 29 1 11/11/2021    MCHC 32 3 04/08/2022    MCHC 32 2 11/17/2021    MCHC 32 1 11/11/2021     04/08/2022     (H) 11/17/2021     11/11/2021      Lab Results   Component Value Date    GLUCOSE 100 05/15/2015    GLUCOSE 97 05/09/2014    CALCIUM 9 9 04/08/2022    CALCIUM 9 7 01/14/2022    CALCIUM 9 6 11/18/2021    AST 21 11/17/2021    AST 11 11/10/2021    AST 15 10/31/2021    ALT 25 11/17/2021    ALT 19 11/10/2021    ALT 22 10/31/2021    ALKPHOS 84 11/17/2021    ALKPHOS 79 11/10/2021    ALKPHOS 70 10/31/2021    PROT 7 4 05/15/2015    PROT 7 4 05/09/2014    BILITOT 0 40 05/15/2015    BILITOT 0 4 05/09/2014    MG 2 1 10/31/2021    MG 2 1 02/27/2021    MG 1 9 06/06/2019       Lab Results   Component Value Date    TROPONINI 0 03 08/11/2021    TROPONINI 0 03 02/27/2021    TROPONINI 0 03 06/06/2019     Lab Results   Component Value Date    DDIMER 1 48 (H) 11/10/2021     Lab Results   Component Value Date    FERRITIN 240 11/17/2021    IRON 52 (L) 11/17/2021    TIBC 194 (L) 11/17/2021     No results found for this visit on 04/27/22        Current Outpatient Medications:     aspirin 81 MG tablet, Take 81 mg by mouth daily  , Disp: , Rfl:     Cholecalciferol (VITAMIN D3) 125 MCG (5000 UT) CAPS, 2 (two) times a week , Disp: , Rfl:     finasteride (PROSCAR) 5 mg tablet, Take 1 tablet (5 mg total) by mouth daily, Disp: 90 tablet, Rfl: 1    furosemide (LASIX) 20 mg tablet, Take 1 tablet (20 mg total) by mouth daily, Disp: 30 tablet, Rfl: 2    metoprolol succinate (TOPROL-XL) 25 mg 24 hr tablet, Take 1 tablet (25 mg total) by mouth daily, Disp: 90 tablet, Rfl: 3    Multiple Vitamin (MULTI-VITAMIN DAILY) TABS, Take by mouth, Disp: , Rfl:     omeprazole (PriLOSEC) 20 mg delayed release capsule, Take 1 capsule (20 mg total) by mouth daily, Disp: 90 capsule, Rfl: 1    rosuvastatin (CRESTOR) 5 mg tablet, Take 1 tablet (5 mg total) by mouth daily, Disp: 90 tablet, Rfl: 0    tamsulosin (FLOMAX) 0 4 mg, Take 1 capsule (0 4 mg total) by mouth daily with dinner, Disp: 30 capsule, Rfl: 11  No Known Allergies    Past Medical History:   Diagnosis Date    Aortic stenosis, severe 2021    Atrial fibrillation (HCC)     Colon polyp     GERD (gastroesophageal reflux disease)     Hearing loss     last assessed 17    Hypertension     Irregular heart beat     Rheumatic fever     Stenosis of aorta     SV[facility]ricular tachycardia) ([facility]      Social History     Socioeconomic History    Marital status: /Civil Union     Spouse name: Not on file    Number of children: Not on file    Years of education: Not on file    Highest education level: Not on file   Occupational History    Not on file   Tobacco Use    Smoking status: Former Smoker     Packs/day: 1 00     Years: 16 00     Pack years: 16 00     Types: Cigarettes     Start date:      Quit date:      Years since quittin 1    Smokeless tobacco: Never Used   Vaping Use    Vaping Use: Never used   Substance and Sexual Activity    Alcohol use: Yes     Comment: occ    Drug use: No    Sexual activity: Not on file   Other Topics Concern    Not on file   Social History Narrative    Not on file     Social Determinants of Health     Financial Resource Strain: Not on file   Food Insecurity: Not on file   Transportation Needs: Not on file   Physical Activity: Not on file   Stress: Not on file   Social Connections: Not on file   Intimate Partner Violence: Not on file   Housing Stability: Not on file      Family History   Problem Relation Age of Onset    Other Mother         old age   [facility] Father    [facility] Other Father         old age   [facility] Son         alcoholism     Past Surgical History:   Procedure Laterality Date    APPENDECTOMY      BACK SURGERY lower    CARDIAC SURGERY      ablation    CATARACT EXTRACTION      JOINT REPLACEMENT Left     knee    KNEE SURGERY Left     MT BIOPSY OF PROSTATE,NEEDLE/PUNCH N/A 3/16/2021    Procedure: Transperineal prostate biopsy with biplanar transrectal ultrasound, using the perineal logic kit; Surgeon: Aide Gonzalez MD;  Location: [facility];  Service: Urology    TONSILLECTOMY AND ADENOIDECTOMY         PREVIOUS WEIGHTS:   Wt Readings from Last 10 Encounters:   04/27/22 83 3 kg (183 lb 9 6 oz)   03/24/22 83 kg (183 lb)   01/31/22 83 kg (183 lb)   12/16/21 84 4 kg (186 lb)   11/29/21 84 8 kg (187 lb)   11/22/21 83 kg (183 lb)   11/19/21 83 2 kg (183 lb 6 4 oz)   11/15/21 83 8 kg (184 lb 12 8 oz)   11/11/21 82 6 kg (182 lb)   11/01/21 81 2 kg (179 lb 0 2 oz)        Review of Systems:  Review of Systems   Constitutional: Negative for activity change  Respiratory: Negative for cough, chest tightness, shortness of breath and wheezing  Cardiovascular: Negative for chest pain, palpitations and leg swelling  Musculoskeletal: Negative for gait problem  Skin: Negative for color change  Neurological: Negative for dizziness, tremors, syncope, weakness, light-headedness and headaches  Psychiatric/Behavioral: Negative for agitation and confusion  Physical Exam:  /72 (BP Location: Right arm, Patient Position: Sitting, Cuff Size: Large)   Pulse (!) 53   Wt 83 3 kg (183 lb 9 6 oz)   BMI 27 11 kg/m²     Physical Exam  Vitals reviewed  Constitutional:       General: He is not in acute distress  Appearance: He is well-developed  HENT:      Head: Normocephalic and atraumatic  Neck:      Thyroid: No thyromegaly  Vascular: No carotid bruit or JVD  Trachea: No tracheal deviation  Cardiovascular:      Rate and Rhythm: Normal rate and regular rhythm  Pulses: Normal pulses  Heart sounds: Murmur heard  Crescendo decrescendo systolic murmur is present with a grade of 3/6     No diastolic murmur is present  No friction rub  No gallop  Pulmonary:      Effort: Pulmonary effort is normal  No respiratory distress  Breath sounds: Normal breath sounds  No wheezing, rhonchi or rales  Chest:      Chest wall: No tenderness  Musculoskeletal:      Cervical back: Normal range of motion and neck supple  Right lower leg: No edema  Left lower leg: No edema  Skin:     General: Skin is warm and dry  Neurological:      General: No focal deficit present  Mental Status: He is alert and oriented to person, place, and time  Psychiatric:         Mood and Affect: Mood normal          Behavior: Behavior normal          Thought Content: Thought content normal          Judgment: Judgment normal        ======================================================  Imaging:   I have personally reviewed pertinent reports  Portions of the record may have been created with voice recognition software  Occasional wrong word or "sound a like" substitutions may have occurred due to the inherent limitations of voice recognition software  Read the chart carefully and recognize, using context, where substitutions have occurred      SIGNATURES:   Lashaun Mattson MD

## 2022-05-02 ENCOUNTER — OFFICE VISIT (OUTPATIENT)
Dept: INTERNAL MEDICINE CLINIC | Facility: CLINIC | Age: 87
End: 2022-05-02
Payer: MEDICARE

## 2022-05-02 VITALS
HEART RATE: 47 BPM | TEMPERATURE: 97.9 F | SYSTOLIC BLOOD PRESSURE: 142 MMHG | BODY MASS INDEX: 27.4 KG/M2 | WEIGHT: 185 LBS | OXYGEN SATURATION: 97 % | DIASTOLIC BLOOD PRESSURE: 80 MMHG | RESPIRATION RATE: 18 BRPM | HEIGHT: 69 IN

## 2022-05-02 DIAGNOSIS — N18.4 CKD (CHRONIC KIDNEY DISEASE) STAGE 4, GFR 15-29 ML/MIN (HCC): ICD-10-CM

## 2022-05-02 DIAGNOSIS — I35.0 NONRHEUMATIC AORTIC VALVE STENOSIS: Primary | Chronic | ICD-10-CM

## 2022-05-02 DIAGNOSIS — I10 PRIMARY HYPERTENSION: ICD-10-CM

## 2022-05-02 DIAGNOSIS — I50.32 CHRONIC DIASTOLIC (CONGESTIVE) HEART FAILURE (HCC): ICD-10-CM

## 2022-05-02 DIAGNOSIS — I47.1 PSVT (PAROXYSMAL SUPRAVENTRICULAR TACHYCARDIA) (HCC): ICD-10-CM

## 2022-05-02 DIAGNOSIS — M48.061 SPINAL STENOSIS OF LUMBAR REGION, UNSPECIFIED WHETHER NEUROGENIC CLAUDICATION PRESENT: ICD-10-CM

## 2022-05-02 PROCEDURE — 99214 OFFICE O/P EST MOD 30 MIN: CPT | Performed by: INTERNAL MEDICINE

## 2022-05-02 PROCEDURE — G0439 PPPS, SUBSEQ VISIT: HCPCS | Performed by: INTERNAL MEDICINE

## 2022-05-02 NOTE — PROGRESS NOTES
Answers for HPI/ROS submitted by the patient on 4/25/2022  How would you rate your overall health?: very good  Compared to last year, how is your physical health?: slightly better  In general, how satisfied are you with your life?: very satisfied  Compared to last year, how is your eyesight?: same  Compared to last year, how is your hearing?: same  Compared to last year, how is your emotional/mental health?: same  How often is anger a problem for you?: never, rarely  How often do you feel unusually tired/fatigued?: sometimes  In the past 7 days, how much pain have you experienced?: some  If you answered "some" or "a lot", please rate the severity of your pain on a scale of 1 to 10 (1 being the least severe pain and 10 being the most intense pain)  : 2/10  In the past 6 months, have you lost or gained 10 pounds without trying?: Yes  One or more falls in the last year: Yes  Do you have trouble with the stairs inside or outside your home?: No  Does your home have working smoke alarms?: Yes  Does your home have a carbon monoxide monitor?: Yes  Which safety hazards (if any) have you experienced in your home? Please select all that apply : none  How would you describe your current diet?  Please select all that apply : No Added Salt  In addition to prescription medications, are you taking any over-the-counter supplements?: Yes  If yes, what supplements are you taking?: Multivitamin  Can you manage your medications?: Yes  Are you currently taking any opioid medications?: No  Can you walk and transfer into and out of your bed and chair?: Yes  Can you dress and groom yourself?: Yes  Can you bathe or shower yourself?: Yes  Can you feed yourself?: Yes  Can you do your laundry/ housekeeping?: Yes  Can you manage your money, pay your bills, and track your expenses?: Yes  Can you make your own meals?: Yes  Can you do your own shopping?: No  Within the last 12 months, have you had any hospitalizations or Emergency Department visits?: Yes  If yes, how many times have you been hospitalized within the past year?: 1-2  Do you have a living will?: Yes  Do you have a Durable POA (Power of ) for healthcare decisions?: Yes  Do you have an Advanced Directive for end of life decisions?: Yes  How often have you used an illegal drug (including marijuana) or a prescription medication for non-medical reasons in the past year?: never  What is the typical number of drinks you consume in a day?: 0  What is the typical number of drinks you consume in a week?: 0  How often did you have a drink containing alcohol in the past year?: monthly or less  How many drinks did you have on a typical day  when you were drinking in the past year?: 1 to 2  How often did you have 6 or more drinks on one occasion in the past year?: never     Assessment and Plan:     Problem List Items Addressed This Visit        Cardiovascular and Mediastinum    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)    Hypertension    PSVT (paroxysmal supraventricular tachycardia) ([facility])    Chronic diastolic (congestive) heart failure ([facility] [address] )       Genitourinary    CKD (chronic kidney disease) stage 4, GFR 15-29 ml/min ([facility] [address] )       Other    Spinal stenosis of lumbar region        BMI Counseling: Body mass index is 27 32 kg/m²  The BMI is above normal  Nutrition recommendations include decreasing portion sizes, encouraging healthy choices of fruits and vegetables, moderation in carbohydrate intake and increasing intake of lean protein  Exercise recommendations include moderate physical activity 150 minutes/week  Rationale for BMI follow-up plan is due to patient being overweight or obese  Depression Screening and Follow-up Plan: Patient was screened for depression during today's encounter  They screened negative with a PHQ-2 score of 0  Falls Plan of Care: balance, strength, and gait training instructions were provided and referral to physical therapy         Preventive health issues were discussed with patient, and age appropriate screening tests were ordered as noted in patient's After Visit Summary  Personalized health advice and appropriate referrals for health education or preventive services given if needed, as noted in patient's After Visit Summary       History of Present Illness:     Patient presents for Medicare Annual Wellness visit    Patient Care Team:  Andrew Bowling DO as PCP - General (Internal Medicine)  Renzo Etienne MD (Orthopedic Surgery)  Samantha Alonzo MD (Cardiology)     Problem List:     Patient Active Problem List   Diagnosis    Chronic anemia    Linda esophagus    Esophageal reflux    Hypertension    Spinal stenosis of lumbar region    Spondylolisthesis    PSVT (paroxysmal supraventricular tachycardia) (HCC)    Palpitations    Systolic murmur    Nonrheumatic aortic valve stenosis    Pure hypercholesterolemia    PVC (premature ventricular contraction)    Cough    CKD (chronic kidney disease) stage 4, GFR 15-29 ml/min (HCC)    Chronic diastolic (congestive) heart failure (HCC)    BPH (benign prostatic hyperplasia)    History of radiofrequency ablation  for SVT    Hematochezia    Pulmonary nodules    Aortic stenosis, severe    Hemoptysis    Elevated d-dimer    Pneumonia    Elevated troponin    Cigarette nicotine dependence in remission      Past Medical and Surgical History:     Past Medical History:   Diagnosis Date    Aortic stenosis, severe 11/1/2021    Atrial fibrillation (HCC)     Colon polyp     GERD (gastroesophageal reflux disease)     Hearing loss     last assessed 11/8/17    Hypertension     Irregular heart beat     Rheumatic fever     Stenosis of aorta     SVT (supraventricular tachycardia) ([facility])      Past Surgical History:   Procedure Laterality Date    APPENDECTOMY      BACK SURGERY      lower    CARDIAC SURGERY      ablation    CATARACT EXTRACTION      JOINT REPLACEMENT Left     knee    KNEE SURGERY Left     [place] BIOPSY OF PROSTATE,NEEDLE/PUNCH N/A 3/16/2021    Procedure: Transperineal prostate biopsy with biplanar transrectal ultrasound, using the perineal logic kit;   Surgeon: Augusta Agrawal MD;  Location: [facility];  Service: Urology    TONSILLECTOMY AND ADENOIDECTOMY        Family History:     Family History   Problem Relation Age of Onset   Bill Paul Other Mother         old age   Bill Paul Esophageal cancer Father    Bill Paul Other Father         old age   Bill Paul Alcohol abuse Son         alcoholism      Social History:     Social History     Socioeconomic History    Marital status: /Civil Union     Spouse name: None    Number of children: None    Years of education: None    Highest education level: None   Occupational History    None   Tobacco Use    Smoking status: Former Smoker     Packs/day: 1 00     Years: 16      Pack years: 16      Types: Cigarettes     Start date:      Quit date:      Years since quittin 1    Smokeless tobacco: Never Used   Vaping Use    Vaping Use: Never used   Substance and Sexual Activity    Alcohol use: Yes     Comment: occ    Drug use: No    Sexual activity: None   Other Topics Concern    None   Social History Narrative    None     Social Determinants of Health     Financial Resource Strain: Not on file   Food Insecurity: Not on file   Transportation Needs: Not on file   Physical Activity: Not on file   Stress: Not on file   Social Connections: Not on file   Intimate Partner Violence: Not on file   Housing Stability: Not on file      Medications and Allergies:     Current Outpatient Medications   Medication Sig Dispense Refill    aspirin 81 MG tablet Take 81 mg by mouth daily        Cholecalciferol (VITAMIN D3) 125 MCG (5000 UT) CAPS 2 (two) times a week       finasteride (PROSCAR) 5 mg tablet Take 1 tablet (5 mg total) by mouth daily 90 tablet 1    furosemide (LASIX) 20 mg tablet Take 1 tablet (20 mg total) by mouth daily 30 tablet 2    metoprolol succinate (TOPROL-XL) 25 mg 24 hr tablet Take 1 tablet (25 mg total) by mouth daily 90 tablet 3    Multiple Vitamin (MULTI-VITAMIN DAILY) TABS Take by mouth      omeprazole (PriLOSEC) 20 mg delayed release capsule Take 1 capsule (20 mg total) by mouth daily 90 capsule 1    rosuvastatin (CRESTOR) 5 mg tablet Take 1 tablet (5 mg total) by mouth daily 90 tablet 0    tamsulosin (FLOMAX) 0 4 mg Take 1 capsule (0 4 mg total) by mouth daily with dinner 30 capsule 11     No current facility-administered medications for this visit  No Known Allergies   Immunizations:     Immunization History   Administered Date(s) Administered    COVID-19 MODERNA VACC 0 5 ML IM 01/19/2021, 02/15/2021, 10/23/2021    Hep A, adult 02/01/2000, 08/15/2000    INFLUENZA 10/23/2009, 10/12/2010, 09/29/2011, 10/03/2012, 10/23/2013, 10/29/2014, 11/06/2015, 09/01/2016, 10/10/2016, 10/06/2017, 10/10/2018, 09/30/2019, 09/11/2020, 09/10/2021    Influenza Split High Dose Preservative Free IM 11/06/2015, 09/01/2016, 10/06/2017    Influenza, seasonal, injectable 11/15/2000, 10/26/2001, 10/28/2003, 12/01/2005, 11/01/2007, 10/03/2012, 10/23/2013    Pneumococcal Conjugate 13-Valent 11/21/2016    Tdap 09/18/2012    Zoster 09/01/2007    Zoster Vaccine Recombinant 02/22/2019, 05/03/2019    influenza, trivalent, adjuvanted 09/30/2019, 09/11/2020      Health Maintenance: There are no preventive care reminders to display for this patient  Topic Date Due    Pneumococcal Vaccine: 65+ Years (1 of 2 - PPSV23) 01/16/2017      Medicare Health Risk Assessment:     /80 (BP Location: Left arm, Patient Position: Sitting, Cuff Size: Standard)   Pulse (!) 47   Temp 97 9 °F (36 6 °C)   Resp 18   Ht 5' 9" (1 753 m)   Wt 83 9 kg (185 lb)   SpO2 97%   BMI 27 32 kg/m²      Fermin is here for his Subsequent Wellness visit  Health Risk Assessment:   Patient rates overall health as very good   Patient feels that their physical health rating is slightly better  Patient is very satisfied with their life  Eyesight was rated as same  Hearing was rated as same  Patient feels that their emotional and mental health rating is same  Patients states they are never, rarely angry  Patient states they are sometimes unusually tired/fatigued  Pain experienced in the last 7 days has been some  Patient's pain rating has been 2/10  Patient states that he has experienced weight loss or gain in last 6 months  Depression Screening:   PHQ-2 Score: 0      Fall Risk Screening: In the past year, patient has experienced: history of falling in past year      Home Safety:  Patient does not have trouble with stairs inside or outside of their home  Patient has working smoke alarms and has working carbon monoxide detector  Home safety hazards include: none  Nutrition:   Current diet is No Added Salt  Medications:   Patient is currently taking over-the-counter supplements  OTC medications include: Multivitamin  Patient is able to manage medications  Activities of Daily Living (ADLs)/Instrumental Activities of Daily Living (IADLs):   Walk and transfer into and out of bed and chair?: Yes  Dress and groom yourself?: Yes    Bathe or shower yourself?: Yes    Feed yourself? Yes  Do your laundry/housekeeping?: Yes  Manage your money, pay your bills and track your expenses?: Yes  Make your own meals?: Yes    Do your own shopping?: No    Previous Hospitalizations:   Any hospitalizations or ED visits within the last 12 months?: Yes    How many hospitalizations have you had in the last year?: 1-2    Advance Care Planning:   Living will: Yes    Durable POA for healthcare:  Yes    Advanced directive: Yes      PREVENTIVE SCREENINGS      Cardiovascular Screening:    General: Screening Not Indicated and History Lipid Disorder      Diabetes Screening:     General: Screening Current      Colorectal Cancer Screening:     General: Screening Not Indicated      Prostate Cancer Screening:    General: Screening Not Indicated      Abdominal Aortic Aneurysm (AAA) Screening:    Risk factors include: tobacco use        Lung Cancer Screening:     General: Screening Not Indicated    Screening, Brief Intervention, and Referral to Treatment (SBIRT)    Screening  Typical number of drinks in a day: 0  Typical number of drinks in a week: 0  Interpretation: Low risk drinking behavior  AUDIT-C Screenin) How often did you have a drink containing alcohol in the past year? monthly or less  2) How many drinks did you have on a typical day when you were drinking in the past year?  1 to 2  3) How often did you have 6 or more drinks on one occasion in the past year? never    AUDIT-C Score: 1  Interpretation: Score 0-3 (male): Negative screen for alcohol misuse    Single Item Drug Screening:  How often have you used an illegal drug (including marijuana) or a prescription medication for non-medical reasons in the past year? never    Single Item Drug Screen Score: 0  Interpretation: Negative screen for possible drug use disorder      Max Sherryle Pen, DO

## 2022-05-02 NOTE — PATIENT INSTRUCTIONS
Fall Prevention   AMBULATORY CARE:   Fall prevention  includes ways to make your home and other areas safer  It also includes ways you can move more carefully to prevent a fall  Health conditions that cause changes in your blood pressure, vision, or muscle strength and coordination may increase your risk for falls  Medicines may also increase your risk for falls if they make you dizzy, weak, or sleepy  Call 911 or have someone else call if:   · You have fallen and are unconscious  · You have fallen and cannot move part of your body  Contact your healthcare provider if:   · You have fallen and have pain or a headache  · You have questions or concerns about your condition or care  Fall prevention tips:   · Stand or sit up slowly  This may help you keep your balance and prevent falls  · Use assistive devices as directed  Your healthcare provider may suggest that you use a cane or walker to help you keep your balance  You may need to have grab bars put in your bathroom near the toilet or in the shower  · Wear shoes that fit well and have soles that   Wear shoes both inside and outside  Use slippers with good   Do not wear shoes with high heels  · Wear a personal alarm  This is a device that allows you to call 911 if you fall and need help  Ask your healthcare provider for more information  · Stay active  Exercise can help strengthen your muscles and improve your balance  Your healthcare provider may recommend water aerobics or walking  He or she may also recommend physical therapy to improve your coordination  Never start an exercise program without talking to your healthcare provider first          · Manage your medical conditions  Keep all appointments with your healthcare providers  Visit your eye doctor as directed  Home safety tips:       · Add items to prevent falls in the bathroom  Put nonslip strips on your bath or shower floor to prevent you from slipping   Use a bath mat if you do not have carpet in the bathroom  This will prevent you from falling when you step out of the bath or shower  Use a shower seat so you do not need to stand while you shower  Sit on the toilet or a chair in your bathroom to dry yourself and put on clothing  This will prevent you from losing your balance from drying or dressing yourself while you are standing  · Keep paths clear  Remove books, shoes, and other objects from walkways and stairs  Place cords for telephones and lamps out of the way so that you do not need to walk over them  Tape them down if you cannot move them  Remove small rugs  If you cannot remove a rug, secure it with double-sided tape  This will prevent you from tripping  · Install bright lights in your home  Use night lights to help light paths to the bathroom or kitchen  Always turn on the light before you start walking  · Keep items you use often on shelves within reach  Do not use a step stool to help you reach an item  · Paint or place reflective tape on the edges of your stairs  This will help you see the stairs better  Follow up with your doctor as directed:  Write down your questions so you remember to ask them during your visits  © Copyright [employer] 2022 Information is for End User's use only and may not be sold, redistributed or otherwise used for commercial purposes  All illustrations and images included in CareNotes® are the copyrighted property of [employer]  or [employer]   The above information is an  only  It is not intended as medical advice for individual conditions or treatments  Talk to your doctor, nurse or pharmacist before following any medical regimen to see if it is safe and effective for you  Medicare Preventive Visit Patient Instructions  Thank you for completing your Welcome to Medicare Visit or Medicare Annual Wellness Visit today  Your next wellness visit will be due in one year (5/3/2023)    The screening/preventive services that you may require over the next 5-10 years are detailed below  Some tests may not apply to you based off risk factors and/or age  Screening tests ordered at today's visit but not completed yet may show as past due  Also, please note that scanned in results may not display below  Preventive Screenings:  Service Recommendations Previous Testing/Comments   Colorectal Cancer Screening  · Colonoscopy    · Fecal Occult Blood Test (FOBT)/Fecal Immunochemical Test (FIT)  · Fecal DNA/Cologuard Test  · Flexible Sigmoidoscopy Age: 54-65 years old   Colonoscopy: every 10 years (May be performed more frequently if at higher risk)  OR  FOBT/FIT: every 1 year  OR  Cologuard: every 3 years  OR  Sigmoidoscopy: every 5 years  Screening may be recommended earlier than age 48 if at higher risk for colorectal cancer  Also, an individualized decision between you and your healthcare provider will decide whether screening between the ages of 74-80 would be appropriate  Colonoscopy: 11/01/2021  FOBT/FIT: Not on file  Cologuard: Not on file  Sigmoidoscopy: Not on file    Screening Not Indicated     Prostate Cancer Screening Individualized decision between patient and health care provider in men between ages of 53-78   Medicare will cover every 12 months beginning on the day after your 50th birthday PSA: 1 1 ng/mL     Screening Not Indicated     Hepatitis C Screening Once for adults born between 1945 and 1965  More frequently in patients at high risk for Hepatitis C Hep C Antibody: Not on file        Diabetes Screening 1-2 times per year if you're at risk for diabetes or have pre-diabetes Fasting glucose: 103 mg/dL   A1C: No results in last 5 years    Screening Current   Cholesterol Screening Once every 5 years if you don't have a lipid disorder  May order more often based on risk factors   Lipid panel: 09/03/2020    Screening Not Indicated  History Lipid Disorder      Other Preventive Screenings Covered by Medicare:  1  Abdominal Aortic Aneurysm (AAA) Screening: covered once if your at risk  You're considered to be at risk if you have a family history of AAA or a male between the age of 73-68 who smoking at least 100 cigarettes in your lifetime  2  Lung Cancer Screening: covers low dose CT scan once per year if you meet all of the following conditions: (1) Age 50-69; (2) No signs or symptoms of lung cancer; (3) Current smoker or have quit smoking within the last 15 years; (4) You have a tobacco smoking history of at least 30 pack years (packs per day x number of years you smoked); (5) You get a written order from a healthcare provider  3  Glaucoma Screening: covered annually if you're considered high risk: (1) You have diabetes OR (2) Family history of glaucoma OR (3)  aged 48 and older OR (3)  American aged 72 and older  3  Osteoporosis Screening: covered every 2 years if you meet one of the following conditions: (1) Have a vertebral abnormality; (2) On glucocorticoid therapy for more than 3 months; (3) Have primary hyperparathyroidism; (4) On osteoporosis medications and need to assess response to drug therapy  5  HIV Screening: covered annually if you're between the age of 12-76  Also covered annually if you are younger than 13 and older than 72 with risk factors for HIV infection  For pregnant patients, it is covered up to 3 times per pregnancy  Immunizations:  Immunization Recommendations   Influenza Vaccine Annual influenza vaccination during flu season is recommended for all persons aged >= 6 months who do not have contraindications   Pneumococcal Vaccine (Prevnar and Pneumovax)  * Prevnar = PCV13  * Pneumovax = PPSV23 Adults 25-60 years old: 1-3 doses may be recommended based on certain risk factors  Adults 72 years old: Prevnar (PCV13) vaccine recommended followed by Pneumovax (PPSV23) vaccine   If already received PPSV23 since turning 65, then PCV13 recommended at least one year after PPSV23 dose  Hepatitis B Vaccine 3 dose series if at intermediate or high risk (ex: diabetes, end stage renal disease, liver disease)   Tetanus (Td) Vaccine - COST NOT COVERED BY MEDICARE PART B Following completion of primary series, a booster dose should be given every 10 years to maintain immunity against tetanus  Td may also be given as tetanus wound prophylaxis  Tdap Vaccine - COST NOT COVERED BY MEDICARE PART B Recommended at least once for all adults  For pregnant patients, recommended with each pregnancy  Shingles Vaccine (Shingrix) - COST NOT COVERED BY MEDICARE PART B  2 shot series recommended in those aged 48 and above     Health Maintenance Due:  There are no preventive care reminders to display for this patient  Immunizations Due:      Topic Date Due    Pneumococcal Vaccine: 65+ Years (1 of 2 - PPSV23) 01/16/2017     Advance Directives   What are advance directives? Advance directives are legal documents that state your wishes and plans for medical care  These plans are made ahead of time in case you lose your ability to make decisions for yourself  Advance directives can apply to any medical decision, such as the treatments you want, and if you want to donate organs  What are the types of advance directives? There are many types of advance directives, and each state has rules about how to use them  You may choose a combination of any of the following:  · Living will: This is a written record of the treatment you want  You can also choose which treatments you do not want, which to limit, and which to stop at a certain time  This includes surgery, medicine, IV fluid, and tube feedings  · Durable power of  for healthcare [facility]): This is a written record that states who you want to make healthcare choices for you when you are unable to make them for yourself  This person, called a proxy, is usually a family member or a friend  You may choose more than 1 proxy    · Do not resuscitate (DNR) order:  A DNR order is used in case your heart stops beating or you stop breathing  It is a request not to have certain forms of treatment, such as CPR  A DNR order may be included in other types of advance directives  · Medical directive: This covers the care that you want if you are in a coma, near death, or unable to make decisions for yourself  You can list the treatments you want for each condition  Treatment may include pain medicine, surgery, blood transfusions, dialysis, IV or tube feedings, and a ventilator (breathing machine)  · Values history: This document has questions about your views, beliefs, and how you feel and think about life  This information can help others choose the care that you would choose  Why are advance directives important? An advance directive helps you control your care  Although spoken wishes may be used, it is better to have your wishes written down  Spoken wishes can be misunderstood, or not followed  Treatments may be given even if you do not want them  An advance directive may make it easier for your family to make difficult choices about your care  Fall Prevention    Fall prevention  includes ways to make your home and other areas safer  It also includes ways you can move more carefully to prevent a fall  Health conditions that cause changes in your blood pressure, vision, or muscle strength and coordination may increase your risk for falls  Medicines may also increase your risk for falls if they make you dizzy, weak, or sleepy  Fall prevention tips:   · Stand or sit up slowly  · Use assistive devices as directed  · Wear shoes that fit well and have soles that   · Wear a personal alarm  · Stay active  · Manage your medical conditions  Home Safety Tips:  · Add items to prevent falls in the bathroom  · Keep paths clear  · Install bright lights in your home  · Keep items you use often on shelves within reach      · Paint or place reflective tape on the edges of your stairs  Weight Management   Why it is important to manage your weight:  Being overweight increases your risk of health conditions such as heart disease, high blood pressure, type 2 diabetes, and certain types of cancer  It can also increase your risk for osteoarthritis, sleep apnea, and other respiratory problems  Aim for a slow, steady weight loss  Even a small amount of weight loss can lower your risk of health problems  How to lose weight safely:  A safe and healthy way to lose weight is to eat fewer calories and get regular exercise  You can lose up about 1 pound a week by decreasing the number of calories you eat by 500 calories each day  Healthy meal plan for weight management:  A healthy meal plan includes a variety of foods, contains fewer calories, and helps you stay healthy  A healthy meal plan includes the following:  · Eat whole-grain foods more often  A healthy meal plan should contain fiber  Fiber is the part of grains, fruits, and vegetables that is not broken down by your body  Whole-grain foods are healthy and provide extra fiber in your diet  Some examples of whole-grain foods are whole-wheat breads and pastas, oatmeal, brown rice, and bulgur  · Eat a variety of vegetables every day  Include dark, leafy greens such as spinach, kale, jay greens, and mustard greens  Eat yellow and orange vegetables such as carrots, sweet potatoes, and winter squash  · Eat a variety of fruits every day  Choose fresh or canned fruit (canned in its own juice or light syrup) instead of juice  Fruit juice has very little or no fiber  · Eat low-fat dairy foods  Drink fat-free (skim) milk or 1% milk  Eat fat-free yogurt and low-fat cottage cheese  Try low-fat cheeses such as mozzarella and other reduced-fat cheeses  · Choose meat and other protein foods that are low in fat  Choose beans or other legumes such as split peas or lentils   Choose fish, skinless poultry (chicken or turkey), or lean cuts of red meat (beef or pork)  Before you cook meat or poultry, cut off any visible fat  · Use less fat and oil  Try baking foods instead of frying them  Add less fat, such as margarine, sour cream, regular salad dressing and mayonnaise to foods  Eat fewer high-fat foods  Some examples of high-fat foods include french fries, doughnuts, ice cream, and cakes  · Eat fewer sweets  Limit foods and drinks that are high in sugar  This includes candy, cookies, regular soda, and sweetened drinks  Exercise:  Exercise at least 30 minutes per day on most days of the week  Some examples of exercise include walking, biking, dancing, and swimming  You can also fit in more physical activity by taking the stairs instead of the elevator or parking farther away from stores  Ask your healthcare provider about the best exercise plan for you  © Copyright [employer] 2018 Information is for End User's use only and may not be sold, redistributed or otherwise used for commercial purposes   All illustrations and images included in CareNotes® are the copyrighted property of [employer] M , Inc  or [address] [place] [address]

## 2022-05-02 NOTE — PROGRESS NOTES
INTERNAL MEDICINE OFFICE VISIT  [address] Internal Medicine- [place]    NAME: Jordan Degroot  AGE: 80 y o  SEX: male    DATE OF ENCOUNTER: 5/2/2022    Assessment and Plan     1  Nonrheumatic aortic valve stenosis  -he has a history of rheumatic fever, now with severe aortic stenosis  -Most recent echo in November 2021 showed severe aortic stenosis  -TAVR eval has been deferred for now, significant risk of decline in kidney function with contrast dye that would be involved with TAVR workup  -he does not seem to be having significant symptoms from this at this time thankfully    2  Primary hypertension  -control is acceptable  Currently on Toprol-XL 25 mg daily    3  PSVT (paroxysmal supraventricular tachycardia) (HCC)  -stable, remains on beta-blocker    4  Chronic diastolic (congestive) heart failure (HCC)  -appears relatively euvolemic today  -Currently on Lasix 20 mg daily  Per Cardiology note, when attempt was made to reduce Lasix to 20 mg every other day, patient decompensated  -EF of 54% on most recent echo November 2021    5  CKD (chronic kidney disease) stage 4, GFR 15-29 ml/min ([facility])  -follows with Nephrology, most recent creatinine 2 23  -baseline creatinine around 1 7 - 2 1    6  Spinal stenosis of lumbar region, unspecified whether neurogenic claudication present  -symptoms appear to be improving, continue follow-up with physical therapy              No orders of the defined types were placed in this encounter  Chief Complaint     Chief Complaint   Patient presents with    Medicare Wellness Visit    Follow-up     4 month / pneumo       History of Present Illness     Jose G Dickerson comes in today for follow-up/AWV  He has a medical history of aortic stenosi diastolic heart failure, s, hyperlipidemia, chronic kidney disease, BPH, hypertension, SVT status post ablation    He is a retired pharmacist   He is feeling well today  Has no major concerns    He has been working with physical therapy at [facility]  He is very satisfied with results of this  He intermittently has been using cane  He feels his gait is much improved  He denies any recent chest pain, palpitations, shortness of breath, dyspnea on exertion, lightheadedness, worsening peripheral edema      The following portions of the patient's history were reviewed and updated as appropriate: allergies, current medications, past family history, past medical history, past social history, past surgical history and problem list     Review of Systems     10 point ROS negative except per HPI    Active Problem List     Patient Active Problem List   Diagnosis    Chronic anemia    Linda esophagus    Esophageal reflux    Hypertension    Spinal stenosis of lumbar region    Spondylolisthesis    PSVT (paroxysmal supraventricular tachycardia) ([facility])    Palpitations    Systolic murmur    Nonrheumatic aortic valve stenosis    Pure hypercholesterolemia    PVC (premature ventricular contraction)    Cough    CKD (chronic kidney disease) stage 4, GFR 15-29 ml/min ([facility])    Chronic diastolic (congestive) heart failure ([facility])    BPH (benign prostatic hyperplasia)    History of radiofrequency ablation  for SVT    Hematochezia    Pulmonary nodules    Aortic stenosis, severe    Hemoptysis    Elevated d-dimer    Pneumonia    Elevated troponin    Cigarette nicotine dependence in remission       Objective     /80 (BP Location: Left arm, Patient Position: Sitting, Cuff Size: Standard)   Pulse (!) 47   Temp 97 9 °F (36 6 °C)   Resp 18   Ht 5' 9" (1 753 m)   Wt 83 9 kg (185 lb)   SpO2 97%   BMI 27 32 kg/m²     Physical Exam  Constitutional:       Appearance: Normal appearance  He is not ill-appearing  HENT:      Head: Normocephalic and atraumatic  Eyes:      General: No scleral icterus  Right eye: No discharge  Left eye: No discharge  Cardiovascular:      Rate and Rhythm: Normal rate and regular rhythm        Heart sounds: No murmur heard  No friction rub  Pulmonary:      Effort: Pulmonary effort is normal       Breath sounds: Normal breath sounds  No wheezing or rales  Abdominal:      General: Abdomen is flat  There is no distension  Palpations: Abdomen is soft  Tenderness: There is no abdominal tenderness  Musculoskeletal:         General: No swelling or tenderness  Skin:     General: Skin is warm and dry  Findings: No erythema  Neurological:      Mental Status: He is alert  Mental status is at baseline  Motor: No weakness  Psychiatric:         Mood and Affect: Mood normal          Behavior: Behavior normal          Pertinent Laboratory/Diagnostic Studies:  CT chest wo contrast    Result Date: 12/13/2021  Impression: Moderate interval improvement in previous masslike consolidation in the right lower lobe supporting the diagnosis of resolving infection Recommend follow-up in 6 months for groundglass nodule in the left upper lobe as well as surveillance for low suspicion pleural-based nodule in the left lower lobe  Atherosclerosis and cardiomegaly and calcification in the aortic valve and mitral annulus  The study was marked in EPIC for significant notification   Workstation performed: [id number]       Images and diagnostics reviewed     Current Medications     Current Outpatient Medications:     aspirin 81 MG tablet, Take 81 mg by mouth daily  , Disp: , Rfl:     Cholecalciferol (VITAMIN D3) 125 MCG (5000 UT) CAPS, 2 (two) times a week , Disp: , Rfl:     finasteride (PROSCAR) 5 mg tablet, Take 1 tablet (5 mg total) by mouth daily, Disp: 90 tablet, Rfl: 1    furosemide (LASIX) 20 mg tablet, Take 1 tablet (20 mg total) by mouth daily, Disp: 30 tablet, Rfl: 2    metoprolol succinate (TOPROL-XL) 25 mg 24 hr tablet, Take 1 tablet (25 mg total) by mouth daily, Disp: 90 tablet, Rfl: 3    Multiple Vitamin (MULTI-VITAMIN DAILY) TABS, Take by mouth, Disp: , Rfl:     omeprazole (PriLOSEC) 20 mg delayed release capsule, Take 1 capsule (20 mg total) by mouth daily, Disp: 90 capsule, Rfl: 1    rosuvastatin (CRESTOR) 5 mg tablet, Take 1 tablet (5 mg total) by mouth daily, Disp: 90 tablet, Rfl: 0    tamsulosin (FLOMAX) 0 4 mg, Take 1 capsule (0 4 mg total) by mouth daily with dinner, Disp: 30 capsule, Rfl: 11    Health Maintenance     Health Maintenance   Topic Date Due    Pneumococcal Vaccine: 65+ Years (1 of 2 - PPSV23) 01/16/2017    DTaP,Tdap,and Td Vaccines (2 - Td or Tdap) 09/18/2022    Fall Risk  05/02/2023    Depression Screening  05/02/2023    Medicare Annual Wellness Visit (AWV)  05/02/2023    BMI: Followup Plan  05/02/2023    BMI: Adult  05/02/2023    Falls: Plan of Care  05/02/2023    Influenza Vaccine  Completed    COVID-19 Vaccine  Completed    HIB Vaccine  Aged Out    Hepatitis B Vaccine  Aged Out    IPV Vaccine  Aged Out    Hepatitis A Vaccine  Aged Out    Meningococcal ACWY Vaccine  Aged Out    HPV Vaccine  Aged Out     Immunization History   Administered Date(s) Administered    COVID-19 MODERNA VACC 0 5 ML IM 01/19/2021, 02/15/2021, 10/23/2021    Hep A, adult 02/01/2000, 08/15/2000    INFLUENZA 10/23/2009, 10/12/2010, 09/29/2011, 10/03/2012, 10/23/2013, 10/29/2014, 11/06/2015, 09/01/2016, 10/10/2016, 10/06/2017, 10/10/2018, 09/30/2019, 09/11/2020, 09/10/2021    Influenza Split High Dose Preservative Free IM 11/06/2015, 09/01/2016, 10/06/2017    Influenza, seasonal, injectable 11/15/2000, 10/26/2001, 10/28/2003, 12/01/2005, 11/01/2007, 10/03/2012, 10/23/2013    Pneumococcal Conjugate 13-Valent 11/21/2016    Tdap 09/18/2012    Zoster 09/01/2007    Zoster Vaccine Recombinant 02/22/2019, 05/03/2019    influenza, trivalent, adjuvanted 09/30/2019, 09/11/2020       BONIFACIO Dugan  Internal Medicine [facility]  [address], [facility] [address]  [place], [address] [facility]  Office: [phone]      Answers for HPI/ROS submitted by the patient on 4/25/2022  How would you rate your overall health?: very good  Compared to last year, how is your physical health?: slightly better  In general, how satisfied are you with your life?: very satisfied  Compared to last year, how is your eyesight?: same  Compared to last year, how is your hearing?: same  Compared to last year, how is your emotional/mental health?: same  How often is anger a problem for you?: never, rarely  How often do you feel unusually tired/fatigued?: sometimes  In the past 7 days, how much pain have you experienced?: some  If you answered "some" or "a lot", please rate the severity of your pain on a scale of 1 to 10 (1 being the least severe pain and 10 being the most intense pain)  : 2/10  In the past 6 months, have you lost or gained 10 pounds without trying?: Yes  One or more falls in the last year: Yes  Do you have trouble with the stairs inside or outside your home?: No  Does your home have working smoke alarms?: Yes  Does your home have a carbon monoxide monitor?: Yes  Which safety hazards (if any) have you experienced in your home? Please select all that apply : none  How would you describe your current diet?  Please select all that apply : No Added Salt  In addition to prescription medications, are you taking any over-the-counter supplements?: Yes  If yes, what supplements are you taking?: Multivitamin  Can you manage your medications?: Yes  Are you currently taking any opioid medications?: No  Can you walk and transfer into and out of your bed and chair?: Yes  Can you dress and groom yourself?: Yes  Can you bathe or shower yourself?: Yes  Can you feed yourself?: Yes  Can you do your laundry/ housekeeping?: Yes  Can you manage your money, pay your bills, and track your expenses?: Yes  Can you make your own meals?: Yes  Can you do your own shopping?: No  Within the last 12 months, have you had any hospitalizations or Emergency Department visits?: Yes  If yes, how many times have you been hospitalized within the past year?: 1-2  Do you have a living will?: Yes  Do you have a Durable POA (Power of ) for healthcare decisions?: Yes  Do you have an Advanced Directive for end of life decisions?: Yes  How often have you used an illegal drug (including marijuana) or a prescription medication for non-medical reasons in the past year?: never  What is the typical number of drinks you consume in a day?: 0  What is the typical number of drinks you consume in a week?: 0  How often did you have a drink containing alcohol in the past year?: monthly or less  How many drinks did you have on a typical day  when you were drinking in the past year?: 1 to 2  How often did you have 6 or more drinks on one occasion in the past year?: never

## 2022-05-26 ENCOUNTER — TELEPHONE (OUTPATIENT)
Dept: NEPHROLOGY | Facility: CLINIC | Age: 87
End: 2022-05-26

## 2022-05-27 ENCOUNTER — LAB (OUTPATIENT)
Dept: LAB | Facility: CLINIC | Age: 87
End: 2022-05-27
Payer: MEDICARE

## 2022-05-27 DIAGNOSIS — E78.00 PURE HYPERCHOLESTEROLEMIA: ICD-10-CM

## 2022-05-27 DIAGNOSIS — I50.32 CHRONIC DIASTOLIC (CONGESTIVE) HEART FAILURE (HCC): ICD-10-CM

## 2022-05-27 LAB
CHOLEST SERPL-MCNC: 132 MG/DL
HDLC SERPL-MCNC: 50 MG/DL
LDLC SERPL CALC-MCNC: 67 MG/DL (ref 0–100)
NT-PROBNP SERPL-MCNC: 4038 PG/ML
TRIGL SERPL-MCNC: 75 MG/DL

## 2022-05-27 PROCEDURE — 80061 LIPID PANEL: CPT

## 2022-05-27 PROCEDURE — 36415 COLL VENOUS BLD VENIPUNCTURE: CPT

## 2022-05-27 PROCEDURE — 83880 ASSAY OF NATRIURETIC PEPTIDE: CPT

## 2022-05-31 ENCOUNTER — TELEPHONE (OUTPATIENT)
Dept: CARDIOLOGY CLINIC | Facility: CLINIC | Age: 87
End: 2022-05-31

## 2022-05-31 DIAGNOSIS — I35.0 NONRHEUMATIC AORTIC VALVE STENOSIS: ICD-10-CM

## 2022-05-31 DIAGNOSIS — I47.1 PSVT (PAROXYSMAL SUPRAVENTRICULAR TACHYCARDIA) (HCC): Primary | ICD-10-CM

## 2022-05-31 NOTE — RESULT ENCOUNTER NOTE
1  Lipid profile is excellent  2  NT BNP pro is elevated slightly higher than I would like to see it  It was approximately 1500 a year ago  However to lower it would require more diuretics which would be harmful to the kidneys

## 2022-05-31 NOTE — TELEPHONE ENCOUNTER
----- Message from Kalpesh Meneses MD sent at 5/31/2022  5:23 AM EDT -----  1  Lipid profile is excellent  2  NT BNP pro is elevated slightly higher than I would like to see it  It was approximately 1500 a year ago  However to lower it would require more diuretics which would be harmful to the kidneys

## 2022-06-16 ENCOUNTER — HOSPITAL ENCOUNTER (OUTPATIENT)
Dept: CT IMAGING | Facility: HOSPITAL | Age: 87
Discharge: HOME | End: 2022-06-16
Attending: INTERNAL MEDICINE
Payer: MEDICARE

## 2022-06-16 DIAGNOSIS — R91.8 PULMONARY NODULES: ICD-10-CM

## 2022-06-16 DIAGNOSIS — E78.5 HYPERLIPIDEMIA, UNSPECIFIED HYPERLIPIDEMIA TYPE: ICD-10-CM

## 2022-06-16 PROCEDURE — 71250 CT THORAX DX C-: CPT

## 2022-06-16 PROCEDURE — G1004 CDSM NDSC: HCPCS

## 2022-06-16 RX ORDER — ROSUVASTATIN CALCIUM 5 MG/1
5 TABLET, COATED ORAL DAILY
Qty: 90 TABLET | Refills: 0 | Status: SHIPPED | OUTPATIENT
Start: 2022-06-16

## 2022-06-20 ENCOUNTER — TELEPHONE (OUTPATIENT)
Dept: PULMONOLOGY | Facility: CLINIC | Age: 87
End: 2022-06-20

## 2022-06-20 DIAGNOSIS — R91.8 PULMONARY NODULES: Primary | ICD-10-CM

## 2022-06-20 NOTE — TELEPHONE ENCOUNTER
Spoke with patient nodules have ever so slightly increased in size will repeat chest CT in 6 months order has been placed

## 2022-06-22 ENCOUNTER — APPOINTMENT (OUTPATIENT)
Dept: LAB | Facility: CLINIC | Age: 87
End: 2022-06-22
Payer: MEDICARE

## 2022-06-22 DIAGNOSIS — I35.0 NONRHEUMATIC AORTIC VALVE STENOSIS: ICD-10-CM

## 2022-06-22 DIAGNOSIS — I50.9 ACUTE CONGESTIVE HEART FAILURE, UNSPECIFIED HEART FAILURE TYPE (HCC): ICD-10-CM

## 2022-06-22 DIAGNOSIS — I47.1 PSVT (PAROXYSMAL SUPRAVENTRICULAR TACHYCARDIA) (HCC): ICD-10-CM

## 2022-06-22 LAB
INR PPP: 1.06 (ref 0.84–1.19)
PROTHROMBIN TIME: 13.4 SECONDS (ref 11.6–14.5)

## 2022-06-22 PROCEDURE — 85610 PROTHROMBIN TIME: CPT

## 2022-06-22 PROCEDURE — 36415 COLL VENOUS BLD VENIPUNCTURE: CPT

## 2022-06-22 RX ORDER — FUROSEMIDE 20 MG/1
20 TABLET ORAL DAILY
Qty: 30 TABLET | Refills: 2 | Status: SHIPPED | OUTPATIENT
Start: 2022-06-22

## 2022-06-29 ENCOUNTER — APPOINTMENT (OUTPATIENT)
Dept: LAB | Facility: CLINIC | Age: 87
End: 2022-06-29
Payer: MEDICARE

## 2022-06-29 DIAGNOSIS — I47.1 PSVT (PAROXYSMAL SUPRAVENTRICULAR TACHYCARDIA) (HCC): ICD-10-CM

## 2022-06-29 DIAGNOSIS — I35.0 NONRHEUMATIC AORTIC VALVE STENOSIS: ICD-10-CM

## 2022-06-29 LAB
INR PPP: 1.09 (ref 0.84–1.19)
PROTHROMBIN TIME: 13.7 SECONDS (ref 11.6–14.5)

## 2022-06-29 PROCEDURE — 36415 COLL VENOUS BLD VENIPUNCTURE: CPT

## 2022-06-29 PROCEDURE — 85610 PROTHROMBIN TIME: CPT

## 2022-07-06 ENCOUNTER — APPOINTMENT (OUTPATIENT)
Dept: LAB | Facility: CLINIC | Age: 87
End: 2022-07-06
Payer: MEDICARE

## 2022-07-06 DIAGNOSIS — I35.0 NONRHEUMATIC AORTIC VALVE STENOSIS: ICD-10-CM

## 2022-07-06 DIAGNOSIS — I47.1 PSVT (PAROXYSMAL SUPRAVENTRICULAR TACHYCARDIA) (HCC): ICD-10-CM

## 2022-07-06 LAB
INR PPP: 1.06 (ref 0.84–1.19)
PROTHROMBIN TIME: 13.4 SECONDS (ref 11.6–14.5)

## 2022-07-06 PROCEDURE — 36415 COLL VENOUS BLD VENIPUNCTURE: CPT

## 2022-07-06 PROCEDURE — 85610 PROTHROMBIN TIME: CPT

## 2022-07-14 ENCOUNTER — APPOINTMENT (OUTPATIENT)
Dept: LAB | Facility: CLINIC | Age: 87
End: 2022-07-14
Payer: MEDICARE

## 2022-07-14 DIAGNOSIS — I47.1 PSVT (PAROXYSMAL SUPRAVENTRICULAR TACHYCARDIA) (HCC): ICD-10-CM

## 2022-07-14 DIAGNOSIS — I35.0 NONRHEUMATIC AORTIC VALVE STENOSIS: ICD-10-CM

## 2022-07-14 DIAGNOSIS — N25.81 SECONDARY HYPERPARATHYROIDISM OF RENAL ORIGIN (HCC): ICD-10-CM

## 2022-07-14 DIAGNOSIS — N18.4 CKD (CHRONIC KIDNEY DISEASE) STAGE 4, GFR 15-29 ML/MIN (HCC): ICD-10-CM

## 2022-07-14 LAB
25(OH)D3 SERPL-MCNC: 50.4 NG/ML (ref 30–100)
ALBUMIN SERPL BCP-MCNC: 3.3 G/DL (ref 3.5–5)
ANION GAP SERPL CALCULATED.3IONS-SCNC: 6 MMOL/L (ref 4–13)
BUN SERPL-MCNC: 42 MG/DL (ref 5–25)
CALCIUM ALBUM COR SERPL-MCNC: 10.3 MG/DL (ref 8.3–10.1)
CALCIUM SERPL-MCNC: 9.7 MG/DL (ref 8.3–10.1)
CHLORIDE SERPL-SCNC: 109 MMOL/L (ref 100–108)
CO2 SERPL-SCNC: 24 MMOL/L (ref 21–32)
CREAT SERPL-MCNC: 2.34 MG/DL (ref 0.6–1.3)
GFR SERPL CREATININE-BSD FRML MDRD: 24 ML/MIN/1.73SQ M
GLUCOSE SERPL-MCNC: 114 MG/DL (ref 65–140)
INR PPP: 1.04 (ref 0.84–1.19)
PHOSPHATE SERPL-MCNC: 3.3 MG/DL (ref 2.3–4.1)
POTASSIUM SERPL-SCNC: 4.2 MMOL/L (ref 3.5–5.3)
PROTHROMBIN TIME: 13.2 SECONDS (ref 11.6–14.5)
PTH-INTACT SERPL-MCNC: 152 PG/ML (ref 18.4–80.1)
SODIUM SERPL-SCNC: 139 MMOL/L (ref 136–145)

## 2022-07-14 PROCEDURE — 82306 VITAMIN D 25 HYDROXY: CPT

## 2022-07-14 PROCEDURE — 83970 ASSAY OF PARATHORMONE: CPT

## 2022-07-14 PROCEDURE — 36415 COLL VENOUS BLD VENIPUNCTURE: CPT

## 2022-07-14 PROCEDURE — 80069 RENAL FUNCTION PANEL: CPT

## 2022-07-14 PROCEDURE — 85610 PROTHROMBIN TIME: CPT

## 2022-07-20 ENCOUNTER — APPOINTMENT (OUTPATIENT)
Dept: LAB | Facility: CLINIC | Age: 87
End: 2022-07-20
Payer: MEDICARE

## 2022-07-20 DIAGNOSIS — I47.1 PSVT (PAROXYSMAL SUPRAVENTRICULAR TACHYCARDIA) (HCC): ICD-10-CM

## 2022-07-20 DIAGNOSIS — I35.0 NONRHEUMATIC AORTIC VALVE STENOSIS: ICD-10-CM

## 2022-07-20 LAB
INR PPP: 1.02 (ref 0.84–1.19)
PROTHROMBIN TIME: 13.6 SECONDS (ref 11.6–14.5)

## 2022-07-20 PROCEDURE — 85610 PROTHROMBIN TIME: CPT

## 2022-07-20 PROCEDURE — 36415 COLL VENOUS BLD VENIPUNCTURE: CPT

## 2022-07-21 ENCOUNTER — OFFICE VISIT (OUTPATIENT)
Dept: CARDIOLOGY CLINIC | Facility: CLINIC | Age: 87
End: 2022-07-21
Payer: MEDICARE

## 2022-07-21 VITALS
HEART RATE: 61 BPM | OXYGEN SATURATION: 98 % | DIASTOLIC BLOOD PRESSURE: 80 MMHG | HEIGHT: 69 IN | SYSTOLIC BLOOD PRESSURE: 146 MMHG | WEIGHT: 178 LBS | BODY MASS INDEX: 26.36 KG/M2

## 2022-07-21 DIAGNOSIS — N18.4 CKD (CHRONIC KIDNEY DISEASE) STAGE 4, GFR 15-29 ML/MIN (HCC): ICD-10-CM

## 2022-07-21 DIAGNOSIS — E78.00 PURE HYPERCHOLESTEROLEMIA: ICD-10-CM

## 2022-07-21 DIAGNOSIS — I47.1 PSVT (PAROXYSMAL SUPRAVENTRICULAR TACHYCARDIA) (HCC): ICD-10-CM

## 2022-07-21 DIAGNOSIS — I49.3 PVC (PREMATURE VENTRICULAR CONTRACTION): ICD-10-CM

## 2022-07-21 DIAGNOSIS — F17.211 CIGARETTE NICOTINE DEPENDENCE IN REMISSION: ICD-10-CM

## 2022-07-21 DIAGNOSIS — D64.9 CHRONIC ANEMIA: ICD-10-CM

## 2022-07-21 DIAGNOSIS — I50.32 CHRONIC DIASTOLIC (CONGESTIVE) HEART FAILURE (HCC): ICD-10-CM

## 2022-07-21 DIAGNOSIS — R91.8 PULMONARY NODULES: ICD-10-CM

## 2022-07-21 DIAGNOSIS — I10 PRIMARY HYPERTENSION: ICD-10-CM

## 2022-07-21 DIAGNOSIS — Z98.890 HISTORY OF RADIOFREQUENCY ABLATION PROCEDURE FOR CARDIAC ARRHYTHMIA: ICD-10-CM

## 2022-07-21 DIAGNOSIS — I35.0 AORTIC STENOSIS, SEVERE: Primary | Chronic | ICD-10-CM

## 2022-07-21 PROCEDURE — 99214 OFFICE O/P EST MOD 30 MIN: CPT | Performed by: INTERNAL MEDICINE

## 2022-07-21 NOTE — PROGRESS NOTES
[facility]  [address] [facility] [address]  [address]  Phone#  [phone]   Fax#  [phone]  *-*-*-*-*-*-*-*-*-*-*-*-*-*-*-*-*-*-*-*-*-*-*-*-*-*-*-*-*-*-*-*-*-*-*-*-*-*-*-*-*-*-*-*-*-*-*-*-*-*-*-*-*-*                                   Cardiology Follow Up      ENCOUNTER DATE: 22 2:57 PM  PATIENT NAME: Terence Singh   : 1935    MRN: [id number]  AGE:86 y o  SEX: male  [address] John Mauro MD     PRIMARY CARE PHYSICIAN: Andrew Alves DO    ACTIVE DIAGNOSIS THIS VISIT  1  Aortic stenosis, severe  Echo complete w/ contrast if indicated   2  Chronic diastolic (congestive) heart failure ([address] )     3  PSVT (paroxysmal supraventricular tachycardia) ([address] )     4  Pure hypercholesterolemia     5  PVC (premature ventricular contraction)     6  Chronic anemia     7  Primary hypertension     8  CKD (chronic kidney disease) stage 4, GFR 15-29 ml/min (HCC)     9  Pulmonary nodules     10  History of radiofrequency ablation  for SVT     11   Cigarette nicotine dependence in remission       ACTIVE PROBLEM LIST  Patient Active Problem List   Diagnosis    Chronic anemia    Linda esophagus    Esophageal reflux    Hypertension    Spinal stenosis of lumbar region    Spondylolisthesis    PSVT (paroxysmal supraventricular tachycardia) (HCC)    Palpitations    Systolic murmur    Nonrheumatic aortic valve stenosis    Pure hypercholesterolemia    PVC (premature ventricular contraction)    Cough    CKD (chronic kidney disease) stage 4, GFR 15-29 ml/min (HCC)    Chronic diastolic (congestive) heart failure (HCC)    BPH (benign prostatic hyperplasia)    History of radiofrequency ablation  for SVT    Hematochezia    Pulmonary nodules    Aortic stenosis, severe    Hemoptysis    Elevated d-dimer    Pneumonia    Elevated troponin    Cigarette nicotine dependence in remission       CARDIOLOGY SPECIALTY COMMENTS  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 with hemoptysis and right lower lobe pneumonia    11/22/2021 ECHOCARDIOGRAM LIMITED:   Normal left ventricular systolic and diastolic function, EF 66%  Moderate to severe aortic stenosis with moderate aortic regurgitation  The aortic valve regurgitant jet pressure half time was 374 MS  The aortic valve mean gradient is 34 5 mmHg  The valve area is 0 8 cm2  The left ventricular stroke index is 41 1 mL/m2  By my calculation, the dimensionless index was 0 27 which is close to severe (severe less than 0 25)    INTERVAL HISTORY:        Patient is having no cardiac symptoms  He has congestive heart failure which it appears compensated  Patient denies chest discomfort or shortness of breath  Patient has no palpitations  Patient denies symptoms of dizziness, lightheadedness or near-syncope/syncope  Patient denies leg edema  Patient denies symptoms of orthopnea or paroxysmal nocturnal dyspnea  His blood pressure is controlled 146/80  Would not want to see it lower with his kidney disease  His last total cholesterol was 132      DISCUSSION/PLAN:          Continue present medications  Obtain echocardiogram for progression of aortic stenosis  Return in 3 months    Lab Studies:    Lab Results   Component Value Date    CHOLESTEROL 132 05/27/2022    CHOLESTEROL 146 09/03/2020    CHOLESTEROL 148 08/06/2019     Lab Results   Component Value Date    TRIG 75 05/27/2022    TRIG 69 09/03/2020    TRIG 96 08/06/2019     Lab Results   Component Value Date    HDL 50 05/27/2022    HDL 52 09/03/2020    HDL 43 08/06/2019     Lab Results   Component Value Date    LDLCALC 67 05/27/2022    LDLCALC 80 09/03/2020    LDLCALC 86 08/06/2019         Lab Results   Component Value Date    NTBNP 4,038 (H) 05/27/2022    NTBNP 5,024 (H) 11/10/2021    NTBNP 2,942 (H) 08/11/2021     Lab Results   Component Value Date    EGFR 24 07/14/2022    EGFR 25 04/08/2022    EGFR 28 01/14/2022    SODIUM 139 07/14/2022    SODIUM 138 04/08/2022    SODIUM 138 01/14/2022    K 4 2 07/14/2022    K 4 1 04/08/2022    K 4 1 01/14/2022     (H) 07/14/2022     04/08/2022     01/14/2022    CO2 24 07/14/2022    CO2 26 04/08/2022    CO2 28 01/14/2022    ANIONGAP 7 05/15/2015    ANIONGAP 8 05/09/2014    BUN 42 (H) 07/14/2022    BUN 42 (H) 04/08/2022    BUN 40 (H) 01/14/2022    CREATININE 2 34 (H) 07/14/2022    CREATININE 2 23 (H) 04/08/2022    CREATININE 2 05 (H) 01/14/2022     Lab Results   Component Value Date    WBC 9 18 04/08/2022    WBC 9 02 11/17/2021    WBC 11 12 (H) 11/11/2021    HGB 10 4 (L) 04/08/2022    HGB 10 2 (L) 11/17/2021    HGB 8 8 (L) 11/11/2021    HCT 32 2 (L) 04/08/2022    HCT 31 7 (L) 11/17/2021    HCT 27 4 (L) 11/11/2021    MCV 89 04/08/2022    MCV 90 11/17/2021    MCV 91 11/11/2021    MCH 28 7 04/08/2022    MCH 28 8 11/17/2021    MCH 29 1 11/11/2021    MCHC 32 3 04/08/2022    MCHC 32 2 11/17/2021    MCHC 32 1 11/11/2021     04/08/2022     (H) 11/17/2021     11/11/2021      Lab Results   Component Value Date    GLUCOSE 100 05/15/2015    GLUCOSE 97 05/09/2014    CALCIUM 9 7 07/14/2022    CALCIUM 9 9 04/08/2022    CALCIUM 9 7 01/14/2022    AST 21 11/17/2021    AST 11 11/10/2021    AST 15 10/31/2021    ALT 25 11/17/2021    ALT 19 11/10/2021    ALT 22 10/31/2021    ALKPHOS 84 11/17/2021    ALKPHOS 79 11/10/2021    ALKPHOS 70 10/31/2021    PROT 7 4 05/15/2015    PROT 7 4 05/09/2014 BILITOT 0 40 05/15/2015    BILITOT 0 4 05/09/2014    MG 2 1 10/31/2021    MG 2 1 02/27/2021    MG 1 9 06/06/2019       Lab Results   Component Value Date    TROPONINI 0 03 08/11/2021    TROPONINI 0 03 02/27/2021    TROPONINI 0 03 06/06/2019     Lab Results   Component Value Date    DDIMER 1 48 (H) 11/10/2021       Lab Results   Component Value Date    FERRITIN 240 11/17/2021    IRON 52 (L) 11/17/2021    TIBC 194 (L) 11/17/2021     No results found for this visit on 07/21/22        Current Outpatient Medications:     aspirin 81 MG tablet, Take 81 mg by mouth daily  , Disp: , Rfl:     Cholecalciferol (VITAMIN D3) 125 MCG (5000 UT) CAPS, 2 (two) times a week , Disp: , Rfl:     finasteride (PROSCAR) 5 mg tablet, Take 1 tablet (5 mg total) by mouth daily, Disp: 90 tablet, Rfl: 1    furosemide (LASIX) 20 mg tablet, Take 1 tablet (20 mg total) by mouth daily, Disp: 30 tablet, Rfl: 2    metoprolol succinate (TOPROL-XL) 25 mg 24 hr tablet, Take 1 tablet (25 mg total) by mouth daily, Disp: 90 tablet, Rfl: 3    Multiple Vitamin (MULTI-VITAMIN DAILY) TABS, Take by mouth, Disp: , Rfl:     omeprazole (PriLOSEC) 20 mg delayed release capsule, Take 1 capsule (20 mg total) by mouth daily, Disp: 90 capsule, Rfl: 1    rosuvastatin (CRESTOR) 5 mg tablet, Take 1 tablet (5 mg total) by mouth daily, Disp: 90 tablet, Rfl: 0    tamsulosin (FLOMAX) 0 4 mg, Take 1 capsule (0 4 mg total) by mouth daily with dinner, Disp: 30 capsule, Rfl: 11  No Known Allergies    Past Medical History:   Diagnosis Date    Aortic stenosis, severe 11/1/2021    Atrial fibrillation (HCC)     Colon polyp     GERD (gastroesophageal reflux disease)     Hearing loss     last assessed 11/8/17    Hypertension     Irregular heart beat     Rheumatic fever     Stenosis of aorta     SVT (supraventricular tachycardia) (HCC)      Social History     Socioeconomic History    Marital status: /Civil Union     Spouse name: Not on file    Number of children: Not on file    Years of education: Not on file    Highest education level: Not on file   Occupational History    Not on file   Tobacco Use    Smoking status: Former Smoker     Packs/day: 1 00     Years: 16 00     Pack years: 16 00     Types: Cigarettes     Start date: 5     Quit date:      Years since quittin 8    Smokeless tobacco: Never Used   Vaping Use    Vaping Use: Never used   Substance and Sexual Activity    Alcohol use: Yes     Comment: occ    Drug use: No    Sexual activity: Not on file   Other Topics Concern    Not on file   Social History Narrative    Not on file     Social Determinants of Health     Financial Resource Strain: Not on file   Food Insecurity: Not on file   Transportation Needs: Not on file   Physical Activity: Not on file   Stress: Not on file   Social Connections: Not on file   Intimate Partner Violence: Not on file   Housing Stability: Not on file      Family History   Problem Relation Age of Onset    Other Mother         old age   Nasim Almaguer Esophageal cancer Father    Nasim Almaguer Other Father         old age   Nasim Almaguer Alcohol abuse Son         alcoholism     Past Surgical History:   Procedure Laterality Date    APPENDECTOMY      BACK SURGERY      lower    CARDIAC SURGERY      ablation    CATARACT EXTRACTION      JOINT REPLACEMENT Left     knee    KNEE SURGERY Left     OH BIOPSY OF PROSTATE,NEEDLE/PUNCH N/A 3/16/2021    Procedure: Transperineal prostate biopsy with biplanar transrectal ultrasound, using the perineal logic kit;   Surgeon: August Hamlin MD;  Location: [facility];  Service: Urology    TONSILLECTOMY AND ADENOIDECTOMY         PREVIOUS WEIGHTS:   Wt Readings from Last 10 Encounters:   22 80 7 kg (178 lb)   22 83 9 kg (185 lb)   22 83 3 kg (183 lb 9 6 oz)   22 83 kg (183 lb)   22 83 kg (183 lb)   21 84 4 kg (186 lb)   21 84 8 kg (187 lb)   21 83 kg (183 lb)   21 83 2 kg (183 lb 6 4 oz)   11/15/21 83 8 kg (184 lb 12 8 oz)        Review of Systems:  Review of Systems   Constitutional: Negative for activity change  Respiratory: Negative for cough, chest tightness, shortness of breath and wheezing  Cardiovascular: Negative for chest pain, palpitations and leg swelling  Musculoskeletal: Negative for gait problem  Skin: Negative for color change  Neurological: Negative for dizziness, tremors, syncope, weakness, light-headedness and headaches  Psychiatric/Behavioral: Negative for agitation and confusion  Physical Exam:  /80   Pulse 61   Ht 5' 9" (1 753 m)   Wt 80 7 kg (178 lb)   SpO2 98%   BMI 26 29 kg/m²     Physical Exam  Vitals reviewed  Constitutional:       General: He is not in acute distress  Appearance: He is well-developed  HENT:      Head: Normocephalic and atraumatic  Neck:      Thyroid: No thyromegaly  Vascular: No carotid bruit or JVD  Trachea: No tracheal deviation  Cardiovascular:      Rate and Rhythm: Normal rate and regular rhythm  Pulses: Normal pulses  Heart sounds: Murmur heard  Crescendo decrescendo systolic murmur is present with a grade of 3/6  No friction rub  No gallop  Pulmonary:      Effort: Pulmonary effort is normal  No respiratory distress  Breath sounds: Normal breath sounds  No wheezing, rhonchi or rales  Chest:      Chest wall: No tenderness  Musculoskeletal:      Cervical back: Normal range of motion and neck supple  Right lower leg: No edema  Left lower leg: No edema  Skin:     General: Skin is warm and dry  Neurological:      General: No focal deficit present  Mental Status: He is alert and oriented to person, place, and time  Psychiatric:         Mood and Affect: Mood normal          Behavior: Behavior normal          Thought Content:  Thought content normal          Judgment: Judgment normal            ======================================================  Imaging:   I have personally reviewed pertinent reports  Portions of the record may have been created with voice recognition software  Occasional wrong word or "sound a like" substitutions may have occurred due to the inherent limitations of voice recognition software  Read the chart carefully and recognize, using context, where substitutions have occurred      SIGNATURES:   Aliyah Goff MD

## 2022-08-04 ENCOUNTER — TELEPHONE (OUTPATIENT)
Dept: NEPHROLOGY | Facility: CLINIC | Age: 87
End: 2022-08-04

## 2022-08-04 NOTE — TELEPHONE ENCOUNTER
Appointment Confirmation   Person confirmed appointment with  If not patient, name of the person Patient     Date and time of appointment 08/05   Patient acknowledged and will be at appointment?  yes   Did you advise the patient that they will need a urine sample if they are a new patient? no   Did you advise the patient to bring their current medications for verification? (including any OTC) yes   Additional Information

## 2022-08-05 ENCOUNTER — TELEPHONE (OUTPATIENT)
Dept: NEPHROLOGY | Facility: CLINIC | Age: 87
End: 2022-08-05

## 2022-08-05 ENCOUNTER — OFFICE VISIT (OUTPATIENT)
Dept: NEPHROLOGY | Facility: CLINIC | Age: 87
End: 2022-08-05
Payer: MEDICARE

## 2022-08-05 VITALS
HEART RATE: 52 BPM | BODY MASS INDEX: 26.36 KG/M2 | DIASTOLIC BLOOD PRESSURE: 74 MMHG | SYSTOLIC BLOOD PRESSURE: 144 MMHG | HEIGHT: 69 IN | OXYGEN SATURATION: 97 % | WEIGHT: 178 LBS

## 2022-08-05 DIAGNOSIS — I35.0 AORTIC STENOSIS, SEVERE: Chronic | ICD-10-CM

## 2022-08-05 DIAGNOSIS — I50.32 CHRONIC DIASTOLIC (CONGESTIVE) HEART FAILURE (HCC): ICD-10-CM

## 2022-08-05 DIAGNOSIS — E78.00 PURE HYPERCHOLESTEROLEMIA: ICD-10-CM

## 2022-08-05 DIAGNOSIS — N18.4 CKD (CHRONIC KIDNEY DISEASE) STAGE 4, GFR 15-29 ML/MIN (HCC): Primary | ICD-10-CM

## 2022-08-05 DIAGNOSIS — I10 PRIMARY HYPERTENSION: ICD-10-CM

## 2022-08-05 DIAGNOSIS — N40.0 BENIGN PROSTATIC HYPERPLASIA WITHOUT LOWER URINARY TRACT SYMPTOMS: ICD-10-CM

## 2022-08-05 PROCEDURE — 99214 OFFICE O/P EST MOD 30 MIN: CPT | Performed by: INTERNAL MEDICINE

## 2022-08-05 NOTE — PROGRESS NOTES
OFFICE FOLLOW UP - Nephrology   Raya Goodwin 80 y o  male MRN: [id number]       ASSESSMENT and PLAN:  Valentine Parikh was seen today for follow-up and chronic kidney disease  Diagnoses and all orders for this visit:    CKD (chronic kidney disease) stage 4, GFR 15-29 ml/min (Formerly [facility])  -     CBC; Future  -     Renal function panel; Future  -     PTH, intact; Future  -     Urinalysis with microscopic; Future  -     Protein / creatinine ratio, urine; Future    Primary hypertension    Chronic diastolic (congestive) heart failure (HCC)    Aortic stenosis, severe    Pure hypercholesterolemia    Benign prostatic hyperplasia without lower urinary tract symptoms        This is an 80year-old gentleman with known history of chronic kidney disease stage 4 previous creatinine in the high 1s to low 2s since 2017 who was initially seen for evaluation on 03/2022 patient today returns to the office for CKD follow-up  Also history of chronic diastolic heart failure, severe aortic stenosis, hypertension and hyperlipidemia  1  Chronic kidney disease stage 4 with previous creatinine around 1 7-2 1 since 2017  Noted most recent creatinine is slightly elevated at 2 3  CKD likely multifactorial in the setting of hypertension, diastolic heart failure, severe aortic stenosis, possible component of cardiorenal syndrome as well as age-related nephron loss and atherosclerotic disease  Previous urinalysis bland with minimal proteinuria  Once again recommend to continue with low-salt diet, avoid NSAIDs, increase fluid intake to an extra glass of water  I would like to see him back in the office in 4 months with repeat blood and urine test    2  Hypertension, blood pressure currently acceptable on current regimen  3  Chronic diastolic heart failure, severe aortic stenosis, on exam looks near euvolemic  Continue follow-up with cardiology  Patient currently seems to be pretty asymptomatic    4   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, hemoglobin low but stable, follow CBC in 4 months  5  Mineral bone disease, elevated PTH slightly 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, noted calcium though is slightly elevated, follow repeat labs in 4 months    6  History of BPH, on Flomax  7  Hyperlipidemia, continue with statins        Patient Instructions   As we discussed in the office visit, based on the most recent blood test your kidney function is overall is stable though your creatinine is lately slightly higher  I would like to see you back in the office in 4 months with repeat blood and urine test   Continue with current medications  Remember to follow low-salt diet less than 2 g of salt in a day  Stay well-hydrated, recommend to increase an extra glass of water to your current regimen  Continue close follow-up with cardiologist and your primary care doctor  Okay to take Tylenol or paracetamol or acetaminophen as needed for pain  Avoid NSAIDs (no ibuprofen, Motrin, Advil, Aleve, naproxen)        HPI: Susy Marin is a 80 y o  male who is here for Follow-up and Chronic Kidney Disease    This is a patient history of chronic diastolic heart failure, severe aortic stenosis, hypertension, chronic kidney disease stage 3B/4 was initially seen for evaluation on 03/2022  Patient today returns to the office for follow-up with his wife    Patient currently has no complaints at this time and is feeling well  Patient denies any chest pain, shortness of breath no dyspnea on exertion or leg swelling  Denies any abdominal pain, no nausea, vomiting, no diarrhea or constipation  Denies any urinary problems, no burning sensation or gross hematuria  The last blood work was done on 07/14/2022, which we have reviewed together  ROS:   All the systems were reviewed and were negative except as documented on the HPI  Allergies: Patient has no known allergies      Medications:   Current Outpatient Medications:     aspirin 81 MG tablet, Take 81 mg by mouth daily  , Disp: , Rfl:     Cholecalciferol (VITAMIN D3) 125 MCG (5000 UT) CAPS, 2 (two) times a week , Disp: , Rfl:     finasteride (PROSCAR) 5 mg tablet, Take 1 tablet (5 mg total) by mouth daily, Disp: 90 tablet, Rfl: 1    furosemide (LASIX) 20 mg tablet, Take 1 tablet (20 mg total) by mouth daily, Disp: 30 tablet, Rfl: 2    metoprolol succinate (TOPROL-XL) 25 mg 24 hr tablet, Take 1 tablet (25 mg total) by mouth daily, Disp: 90 tablet, Rfl: 3    Multiple Vitamin (MULTI-VITAMIN DAILY) TABS, Take by mouth, Disp: , Rfl:     omeprazole (PriLOSEC) 20 mg delayed release capsule, Take 1 capsule (20 mg total) by mouth daily, Disp: 90 capsule, Rfl: 1    rosuvastatin (CRESTOR) 5 mg tablet, Take 1 tablet (5 mg total) by mouth daily, Disp: 90 tablet, Rfl: 0    tamsulosin (FLOMAX) 0 4 mg, Take 1 capsule (0 4 mg total) by mouth daily with dinner, Disp: 30 capsule, Rfl: 11    Past Medical History:   Diagnosis Date    Aortic stenosis, severe 11/1/2021    Atrial fibrillation (HCC)     Colon polyp     GERD (gastroesophageal reflux disease)     Hearing loss     last assessed 11/8/17    Hypertension     Irregular heart beat     Rheumatic fever     Stenosis of aorta     SVT (supraventricular tachycardia) (HCC)      Past Surgical History:   Procedure Laterality Date    APPENDECTOMY      BACK SURGERY      lower    CARDIAC SURGERY      ablation    CATARACT EXTRACTION      JOINT REPLACEMENT Left     knee    KNEE SURGERY Left     TX BIOPSY OF PROSTATE,NEEDLE/PUNCH N/A 3/16/2021    Procedure: Transperineal prostate biopsy with biplanar transrectal ultrasound, using the perineal logic kit;   Surgeon: Viona Peabody, MD;  [facility];  Service: Urology    TONSILLECTOMY AND ADENOIDECTOMY       Family History   Problem Relation Age of Onset    Other Mother         old age   Shawn Driscoll Esophageal cancer Father    Shawn Driscoll Other Father         old age   Shawn Driscoll Alcohol abuse Son alcoholism      reports that he quit smoking about 53 years ago  His smoking use included cigarettes  He started smoking about 69 years ago  He has a 16 00 pack-year smoking history  He has never used smokeless tobacco  He reports current alcohol use  He reports that he does not use drugs  Physical Exam:   Vitals:    08/05/22 1151   BP: 144/74   BP Location: Left arm   Patient Position: Sitting   Cuff Size: Adult   Pulse: (!) 52   SpO2: 97%   Weight: 80 7 kg (178 lb)   Height: 5' 9" (1 753 m)     Body mass index is 26 29 kg/m²  General: cooperative, in not acute distress  Eyes: conjunctivae pink, anicteric sclerae  ENT: lips and mucous membranes moist  Neck: supple, no JVD  Chest: clear breath sounds bilateral, no crackles, ronchus or wheezings  CVS: distinct S1 & S2, normal rate, regular rhythm positive systolic ejection murmur in aortic area  Abdomen: soft, non-tender, non-distended, normoactive bowel sounds  Back: no CVA tenderness  Extremities: no significant edema of both legs, varicose veins in both lower extremities  Skin: no rash  Neuro: awake, alert, oriented      Laboratory Results:  Lab Results   Component Value Date    WBC 9 18 04/08/2022    HGB 10 4 (L) 04/08/2022    HCT 32 2 (L) 04/08/2022    MCV 89 04/08/2022     04/08/2022     Lab Results   Component Value Date    SODIUM 139 07/14/2022    K 4 2 07/14/2022     (H) 07/14/2022    CO2 24 07/14/2022    BUN 42 (H) 07/14/2022    CREATININE 2 34 (H) 07/14/2022    GLUC 114 07/14/2022    CALCIUM 9 7 07/14/2022       Lab Results   Component Value Date     0 (H) 07/14/2022    CALCIUM 9 7 07/14/2022    PHOS 3 3 07/14/2022             Portions of the record may have been created with voice recognition software  Occasional wrong word or "sound a like" substitutions may have occurred due to the inherent limitations of voice recognition software   Read the chart carefully and recognize, using context, where substitutions have occurred  If you have any questions, please contact the dictating provider

## 2022-08-05 NOTE — TELEPHONE ENCOUNTER
Reschedule Appointment   Person speaking to  patient    Date of original appointment 12/22   New appointment date 65   Patient on dialysis no   Location [place]    Provider Dr Rhoda Medina

## 2022-08-05 NOTE — PATIENT INSTRUCTIONS
As we discussed in the office visit, based on the most recent blood test your kidney function is overall is stable though your creatinine is lately slightly higher  I would like to see you back in the office in 4 months with repeat blood and urine test   Continue with current medications  Remember to follow low-salt diet less than 2 g of salt in a day  Stay well-hydrated, recommend to increase an extra glass of water to your current regimen  Continue close follow-up with cardiologist and your primary care doctor  Okay to take Tylenol or paracetamol or acetaminophen as needed for pain    Avoid NSAIDs (no ibuprofen, Motrin, Advil, Aleve, naproxen)

## 2022-08-19 ENCOUNTER — HOSPITAL ENCOUNTER (EMERGENCY)
Facility: HOSPITAL | Age: 87
Discharge: HOME | End: 2022-08-19
Attending: EMERGENCY MEDICINE
Payer: MEDICARE

## 2022-08-19 ENCOUNTER — TELEPHONE (OUTPATIENT)
Dept: VASCULAR SURGERY | Facility: CLINIC | Age: 87
End: 2022-08-19

## 2022-08-19 ENCOUNTER — APPOINTMENT (EMERGENCY)
Dept: NON INVASIVE DIAGNOSTICS | Facility: HOSPITAL | Age: 87
End: 2022-08-19
Payer: MEDICARE

## 2022-08-19 VITALS
RESPIRATION RATE: 18 BRPM | OXYGEN SATURATION: 96 % | DIASTOLIC BLOOD PRESSURE: 84 MMHG | BODY MASS INDEX: 26.7 KG/M2 | WEIGHT: 180.78 LBS | SYSTOLIC BLOOD PRESSURE: 169 MMHG | TEMPERATURE: 97.7 F | HEART RATE: 67 BPM

## 2022-08-19 DIAGNOSIS — M79.89 RIGHT LEG SWELLING: Primary | ICD-10-CM

## 2022-08-19 PROCEDURE — 93971 EXTREMITY STUDY: CPT

## 2022-08-19 PROCEDURE — 99283 EMERGENCY DEPT VISIT LOW MDM: CPT

## 2022-08-19 PROCEDURE — 93971 EXTREMITY STUDY: CPT | Performed by: SURGERY

## 2022-08-19 PROCEDURE — 99284 EMERGENCY DEPT VISIT MOD MDM: CPT | Performed by: EMERGENCY MEDICINE

## 2022-08-19 NOTE — TELEPHONE ENCOUNTER
Patient called to inform that he wants  to speak to Dr Isabela Mendoza in reference to his right leg being swollen from the knee to down  The patient is concerned that this may be from his heart and would like to speak with the doctor

## 2022-08-19 NOTE — TELEPHONE ENCOUNTER
Spoke to him on the phone today  He notes asymmetric lower extremity edema without provoking event  He notes that is tender to palpation, no erythema  He is not on full-dose anticoagulation    I recommended that he come into the emergency department for evaluation as this could be a DVT

## 2022-08-19 NOTE — TELEPHONE ENCOUNTER
Placed call to patient  Swelling past couple days in right leg  Denies SOB, lightheadedness, dizziness, chest pain  Current weight 185lbs  Has been elevating leg today, patient thinks it is slightly better than it was yesterday  Dr Rick Forrest not available today, will be discussing with covering provider in the office

## 2022-08-20 NOTE — DISCHARGE INSTRUCTIONS
Continue to try to keep your leg elevated to reduce swelling  Follow up with your cardiologist on Monday, let him know the vascular study did not demonstrate a blood clot in the let  You should be seen for further evaluation and management in the office

## 2022-08-20 NOTE — ED PROVIDER NOTES
History  Chief Complaint   Patient presents with    Leg Pain     Right leg swelling, DENIES pain, chest pain, SOB  Patient reports he was sent in by cardiology to have a doppler done  81 YO male presents with swelling to the Right leg  Pt states he noticed this yesterday after being out shopping  States this did seem to improve some with elevation of the leg overnight and some throughout the day, though it is still present  Pt has had some swelling in the legs in the past but feels this is more significatnt  He denies any leg pain, no chest pain or shortness of breath  Pt has not had any recent travel, no surgeries or hospitalizations recently, he takes ASA, denies previous DVT  Pt states he called his cardiologist and was told to come to the ED for evaluation of potential DVT  Pt denies CP/SOB/F/C/N/V/D/C, no dysuria, burning on urination or blood in urine  History provided by:  Patient   used: No    Medical Problem  Location:  Right leg  Quality:  Swelling  Severity:  Mild  Onset quality:  Gradual  Duration:  1 day  Timing:  Constant  Progression:  Waxing and waning  Chronicity:  New  Relieved by:  Nothing  Worsened by:  Elevation  Associated symptoms: no abdominal pain, no chest pain, no fever, no nausea, no rash, no shortness of breath and no vomiting        Prior to Admission Medications   Prescriptions Last Dose Informant Patient Reported? Taking?    Cholecalciferol (VITAMIN D3) 125 MCG (5000 UT) CAPS  Self Yes No   Si (two) times a week    Multiple Vitamin (MULTI-VITAMIN DAILY) TABS  Self Yes No   Sig: Take by mouth   aspirin 81 MG tablet  Self Yes No   Sig: Take 81 mg by mouth daily     finasteride (PROSCAR) 5 mg tablet  Self No No   Sig: Take 1 tablet (5 mg total) by mouth daily   furosemide (LASIX) 20 mg tablet  Self No No   Sig: Take 1 tablet (20 mg total) by mouth daily   metoprolol succinate (TOPROL-XL) 25 mg 24 hr tablet  Self No No   Sig: Take 1 tablet (25 mg total) by mouth daily   omeprazole (PriLOSEC) 20 mg delayed release capsule  Self No No   Sig: Take 1 capsule (20 mg total) by mouth daily   rosuvastatin (CRESTOR) 5 mg tablet  Self No No   Sig: Take 1 tablet (5 mg total) by mouth daily   tamsulosin (FLOMAX) 0 4 mg  Self No No   Sig: Take 1 capsule (0 4 mg total) by mouth daily with dinner      Facility-Administered Medications: None       Past Medical History:   Diagnosis Date    Aortic stenosis, severe 2021    Atrial fibrillation (HCC)     Colon polyp     GERD (gastroesophageal reflux disease)     Hearing loss     last assessed 17    Hypertension     Irregular heart beat     Rheumatic fever     Stenosis of aorta     SVT [facility]supraventricular tachycardia) ([facility] [address] )        Past Surgical History:   Procedure Laterality Date    APPENDECTOMY      BACK SURGERY      lower    CARDIAC SURGERY      ablation    CATARACT EXTRACTION      JOINT REPLACEMENT Left     knee    KNEE SURGERY Left     MO BIOPSY OF PROSTATE,NEEDLE/PUNCH N/A 3/16/2021    Procedure: Transperineal prostate biopsy with biplanar transrectal ultrasound, using the perineal logic kit; Surgeon: Kristofer Fischer MD;  Location[facility];  Service: Urology    TONSILLECTOMY AND ADENOIDECTOMY         Family History   Problem Relation Age of Onset    Other Mother         old age   Delmy Leisure Esophageal cancer Father    Delmy Leisure Other Father         old age   Delmy Leisure Alcohol abuse Son         alcoholism     I have reviewed and agree with the history as documented      E-Cigarette/Vaping    E-Cigarette Use Never User      E-Cigarette/Vaping Substances    Nicotine No     THC No     CBD No     Flavoring No     Other No     Unknown No      Social History     Tobacco Use    Smoking status: Former Smoker     Packs/day: 1 00     Years: 16 00     Pack years: 16 00     Types: Cigarettes     Start date: 5     Quit date:      Years since quittin 6    Smokeless tobacco: Never Used   Vaping Use    Vaping Use: Never used Substance Use Topics    Alcohol use: Yes     Comment: occ    Drug use: No       Review of Systems   Constitutional: Negative for fever  HENT: Negative for dental problem  Eyes: Negative for visual disturbance  Respiratory: Negative for shortness of breath  Cardiovascular: Negative for chest pain  Gastrointestinal: Negative for abdominal pain, nausea and vomiting  Genitourinary: Negative for dysuria and frequency  Musculoskeletal: Negative for neck pain and neck stiffness  Skin: Negative for rash  Neurological: Negative for dizziness, weakness and light-headedness  Psychiatric/Behavioral: Negative for agitation, behavioral problems and confusion  All other systems reviewed and are negative  Physical Exam  Physical Exam  Vitals and nursing note reviewed  Constitutional:       Appearance: He is well-developed  HENT:      Head: Normocephalic and atraumatic  Eyes:      Extraocular Movements: Extraocular movements intact  Cardiovascular:      Rate and Rhythm: Normal rate  Pulmonary:      Effort: Pulmonary effort is normal    Abdominal:      General: There is no distension  Musculoskeletal:         General: Normal range of motion  Cervical back: Normal range of motion  Comments: Mild swelling in the RLE compared to Left, no tenderness, - Rayo's sign  Skin:     Findings: No rash  Neurological:      Mental Status: He is alert and oriented to person, place, and time     Psychiatric:         Behavior: Behavior normal          Vital Signs  ED Triage Vitals [08/19/22 1904]   Temperature Pulse Respirations Blood Pressure SpO2   97 7 °F (36 5 °C) 58 18 (!) 186/86 97 %      Temp Source Heart Rate Source Patient Position - Orthostatic VS BP Location FiO2 (%)   Oral Monitor Sitting Right arm --      Pain Score       No Pain           Vitals:    08/19/22 1904 08/19/22 2019   BP: (!) 186/86 169/84   Pulse: 58 67   Patient Position - Orthostatic VS: Sitting Lying         Visual Acuity      ED Medications  Medications - No data to display    Diagnostic Studies  Results Reviewed     None                 VAS lower limb venous duplex study, unilateral/limited   Final Result by Gary Jensen MD (08/19 2129)                 Procedures  Procedures         ED Course  ED Course as of 08/20/22 0120   Fri Aug 19, 2022   2018 Spoke with vascular tech, no DVT in the RLE  SBIRT 22yo+    Flowsheet Row Most Recent Value   SBIRT (25 yo +)    In order to provide better care to our patients, we are screening all of our patients for alcohol and drug use  Would it be okay to ask you these screening questions? No Filed at: 08/19/2022 2008                    [facility]  Number of Diagnoses or Management Options  Right leg swelling: new and requires workup  Diagnosis management comments: 1  Leg swelling - Pt with swelling since yesterday, sent in to rule out DVT  Will order vascular study  Amount and/or Complexity of Data Reviewed  Tests in the radiology section of CPT®: ordered  Discussion of test results with the performing providers: yes    Patient Progress  Patient progress: stable      Disposition  Final diagnoses:   Right leg swelling     Time reflects when diagnosis was documented in both MDM as applicable and the Disposition within this note     Time User Action Codes Description Comment    8/19/2022  8:18 PM Elfego Otto Add [M79 89] Right leg swelling       ED Disposition     ED Disposition   Discharge    Condition   Stable    Date/Time   Fri Aug 19, 2022  8:18 PM    Comment   Raya Goodwin discharge to home/self care  Follow-up Information     Follow up With Specialties Details Why Charito Manzano MD Cardiology Schedule an appointment as soon as possible for a visit   [address]  [phone]            Discharge Medication List as of 8/19/2022  8:19 PM      CONTINUE these medications which have NOT CHANGED    Details   aspirin 81 MG tablet Take 81 mg by mouth daily  , Historical Med      Cholecalciferol (VITAMIN D3) 125 MCG (5000 UT) CAPS 2 (two) times a week , Historical Med      finasteride (PROSCAR) 5 mg tablet Take 1 tablet (5 mg total) by mouth daily, Starting Thu 3/24/2022, Normal      furosemide (LASIX) 20 mg tablet Take 1 tablet (20 mg total) by mouth daily, Starting Wed 6/22/2022, Normal      metoprolol succinate (TOPROL-XL) 25 mg 24 hr tablet Take 1 tablet (25 mg total) by mouth daily, Starting Wed 3/2/2022, Normal      Multiple Vitamin (MULTI-VITAMIN DAILY) TABS Take by mouth, Historical Med      omeprazole (PriLOSEC) 20 mg delayed release capsule Take 1 capsule (20 mg total) by mouth daily, Starting Tue 1/14/2020, Normal      rosuvastatin (CRESTOR) 5 mg tablet Take 1 tablet (5 mg total) by mouth daily, Starting Thu 6/16/2022, Normal      tamsulosin (FLOMAX) 0 4 mg Take 1 capsule (0 4 mg total) by mouth daily with dinner, Starting Tue 1/4/2022, Normal             No discharge procedures on file      PDMP Review     None          ED Provider  Electronically Signed by           Glory Banegas MD  08/20/22 7284

## 2022-08-20 NOTE — ED NOTES
vascular tech at bedside        Agnes Richardson RN  08/19/22 2013 Labs/Imaging Studies/EKG Imaging Studies/EKG/Medications/Labs

## 2022-08-29 ENCOUNTER — RA CDI HCC (OUTPATIENT)
Dept: OTHER | Facility: HOSPITAL | Age: 87
End: 2022-08-29

## 2022-08-29 NOTE — PROGRESS NOTES
[facility]  coding opportunities     I13 0     Chart Reviewed number of suggestions sent to Provider: 1     Patients Insurance     Medicare Insurance: [employer]

## 2022-09-01 NOTE — PROGRESS NOTES
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)  Assessment & Plan:  -follows with Nephrology, most recent creatinine around 2 3 in July  -baseline creatinine around 1 7 - 2 1      4  Primary hypertension  Assessment & Plan:  Control is acceptable, continue Toprol-XL      5  PSVT (paroxysmal supraventricular tachycardia) ([facility])  Assessment & Plan:  Stable, remains on beta-blocker      6  Chronic diastolic (congestive) heart failure ([facility])  Assessment & Plan:  -appears relatively euvolemic today  -Currently on Lasix 20 mg daily  Per Cardiology note, when attempt was made to reduce Lasix to 20 mg every other day, patient decompensated  -EF of 54% on most recent echo November 2021          7  Spinal stenosis of lumbar region, unspecified whether neurogenic claudication present    8  Pure hypercholesterolemia  Assessment & Plan:  Well controlled, continue statin      9  Benign prostatic hyperplasia without lower urinary tract symptoms  Assessment & Plan:  Maintained on finasteride, tamsulosin           Patient scheduled for follow-up chest CT for re-evaluation of persistent 8 mm subsolid nodule in left upper lobe with central solid component peripheral GG opacity    Last seen on chest CT June 2022    Patient states he previously received Prevnar 13 in addition to Pneumovax  He is up-to-date on pneumococcal vaccinations                No orders of the defined types were placed in this encounter  History of Present Illness     Here today for follow-up  Medical history of rheumatic fever with resulting severe aortic stenosis, hypertension, hyperlipidemia, BPH, paroxysmal SVT, chronic diastolic CHF, CKD stage 4, spinal stenosis, left knee replacement, lung nodule    No concerns today    The following portions of the patient's history were reviewed and updated as appropriate: allergies, current medications, past family history, past medical history, past social history, past surgical history and problem list     Chief Complaint     Chief Complaint   Patient presents with    Follow-up     4 month        Review of Systems     10 point ROS negative except per HPI    Active Problem List     Patient Active Problem List   Diagnosis    Chronic anemia    Linda esophagus    Esophageal reflux    Hypertension    Spinal stenosis of lumbar region    Spondylolisthesis    PSVT (paroxysmal supraventricular tachycardia) (HCC)    Palpitations    Pure hypercholesterolemia    PVC (premature ventricular contraction)    CKD (chronic kidney disease) stage 4, GFR 15-29 ml/min (HCC)    Chronic diastolic (congestive) heart failure (HCC)    BPH (benign prostatic hyperplasia)    History of radiofrequency ablation  for SVT    Pulmonary nodules    Aortic stenosis, severe    Hemoptysis    Pneumonia    Elevated troponin    Cigarette nicotine dependence in remission       Objective     /72 (BP Location: Left arm, Patient Position: Sitting, Cuff Size: Adult)   Pulse 57   Temp 97 6 °F (36 4 °C) (Temporal)   Resp 16   Ht 5' 9" (1 753 m)   Wt 81 8 kg (180 lb 6 4 oz)   SpO2 98%   BMI 26 64 kg/m²     Physical Exam  Constitutional:       Appearance: Normal appearance  He is not ill-appearing     HENT:      Head: Normocephalic and atraumatic  Eyes:      General: No scleral icterus  Right eye: No discharge  Left eye: No discharge  Cardiovascular:      Rate and Rhythm: Normal rate and regular rhythm  Heart sounds: Murmur heard  No friction rub  Pulmonary:      Effort: Pulmonary effort is normal       Breath sounds: Normal breath sounds  No wheezing or rales  Abdominal:      General: Abdomen is flat  There is no distension  Palpations: Abdomen is soft  Tenderness: There is no abdominal tenderness  Musculoskeletal:         General: No swelling or tenderness  Skin:     General: Skin is warm and dry  Findings: No erythema  Neurological:      Mental Status: He is alert  Mental status is at baseline  Motor: No weakness  Psychiatric:         Mood and Affect: Mood normal          Behavior: Behavior normal          Pertinent Laboratory/Diagnostic Studies:  No results found      Images and diagnostics reviewed     Current Medications     Current Outpatient Medications:     aspirin 81 MG tablet, Take 81 mg by mouth daily  , Disp: , Rfl:     Cholecalciferol (VITAMIN D3) 125 MCG (5000 UT) CAPS, 2 (two) times a week , Disp: , Rfl:     finasteride (PROSCAR) 5 mg tablet, Take 1 tablet (5 mg total) by mouth daily, Disp: 90 tablet, Rfl: 1    furosemide (LASIX) 20 mg tablet, Take 1 tablet (20 mg total) by mouth daily, Disp: 30 tablet, Rfl: 2    metoprolol succinate (TOPROL-XL) 25 mg 24 hr tablet, Take 1 tablet (25 mg total) by mouth daily, Disp: 90 tablet, Rfl: 3    Multiple Vitamin (MULTI-VITAMIN DAILY) TABS, Take by mouth, Disp: , Rfl:     omeprazole (PriLOSEC) 20 mg delayed release capsule, Take 1 capsule (20 mg total) by mouth daily, Disp: 90 capsule, Rfl: 1    rosuvastatin (CRESTOR) 5 mg tablet, Take 1 tablet (5 mg total) by mouth daily, Disp: 90 tablet, Rfl: 0    tamsulosin (FLOMAX) 0 4 mg, Take 1 capsule (0 4 mg total) by mouth daily with dinner, Disp: 30 capsule, Rfl: 11    Health Maintenance     Health Maintenance   Topic Date Due    Pneumococcal Vaccine: 65+ Years (2 - PPSV23 or PCV20) 11/21/2017    COVID-19 Vaccine (4 - Booster for Moderna series) 02/23/2022    Influenza Vaccine (1) 09/01/2022    Fall Risk  05/02/2023    Depression Screening  05/02/2023    Medicare Annual Wellness Visit (AWV)  05/02/2023    BMI: Followup Plan  05/02/2023    BMI: Adult  09/02/2023    HIB Vaccine  Aged Out    Hepatitis B Vaccine  Aged Out    IPV Vaccine  Aged Out    Hepatitis A Vaccine  Aged Out    Meningococcal ACWY Vaccine  Aged Out    HPV Vaccine  Aged Out     Immunization History   Administered Date(s) Administered    COVID-19 MODERNA VACC 0 5 ML IM 01/19/2021, 02/15/2021, 10/23/2021    Hep A, adult 02/01/2000, 08/15/2000    INFLUENZA 10/23/2009, 10/12/2010, 09/29/2011, 10/03/2012, 10/23/2013, 10/29/2014, 11/06/2015, 09/01/2016, 10/10/2016, 10/06/2017, 10/10/2018, 09/30/2019, 09/11/2020, 09/10/2021    Influenza Split High Dose Preservative Free IM 11/06/2015, 09/01/2016, 10/06/2017    Influenza, seasonal, injectable 11/15/2000, 10/26/2001, 10/28/2003, 12/01/2005, 11/01/2007, 10/03/2012, 10/23/2013    Pneumococcal Conjugate 13-Valent 11/21/2016    Tdap 09/18/2012    Zoster 09/01/2007    Zoster Vaccine Recombinant 02/22/2019, 05/03/2019    influenza, trivalent, adjuvanted 09/30/2019, 09/11/2020       BONIFACIO Finn  Internal Medicine [address], [facility] [address]  [place], [address]   Office: [phone]  Fax: [phone]

## 2022-09-01 NOTE — ASSESSMENT & PLAN NOTE
-he has a history of rheumatic fever, now with severe aortic stenosis  -Most recent echo in November 2021 showed severe aortic stenosis  -TAVR eval has been deferred for now, significant risk of decline in kidney function with contrast dye that would be involved with TAVR workup  -follows with Cardiology, repeat echo has been scheduled

## 2022-09-01 NOTE — ASSESSMENT & PLAN NOTE
-appears relatively euvolemic today  -Currently on Lasix 20 mg daily    Per Cardiology note, when attempt was made to reduce Lasix to 20 mg every other day, patient decompensated  -EF of 54% on most recent echo November 2021

## 2022-09-01 NOTE — ASSESSMENT & PLAN NOTE
-follows with Nephrology, most recent creatinine around 2 3 in July  -baseline creatinine around 1 7 - 2 1

## 2022-09-02 ENCOUNTER — OFFICE VISIT (OUTPATIENT)
Dept: INTERNAL MEDICINE CLINIC | Facility: CLINIC | Age: 87
End: 2022-09-02
Payer: MEDICARE

## 2022-09-02 VITALS
BODY MASS INDEX: 26.72 KG/M2 | SYSTOLIC BLOOD PRESSURE: 140 MMHG | TEMPERATURE: 97.6 F | HEIGHT: 69 IN | RESPIRATION RATE: 16 BRPM | HEART RATE: 57 BPM | OXYGEN SATURATION: 98 % | WEIGHT: 180.4 LBS | DIASTOLIC BLOOD PRESSURE: 72 MMHG

## 2022-09-02 DIAGNOSIS — N40.0 BENIGN PROSTATIC HYPERPLASIA WITHOUT LOWER URINARY TRACT SYMPTOMS: ICD-10-CM

## 2022-09-02 DIAGNOSIS — M48.061 SPINAL STENOSIS OF LUMBAR REGION, UNSPECIFIED WHETHER NEUROGENIC CLAUDICATION PRESENT: ICD-10-CM

## 2022-09-02 DIAGNOSIS — N18.4 CKD (CHRONIC KIDNEY DISEASE) STAGE 4, GFR 15-29 ML/MIN (HCC): ICD-10-CM

## 2022-09-02 DIAGNOSIS — I10 PRIMARY HYPERTENSION: ICD-10-CM

## 2022-09-02 DIAGNOSIS — I50.32 CHRONIC DIASTOLIC (CONGESTIVE) HEART FAILURE (HCC): ICD-10-CM

## 2022-09-02 DIAGNOSIS — I35.0 AORTIC STENOSIS, SEVERE: Chronic | ICD-10-CM

## 2022-09-02 DIAGNOSIS — K22.70 BARRETT'S ESOPHAGUS WITHOUT DYSPLASIA: Primary | ICD-10-CM

## 2022-09-02 DIAGNOSIS — E78.00 PURE HYPERCHOLESTEROLEMIA: ICD-10-CM

## 2022-09-02 DIAGNOSIS — I47.1 PSVT (PAROXYSMAL SUPRAVENTRICULAR TACHYCARDIA) (HCC): ICD-10-CM

## 2022-09-02 PROCEDURE — 99214 OFFICE O/P EST MOD 30 MIN: CPT | Performed by: INTERNAL MEDICINE

## 2022-09-19 DIAGNOSIS — E78.5 HYPERLIPIDEMIA, UNSPECIFIED HYPERLIPIDEMIA TYPE: ICD-10-CM

## 2022-09-19 RX ORDER — ROSUVASTATIN CALCIUM 5 MG/1
5 TABLET, COATED ORAL DAILY
Qty: 90 TABLET | Refills: 0 | Status: SHIPPED | OUTPATIENT
Start: 2022-09-19

## 2022-09-26 DIAGNOSIS — R33.9 INCOMPLETE BLADDER EMPTYING: ICD-10-CM

## 2022-09-26 DIAGNOSIS — N40.1 BPH WITH OBSTRUCTION/LOWER URINARY TRACT SYMPTOMS: ICD-10-CM

## 2022-09-26 DIAGNOSIS — N13.8 BPH WITH OBSTRUCTION/LOWER URINARY TRACT SYMPTOMS: ICD-10-CM

## 2022-09-26 DIAGNOSIS — I50.9 ACUTE CONGESTIVE HEART FAILURE, UNSPECIFIED HEART FAILURE TYPE (HCC): ICD-10-CM

## 2022-09-26 RX ORDER — FINASTERIDE 5 MG/1
5 TABLET, FILM COATED ORAL DAILY
Qty: 90 TABLET | Refills: 1 | Status: SHIPPED | OUTPATIENT
Start: 2022-09-26

## 2022-09-26 RX ORDER — FUROSEMIDE 20 MG/1
20 TABLET ORAL DAILY
Qty: 30 TABLET | Refills: 2 | Status: SHIPPED | OUTPATIENT
Start: 2022-09-26

## 2022-10-05 ENCOUNTER — HOSPITAL ENCOUNTER (OUTPATIENT)
Dept: NON INVASIVE DIAGNOSTICS | Facility: HOSPITAL | Age: 87
Discharge: HOME | End: 2022-10-05
Payer: MEDICARE

## 2022-10-05 VITALS
DIASTOLIC BLOOD PRESSURE: 72 MMHG | WEIGHT: 180 LBS | SYSTOLIC BLOOD PRESSURE: 140 MMHG | BODY MASS INDEX: 26.66 KG/M2 | HEIGHT: 69 IN | HEART RATE: 66 BPM

## 2022-10-05 DIAGNOSIS — I35.0 AORTIC STENOSIS, SEVERE: Chronic | ICD-10-CM

## 2022-10-05 LAB
AORTIC ROOT: 3.4 CM
AORTIC VALVE MEAN VELOCITY: 29.7 M/S
APICAL FOUR CHAMBER EJECTION FRACTION: 57 %
ASCENDING AORTA: 3.8 CM
AV AREA BY CONTINUOUS VTI: 0.7 CM2
AV AREA PEAK VELOCITY: 0.6 CM2
AV LVOT MEAN GRADIENT: 1 MMHG
AV LVOT PEAK GRADIENT: 2 MMHG
AV MEAN GRADIENT: 40 MMHG
AV PEAK GRADIENT: 58 MMHG
AV REGURGITATION PRESSURE HALF TIME: 514 MS
AV VALVE AREA: 0.65 CM2
AV VELOCITY RATIO: 0.19
DOP CALC AO PEAK VEL: 3.9 M/S
DOP CALC AO VTI: 98.59 CM
DOP CALC LVOT AREA: 3.14 CM2
DOP CALC LVOT DIAMETER: 2 CM
DOP CALC LVOT PEAK VEL VTI: 20.47 CM
DOP CALC LVOT PEAK VEL: 0.76 M/S
DOP CALC LVOT STROKE INDEX: 33.3 ML/M2
DOP CALC LVOT STROKE VOLUME: 64.28
DOP CALC MV VTI: 37.67 CM
E WAVE DECELERATION TIME: 244 MS
FRACTIONAL SHORTENING: 29 (ref 28–44)
INTERVENTRICULAR SEPTUM IN DIASTOLE (PARASTERNAL SHORT AXIS VIEW): 1.2 CM
INTERVENTRICULAR SEPTUM: 1.2 CM (ref 0.6–1.1)
LAAS-AP2: 23.9 CM2
LAAS-AP4: 21.2 CM2
LEFT ATRIUM SIZE: 4.4 CM
LEFT INTERNAL DIMENSION IN SYSTOLE: 2.9 CM (ref 2.1–4)
LEFT VENTRICULAR INTERNAL DIMENSION IN DIASTOLE: 4.1 CM (ref 3.5–6)
LEFT VENTRICULAR POSTERIOR WALL IN END DIASTOLE: 1.2 CM
LEFT VENTRICULAR STROKE VOLUME: 41 ML
LVSV (TEICH): 41 ML
MV E'TISSUE VEL-SEP: 7 CM/S
MV MEAN GRADIENT: 2 MMHG
MV PEAK A VEL: 0.79 M/S
MV PEAK E VEL: 127 CM/S
MV PEAK GRADIENT: 7 MMHG
MV STENOSIS PRESSURE HALF TIME: 71 MS
MV VALVE AREA BY CONTINUITY EQUATION: 1.71 CM2
MV VALVE AREA P 1/2 METHOD: 3.1
PA SYSTOLIC PRESSURE: 60 MMHG
RIGHT ATRIUM AREA SYSTOLE A4C: 15.7 CM2
RIGHT VENTRICLE ID DIMENSION: 3.8 CM
SL CV AV DECELERATION TIME RETROGRADE: 1774 MS
SL CV AV PEAK GRADIENT RETROGRADE: 67 MMHG
SL CV LEFT ATRIUM LENGTH A2C: 4.9 CM
SL CV LV EF: 55
SL CV PED ECHO LEFT VENTRICLE DIASTOLIC VOLUME (MOD BIPLANE) 2D: 74 ML
SL CV PED ECHO LEFT VENTRICLE SYSTOLIC VOLUME (MOD BIPLANE) 2D: 34 ML
TR MAX PG: 50 MMHG
TR PEAK VELOCITY: 3.5 M/S
TRICUSPID VALVE PEAK REGURGITATION VELOCITY: 3.54 M/S

## 2022-10-05 PROCEDURE — 93306 TTE W/DOPPLER COMPLETE: CPT | Performed by: INTERNAL MEDICINE

## 2022-10-05 PROCEDURE — 93306 TTE W/DOPPLER COMPLETE: CPT

## 2022-10-09 DIAGNOSIS — N18.4 CHRONIC KIDNEY DISEASE, STAGE 4 (SEVERE) (HCC): Primary | ICD-10-CM

## 2022-10-12 PROBLEM — J18.9 PNEUMONIA: Status: RESOLVED | Noted: 2021-11-10 | Resolved: 2022-10-12

## 2022-10-27 ENCOUNTER — APPOINTMENT (OUTPATIENT)
Dept: LAB | Facility: CLINIC | Age: 87
End: 2022-10-27
Payer: MEDICARE

## 2022-10-27 DIAGNOSIS — N18.4 CHRONIC KIDNEY DISEASE, STAGE 4 (SEVERE) (HCC): ICD-10-CM

## 2022-10-27 PROCEDURE — 80048 BASIC METABOLIC PNL TOTAL CA: CPT

## 2022-10-27 PROCEDURE — 36415 COLL VENOUS BLD VENIPUNCTURE: CPT

## 2022-10-28 LAB
ANION GAP SERPL CALCULATED.3IONS-SCNC: 6 MMOL/L (ref 4–13)
BUN SERPL-MCNC: 41 MG/DL (ref 5–25)
CALCIUM SERPL-MCNC: 9.6 MG/DL (ref 8.3–10.1)
CHLORIDE SERPL-SCNC: 108 MMOL/L (ref 96–108)
CO2 SERPL-SCNC: 25 MMOL/L (ref 21–32)
CREAT SERPL-MCNC: 2.12 MG/DL (ref 0.6–1.3)
GFR SERPL CREATININE-BSD FRML MDRD: 27 ML/MIN/1.73SQ M
GLUCOSE P FAST SERPL-MCNC: 116 MG/DL (ref 65–99)
POTASSIUM SERPL-SCNC: 4.5 MMOL/L (ref 3.5–5.3)
SODIUM SERPL-SCNC: 139 MMOL/L (ref 135–147)

## 2022-11-01 ENCOUNTER — OFFICE VISIT (OUTPATIENT)
Dept: CARDIOLOGY CLINIC | Facility: CLINIC | Age: 87
End: 2022-11-01

## 2022-11-01 VITALS
SYSTOLIC BLOOD PRESSURE: 126 MMHG | WEIGHT: 179 LBS | BODY MASS INDEX: 26.43 KG/M2 | DIASTOLIC BLOOD PRESSURE: 72 MMHG | HEART RATE: 58 BPM

## 2022-11-01 DIAGNOSIS — I49.3 PVC (PREMATURE VENTRICULAR CONTRACTION): ICD-10-CM

## 2022-11-01 DIAGNOSIS — D64.9 CHRONIC ANEMIA: ICD-10-CM

## 2022-11-01 DIAGNOSIS — I35.0 AORTIC STENOSIS, SEVERE: Primary | Chronic | ICD-10-CM

## 2022-11-01 DIAGNOSIS — Z98.890 HISTORY OF RADIOFREQUENCY ABLATION PROCEDURE FOR CARDIAC ARRHYTHMIA: ICD-10-CM

## 2022-11-01 DIAGNOSIS — I47.1 PSVT (PAROXYSMAL SUPRAVENTRICULAR TACHYCARDIA) (HCC): ICD-10-CM

## 2022-11-01 DIAGNOSIS — I50.32 CHRONIC DIASTOLIC (CONGESTIVE) HEART FAILURE (HCC): ICD-10-CM

## 2022-11-01 DIAGNOSIS — E78.5 HYPERLIPIDEMIA, UNSPECIFIED HYPERLIPIDEMIA TYPE: ICD-10-CM

## 2022-11-01 DIAGNOSIS — E78.00 PURE HYPERCHOLESTEROLEMIA: ICD-10-CM

## 2022-11-01 DIAGNOSIS — I10 PRIMARY HYPERTENSION: ICD-10-CM

## 2022-11-01 DIAGNOSIS — F17.211 CIGARETTE NICOTINE DEPENDENCE IN REMISSION: ICD-10-CM

## 2022-11-01 DIAGNOSIS — N18.4 CKD (CHRONIC KIDNEY DISEASE) STAGE 4, GFR 15-29 ML/MIN (HCC): ICD-10-CM

## 2022-11-01 RX ORDER — ROSUVASTATIN CALCIUM 5 MG/1
5 TABLET, COATED ORAL DAILY
Qty: 90 TABLET | Refills: 0 | Status: CANCELLED | OUTPATIENT
Start: 2022-11-01

## 2022-11-01 NOTE — PROGRESS NOTES
[facility]  [id number] [facility]   [address]  [id number]  Phone#  [phone]   Fax#  [phone]  *-*-*-*-*-*-*-*-*-*-*-*-*-*-*-*-*-*-*-*-*-*-*-*-*-*-*-*-*-*-*-*-*-*-*-*-*-*-*-*-*-*-*-*-*-*-*-*-*-*-*-*-*-*                                   Cardiology Follow Up      ENCOUNTER DATE: 22 9:56 AM  PATIENT NAME: Gela Pepper   : 1935    MRN: [id number]  AGE:86 y o  SEX: male  [address] [facility]     PRIMARY CARE PHYSICIAN: Max Chauncey Schilder, DO    ACTIVE DIAGNOSIS THIS VISIT  1  Aortic stenosis, severe     2  PSVT (paroxysmal supraventricular tachycardia) (HCC)  POCT ECG   3  Pure hypercholesterolemia     4  PVC (premature ventricular contraction)     5  Chronic diastolic (congestive) heart failure (HCC)     6  Chronic anemia     7  Primary hypertension     8  CKD (chronic kidney disease) stage 4, GFR 15-29 ml/min (HCC)     9  History of radiofrequency ablation  for SVT     10  Cigarette nicotine dependence in remission     11   Hyperlipidemia, unspecified hyperlipidemia type       ACTIVE PROBLEM LIST  Patient Active Problem List   Diagnosis   • Chronic anemia   • Linda esophagus   • Esophageal reflux   • Hypertension   • Spinal stenosis of lumbar region   • Spondylolisthesis   • PSVT (paroxysmal supraventricular tachycardia) (HCC)   • Palpitations   • Pure hypercholesterolemia   • PVC (premature ventricular contraction)   • CKD (chronic kidney disease) stage 4, GFR 15-29 ml/min (HCC)   • Chronic diastolic (congestive) heart failure (HCC)   • BPH (benign prostatic hyperplasia)   • History of radiofrequency ablation  for SVT   • Pulmonary nodules   • Aortic stenosis, severe   • Hemoptysis   • Elevated troponin   • Cigarette nicotine dependence in remission       CARDIOLOGY SPECIALTY COMMENTS  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 BPH, CKD stage IV, primary hypercholesterolemia, hypertension, SVT, status post SVT ablation presents at the suggestion of his PCP for worsening cough and shortness of breath x2 weeks  Symptoms were suggestive of bronchitis and chest x-ray was negative in the ED  He was diagnosed with cardio renal syndrome with fluid overload  Diuresis was limited by his renal function  His respiratory status has returned back to baseline and he is no longer short of breath  11/10-11/11/2021 hospitalized [facility] with hemoptysis and right lower lobe pneumonia    11/22/2021 ECHOCARDIOGRAM LIMITED:   Normal left ventricular systolic and diastolic function, EF 60%  Moderate to severe aortic stenosis with moderate aortic regurgitation  The aortic valve regurgitant jet pressure half time was 374 MS  The aortic valve mean gradient is 34 5 mmHg  The valve area is 0 8 cm2  The left ventricular stroke index is 41 1 mL/m2  By my calculation, the dimensionless index was 0 27 which is close to severe (severe less than 0 25)    08/19/2022 [facility] right leg swelling    10/05/2022 echocardiogram: Normal left ventricular systolic function, EF 48% grade 2 diastolic dysfunction  Mild left atrial enlargement  Severe aortic stenosis with mild-to-moderate aortic regurgitation  Aortic valve maximum velocity 3 9 M/S  Aortic valve mean gradient 40 mmHg  MARLY 0 65 cm2  LVOT stroke volume index 33 3 mL/M2, DVI 0  19  Mild-to-moderate MR with moderate TR  PASP 60 mmHg  Aortic root mildly dilated  INTERVAL HISTORY:        Patient with severe aortic stenosis, stage 4 chronic kidney disease and tendency to develop fluid overload returns  He denies exertional anginal symptoms  He denies dyspnea on exertion  He denies lightheadedness or feeling like he would faint with physical exertion  I think his lack of symptoms are secondary to the natural nature of slowing down with age and with heart disease  His aortic valve parameters are getting worse and I would normally recommend aortic valve replacement except for his kidneys  He does not want to end up on dialysis I most likely if he underwent a TAVR evaluation and placement, he would end up on dialysis  DISCUSSION/PLAN:          · Return in 6 months  · Echocardiogram on return        Lab Studies:    Lab Results   Component Value Date    CHOLESTEROL 132 05/27/2022    CHOLESTEROL 146 09/03/2020    CHOLESTEROL 148 08/06/2019     Lab Results   Component Value Date    TRIG 75 05/27/2022    TRIG 69 09/03/2020    TRIG 96 08/06/2019     Lab Results   Component Value Date    HDL 50 05/27/2022    HDL 52 09/03/2020    HDL 43 08/06/2019     Lab Results   Component Value Date    LDLCALC 67 05/27/2022    LDLCALC 80 09/03/2020    LDLCALC 86 08/06/2019         Lab Results   Component Value Date    NTBNP 4,038 (H) 05/27/2022    NTBNP 5,024 (H) 11/10/2021    NTBNP 2,942 (H) 08/11/2021       Lab Results   Component Value Date    EGFR 27 10/27/2022    EGFR 24 07/14/2022    EGFR 25 04/08/2022    SODIUM 139 10/27/2022    SODIUM 139 07/14/2022    SODIUM 138 04/08/2022    K 4 5 10/27/2022    K 4 2 07/14/2022    K 4 1 04/08/2022     10/27/2022     (H) 07/14/2022     04/08/2022    CO2 25 10/27/2022    CO2 24 07/14/2022    CO2 26 04/08/2022    ANIONGAP 7 05/15/2015    ANIONGAP 8 05/09/2014    BUN 41 (H) 10/27/2022    BUN 42 (H) 07/14/2022    BUN 42 (H) 04/08/2022    CREATININE 2 12 (H) 10/27/2022    CREATININE 2 34 (H) 07/14/2022    CREATININE 2 23 (H) 04/08/2022     Lab Results   Component Value Date    WBC 9 18 04/08/2022    WBC 9 02 11/17/2021    WBC 11 12 (H) 11/11/2021    HGB 10 4 (L) 04/08/2022    HGB 10 2 (L) 11/17/2021    HGB 8 8 (L) 11/11/2021    HCT 32 2 (L) 04/08/2022    HCT 31 7 (L) 11/17/2021    HCT 27 4 (L) 11/11/2021    MCV 89 04/08/2022    MCV 90 11/17/2021    MCV 91 11/11/2021    MCH 28 7 04/08/2022    MCH 28 8 11/17/2021    MCH 29 1 11/11/2021    MCHC 32 3 04/08/2022    MCHC 32 2 11/17/2021    MCHC 32 1 11/11/2021     04/08/2022     (H) 11/17/2021     11/11/2021      Lab Results   Component Value Date    GLUCOSE 100 05/15/2015    GLUCOSE 97 05/09/2014    CALCIUM 9 6 10/27/2022    CALCIUM 9 7 07/14/2022    CALCIUM 9 9 04/08/2022    AST 21 11/17/2021    AST 11 11/10/2021    AST 15 10/31/2021    ALT 25 11/17/2021    ALT 19 11/10/2021    ALT 22 10/31/2021    ALKPHOS 84 11/17/2021    ALKPHOS 79 11/10/2021    ALKPHOS 70 10/31/2021    PROT 7 4 05/15/2015    PROT 7 4 05/09/2014    BILITOT 0 40 05/15/2015    BILITOT 0 4 05/09/2014    MG 2 1 10/31/2021    MG 2 1 02/27/2021    MG 1 9 06/06/2019       Lab Results   Component Value Date    DDIMER 1 48 (H) 11/10/2021     No results found for: DIGOXIN  Lab Results   Component Value Date    FERRITIN 240 11/17/2021    IRON 52 (L) 11/17/2021    TIBC 194 (L) 11/17/2021     Results for orders placed or performed in visit on 11/01/22   POCT ECG    Narrative    Sinus bradycardia at a rate of 58 beats per minute  Left axis deviation  Poor R-wave progression  Abnormal EKG           Current Outpatient Medications:   •  aspirin 81 MG tablet, Take 81 mg by mouth daily  , Disp: , Rfl:   •  Cholecalciferol (VITAMIN D3) 125 MCG (5000 UT) CAPS, 2 (two) times a week , Disp: , Rfl:   •  finasteride (PROSCAR) 5 mg tablet, Take 1 tablet (5 mg total) by mouth daily, Disp: 90 tablet, Rfl: 1  •  furosemide (LASIX) 20 mg tablet, Take 1 tablet (20 mg total) by mouth daily, Disp: 30 tablet, Rfl: 2  •  metoprolol succinate (TOPROL-XL) 25 mg 24 hr tablet, Take 1 tablet (25 mg total) by mouth daily, Disp: 90 tablet, Rfl: 3  •  Multiple Vitamin (MULTI-VITAMIN DAILY) TABS, Take by mouth, Disp: , Rfl:   •  omeprazole (PriLOSEC) 20 mg delayed release capsule, Take 1 capsule (20 mg total) by mouth daily, Disp: 90 capsule, Rfl: 1  •  rosuvastatin (CRESTOR) 5 mg tablet, Take 1 tablet (5 mg total) by mouth daily, Disp: 90 tablet, Rfl: 0  •  tamsulosin (FLOMAX) 0 4 mg, Take 1 capsule (0 4 mg total) by mouth daily with dinner, Disp: 30 capsule, Rfl: 11  No Known Allergies    Past Medical History:   Diagnosis Date   • Aortic stenosis, severe 2021   • Atrial fibrillation (HCC)    • Colon polyp    • GERD (gastroesophageal reflux disease)    • Hearing loss     last assessed 17   • Hypertension    • Rheumatic fever    • Stenosis of aorta      Social History     Socioeconomic History   • Marital status: /Civil Union     Spouse name: Not on file   • Number of children: Not on file   • Years of education: Not on file   • Highest education level: Not on file   Occupational History   • Not on file   Tobacco Use   • Smoking status: Former Smoker     Packs/day: 1 00     Years: 16 00     Pack years: 16 00     Types: Cigarettes     Start date:      Quit date:      Years since quittin 8   • Smokeless tobacco: Never Used   Vaping Use   • Vaping Use: Never used   Substance and Sexual Activity   • Alcohol use: Yes     Comment: occ   • Drug use: No   • Sexual activity: Not on file   Other Topics Concern   • Not on file   Social History Narrative   • Not on file     Social Determinants of Health     Financial Resource Strain: Not on file   Food Insecurity: Not on file   Transportation Needs: Not on file   Physical Activity: Not on file   Stress: Not on file   Social Connections: Not on file   Intimate Partner Violence: Not on file   Housing Stability: Not on file      Family History   Problem Relation Age of Onset   • Other Mother         old age   • Esophageal cancer Father    • Other Father         old age   • Alcohol abuse Son         alcoholism     Past Surgical History:   Procedure Laterality Date   • APPENDECTOMY     • BACK SURGERY      lower   • CARDIAC SURGERY      ablation   • CATARACT EXTRACTION     • JOINT REPLACEMENT Left     knee   • KNEE SURGERY Left    • AR BIOPSY OF PROSTATE,NEEDLE/PUNCH N/A 3/16/2021    Procedure: Transperineal prostate biopsy with biplanar transrectal ultrasound, using the perineal logic kit;   Surgeon: Berry Leslie MD;  Location: [facility];  Service: Urology   • TONSILLECTOMY AND ADENOIDECTOMY         PREVIOUS WEIGHTS:   Wt Readings from Last 10 Encounters:   11/01/22 81 2 kg (179 lb)   10/05/22 81 6 kg (180 lb)   09/02/22 81 8 kg (180 lb 6 4 oz)   08/19/22 82 kg (180 lb 12 4 oz)   08/05/22 80 7 kg (178 lb)   07/21/22 80 7 kg (178 lb)   05/02/22 83 9 kg (185 lb)   04/27/22 83 3 kg (183 lb 9 6 oz)   03/24/22 83 kg (183 lb)   01/31/22 83 kg (183 lb)        Review of Systems:  Review of Systems   Constitutional: Negative for activity change  Respiratory: Negative for cough, chest tightness, shortness of breath and wheezing  Cardiovascular: Negative for chest pain, palpitations and leg swelling  Musculoskeletal: Negative for gait problem  Skin: Negative for color change  Neurological: Negative for dizziness, tremors, syncope, weakness, light-headedness and headaches  Psychiatric/Behavioral: Negative for agitation and confusion  Physical Exam:  /72 (BP Location: Right arm, Patient Position: Sitting, Cuff Size: Standard)   Pulse 58   Wt 81 2 kg (179 lb)   BMI 26 43 kg/m²     Physical Exam  Vitals reviewed  Constitutional:       General: He is not in acute distress  Appearance: He is well-developed  HENT:      Head: Normocephalic and atraumatic  Neck:      Thyroid: No thyromegaly  Vascular: No carotid bruit or JVD  Trachea: No tracheal deviation  Cardiovascular:      Rate and Rhythm: Normal rate and regular rhythm  Pulses: Normal pulses  Heart sounds: Murmur heard  Crescendo decrescendo systolic murmur is present with a grade of 3/6  No friction rub  No gallop  Pulmonary:      Effort: Pulmonary effort is normal  No respiratory distress  Breath sounds: Normal breath sounds  No wheezing, rhonchi or rales  Chest:      Chest wall: No tenderness  Musculoskeletal:      Cervical back: Normal range of motion and neck supple  Right lower leg: No edema        Left lower leg: No edema    Skin:     General: Skin is warm and dry  Neurological:      General: No focal deficit present  Mental Status: He is alert and oriented to person, place, and time  Psychiatric:         Mood and Affect: Mood normal          Behavior: Behavior normal          Thought Content: Thought content normal          Judgment: Judgment normal        ======================================================  Imaging:   I have personally reviewed pertinent reports  Portions of the record may have been created with voice recognition software  Occasional wrong word or "sound a like" substitutions may have occurred due to the inherent limitations of voice recognition software  Read the chart carefully and recognize, using context, where substitutions have occurred      SIGNATURES:   Bonny Carrel

## 2022-12-12 ENCOUNTER — TELEPHONE (OUTPATIENT)
Dept: NEPHROLOGY | Facility: CLINIC | Age: 87
End: 2022-12-12

## 2022-12-12 NOTE — TELEPHONE ENCOUNTER
Called patient and left a voicemail asking to please have blood and urine work done before the follow up appointment

## 2022-12-14 ENCOUNTER — TELEPHONE (OUTPATIENT)
Dept: NEPHROLOGY | Facility: CLINIC | Age: 87
End: 2022-12-14

## 2022-12-16 ENCOUNTER — APPOINTMENT (OUTPATIENT)
Dept: LAB | Facility: CLINIC | Age: 87
End: 2022-12-16

## 2022-12-16 DIAGNOSIS — N18.4 CKD (CHRONIC KIDNEY DISEASE) STAGE 4, GFR 15-29 ML/MIN (HCC): ICD-10-CM

## 2022-12-16 LAB
ALBUMIN SERPL BCP-MCNC: 3.6 G/DL (ref 3.5–5)
ANION GAP SERPL CALCULATED.3IONS-SCNC: 5 MMOL/L (ref 4–13)
BACTERIA UR QL AUTO: NORMAL /HPF
BILIRUB UR QL STRIP: NEGATIVE
BUN SERPL-MCNC: 38 MG/DL (ref 5–25)
CALCIUM SERPL-MCNC: 9.4 MG/DL (ref 8.3–10.1)
CHLORIDE SERPL-SCNC: 107 MMOL/L (ref 96–108)
CLARITY UR: CLEAR
CO2 SERPL-SCNC: 26 MMOL/L (ref 21–32)
COLOR UR: NORMAL
CREAT SERPL-MCNC: 2.31 MG/DL (ref 0.6–1.3)
CREAT UR-MCNC: 43 MG/DL
ERYTHROCYTE [DISTWIDTH] IN BLOOD BY AUTOMATED COUNT: 13.4 % (ref 11.6–15.1)
GFR SERPL CREATININE-BSD FRML MDRD: 24 ML/MIN/1.73SQ M
GLUCOSE P FAST SERPL-MCNC: 105 MG/DL (ref 65–99)
GLUCOSE UR STRIP-MCNC: NEGATIVE MG/DL
HCT VFR BLD AUTO: 32.4 % (ref 36.5–49.3)
HGB BLD-MCNC: 10.3 G/DL (ref 12–17)
HGB UR QL STRIP.AUTO: NEGATIVE
KETONES UR STRIP-MCNC: NEGATIVE MG/DL
LEUKOCYTE ESTERASE UR QL STRIP: NEGATIVE
MCH RBC QN AUTO: 28.6 PG (ref 26.8–34.3)
MCHC RBC AUTO-ENTMCNC: 31.8 G/DL (ref 31.4–37.4)
MCV RBC AUTO: 90 FL (ref 82–98)
NITRITE UR QL STRIP: NEGATIVE
NON-SQ EPI CELLS URNS QL MICRO: NORMAL /HPF
PH UR STRIP.AUTO: 6 [PH]
PHOSPHATE SERPL-MCNC: 3.3 MG/DL (ref 2.3–4.1)
PLATELET # BLD AUTO: 239 THOUSANDS/UL (ref 149–390)
PMV BLD AUTO: 11.4 FL (ref 8.9–12.7)
POTASSIUM SERPL-SCNC: 4.2 MMOL/L (ref 3.5–5.3)
PROT UR STRIP-MCNC: NEGATIVE MG/DL
PROT UR-MCNC: 12 MG/DL
PROT/CREAT UR: 0.28 MG/G{CREAT} (ref 0–0.1)
PTH-INTACT SERPL-MCNC: 187.8 PG/ML (ref 18.4–80.1)
RBC # BLD AUTO: 3.6 MILLION/UL (ref 3.88–5.62)
RBC #/AREA URNS AUTO: NORMAL /HPF
SODIUM SERPL-SCNC: 138 MMOL/L (ref 135–147)
SP GR UR STRIP.AUTO: 1.01 (ref 1–1.03)
UROBILINOGEN UR STRIP-ACNC: <2 MG/DL
WBC # BLD AUTO: 8.6 THOUSAND/UL (ref 4.31–10.16)
WBC #/AREA URNS AUTO: NORMAL /HPF

## 2022-12-19 ENCOUNTER — TELEPHONE (OUTPATIENT)
Dept: NEPHROLOGY | Facility: CLINIC | Age: 87
End: 2022-12-19

## 2022-12-19 NOTE — TELEPHONE ENCOUNTER
Appointment Confirmation   Person confirmed appointment with  If not patient, name of the person Answering Machine    Date and time of appointment 12/20/2022    Patient acknowledged and will be at appointment? no   Did you advise the patient that they will need a urine sample if they are a new patient? no   Did you advise the patient to bring their current medications for verification? (including any OTC) yes   Additional Information

## 2022-12-20 ENCOUNTER — APPOINTMENT (OUTPATIENT)
Dept: LAB | Facility: HOSPITAL | Age: 87
End: 2022-12-20

## 2022-12-20 ENCOUNTER — OFFICE VISIT (OUTPATIENT)
Dept: NEPHROLOGY | Facility: CLINIC | Age: 87
End: 2022-12-20

## 2022-12-20 ENCOUNTER — HOSPITAL ENCOUNTER (OUTPATIENT)
Dept: CT IMAGING | Facility: HOSPITAL | Age: 87
Discharge: HOME | End: 2022-12-20

## 2022-12-20 VITALS
SYSTOLIC BLOOD PRESSURE: 138 MMHG | HEIGHT: 69 IN | OXYGEN SATURATION: 98 % | BODY MASS INDEX: 26.96 KG/M2 | WEIGHT: 182 LBS | DIASTOLIC BLOOD PRESSURE: 70 MMHG | HEART RATE: 68 BPM

## 2022-12-20 DIAGNOSIS — R91.8 PULMONARY NODULES: ICD-10-CM

## 2022-12-20 DIAGNOSIS — I35.0 AORTIC STENOSIS, SEVERE: Chronic | ICD-10-CM

## 2022-12-20 DIAGNOSIS — I10 PRIMARY HYPERTENSION: ICD-10-CM

## 2022-12-20 DIAGNOSIS — N18.4 CKD (CHRONIC KIDNEY DISEASE) STAGE 4, GFR 15-29 ML/MIN (HCC): Primary | ICD-10-CM

## 2022-12-20 DIAGNOSIS — E78.00 PURE HYPERCHOLESTEROLEMIA: ICD-10-CM

## 2022-12-20 DIAGNOSIS — I50.32 CHRONIC DIASTOLIC (CONGESTIVE) HEART FAILURE (HCC): ICD-10-CM

## 2022-12-20 DIAGNOSIS — D64.9 CHRONIC ANEMIA: ICD-10-CM

## 2022-12-20 DIAGNOSIS — N40.0 BENIGN PROSTATIC HYPERPLASIA WITHOUT LOWER URINARY TRACT SYMPTOMS: ICD-10-CM

## 2022-12-20 NOTE — PROGRESS NOTES
OFFICE FOLLOW UP - Nephrology   Chaparro Mcfarland 80 y o  male MRN: [id number]       ASSESSMENT and PLAN:  Becca Lloyd was seen today for follow-up and chronic kidney disease  Diagnoses and all orders for this visit:    CKD (chronic kidney disease) stage 4, GFR 15-29 ml/min (HCC)  -     CBC; Future  -     Renal function panel; Future  -     PTH, intact; Future  -     Protein / creatinine ratio, urine; Future    Chronic diastolic (congestive) heart failure (HCC)    Aortic stenosis, severe    Chronic anemia    Pure hypercholesterolemia    Benign prostatic hyperplasia without lower urinary tract symptoms    Primary hypertension        This is an 77-year-old gentleman with history of chronic kidney disease stage 4 previous creatinine in the high 1s to low 2s since 2017 who was initially seen for evaluation on 03/2022, also history of chronic diastolic heart failure, severe aortic stenosis, hypertension and hyperlipidemia, patient today returns to the office for CKD follow-up  1  Chronic kidney disease stage 4 with previous creatinine around 1 7-2 1 since 2017  Noted creatinine lately in the 2 1-2 3 range  CKD likely multifactorial in the setting of hypertension, diastolic heart failure, severe aortic stenosis, possible component of cardiorenal syndrome as well as age-related nephron loss and atherosclerotic disease  Most recent urinalysis bland with minimal proteinuria  Once again discussed with patient how important to follow low-salt diet, avoid NSAIDs, stay well-hydrated  I would like to see him back in the office in 4 months with repeat blood and urine test    2  Hypertension, blood pressure currently acceptable on current regimen  No changes at this moment    3  Chronic diastolic heart failure, severe aortic stenosis, on exam looks near euvolemic  Continue follow-up with cardiology  Patient currently is pretty asymptomatic    Patient at this moment is not interested on TAVR or work-up due to concern of worsening kidney function needing dialysis as well as feeling asymptomatic    4  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, hemoglobin low but stable, follow CBC in 4 months  5  Mineral bone disease, elevated PTH slightly 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, noted calcium though is slightly elevated, follow repeat labs in 4 months    6  History of BPH, on Flomax  7  Hyperlipidemia, continue with statins        Patient Instructions   As we discussed in the office visit, based on the most recent blood test your kidney function remains overall stable  You have advanced chronic kidney disease a stage IV  I would like to see you back in the office in 4 months for repeat blood and urine test   Remember to follow low-salt diet less than 2gr of salt in a day  Remember to avoid NSAIDs (no ibuprofen, Motrin, Advil, Aleve, naproxen) repeat  Okay to take Tylenol or paracetamol or acetaminophen as needed for pain  Continue close follow-up with your primary care doctor as well as your cardiologist           HPI: Triny Palacios is a 80 y o  male who is here for Follow-up and Chronic Kidney Disease    This is a patient history of chronic diastolic heart failure, severe aortic stenosis, hypertension, chronic kidney disease stage 3B/4 was initially seen for evaluation on 03/2022  Last time patient was seen in our office was on 8/2022  Patient today returns to the office for follow-up with his wife    Patient currently has no complaints at this time and is feeling well  Patient denies any chest pain, shortness of breath, no dyspnea on exertion or leg swelling  Denies any abdominal pain, no nausea, vomiting, no diarrhea or constipation  Denies any urinary problems, no burning sensation or gross hematuria  Reports nocturia once or twice at night    The last blood work was done on 12/16/2022, which we have reviewed together        ROS: All the systems were reviewed and were negative except as documented on the H&P  Allergies: Patient has no known allergies  Medications:   Current Outpatient Medications:   •  aspirin 81 MG tablet, Take 81 mg by mouth daily  , Disp: , Rfl:   •  Cholecalciferol (VITAMIN D3) 125 MCG (5000 UT) CAPS, 2 (two) times a week , Disp: , Rfl:   •  finasteride (PROSCAR) 5 mg tablet, Take 1 tablet (5 mg total) by mouth daily, Disp: 90 tablet, Rfl: 1  •  furosemide (LASIX) 20 mg tablet, Take 1 tablet (20 mg total) by mouth daily, Disp: 30 tablet, Rfl: 2  •  metoprolol succinate (TOPROL-XL) 25 mg 24 hr tablet, Take 1 tablet (25 mg total) by mouth daily, Disp: 90 tablet, Rfl: 3  •  Multiple Vitamin (MULTI-VITAMIN DAILY) TABS, Take by mouth, Disp: , Rfl:   •  omeprazole (PriLOSEC) 20 mg delayed release capsule, Take 1 capsule (20 mg total) by mouth daily, Disp: 90 capsule, Rfl: 1  •  rosuvastatin (CRESTOR) 5 mg tablet, Take 1 tablet (5 mg total) by mouth daily, Disp: 90 tablet, Rfl: 0  •  tamsulosin (FLOMAX) 0 4 mg, Take 1 capsule (0 4 mg total) by mouth daily with dinner, Disp: 30 capsule, Rfl: 11    Past Medical History:   Diagnosis Date   • Aortic stenosis, severe 11/1/2021   • Atrial fibrillation (HCC)    • Colon polyp    • GERD (gastroesophageal reflux disease)    • Hearing loss     last assessed 11/8/17   • Hypertension    • Rheumatic fever    • Stenosis of aorta      Past Surgical History:   Procedure Laterality Date   • APPENDECTOMY     • BACK SURGERY      lower   • CARDIAC SURGERY      ablation   • CATARACT EXTRACTION     • JOINT REPLACEMENT Left     knee   • KNEE SURGERY Left    • AK BIOPSY OF PROSTATE,NEEDLE/PUNCH N/A 3/16/2021    Procedure: Transperineal prostate biopsy with biplanar transrectal ultrasound, using the perineal logic kit;   Surgeon: Neto Naqvi MD;  Location: [facility];  Service: Urology   • TONSILLECTOMY AND ADENOIDECTOMY       Family History   Problem Relation Age of Onset   • Other Mother         old age   • Esophageal cancer Father    • Other Father         old age   • Alcohol abuse Son         alcoholism      reports that he quit smoking about 54 years ago  His smoking use included cigarettes  He started smoking about 70 years ago  He has a 16 00 pack-year smoking history  He has never used smokeless tobacco  He reports current alcohol use  He reports that he does not use drugs  Physical Exam:   Vitals:    12/20/22 1028   BP: 138/70   BP Location: Left arm   Patient Position: Sitting   Cuff Size: Adult   Pulse: 68   SpO2: 98%   Weight: 82 6 kg (182 lb)   Height: 5' 9" (1 753 m)     Body mass index is 26 88 kg/m²  General: conscious, cooperative, in not acute distress  Eyes: conjunctivae pink, anicteric sclerae  ENT: lips and mucous membranes moist  Neck: supple, no JVD  Chest: clear breath sounds bilateral, no crackles, ronchus or wheezings  CVS: distinct S1 & S2, normal rate, regular rhythm, positive systolic ejection murmur  Abdomen: soft, non-tender, non-distended, normoactive bowel sounds  Back: no CVA tenderness  Extremities: Minimal edema of both legs  Skin: no rash  Neuro: awake, alert, oriented        Laboratory Results:  Lab Results   Component Value Date    WBC 8 60 12/16/2022    HGB 10 3 (L) 12/16/2022    HCT 32 4 (L) 12/16/2022    MCV 90 12/16/2022     12/16/2022     Lab Results   Component Value Date    SODIUM 138 12/16/2022    K 4 2 12/16/2022     12/16/2022    CO2 26 12/16/2022    BUN 38 (H) 12/16/2022    CREATININE 2 31 (H) 12/16/2022    GLUC 114 07/14/2022    CALCIUM 9 4 12/16/2022       Lab Results   Component Value Date     8 (H) 12/16/2022    CALCIUM 9 4 12/16/2022    PHOS 3 3 12/16/2022             Portions of the record may have been created with voice recognition software  Occasional wrong word or "sound a like" substitutions may have occurred due to the inherent limitations of voice recognition software  Read the chart carefully and recognize, using context, where substitutions have occurred  If you have any questions, please contact the dictating provider

## 2022-12-20 NOTE — PATIENT INSTRUCTIONS
As we discussed in the office visit, based on the most recent blood test your kidney function remains overall stable  You have advanced chronic kidney disease a stage IV  I would like to see you back in the office in 4 months for repeat blood and urine test   Remember to follow low-salt diet less than 2gr of salt in a day  Remember to avoid NSAIDs (no ibuprofen, Motrin, Advil, Aleve, naproxen) repeat  Okay to take Tylenol or paracetamol or acetaminophen as needed for pain    Continue close follow-up with your primary care doctor as well as your cardiologist

## 2022-12-22 DIAGNOSIS — E78.5 HYPERLIPIDEMIA, UNSPECIFIED HYPERLIPIDEMIA TYPE: ICD-10-CM

## 2022-12-22 RX ORDER — ROSUVASTATIN CALCIUM 5 MG/1
5 TABLET, COATED ORAL DAILY
Qty: 90 TABLET | Refills: 0 | Status: SHIPPED | OUTPATIENT
Start: 2022-12-22

## 2022-12-27 ENCOUNTER — TELEPHONE (OUTPATIENT)
Dept: OTHER | Facility: HOSPITAL | Age: 87
End: 2022-12-27

## 2022-12-27 NOTE — TELEPHONE ENCOUNTER
Dr Esme Carranza appears left upper lobe nodule is growing slowly which he like to obtain PET scan or discuss at tumor board

## 2022-12-28 DIAGNOSIS — I50.9 ACUTE CONGESTIVE HEART FAILURE, UNSPECIFIED HEART FAILURE TYPE (HCC): ICD-10-CM

## 2022-12-28 RX ORDER — FUROSEMIDE 20 MG/1
20 TABLET ORAL DAILY
Qty: 90 TABLET | Refills: 2 | Status: SHIPPED | OUTPATIENT
Start: 2022-12-28

## 2022-12-29 NOTE — TELEPHONE ENCOUNTER
I think this needs a conversation at his next appointment  Can you call and let him know it's bigger and needs to be discussed? If he doesn't have an appointment, please help make one for him

## 2022-12-29 NOTE — TELEPHONE ENCOUNTER
Spoke with patient and I made him aware that nodule has grown only 2 mm since previous imaging-I did state that I spoke with Dr Socrates Espana and she would like to see him in the office to discuss options whether that be PET scan versus surveillance versus biopsy-    Can you please call and schedule follow-up appointment

## 2022-12-30 NOTE — TELEPHONE ENCOUNTER
Spoke with patient  He scheduled for 1/20 at 9am with Dr Sorcates Espana at [facility]

## 2023-01-05 ENCOUNTER — OFFICE VISIT (OUTPATIENT)
Dept: INTERNAL MEDICINE CLINIC | Facility: CLINIC | Age: 88
End: 2023-01-05

## 2023-01-05 VITALS
OXYGEN SATURATION: 97 % | HEIGHT: 69 IN | RESPIRATION RATE: 18 BRPM | TEMPERATURE: 97.2 F | SYSTOLIC BLOOD PRESSURE: 130 MMHG | DIASTOLIC BLOOD PRESSURE: 90 MMHG | WEIGHT: 184.2 LBS | HEART RATE: 67 BPM | BODY MASS INDEX: 27.28 KG/M2

## 2023-01-05 DIAGNOSIS — I47.1 PSVT (PAROXYSMAL SUPRAVENTRICULAR TACHYCARDIA) (HCC): ICD-10-CM

## 2023-01-05 DIAGNOSIS — R91.1 LESION OF LEFT LUNG: ICD-10-CM

## 2023-01-05 DIAGNOSIS — I35.0 AORTIC STENOSIS, SEVERE: Chronic | ICD-10-CM

## 2023-01-05 DIAGNOSIS — R07.89 CHEST DISCOMFORT: Primary | ICD-10-CM

## 2023-01-05 DIAGNOSIS — I50.32 CHRONIC DIASTOLIC (CONGESTIVE) HEART FAILURE (HCC): ICD-10-CM

## 2023-01-05 DIAGNOSIS — N18.4 CKD (CHRONIC KIDNEY DISEASE) STAGE 4, GFR 15-29 ML/MIN (HCC): ICD-10-CM

## 2023-01-05 PROBLEM — I73.9 PVD (PERIPHERAL VASCULAR DISEASE) (HCC): Status: ACTIVE | Noted: 2023-01-05

## 2023-01-05 NOTE — ASSESSMENT & PLAN NOTE
CT of the chest completed December 2022 shows slowly growing part solid nodule in left upper lobe now measuring 10 mm  Finding suspicious for slow-growing bronchogenic malignancy    Has an appointment scheduled with pulmonology for later this month to discuss next steps

## 2023-01-05 NOTE — ASSESSMENT & PLAN NOTE
Had episode of tightness in the center of his chest which occurred this past Sunday while he was walking to the bathroom  Felt out of breath coming back to bed  Did not have any associated nausea, vomiting, diaphoresis  Pain went away on its own  He denies any reflux like symptoms  Chest wall nontender to palpation today, denies any chest or upper body exercises  -Suggested obtaining EKG today and possibly pharmacologic nuclear stress test for further evaluation  He has a number of other comorbidities including severe aortic stenosis, lung lesion concerning for possible bronchogenic malignancy  Per our discussion, even in the event that testing is suggestive of underlying CAD, he probably would prefer against any sort of invasive testing or intervention, which I think is reasonable  We will continue to monitor for now    If he has recurrence of symptoms, recommend he reach out to his cardiologist to discuss further

## 2023-01-05 NOTE — ASSESSMENT & PLAN NOTE
-he has a history of rheumatic fever, now with severe aortic stenosis  -Most recent 2D echo in October 2022 showed progression of severe aortic stenosis  -TAVR eval has been deferred for now, significant risk of decline in kidney function with contrast dye that would be involved with TAVR workup  -follows with Cardiology, repeat echo has been scheduled

## 2023-01-05 NOTE — ASSESSMENT & PLAN NOTE
-follows with Nephrology, Most recent creatinine 2 31 December 2022  -baseline creatinine around 1 7 - 2 1

## 2023-01-05 NOTE — PROGRESS NOTES
INTERNAL MEDICINE OFFICE VISIT  [facility] Internal Medicine- [place]    NAME: Cyndi Feliz  AGE: 80 y o  SEX: male    DATE OF ENCOUNTER: 1/5/2023    Assessment and Plan/History of Present Illness     Here today for 4-month follow-up  1  Chest discomfort  Assessment & Plan:  Had episode of tightness in the center of his chest which occurred this past Sunday while he was walking to the bathroom  Felt out of breath coming back to bed  Did not have any associated nausea, vomiting, diaphoresis  Pain went away on its own  He denies any reflux like symptoms  Chest wall nontender to palpation today, denies any chest or upper body exercises  -Suggested obtaining EKG today and possibly pharmacologic nuclear stress test for further evaluation  He has a number of other comorbidities including severe aortic stenosis, lung lesion concerning for possible bronchogenic malignancy  Per our discussion, even in the event that testing is suggestive of underlying CAD, he probably would prefer against any sort of invasive testing or intervention, which I think is reasonable  We will continue to monitor for now  If he has recurrence of symptoms, recommend he reach out to his cardiologist to discuss further      2  Chronic diastolic (congestive) heart failure (HCC)  Assessment & Plan:  -appears euvolemic today  -Currently on Lasix 20 mg daily  Per Cardiology note, when attempt was made to reduce Lasix to 20 mg every other day, patient decompensated            3  CKD (chronic kidney disease) stage 4, GFR 15-29 ml/min [facility])  Assessment & Plan:  -follows with Nephrology, Most recent creatinine 2 31 December 2022  -baseline creatinine around 1 7 - 2 1      4  PSVT (paroxysmal supraventricular tachycardia) ([facility])  Assessment & Plan:  Stable, remains on beta-blocker      5   Aortic stenosis, severe  Assessment & Plan:  -he has a history of rheumatic fever, now with severe aortic stenosis  -Most recent 2D echo in October 2022 showed progression of severe aortic stenosis  -TAVR eval has been deferred for now, significant risk of decline in kidney function with contrast dye that would be involved with TAVR workup  -follows with Cardiology, repeat echo has been scheduled      6  Lesion of left lung  Assessment & Plan:  CT of the chest completed December 2022 shows slowly growing part solid nodule in left upper lobe now measuring 10 mm  Finding suspicious for slow-growing bronchogenic malignancy  Has an appointment scheduled with pulmonology for later this month to discuss next steps                   No orders of the defined types were placed in this encounter        Chief Complaint     Chief Complaint   Patient presents with   • Follow-up     4 mo       Review of Systems     10 point ROS negative except per HPI    The following portions of the patient's history were reviewed and updated as appropriate: allergies, current medications, past family history, past medical history, past social history, past surgical history and problem list     Active Problem List     Patient Active Problem List   Diagnosis   • Chronic anemia   • Linda esophagus   • Esophageal reflux   • Hypertension   • Spinal stenosis of lumbar region   • Spondylolisthesis   • PSVT (paroxysmal supraventricular tachycardia) (HCC)   • Palpitations   • Pure hypercholesterolemia   • PVC (premature ventricular contraction)   • CKD (chronic kidney disease) stage 4, GFR 15-29 ml/min (HCC)   • Chronic diastolic (congestive) heart failure (HCC)   • BPH (benign prostatic hyperplasia)   • History of radiofrequency ablation  for SVT   • Pulmonary nodules   • Aortic stenosis, severe   • Hemoptysis   • Elevated troponin   • Cigarette nicotine dependence in remission   • Chest discomfort   • Lesion of left lung       Objective     /90   Pulse 67   Temp (!) 97 2 °F (36 2 °C)   Resp 18   Ht 5' 9" (1 753 m)   Wt 83 6 kg (184 lb 3 2 oz)   SpO2 97%   BMI 27 20 kg/m²     Physical Exam    Pertinent Laboratory/Diagnostic Studies:  CT chest wo contrast    Result Date: 12/27/2022  Impression: 1  Slowly growing part solid nodule in the left upper lobe measuring 10 mm (previously 8 mm on 11/10/2021 when remeasured in a similar fashion)  Findings are suspicious for slow-growing bronchogenic malignancy  Consider thoracic surgery consultation, FDG PET/CT, and/or continued follow-up with CT chest  2   Severe coronary artery calcifications  Moderate to severe calcifications of the aortic root  3   2 0 x 1 7 cm low-intermediate attenuation lesion within the posterior right hepatic lobe, unchanged in size from the CT of 10/30/2021  This is indeterminate but probably represents a benign hemangioma given stability; recommend attention to on subsequent follow-up  The study was marked in EPIC for significant notification   Workstation performed: [id number]       Images and diagnostics reviewed     Current Medications     Current Outpatient Medications:   •  aspirin 81 MG tablet, Take 81 mg by mouth daily  , Disp: , Rfl:   •  Cholecalciferol (VITAMIN D3) 125 MCG (5000 UT) CAPS, 2 (two) times a week , Disp: , Rfl:   •  finasteride (PROSCAR) 5 mg tablet, Take 1 tablet (5 mg total) by mouth daily, Disp: 90 tablet, Rfl: 1  •  furosemide (LASIX) 20 mg tablet, Take 1 tablet (20 mg total) by mouth daily, Disp: 90 tablet, Rfl: 2  •  metoprolol succinate (TOPROL-XL) 25 mg 24 hr tablet, Take 1 tablet (25 mg total) by mouth daily, Disp: 90 tablet, Rfl: 3  •  Multiple Vitamin (MULTI-VITAMIN DAILY) TABS, Take by mouth, Disp: , Rfl:   •  omeprazole (PriLOSEC) 20 mg delayed release capsule, Take 1 capsule (20 mg total) by mouth daily, Disp: 90 capsule, Rfl: 1  •  rosuvastatin (CRESTOR) 5 mg tablet, Take 1 tablet (5 mg total) by mouth daily, Disp: 90 tablet, Rfl: 0  •  tamsulosin (FLOMAX) 0 4 mg, Take 1 capsule (0 4 mg total) by mouth daily with dinner, Disp: 30 capsule, Rfl: 11    Health Maintenance     Health Maintenance Topic Date Due   • Hepatitis B Vaccine (1 of 3 - Risk 3-dose series) Never done   • Pneumococcal Vaccine: 65+ Years (2 - PPSV23 if available, else PCV20) 11/21/2017   • COVID-19 Vaccine (4 - Booster for Moderna series) 12/18/2021   • Depression Screening  05/02/2023   • BMI: Followup Plan  05/02/2023   • Fall Risk  05/02/2023   • Medicare Annual Wellness Visit (AWV)  05/02/2023   • BMI: Adult  12/20/2023   • Hepatitis A Vaccine  Completed   • Influenza Vaccine  Completed   • HIB Vaccine  Aged Out   • IPV Vaccine  Aged Out   • Meningococcal ACWY Vaccine  Aged Out   • HPV Vaccine  Aged Out     Immunization History   Administered Date(s) Administered   • COVID-19 MODERNA VACC 0 5 ML IM 01/19/2021, 02/15/2021, 10/23/2021   • Hep A, adult 02/01/2000, 08/15/2000   • INFLUENZA 10/23/2009, 10/12/2010, 09/29/2011, 10/03/2012, 10/23/2013, 10/29/2014, 11/06/2015, 09/01/2016, 10/10/2016, 10/06/2017, 10/03/2018, 10/10/2018, 09/20/2019, 09/30/2019, 10/01/2019, 10/02/2019, 09/02/2020, 09/11/2020, 10/07/2020, 09/10/2021, 10/10/2022   • Influenza Split High Dose Preservative Free IM 11/06/2015, 09/01/2016, 10/06/2017   • Influenza, seasonal, injectable 11/15/2000, 10/26/2001, 10/28/2003, 12/01/2005, 11/01/2007, 10/03/2012, 10/23/2013   • Pneumococcal Conjugate 13-Valent 11/21/2016   • Tdap 09/18/2012   • Zoster 09/01/2007   • Zoster Vaccine Recombinant 02/22/2019, 05/03/2019   • influenza, trivalent, adjuvanted 09/30/2019, 09/11/2020       BONIFACIO Patel  Internal Medicine [address], [address]  [place], [address]  Office: [phone]  Fax: [phone]

## 2023-01-05 NOTE — ASSESSMENT & PLAN NOTE
-appears euvolemic today  -Currently on Lasix 20 mg daily    Per Cardiology note, when attempt was made to reduce Lasix to 20 mg every other day, patient decompensated

## 2023-01-20 ENCOUNTER — OFFICE VISIT (OUTPATIENT)
Dept: PULMONOLOGY | Facility: CLINIC | Age: 88
End: 2023-01-20

## 2023-01-20 VITALS
HEIGHT: 69 IN | HEART RATE: 89 BPM | DIASTOLIC BLOOD PRESSURE: 76 MMHG | WEIGHT: 184 LBS | OXYGEN SATURATION: 97 % | TEMPERATURE: 97.1 F | SYSTOLIC BLOOD PRESSURE: 120 MMHG | BODY MASS INDEX: 27.25 KG/M2

## 2023-01-20 DIAGNOSIS — I35.0 AORTIC STENOSIS, SEVERE: Chronic | ICD-10-CM

## 2023-01-20 DIAGNOSIS — R91.8 PULMONARY NODULES: Primary | ICD-10-CM

## 2023-01-20 DIAGNOSIS — F17.211 CIGARETTE NICOTINE DEPENDENCE IN REMISSION: ICD-10-CM

## 2023-01-20 PROBLEM — R04.2 HEMOPTYSIS: Status: RESOLVED | Noted: 2021-11-10 | Resolved: 2023-01-20

## 2023-01-20 NOTE — ASSESSMENT & PLAN NOTE
CT reviewed, TERRY nodule has increased in size and is spiculated; suspect this is a slow growing adenocarcinoma in situ vs aggressive other lung cancer vs inflammation   - Discussed diagnosis options at length with pt and his wife -discussed option to check PET-CT and if positive pursue biopsy  If consistent with slow growing bronchogenic carcinoma without spread, pt would be possible candidate for SBRT  - Discussed option to repeat CT scan in 6 months and verify that it is slow growing  And if it is, then option to continue surveillance vs stop checking images  - Discussed option to do nothing - risk would be that if this is an aggressive cancer, it could spread  Benefit would be no further testing/procedures  - After long discussion and thru shared decision making, pt leans toward doing nothing but for now will schedule a follow up CT chest in 6 months   I think this is a reasonable decision and one that will require ongoing conversations about goals of care, need for testing, etc

## 2023-01-20 NOTE — PROGRESS NOTES
Pulmonary Follow Up Note   Sarah Palma 80 y o  male MRN: [id number]  1/20/2023      Referring provider: Dr Caitlin Diamond and Plan:    Pulmonary nodules  CT reviewed, TERRY nodule has increased in size and is spiculated; suspect this is a slow growing adenocarcinoma in situ vs aggressive other lung cancer vs inflammation   - Discussed diagnosis options at length with pt and his wife -discussed option to check PET-CT and if positive pursue biopsy  If consistent with slow growing bronchogenic carcinoma without spread, pt would be possible candidate for SBRT  - Discussed option to repeat CT scan in 6 months and verify that it is slow growing  And if it is, then option to continue surveillance vs stop checking images  - Discussed option to do nothing - risk would be that if this is an aggressive cancer, it could spread  Benefit would be no further testing/procedures  - After long discussion and thru shared decision making, pt leans toward doing nothing but for now will schedule a follow up CT chest in 6 months  I think this is a reasonable decision and one that will require ongoing conversations about goals of care, need for testing, etc      Cigarette nicotine dependence in remission  20 pack year history; quit 56 years ago  Remains committed to abstinence  Aortic stenosis, severe  Follows with cardiology  Discussed that given his advanced AS, he would likely not be a surgical candidate for his presumed lung cancer  Maintained on Lasix daily  Diagnoses and all orders for this visit:    Pulmonary nodules  -     CT lung nodule follow-up; Future    Cigarette nicotine dependence in remission    Aortic stenosis, severe    Plan of care discussed with patient  Concerns addressed  Return for follow up pending results of CT chest     History of Present Illness   HPI:  Sarah Palma is a 80 y o  male who presents for evaluation of recent abnormal CT scan      Denies pulmonary complaints, no cough     Weight stable, good appetite  Walks around [place], go to the dairy for milk/bread, etc      States that prior to coming in, he had decided he would not want further work up of the lung nodule  Feels that his heart is sick and he will likely die of a heart attack over the next few years  States that at his age, he is going to live another 20 years  States he feels 'pretty good' but not as good as when he was younger  Here with his wife of 72 years :)    Review of Systems  Positive as above and negative otherwise  Historical Information   Past Medical History:   Diagnosis Date   • Aortic stenosis, severe 11/1/2021   • Atrial fibrillation (HCC)    • Colon polyp    • GERD (gastroesophageal reflux disease)    • Hearing loss     last assessed 11/8/17   • Hypertension    • Rheumatic fever    • Stenosis of aorta      Past Surgical History:   Procedure Laterality Date   • APPENDECTOMY     • BACK SURGERY      lower   • CARDIAC SURGERY      ablation   • CATARACT EXTRACTION     • JOINT REPLACEMENT Left     knee   • KNEE SURGERY Left    • [place] PROSTATE NEEDLE BIOPSY ANY APPROACH N/A 3/16/2021    Procedure: Transperineal prostate biopsy with biplanar transrectal ultrasound, using the perineal logic kit;   Surgeon: Rhoda Mohan MD;  Location: [facility];  Service: Urology   • TONSILLECTOMY AND ADENOIDECTOMY       Family History   Problem Relation Age of Onset   • Other Mother         old age   • Esophageal cancer Father    • Other Father         old age   • Alcohol abuse Son         alcoholism         Meds/Allergies     Current Outpatient Medications:   •  aspirin 81 MG tablet, Take 81 mg by mouth daily  , Disp: , Rfl:   •  Cholecalciferol (VITAMIN D3) 125 MCG (5000 UT) CAPS, 2 (two) times a week , Disp: , Rfl:   •  finasteride (PROSCAR) 5 mg tablet, Take 1 tablet (5 mg total) by mouth daily, Disp: 90 tablet, Rfl: 1  •  furosemide (LASIX) 20 mg tablet, Take 1 tablet (20 mg total) by mouth daily, Disp: 90 tablet, Rfl: 2  •  metoprolol succinate (TOPROL-XL) 25 mg 24 hr tablet, Take 1 tablet (25 mg total) by mouth daily, Disp: 90 tablet, Rfl: 3  •  Multiple Vitamin (MULTI-VITAMIN DAILY) TABS, Take by mouth, Disp: , Rfl:   •  omeprazole (PriLOSEC) 20 mg delayed release capsule, Take 1 capsule (20 mg total) by mouth daily, Disp: 90 capsule, Rfl: 1  •  rosuvastatin (CRESTOR) 5 mg tablet, Take 1 tablet (5 mg total) by mouth daily, Disp: 90 tablet, Rfl: 0  •  tamsulosin (FLOMAX) 0 4 mg, Take 1 capsule (0 4 mg total) by mouth daily with dinner, Disp: 30 capsule, Rfl: 11  No Known Allergies    Vitals: Blood pressure 120/76, pulse 89, temperature (!) 97 1 °F (36 2 °C), temperature source Tympanic, height 5' 9" (1 753 m), weight 83 5 kg (184 lb), SpO2 97 %  Body mass index is 27 17 kg/m²  Oxygen Therapy  SpO2: 97 %  Oxygen Therapy: None (Room air)      Physical Exam  GEN: WDWN, nad, comfortable  HEENT: NCAT, EOMI  CVS: Regular, +4/6 ANGEL LUIS  LUNGS: CTA b/l, no wheezing or rales  ABD: soft  EXT: No c/c/e  NEURO: No focal deficits  MS: Moving all extremities  SKIN: warm, dry  PSYCH: calm, cooperative    Labs: I have personally reviewed pertinent lab results    Lab Results   Component Value Date    WBC 8 60 12/16/2022    HGB 10 3 (L) 12/16/2022    HCT 32 4 (L) 12/16/2022    MCV 90 12/16/2022     12/16/2022     Lab Results   Component Value Date    GLUCOSE 100 05/15/2015    CALCIUM 9 4 12/16/2022     05/15/2015    K 4 2 12/16/2022    CO2 26 12/16/2022     12/16/2022    BUN 38 (H) 12/16/2022    CREATININE 2 31 (H) 12/16/2022     No results found for: IGE  Lab Results   Component Value Date    ALT 25 11/17/2021    AST 21 11/17/2021    ALKPHOS 84 11/17/2021    BILITOT 0 40 05/15/2015       Labs per my interpretation show anemia, elevated creatinine    Imaging and other studies: I have personally reviewed pertinent films in PACS  CT chest 12/20 per my review shows a spiculated TERRY mostly GG nodule, now 1 0 x 1 0 cm, increased from previous of 0 8 cm; stable 6 mm LLL nodule  EKG, Pathology, and Other Studies: I have personally reviewed pertinent reports  ECHO 10/5/2022: EF normal, grade II diastolic dysfunction; severe AS; moderate TR    Courtney L Dostal, D O [facility]

## 2023-01-20 NOTE — ASSESSMENT & PLAN NOTE
Follows with cardiology  Discussed that given his advanced AS, he would likely not be a surgical candidate for his presumed lung cancer  Maintained on Lasix daily

## 2023-02-03 DIAGNOSIS — R33.9 INCOMPLETE BLADDER EMPTYING: ICD-10-CM

## 2023-02-03 DIAGNOSIS — R39.12 WEAK URINARY STREAM: ICD-10-CM

## 2023-02-03 DIAGNOSIS — N13.8 BPH WITH OBSTRUCTION/LOWER URINARY TRACT SYMPTOMS: ICD-10-CM

## 2023-02-03 DIAGNOSIS — N40.1 BPH WITH OBSTRUCTION/LOWER URINARY TRACT SYMPTOMS: ICD-10-CM

## 2023-02-03 RX ORDER — TAMSULOSIN HYDROCHLORIDE 0.4 MG/1
0.4 CAPSULE ORAL
Qty: 30 CAPSULE | Refills: 5 | Status: SHIPPED | OUTPATIENT
Start: 2023-02-03

## 2023-02-15 ENCOUNTER — TELEPHONE (OUTPATIENT)
Dept: CARDIOLOGY CLINIC | Facility: CLINIC | Age: 88
End: 2023-02-15

## 2023-02-15 DIAGNOSIS — I25.9 CHEST PAIN DUE TO MYOCARDIAL ISCHEMIA, UNSPECIFIED ISCHEMIC CHEST PAIN TYPE: Primary | ICD-10-CM

## 2023-02-15 RX ORDER — NITROGLYCERIN 0.4 MG/1
0.4 TABLET SUBLINGUAL ONCE AS NEEDED
Qty: 25 TABLET | Refills: 2 | Status: SHIPPED | OUTPATIENT
Start: 2023-02-15

## 2023-02-15 NOTE — TELEPHONE ENCOUNTER
Phone call from patient  2AM this morning patient woke with left   Shoulder and arm pain  Lasted less than 3 minutes  No pain into the jaw  No SOB, Has sweating  No strenuous work day before  Went out for dinner last night  Still felt full when he went to bed  Patient states never had pain like this before  Did not take nitro or aspirin during the event  Spoke with Dr Woody Cantor prescribed  Nitroglycerin  If pain reoccurs, instructs patient to lay down then take nitro  Stay laying 15-20 minutes  If pain goes away patient to get up carefully  If pain persists, patient to call 911    Follow up appointment made for 3/15/23    Patient understood and agreed to the plan

## 2023-03-06 DIAGNOSIS — I50.32 CHRONIC DIASTOLIC (CONGESTIVE) HEART FAILURE (HCC): ICD-10-CM

## 2023-03-06 RX ORDER — METOPROLOL SUCCINATE 25 MG/1
25 TABLET, EXTENDED RELEASE ORAL DAILY
Qty: 90 TABLET | Refills: 3 | Status: SHIPPED | OUTPATIENT
Start: 2023-03-06

## 2023-03-12 PROBLEM — I20.0 ANGINA PECTORIS, UNSTABLE (HCC): Status: ACTIVE | Noted: 2023-03-12

## 2023-03-15 ENCOUNTER — OFFICE VISIT (OUTPATIENT)
Dept: CARDIOLOGY CLINIC | Facility: CLINIC | Age: 88
End: 2023-03-15

## 2023-03-15 ENCOUNTER — TELEPHONE (OUTPATIENT)
Dept: CARDIOLOGY CLINIC | Facility: CLINIC | Age: 88
End: 2023-03-15

## 2023-03-15 VITALS
SYSTOLIC BLOOD PRESSURE: 130 MMHG | DIASTOLIC BLOOD PRESSURE: 80 MMHG | WEIGHT: 180.8 LBS | BODY MASS INDEX: 26.78 KG/M2 | HEIGHT: 69 IN | HEART RATE: 81 BPM

## 2023-03-15 DIAGNOSIS — I50.32 CHRONIC DIASTOLIC (CONGESTIVE) HEART FAILURE (HCC): ICD-10-CM

## 2023-03-15 DIAGNOSIS — I48.19 PERSISTENT ATRIAL FIBRILLATION (HCC): ICD-10-CM

## 2023-03-15 DIAGNOSIS — E78.00 PURE HYPERCHOLESTEROLEMIA: ICD-10-CM

## 2023-03-15 DIAGNOSIS — I35.0 AORTIC STENOSIS, SEVERE: Chronic | ICD-10-CM

## 2023-03-15 DIAGNOSIS — N18.4 CKD (CHRONIC KIDNEY DISEASE) STAGE 4, GFR 15-29 ML/MIN (HCC): ICD-10-CM

## 2023-03-15 DIAGNOSIS — I47.1 PSVT (PAROXYSMAL SUPRAVENTRICULAR TACHYCARDIA) (HCC): ICD-10-CM

## 2023-03-15 DIAGNOSIS — D64.9 CHRONIC ANEMIA: ICD-10-CM

## 2023-03-15 DIAGNOSIS — I20.0 ANGINA PECTORIS, UNSTABLE (HCC): Primary | ICD-10-CM

## 2023-03-15 DIAGNOSIS — I49.3 PVC (PREMATURE VENTRICULAR CONTRACTION): ICD-10-CM

## 2023-03-15 DIAGNOSIS — I10 PRIMARY HYPERTENSION: ICD-10-CM

## 2023-03-15 DIAGNOSIS — Z98.890 HISTORY OF RADIOFREQUENCY ABLATION PROCEDURE FOR CARDIAC ARRHYTHMIA: ICD-10-CM

## 2023-03-15 NOTE — PROGRESS NOTES
[facility]  [id number]  [address]  Phone#  [phone]   Fax#  [phone]  *-*-*-*-*-*-*-*-*-*-*-*-*-*-*-*-*-*-*-*-*-*-*-*-*-*-*-*-*-*-*-*-*-*-*-*-*-*-*-*-*-*-*-*-*-*-*-*-*-*-*-*-*-*                                   Cardiology Follow Up      ENCOUNTER DATE: 03/15/23 6:46 PM  PATIENT NAME: Tennille Washington   : 1935    MRN: [id number]  AGE:87 y o  SEX: male  [address] Mauro MD     PRIMARY CARE PHYSICIAN: Andrew Pace DO    ACTIVE DIAGNOSIS THIS VISIT  1  Angina pectoris, unstable ([address] ) New onset  POCT ECG      2  Aortic stenosis, severe        3  Chronic diastolic (congestive) heart failure ([address] )        4  PSVT (paroxysmal supraventricular tachycardia) ([address] )        5  Pure hypercholesterolemia        6  PVC (premature ventricular contraction)        7  Primary hypertension        8  History of radiofrequency ablation  for SVT        9  CKD (chronic kidney disease) stage 4, GFR 15-29 ml/min (HCC)        10  Chronic anemia        11   Persistent atrial fibrillation (HCC)  apixaban (Eliquis) 2 5 mg        ACTIVE PROBLEM LIST  Patient Active Problem List   Diagnosis   • Chronic anemia   • Linda esophagus   • Esophageal reflux   • Hypertension   • Spinal stenosis of lumbar region   • Spondylolisthesis   • PSVT (paroxysmal supraventricular tachycardia) (HCC)   • Palpitations   • Pure hypercholesterolemia   • PVC (premature ventricular contraction)   • CKD (chronic kidney disease) stage 4, GFR 15-29 ml/min (HCC)   • Chronic diastolic (congestive) heart failure (HCC)   • BPH (benign prostatic hyperplasia)   • History of radiofrequency ablation  for SVT   • Pulmonary nodules   • Aortic stenosis, severe   • Elevated troponin   • Cigarette nicotine dependence in remission   • Chest discomfort   • Lesion of left lung   • Angina pectoris, unstable ([address] ) New onset       CARDIOLOGY SPECIALTY COMMENTS  Tennille Washington 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 with hemoptysis and right lower lobe pneumonia    11/22/2021 ECHOCARDIOGRAM LIMITED:   Normal left ventricular systolic and diastolic function, EF 04%  Moderate to severe aortic stenosis with moderate aortic regurgitation  The aortic valve regurgitant jet pressure half time was 374 MS  The aortic valve mean gradient is 34 5 mmHg  The valve area is 0 8 cm2  The left ventricular stroke index is 41 1 mL/m2  By my calculation, the dimensionless index was 0 27 which is close to severe (severe less than 0 25)    08/19/2022 [facility] right leg swelling    10/05/2022 echocardiogram: Normal left ventricular systolic function, EF 45% grade 2 diastolic dysfunction  Mild left atrial enlargement  Severe aortic stenosis with mild-to-moderate aortic regurgitation  Aortic valve maximum velocity 3 9 M/S  Aortic valve mean gradient 40 mmHg  MARLY 0 65 cm2  LVOT stroke volume index 33 3 mL/M2, DVI 0  19  Mild-to-moderate MR with moderate TR  PASP 60 mmHg  Aortic root mildly dilated  INTERVAL HISTORY:        Patient is well-known to me with severe aortic stenosis history of diastolic congestive heart failure, and chronic kidney disease stage IV  He called our office to say that he had left arm pain which awoke him from his sleep and lasted approximately 5 minutes  He awoke his wife at the time in case he would be getting worse    Since the pain went away on its own, he did not take a sublingual nitroglycerin  He would have already undergone cardiac catheterization and TAVR except for the fact that it will most likely place him on dialysis  At this time, he is in atrial fibrillation for the first time with rate control  This is new onset for this patient  Most likely his episode of left arm pain: Sided with when he went into atrial fibrillation      DISCUSSION/PLAN:          · Begin Eliquis 2 5 mg twice daily,  Samples given and a prescription  · Patient instructed to call our office should he have any more left arm chest or back comfort  · After he is anticoagulated for 2 months, we will need to decide whether it is worth performing a cardioversion considering amiodarone  · Return in 1 month  · EKG on return      Lab Studies:    Lab Results   Component Value Date    CHOLESTEROL 132 05/27/2022    CHOLESTEROL 146 09/03/2020    CHOLESTEROL 148 08/06/2019     Lab Results   Component Value Date    TRIG 75 05/27/2022    TRIG 69 09/03/2020    TRIG 96 08/06/2019     Lab Results   Component Value Date    HDL 50 05/27/2022    HDL 52 09/03/2020    HDL 43 08/06/2019     Lab Results   Component Value Date    LDLCALC 67 05/27/2022    LDLCALC 80 09/03/2020    LDLCALC 86 08/06/2019       Lab Results   Component Value Date    NTBNP 4,038 (H) 05/27/2022    NTBNP 5,024 (H) 11/10/2021    NTBNP 2,942 (H) 08/11/2021       Lab Results   Component Value Date    EGFR 24 12/16/2022    EGFR 27 10/27/2022    EGFR 24 07/14/2022    SODIUM 138 12/16/2022    SODIUM 139 10/27/2022    SODIUM 139 07/14/2022    K 4 2 12/16/2022    K 4 5 10/27/2022    K 4 2 07/14/2022     12/16/2022     10/27/2022     (H) 07/14/2022    CO2 26 12/16/2022    CO2 25 10/27/2022    CO2 24 07/14/2022    ANIONGAP 7 05/15/2015    ANIONGAP 8 05/09/2014    BUN 38 (H) 12/16/2022    BUN 41 (H) 10/27/2022    BUN 42 (H) 07/14/2022    CREATININE 2 31 (H) 12/16/2022    CREATININE 2 12 (H) 10/27/2022    CREATININE 2 34 (H) 07/14/2022     Lab Results   Component Value Date    WBC 8 60 12/16/2022    WBC 9 18 04/08/2022    WBC 9 02 11/17/2021    HGB 10 3 (L) 12/16/2022    HGB 10 4 (L) 04/08/2022    HGB 10 2 (L) 11/17/2021    HCT 32 4 (L) 12/16/2022    HCT 32 2 (L) 04/08/2022    HCT 31 7 (L) 11/17/2021    MCV 90 12/16/2022    MCV 89 04/08/2022    MCV 90 11/17/2021    MCH 28 6 12/16/2022    MCH 28 7 04/08/2022    MCH 28 8 11/17/2021    MCHC 31 8 12/16/2022    MCHC 32 3 04/08/2022    MCHC 32 2 11/17/2021     12/16/2022     04/08/2022     (H) 11/17/2021      Lab Results   Component Value Date    GLUCOSE 100 05/15/2015    GLUCOSE 97 05/09/2014    CALCIUM 9 4 12/16/2022    CALCIUM 9 6 10/27/2022    CALCIUM 9 7 07/14/2022    AST 21 11/17/2021    AST 11 11/10/2021    AST 15 10/31/2021    ALT 25 11/17/2021    ALT 19 11/10/2021    ALT 22 10/31/2021    ALKPHOS 84 11/17/2021    ALKPHOS 79 11/10/2021    ALKPHOS 70 10/31/2021    PROT 7 4 05/15/2015    PROT 7 4 05/09/2014    BILITOT 0 40 05/15/2015    BILITOT 0 4 05/09/2014    MG 2 1 10/31/2021    MG 2 1 02/27/2021    MG 1 9 06/06/2019       Lab Results   Component Value Date    DDIMER 1 48 (H) 11/10/2021     Lab Results   Component Value Date    FERRITIN 240 11/17/2021    IRON 52 (L) 11/17/2021    TIBC 194 (L) 11/17/2021       Results for orders placed or performed in visit on 03/15/23   POCT ECG    Narrative    Atrial fibrillation with premature ventricular contractions or aberrantly conducted complexes  Septal infarction of indeterminate age    Abnormal EKG         Current Outpatient Medications:   •  apixaban (Eliquis) 2 5 mg, Take 1 tablet (2 5 mg total) by mouth 2 (two) times a day, Disp: 60 tablet, Rfl: 5  •  Cholecalciferol (VITAMIN D3) 125 MCG (5000 UT) CAPS, 2 (two) times a week , Disp: , Rfl:   •  finasteride (PROSCAR) 5 mg tablet, Take 1 tablet (5 mg total) by mouth daily, Disp: 90 tablet, Rfl: 1  •  furosemide (LASIX) 20 mg tablet, Take 1 tablet (20 mg total) by mouth daily, Disp: 90 tablet, Rfl: 2  •  metoprolol succinate (TOPROL-XL) 25 mg 24 hr tablet, Take 1 tablet (25 mg total) by mouth daily, Disp: 90 tablet, Rfl: 3  •  Multiple Vitamin (MULTI-VITAMIN DAILY) TABS, Take by mouth, Disp: , Rfl:   •  nitroglycerin (NITROSTAT) 0 4 mg SL tablet, Place 1 tablet (0 4 mg total) under the tongue once as needed for chest pain for up to 1 dose Lay down supine before taking and wait 15 minutes to get up  Get up very slowly    If pain continues, call 911, Disp: 25 tablet, Rfl: 2  •  omeprazole (PriLOSEC) 20 mg delayed release capsule, Take 1 capsule (20 mg total) by mouth daily, Disp: 90 capsule, Rfl: 1  •  rosuvastatin (CRESTOR) 5 mg tablet, Take 1 tablet (5 mg total) by mouth daily, Disp: 90 tablet, Rfl: 0  •  tamsulosin (FLOMAX) 0 4 mg, Take 1 capsule (0 4 mg total) by mouth daily with dinner, Disp: 30 capsule, Rfl: 5  No Known Allergies    Past Medical History:   Diagnosis Date   • Aortic stenosis, severe 2021   • Atrial fibrillation (HCC)    • Colon polyp    • GERD (gastroesophageal reflux disease)    • Hearing loss     last assessed 17   • Hypertension    • Rheumatic fever    • Stenosis of aorta      Social History     Socioeconomic History   • Marital status: /Civil Union     Spouse name: Not on file   • Number of children: Not on file   • Years of education: Not on file   • Highest education level: Not on file   Occupational History   • Not on file   Tobacco Use   • Smoking status: Former     Packs/day: 1 00     Years: 16 00     Pack years: 16 00     Types: Cigarettes     Start date: 5     Quit date:      Years since quittin 2   • Smokeless tobacco: Never   Vaping Use   • Vaping Use: Never used   Substance and Sexual Activity   • Alcohol use: Yes     Comment: occ   • Drug use: No   • Sexual activity: Not on file   Other Topics Concern   • Not on file   Social History Narrative   • Not on file     Social Determinants of Health     Financial Resource Strain: Not on file Food Insecurity: Not on file   Transportation Needs: Not on file   Physical Activity: Not on file   Stress: Not on file   Social Connections: Not on file   Intimate Partner Violence: Not on file   Housing Stability: Not on file      Family History   Problem Relation Age of Onset   • Other Mother         old age   • Esophageal cancer Father    • Other Father         old age   • Alcohol abuse Son         alcoholism     Past Surgical History:   Procedure Laterality Date   • APPENDECTOMY     • BACK SURGERY      lower   • CARDIAC SURGERY      ablation   • CATARACT EXTRACTION     • JOINT REPLACEMENT Left     knee   • KNEE SURGERY Left    • [place] PROSTATE NEEDLE BIOPSY ANY APPROACH N/A 3/16/2021    Procedure: Transperineal prostate biopsy with biplanar transrectal ultrasound, using the perineal logic kit; Surgeon: Afia Mclain MD;  Location: [facility];  Service: Urology   • TONSILLECTOMY AND ADENOIDECTOMY         PREVIOUS WEIGHTS:   Wt Readings from Last 10 Encounters:   03/15/23 82 kg (180 lb 12 8 oz)   01/20/23 83 5 kg (184 lb)   01/05/23 83 6 kg (184 lb 3 2 oz)   12/20/22 82 6 kg (182 lb)   11/01/22 81 2 kg (179 lb)   10/05/22 81 6 kg (180 lb)   09/02/22 81 8 kg (180 lb 6 4 oz)   08/19/22 82 kg (180 lb 12 4 oz)   08/05/22 80 7 kg (178 lb)   07/21/22 80 7 kg (178 lb)        Review of Systems:  Review of Systems   Constitutional: Negative for activity change  Respiratory: Negative for cough, choking, chest tightness, shortness of breath, wheezing and stridor  Cardiovascular: Positive for chest pain  Negative for palpitations and leg swelling  Musculoskeletal: Negative for gait problem  Skin: Negative for color change  Neurological: Negative for dizziness, tremors, syncope, weakness, light-headedness and headaches  Psychiatric/Behavioral: Negative for agitation and confusion         Physical Exam:  /80   Pulse 81   Ht 5' 9" (1 753 m)   Wt 82 kg (180 lb 12 8 oz)   BMI 26 70 kg/m²     Physical Exam  Vitals reviewed  Constitutional:       General: He is not in acute distress  Appearance: He is well-developed  HENT:      Head: Normocephalic and atraumatic  Neck:      Thyroid: No thyromegaly  Vascular: No carotid bruit or JVD  Trachea: No tracheal deviation  Cardiovascular:      Rate and Rhythm: Normal rate and regular rhythm  Pulses: Normal pulses  Heart sounds: Murmur heard  Crescendo decrescendo systolic murmur is present with a grade of 3/6  No friction rub  No gallop  Pulmonary:      Effort: Pulmonary effort is normal  No respiratory distress  Breath sounds: Normal breath sounds  No wheezing, rhonchi or rales  Chest:      Chest wall: No tenderness  Musculoskeletal:      Cervical back: Normal range of motion and neck supple  Right lower leg: No edema  Left lower leg: No edema  Skin:     General: Skin is warm and dry  Neurological:      General: No focal deficit present  Mental Status: He is alert and oriented to person, place, and time  Psychiatric:         Mood and Affect: Mood normal          Behavior: Behavior normal          Thought Content: Thought content normal          Judgment: Judgment normal      ======================================================  Imaging:   I have personally reviewed pertinent reports  I spent 30 minutes on the patient's office visit  This time was spent on the day of the visit  I had direct contact with the patient in the office on the day of the visit  Greater than 50% of the total time was spent obtaining a history, examining patient, answering all patient questions, arranging and discussing plan of care with patient as well as directly providing instructions  Additional time then spent on orders and office chart  Portions of the record may have been created with voice recognition software   Occasional wrong word or "sound a like" substitutions may have occurred due to the inherent limitations of voice recognition software  Read the chart carefully and recognize, using context, where substitutions have occurred      SIGNATURES:   Bertie Holter, MD

## 2023-03-15 NOTE — TELEPHONE ENCOUNTER
Samples of Eliquis 2 5mg given at appointment today with Dr Lorri Calzada       LOT: [id number]  EXP: SEPT 2024  DISPENSED: 4 (28 day)

## 2023-03-21 ENCOUNTER — APPOINTMENT (OUTPATIENT)
Dept: LAB | Facility: CLINIC | Age: 88
End: 2023-03-21

## 2023-03-21 DIAGNOSIS — N40.1 BPH WITH OBSTRUCTION/LOWER URINARY TRACT SYMPTOMS: ICD-10-CM

## 2023-03-21 DIAGNOSIS — R33.9 INCOMPLETE BLADDER EMPTYING: ICD-10-CM

## 2023-03-21 DIAGNOSIS — N13.8 BPH WITH OBSTRUCTION/LOWER URINARY TRACT SYMPTOMS: ICD-10-CM

## 2023-03-21 DIAGNOSIS — N18.4 CKD (CHRONIC KIDNEY DISEASE) STAGE 4, GFR 15-29 ML/MIN (HCC): ICD-10-CM

## 2023-03-21 LAB
ALBUMIN SERPL BCP-MCNC: 3.6 G/DL (ref 3.5–5)
ANION GAP SERPL CALCULATED.3IONS-SCNC: 4 MMOL/L (ref 4–13)
BUN SERPL-MCNC: 38 MG/DL (ref 5–25)
CALCIUM SERPL-MCNC: 9.6 MG/DL (ref 8.3–10.1)
CHLORIDE SERPL-SCNC: 108 MMOL/L (ref 96–108)
CO2 SERPL-SCNC: 26 MMOL/L (ref 21–32)
CREAT SERPL-MCNC: 2.28 MG/DL (ref 0.6–1.3)
CREAT UR-MCNC: 75.7 MG/DL
ERYTHROCYTE [DISTWIDTH] IN BLOOD BY AUTOMATED COUNT: 13.4 % (ref 11.6–15.1)
GFR SERPL CREATININE-BSD FRML MDRD: 24 ML/MIN/1.73SQ M
GLUCOSE SERPL-MCNC: 128 MG/DL (ref 65–140)
HCT VFR BLD AUTO: 32.9 % (ref 36.5–49.3)
HGB BLD-MCNC: 10.6 G/DL (ref 12–17)
MCH RBC QN AUTO: 29.1 PG (ref 26.8–34.3)
MCHC RBC AUTO-ENTMCNC: 32.2 G/DL (ref 31.4–37.4)
MCV RBC AUTO: 90 FL (ref 82–98)
PHOSPHATE SERPL-MCNC: 3.2 MG/DL (ref 2.3–4.1)
PLATELET # BLD AUTO: 248 THOUSANDS/UL (ref 149–390)
PMV BLD AUTO: 11.2 FL (ref 8.9–12.7)
POTASSIUM SERPL-SCNC: 4.4 MMOL/L (ref 3.5–5.3)
PROT UR-MCNC: 17 MG/DL
PROT/CREAT UR: 0.22 MG/G{CREAT} (ref 0–0.1)
PTH-INTACT SERPL-MCNC: 194.4 PG/ML (ref 18.4–80.1)
RBC # BLD AUTO: 3.64 MILLION/UL (ref 3.88–5.62)
SODIUM SERPL-SCNC: 138 MMOL/L (ref 135–147)
WBC # BLD AUTO: 7.92 THOUSAND/UL (ref 4.31–10.16)

## 2023-03-21 RX ORDER — FINASTERIDE 5 MG/1
5 TABLET, FILM COATED ORAL DAILY
Qty: 90 TABLET | Refills: 1 | Status: SHIPPED | OUTPATIENT
Start: 2023-03-21

## 2023-03-24 ENCOUNTER — TELEPHONE (OUTPATIENT)
Dept: CARDIOLOGY CLINIC | Facility: CLINIC | Age: 88
End: 2023-03-24

## 2023-03-24 NOTE — TELEPHONE ENCOUNTER
Pt calling reporting SOB /labored breathing since starting the Eliquis which he feels is the cause  States never labored with breathng when doing the steps and since starting this does   would like to discuss this with Dr Isabela Mendoza

## 2023-03-27 DIAGNOSIS — I48.0 PAROXYSMAL ATRIAL FIBRILLATION (HCC): Primary | ICD-10-CM

## 2023-03-28 ENCOUNTER — TELEPHONE (OUTPATIENT)
Dept: CARDIOLOGY CLINIC | Facility: CLINIC | Age: 88
End: 2023-03-28

## 2023-03-28 NOTE — TELEPHONE ENCOUNTER
3 weeks worth of Xarelto 15 mgs given for trial use  Patient will try them first and then use his insurance for after that  I explained to patient that we cannot supply long term for he mentioned they are expensive and unaffordable

## 2023-03-29 DIAGNOSIS — E78.5 HYPERLIPIDEMIA, UNSPECIFIED HYPERLIPIDEMIA TYPE: ICD-10-CM

## 2023-03-29 RX ORDER — ROSUVASTATIN CALCIUM 5 MG/1
5 TABLET, COATED ORAL DAILY
Qty: 90 TABLET | Refills: 0 | Status: SHIPPED | OUTPATIENT
Start: 2023-03-29

## 2023-04-15 PROBLEM — R07.89 CHEST DISCOMFORT: Status: RESOLVED | Noted: 2023-01-05 | Resolved: 2023-04-15

## 2023-04-25 ENCOUNTER — TELEPHONE (OUTPATIENT)
Dept: NEPHROLOGY | Facility: CLINIC | Age: 88
End: 2023-04-25

## 2023-04-25 DIAGNOSIS — N18.4 CKD (CHRONIC KIDNEY DISEASE) STAGE 4, GFR 15-29 ML/MIN (HCC): Primary | ICD-10-CM

## 2023-04-25 DIAGNOSIS — I10 PRIMARY HYPERTENSION: ICD-10-CM

## 2023-04-27 NOTE — TELEPHONE ENCOUNTER
Patient called back and stated he had spoken with the lab techs and I informed him that Dr Samreen Martinez would like more recent blood work as his last blood work was taken 3/27  Patient stated he will get the blood work drawn tomorrow

## 2023-04-27 NOTE — TELEPHONE ENCOUNTER
Received phone call from patient - states he had his labs done last month  I advised that we placed new orders for closer to the appointment but patient stated he does not want to repeat them at this time

## 2023-04-28 ENCOUNTER — APPOINTMENT (OUTPATIENT)
Dept: LAB | Facility: CLINIC | Age: 88
End: 2023-04-28

## 2023-04-28 DIAGNOSIS — I47.1 PSVT (PAROXYSMAL SUPRAVENTRICULAR TACHYCARDIA): ICD-10-CM

## 2023-04-28 DIAGNOSIS — I47.1 PSVT (PAROXYSMAL SUPRAVENTRICULAR TACHYCARDIA) (HCC): ICD-10-CM

## 2023-04-28 DIAGNOSIS — I35.0 NONRHEUMATIC AORTIC VALVE STENOSIS: ICD-10-CM

## 2023-04-28 LAB
ALBUMIN SERPL BCP-MCNC: 3.4 G/DL (ref 3.5–5)
ANION GAP SERPL CALCULATED.3IONS-SCNC: 5 MMOL/L (ref 4–13)
BUN SERPL-MCNC: 45 MG/DL (ref 5–25)
CALCIUM ALBUM COR SERPL-MCNC: 10.1 MG/DL (ref 8.3–10.1)
CALCIUM SERPL-MCNC: 9.6 MG/DL (ref 8.3–10.1)
CHLORIDE SERPL-SCNC: 106 MMOL/L (ref 96–108)
CO2 SERPL-SCNC: 27 MMOL/L (ref 21–32)
CREAT SERPL-MCNC: 2.39 MG/DL (ref 0.6–1.3)
ERYTHROCYTE [DISTWIDTH] IN BLOOD BY AUTOMATED COUNT: 13.6 % (ref 11.6–15.1)
GFR SERPL CREATININE-BSD FRML MDRD: 23 ML/MIN/1.73SQ M
GLUCOSE P FAST SERPL-MCNC: 103 MG/DL (ref 65–99)
HCT VFR BLD AUTO: 33.2 % (ref 36.5–49.3)
HGB BLD-MCNC: 10.2 G/DL (ref 12–17)
INR PPP: 1.63 (ref 0.84–1.19)
MCH RBC QN AUTO: 28.3 PG (ref 26.8–34.3)
MCHC RBC AUTO-ENTMCNC: 30.7 G/DL (ref 31.4–37.4)
MCV RBC AUTO: 92 FL (ref 82–98)
PHOSPHATE SERPL-MCNC: 3.2 MG/DL (ref 2.3–4.1)
PLATELET # BLD AUTO: 284 THOUSANDS/UL (ref 149–390)
PMV BLD AUTO: 11.2 FL (ref 8.9–12.7)
POTASSIUM SERPL-SCNC: 4.1 MMOL/L (ref 3.5–5.3)
PROTHROMBIN TIME: 19.5 SECONDS (ref 11.6–14.5)
PTH-INTACT SERPL-MCNC: 193.7 PG/ML (ref 18.4–80.1)
RBC # BLD AUTO: 3.6 MILLION/UL (ref 3.88–5.62)
SODIUM SERPL-SCNC: 138 MMOL/L (ref 135–147)
WBC # BLD AUTO: 8.45 THOUSAND/UL (ref 4.31–10.16)

## 2023-04-28 PROCEDURE — 36415 COLL VENOUS BLD VENIPUNCTURE: CPT

## 2023-04-28 PROCEDURE — 85610 PROTHROMBIN TIME: CPT

## 2023-04-30 LAB
CREAT UR-MCNC: 66.7 MG/DL
PROT UR-MCNC: 24 MG/DL
PROT/CREAT UR: 0.36 MG/G{CREAT} (ref 0–0.1)

## 2023-05-01 ENCOUNTER — HOSPITAL ENCOUNTER (OUTPATIENT)
Dept: NON INVASIVE DIAGNOSTICS | Facility: HOSPITAL | Age: 88
Discharge: HOME | End: 2023-05-01

## 2023-05-01 VITALS
SYSTOLIC BLOOD PRESSURE: 130 MMHG | HEART RATE: 80 BPM | HEIGHT: 69 IN | DIASTOLIC BLOOD PRESSURE: 90 MMHG | BODY MASS INDEX: 26.66 KG/M2 | WEIGHT: 180 LBS

## 2023-05-01 DIAGNOSIS — I35.0 AORTIC STENOSIS, SEVERE: Chronic | ICD-10-CM

## 2023-05-01 LAB
AORTIC ROOT: 3.4 CM
AORTIC VALVE MEAN VELOCITY: 24.4 M/S
APICAL FOUR CHAMBER EJECTION FRACTION: 41 %
ASCENDING AORTA: 4 CM
AV AREA BY CONTINUOUS VTI: 0.9 CM2
AV AREA PEAK VELOCITY: 0.9 CM2
AV LVOT MEAN GRADIENT: 1 MMHG
AV LVOT PEAK GRADIENT: 2 MMHG
AV MEAN GRADIENT: 26 MMHG
AV PEAK GRADIENT: 40 MMHG
AV REGURGITATION PRESSURE HALF TIME: 892 MS
AV VALVE AREA: 0.88 CM2
AV VELOCITY RATIO: 0.25
DOP CALC AO PEAK VEL: 3.14 M/S
DOP CALC AO VTI: 66.06 CM
DOP CALC LVOT AREA: 3.46 CM2
DOP CALC LVOT DIAMETER: 2.1 CM
DOP CALC LVOT PEAK VEL VTI: 16.7 CM
DOP CALC LVOT PEAK VEL: 0.78 M/S
DOP CALC LVOT STROKE INDEX: 28.3 ML/M2
DOP CALC LVOT STROKE VOLUME: 57.81
DOP CALC MV VTI: 27.54 CM
E WAVE DECELERATION TIME: 129 MS
FRACTIONAL SHORTENING: 33 (ref 28–44)
INTERVENTRICULAR SEPTUM IN DIASTOLE (PARASTERNAL SHORT AXIS VIEW): 0.9 CM
INTERVENTRICULAR SEPTUM: 0.9 CM (ref 0.6–1.1)
IVC: 2.3 MM
LAAS-AP2: 33.3 CM2
LAAS-AP4: 20.2 CM2
LEFT ATRIUM SIZE: 5.6 CM
LEFT INTERNAL DIMENSION IN SYSTOLE: 2.9 CM (ref 2.1–4)
LEFT VENTRICULAR INTERNAL DIMENSION IN DIASTOLE: 4.3 CM (ref 3.5–6)
LEFT VENTRICULAR POSTERIOR WALL IN END DIASTOLE: 1 CM
LEFT VENTRICULAR STROKE VOLUME: 52 ML
LVSV (TEICH): 52 ML
MITRAL REGURGITATION PEAK VELOCITY: 5.65 M/S
MITRAL VALVE MEAN INFLOW VELOCITY: 4 M/S
MITRAL VALVE REGURGITANT PEAK GRADIENT: 128 MMHG
MV E'TISSUE VEL-SEP: 8 CM/S
MV MEAN GRADIENT: 3 MMHG
MV PEAK A VEL: 0.45 M/S
MV PEAK E VEL: 140 CM/S
MV PEAK GRADIENT: 9 MMHG
MV STENOSIS PRESSURE HALF TIME: 38 MS
MV VALVE AREA BY CONTINUITY EQUATION: 2.1 CM2
MV VALVE AREA P 1/2 METHOD: 5.79
RA PRESSURE ESTIMATED: 10 MMHG
RIGHT ATRIUM AREA SYSTOLE A4C: 19.3 CM2
RIGHT VENTRICLE ID DIMENSION: 2.3 CM
RV PSP: 56 MMHG
SL CV AV DECELERATION TIME RETROGRADE: 3076 MS
SL CV AV PEAK GRADIENT RETROGRADE: 58 MMHG
SL CV DOP CALC MV VTI RETROGRADE: 173.7 CM
SL CV LEFT ATRIUM LENGTH A2C: 6.7 CM
SL CV LV EF: 60
SL CV MV MEAN GRADIENT RETROGRADE: 76 MMHG
SL CV PED ECHO LEFT VENTRICLE DIASTOLIC VOLUME (MOD BIPLANE) 2D: 84 ML
SL CV PED ECHO LEFT VENTRICLE SYSTOLIC VOLUME (MOD BIPLANE) 2D: 32 ML
SL CV SINUS OF VALSALVA 2D: 3.1 CM
TR MAX PG: 46 MMHG
TR PEAK VELOCITY: 3.4 M/S
TRICUSPID ANNULAR PLANE SYSTOLIC EXCURSION: 1.8 CM
TRICUSPID VALVE PEAK REGURGITATION VELOCITY: 3.38 M/S

## 2023-05-02 ENCOUNTER — OFFICE VISIT (OUTPATIENT)
Dept: NEPHROLOGY | Facility: CLINIC | Age: 88
End: 2023-05-02

## 2023-05-02 VITALS
HEIGHT: 69 IN | HEART RATE: 65 BPM | DIASTOLIC BLOOD PRESSURE: 78 MMHG | OXYGEN SATURATION: 95 % | SYSTOLIC BLOOD PRESSURE: 140 MMHG | WEIGHT: 179 LBS | BODY MASS INDEX: 26.51 KG/M2

## 2023-05-02 DIAGNOSIS — N25.81 SECONDARY HYPERPARATHYROIDISM OF RENAL ORIGIN (HCC): ICD-10-CM

## 2023-05-02 DIAGNOSIS — I35.0 AORTIC STENOSIS, SEVERE: Chronic | ICD-10-CM

## 2023-05-02 DIAGNOSIS — D64.9 CHRONIC ANEMIA: ICD-10-CM

## 2023-05-02 DIAGNOSIS — N18.4 CKD (CHRONIC KIDNEY DISEASE) STAGE 4, GFR 15-29 ML/MIN (HCC): Primary | ICD-10-CM

## 2023-05-02 DIAGNOSIS — I10 PRIMARY HYPERTENSION: ICD-10-CM

## 2023-05-02 DIAGNOSIS — N40.0 BENIGN PROSTATIC HYPERPLASIA WITHOUT LOWER URINARY TRACT SYMPTOMS: ICD-10-CM

## 2023-05-02 DIAGNOSIS — E78.00 PURE HYPERCHOLESTEROLEMIA: ICD-10-CM

## 2023-05-02 DIAGNOSIS — I50.32 CHRONIC DIASTOLIC (CONGESTIVE) HEART FAILURE (HCC): ICD-10-CM

## 2023-05-02 NOTE — PROGRESS NOTES
OFFICE FOLLOW UP - Nephrology   Yoni Ruggiero 80 y o  male MRN: [id number]       ASSESSMENT and PLAN:  Stewart Avila was seen today for follow-up and chronic kidney disease  Diagnoses and all orders for this visit:    CKD (chronic kidney disease) stage 4, GFR 15-29 ml/min (HCC)  -     Ambulatory Referral to CKD Education Program; Future  -     Ambulatory referral to advanced care planning; Future  -     CBC; Future  -     Renal function panel; Future  -     PTH, intact; Future  -     Protein / creatinine ratio, urine; Future    Aortic stenosis, severe    Chronic diastolic (congestive) heart failure (HCC)    Primary hypertension    Benign prostatic hyperplasia without lower urinary tract symptoms    Chronic anemia    Pure hypercholesterolemia    Secondary hyperparathyroidism of renal origin [facility])        This is an 29-year-old gentleman with history of chronic kidney disease stage 4 previous creatinine in the high 1s to low 2s since 2017 who was initially seen for evaluation on 03/2022, also history of chronic diastolic heart failure, severe aortic stenosis, hypertension and hyperlipidemia, patient today returns to the office for CKD follow-up  1  Chronic kidney disease stage 4 with previous creatinine around 1 7-2 1 since 2017  Creatinine seems early 2022 in the low 2s  CKD likely multifactorial in the setting of hypertension, diastolic heart failure, severe aortic stenosis, possible component of cardiorenal syndrome as well as age-related nephron loss and atherosclerotic disease  Recent creatinine 2 39 with an estimated GFR of 23  Patient in the past reported he was not interested on dialysis if needed  Today patient reports he changed his mind and he is interestied on having further evaluation by cardiothoracic surgery for his severe aortic stenosis, he understands risk of worsening kidney function with TAVR work-up    He now is agreeable with dialysis if needed he is interested on home modality, refer to kidney smart class for further education, refer for ACP  Discussed about importance to follow low-salt diet, avoid NSAIDs  I would like to see him back in the office in 3 to 4 months    2  Hypertension, blood pressure today acceptable on current regimen, no changes on his medication  Follow low-salt diet  3  Chronic diastolic heart failure, severe aortic stenosis, on exam looks near euvolemic  Continue close follow-up with his cardiologist   Status post recent echo  Reported feeling more short of breath lately and having worsening leg swelling  Now he is agreeable to see cardiothoracic surgery and have further evaluation for possible valve replacement, risk given advanced kidney disease were discussed  4  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, hemoglobin low but stable 10 2, follow labs in 3-4 months  5  Min[place] bone disease, 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, PTH elevated but acceptable, follow labs in 3 to 4 months    6  History of BPH, on Flomax  7  Hyperlipidemia, continue with statins, most recent lipid panel well-controlled on 5/2022        Patient Instructions   As we discussed in the office visit based on the most recent blood test you have advanced chronic kidney disease stage IV but your kidney numbers remain fairly stable  Now you reported that you have changed your mind and are willing to go for valve replacement if needed  We discussed about further testing including contrasted studies that can be associated with increased risk of worsening kidney function  At this moment you state that if your kidney function worsened now you are agreeable with dialysis if needed and ideally a home modality if possible  Would like you to go to kidney smart class for further education and modality review    5 wishes packet was given today, recommend advance care planning meeting  Like to see you back in the office in 3 to 4 months with repeat blood and urine test   Continue close follow-up with your primary care doctor and your cardiologist           HPI: Ashley Caceres is a 80 y o  male who is here for Follow-up and Chronic Kidney Disease    This is a patient history of chronic diastolic heart failure, severe aortic stenosis, hypertension, chronic kidney disease stage 4 was initially seen for evaluation on 03/2022  Last time patient was seen in our office was on 12/2022  Patient today returns to the office for follow-up with his wife Dennis Polanco  He reports that lately is having slightly worsening shortness of breath as well as leg swelling  Other than that in general he is feeling about the same  Patient denies any chest pain  Denies any abdominal pain, no nausea, vomiting, no diarrhea or constipation  Denies any urinary problems, no burning sensation or gross hematuria  Reports nocturia once or twice at night  Now he states that he changed his mind and now he is interested on seeing cardiac surgery and be evaluated for possible valve replacement given his worsening symptoms  Understand risk given his advanced kidney disease and he is also agreeable with dialysis if needed    The last blood work was done on 4/28/2023, which we have reviewed together  ROS: All the systems were reviewed and were negative except as documented on the H&P  Allergies: Patient has no known allergies      Medications:   Current Outpatient Medications:     Cholecalciferol (VITAMIN D3) 125 MCG (5000 UT) CAPS, 2 (two) times a week , Disp: , Rfl:     finasteride (PROSCAR) 5 mg tablet, Take 1 tablet (5 mg total) by mouth daily, Disp: 90 tablet, Rfl: 1    furosemide (LASIX) 20 mg tablet, Take 1 tablet (20 mg total) by mouth daily, Disp: 90 tablet, Rfl: 2    metoprolol succinate (TOPROL-XL) 25 mg 24 hr tablet, Take 1 tablet (25 mg total) by mouth daily, Disp: 90 tablet, Rfl: 3    Multiple Vitamin (MULTI-VITAMIN DAILY) TABS, Take by mouth, Disp: , Rfl:    nitroglycerin (NITROSTAT) 0 4 mg SL tablet, Place 1 tablet (0 4 mg total) under the tongue once as needed for chest pain for up to 1 dose Lay down supine before taking and wait 15 minutes to get up  Get up very slowly  If pain continues, call 911, Disp: 25 tablet, Rfl: 2    omeprazole (PriLOSEC) 20 mg delayed release capsule, Take 1 capsule (20 mg total) by mouth daily, Disp: 90 capsule, Rfl: 1    rivaroxaban (Xarelto) 15 mg tablet, Take 1 tablet (15 mg total) by mouth daily with dinner, Disp: 30 tablet, Rfl: 5    rosuvastatin (CRESTOR) 5 mg tablet, Take 1 tablet (5 mg total) by mouth daily, Disp: 90 tablet, Rfl: 0    tamsulosin (FLOMAX) 0 4 mg, Take 1 capsule (0 4 mg total) by mouth daily with dinner, Disp: 30 capsule, Rfl: 5    Past Medical History:   Diagnosis Date    Aortic stenosis, severe 11/1/2021    Atrial fibrillation (HCC)     Colon polyp     GERD (gastroesophageal reflux disease)     Hearing loss     last assessed 11/8/17    Hypertension     Rheumatic fever     Stenosis of aorta      Past Surgical History:   Procedure Laterality Date    APPENDECTOMY      BACK SURGERY      lower    CARDIAC SURGERY      ablation    CATARACT EXTRACTION      JOINT REPLACEMENT Left     knee    KNEE SURGERY Left     PA PROSTATE NEEDLE BIOPSY ANY APPROACH N/A 3/16/2021    Procedure: Transperineal prostate biopsy with biplanar transrectal ultrasound, using the perineal logic kit; Surgeon: Jennifer Pate MD;  Location: [facility];  Service: Urology    TONSILLECTOMY AND ADENOIDECTOMY       Family History   Problem Relation Age of Onset    Other Mother         old age   Yancy Aguilera Esophageal cancer Father    Yancy Aguilera Other Father         old age   Yancy Aguilera Alcohol abuse Son         alcoholism      reports that he quit smoking about 54 years ago  His smoking use included cigarettes  He started smoking about 70 years ago  He has a 16 00 pack-year smoking history  He has never used smokeless tobacco  He reports current alcohol use   He "reports that he does not use drugs  Physical Exam:   Vitals:    05/02/23 1252   BP: 140/78   BP Location: Left arm   Patient Position: Sitting   Cuff Size: Adult   Pulse: 65   SpO2: 95%   Weight: 81 2 kg (179 lb)   Height: 5' 9\" (1 753 m)     Body mass index is 26 43 kg/m²  General: conscious, cooperative, in not acute distress  Eyes: conjunctivae pink, anicteric sclerae  ENT: lips and mucous membranes moist  Neck: supple, no JVD  Chest: clear breath sounds bilateral, no crackles, ronchus or wheezings  CVS: distinct S1 & S2, normal rate, regular rhythm, positive aortic murmur  Abdomen: soft, non-tender, non-distended, normoactive bowel sounds  Back: no CVA tenderness  Extremities: 1+ edema of both legs  Skin: no rash  Neuro: awake, alert, oriented        Laboratory Results:  Lab Results   Component Value Date    WBC 8 45 04/28/2023    HGB 10 2 (L) 04/28/2023    HCT 33 2 (L) 04/28/2023    MCV 92 04/28/2023     04/28/2023     Lab Results   Component Value Date    SODIUM 138 04/28/2023    K 4 1 04/28/2023     04/28/2023    CO2 27 04/28/2023    BUN 45 (H) 04/28/2023    CREATININE 2 39 (H) 04/28/2023    GLUC 128 03/21/2023    CALCIUM 9 6 04/28/2023       Lab Results   Component Value Date     7 (H) 04/28/2023    CALCIUM 9 6 04/28/2023    PHOS 3 2 04/28/2023             Portions of the record may have been created with voice recognition software  Occasional wrong word or \"sound a like\" substitutions may have occurred due to the inherent limitations of voice recognition software  Read the chart carefully and recognize, using context, where substitutions have occurred  If you have any questions, please contact the dictating provider    "

## 2023-05-02 NOTE — PATIENT INSTRUCTIONS
As we discussed in the office visit based on the most recent blood test you have advanced chronic kidney disease stage IV but your kidney numbers remain fairly stable  Now you reported that you have changed your mind and are willing to go for valve replacement if needed  We discussed about further testing including contrasted studies that can be associated with increased risk of worsening kidney function  At this moment you state that if your kidney function worsened now you are agreeable with dialysis if needed and ideally a home modality if possible  Would like you to go to kidney smart class for further education and modality review    5 wishes packet was given today, recommend advance care planning meeting  Like to see you back in the office in 3 to 4 months with repeat blood and urine test   Continue close follow-up with your primary care doctor and your cardiologist

## 2023-05-05 ENCOUNTER — OFFICE VISIT (OUTPATIENT)
Dept: INTERNAL MEDICINE CLINIC | Facility: CLINIC | Age: 88
End: 2023-05-05

## 2023-05-05 VITALS
SYSTOLIC BLOOD PRESSURE: 122 MMHG | BODY MASS INDEX: 26.81 KG/M2 | WEIGHT: 181 LBS | HEIGHT: 69 IN | TEMPERATURE: 97.6 F | HEART RATE: 105 BPM | DIASTOLIC BLOOD PRESSURE: 74 MMHG | RESPIRATION RATE: 18 BRPM | OXYGEN SATURATION: 100 %

## 2023-05-05 DIAGNOSIS — E78.5 HYPERLIPIDEMIA, UNSPECIFIED HYPERLIPIDEMIA TYPE: ICD-10-CM

## 2023-05-05 DIAGNOSIS — I48.0 PAROXYSMAL ATRIAL FIBRILLATION (HCC): ICD-10-CM

## 2023-05-05 DIAGNOSIS — N40.0 BENIGN PROSTATIC HYPERPLASIA WITHOUT LOWER URINARY TRACT SYMPTOMS: ICD-10-CM

## 2023-05-05 DIAGNOSIS — R91.8 PULMONARY NODULES: ICD-10-CM

## 2023-05-05 DIAGNOSIS — N18.4 CKD (CHRONIC KIDNEY DISEASE) STAGE 4, GFR 15-29 ML/MIN (HCC): ICD-10-CM

## 2023-05-05 DIAGNOSIS — I35.0 AORTIC STENOSIS, SEVERE: ICD-10-CM

## 2023-05-05 DIAGNOSIS — Z00.00 MEDICARE ANNUAL WELLNESS VISIT, SUBSEQUENT: Primary | ICD-10-CM

## 2023-05-05 DIAGNOSIS — I50.32 CHRONIC DIASTOLIC (CONGESTIVE) HEART FAILURE (HCC): ICD-10-CM

## 2023-05-05 NOTE — ASSESSMENT & PLAN NOTE
CT of the chest completed December 2022 shows slowly growing part solid nodule in left upper lobe now measuring 10 mm  Finding suspicious for slow-growing bronchogenic malignancy  -Findings discussed between patient and pulmonology    Plan for now is to check follow-up CT scan in 6 months

## 2023-05-05 NOTE — ASSESSMENT & PLAN NOTE
New diagnosis since our last appointment    Had adverse reaction to Eliquis which caused significant chest discomfort which is very gradually improving    Rate controlled on Toprol XL  On Xarelto for stroke prevention

## 2023-05-05 NOTE — ASSESSMENT & PLAN NOTE
-follows with Nephrology  -baseline creatinine around 1 7 - 2 1    Creatinine has been ranging this year from around 2 0-2 4

## 2023-05-05 NOTE — PATIENT INSTRUCTIONS
Medicare Preventive Visit Patient Instructions  Thank you for completing your Welcome to Medicare Visit or Medicare Annual Wellness Visit today  Your next wellness visit will be due in one year (5/5/2024)  The screening/preventive services that you may require over the next 5-10 years are detailed below  Some tests may not apply to you based off risk factors and/or age  Screening tests ordered at today's visit but not completed yet may show as past due  Also, please note that scanned in results may not display below  Preventive Screenings:  Service Recommendations Previous Testing/Comments   Colorectal Cancer Screening  · Colonoscopy    · Fecal Occult Blood Test (FOBT)/Fecal Immunochemical Test (FIT)  · Fecal DNA/Cologuard Test  · Flexible Sigmoidoscopy Age: 39-70 years old   Colonoscopy: every 10 years (May be performed more frequently if at higher risk)  OR  FOBT/FIT: every 1 year  OR  Cologuard: every 3 years  OR  Sigmoidoscopy: every 5 years  Screening may be recommended earlier than age 39 if at higher risk for colorectal cancer  Also, an individualized decision between you and your healthcare provider will decide whether screening between the ages of 74-80 would be appropriate   Colonoscopy: 11/01/2021  FOBT/FIT: Not on file  Cologuard: Not on file  Sigmoidoscopy: Not on file          Prostate Cancer Screening Individualized decision between patient and health care provider in men between ages of 53-78   Medicare will cover every 12 months beginning on the day after your 50th birthday PSA: 1 1 ng/mL           Hepatitis C Screening Once for adults born between 1945 and 1965  More frequently in patients at high risk for Hepatitis C Hep C Antibody: Not on file        Diabetes Screening 1-2 times per year if you're at risk for diabetes or have pre-diabetes Fasting glucose: 103 mg/dL (4/28/2023)  A1C: No results in last 5 years (No results in last 5 years)      Cholesterol Screening Once every 5 years if you don't have a lipid disorder  May order more often based on risk factors  Lipid panel: 05/27/2022         Other Preventive Screenings Covered by Medicare:  1  Abdominal Aortic Aneurysm (AAA) Screening: covered once if your at risk  You're considered to be at risk if you have a family history of AAA or a male between the age of 73-68 who smoking at least 100 cigarettes in your lifetime  2  Lung Cancer Screening: covers low dose CT scan once per year if you meet all of the following conditions: (1) Age 50-69; (2) No signs or symptoms of lung cancer; (3) Current smoker or have quit smoking within the last 15 years; (4) You have a tobacco smoking history of at least 20 pack years (packs per day x number of years you smoked); (5) You get a written order from a healthcare provider  3  Glaucoma Screening: covered annually if you're considered high risk: (1) You have diabetes OR (2) Family history of glaucoma OR (3)  aged 48 and older OR (3)  American aged 72 and older  3  Osteoporosis Screening: covered every 2 years if you meet one of the following conditions: (1) Have a vertebral abnormality; (2) On glucocorticoid therapy for more than 3 months; (3) Have primary hyperparathyroidism; (4) On osteoporosis medications and need to assess response to drug therapy  5  HIV Screening: covered annually if you're between the age of 12-76  Also covered annually if you are younger than 13 and older than 72 with risk factors for HIV infection  For pregnant patients, it is covered up to 3 times per pregnancy      Immunizations:  Immunization Recommendations   Influenza Vaccine Annual influenza vaccination during flu season is recommended for all persons aged >= 6 months who do not have contraindications   Pneumococcal Vaccine   * Pneumococcal conjugate vaccine = PCV13 (Prevnar 13), PCV15 (Vaxneuvance), PCV20 (Prevnar 20)  * Pneumococcal polysaccharide vaccine = PPSV23 (Pneumovax) Adults 25-60 years old: 1-3 doses may be recommended based on certain risk factors  Adults 72 years old: 1-2 doses may be recommended based off what pneumonia vaccine you previously received   Hepatitis B Vaccine 3 dose series if at intermediate or high risk (ex: diabetes, end stage renal disease, liver disease)   Tetanus (Td) Vaccine - COST NOT COVERED BY MEDICARE PART B Following completion of primary series, a booster dose should be given every 10 years to maintain immunity against tetanus  Td may also be given as tetanus wound prophylaxis  Tdap Vaccine - COST NOT COVERED BY MEDICARE PART B Recommended at least once for all adults  For pregnant patients, recommended with each pregnancy  Shingles Vaccine (Shingrix) - COST NOT COVERED BY MEDICARE PART B  2 shot series recommended in those aged 48 and above     Health Maintenance Due:  There are no preventive care reminders to display for this patient  Immunizations Due:      Topic Date Due    Hepatitis B Vaccine (1 of 3 - Risk 3-dose series) Never done    Pneumococcal Vaccine: 65+ Years (2 - PPSV23 if available, else PCV20) 11/21/2017    COVID-19 Vaccine (4 - Booster for Rinda Bonita series) 12/18/2021     Advance Directives   What are advance directives? Advance directives are legal documents that state your wishes and plans for medical care  These plans are made ahead of time in case you lose your ability to make decisions for yourself  Advance directives can apply to any medical decision, such as the treatments you want, and if you want to donate organs  What are the types of advance directives? There are many types of advance directives, and each state has rules about how to use them  You may choose a combination of any of the following:  · Living will: This is a written record of the treatment you want  You can also choose which treatments you do not want, which to limit, and which to stop at a certain time  This includes surgery, medicine, IV fluid, and tube feedings  · Durable power of  for healthcare [facility]): This is a written record that states who you want to make healthcare choices for you when you are unable to make them for yourself  This person, called a proxy, is usually a family member or a friend  You may choose more than 1 proxy  · Do not resuscitate (DNR) order:  A DNR order is used in case your heart stops beating or you stop breathing  It is a request not to have certain forms of treatment, such as CPR  A DNR order may be included in other types of advance directives  · Medical directive: This covers the care that you want if you are in a coma, near death, or unable to make decisions for yourself  You can list the treatments you want for each condition  Treatment may include pain medicine, surgery, blood transfusions, dialysis, IV or tube feedings, and a ventilator (breathing machine)  · Values history: This document has questions about your views, beliefs, and how you feel and think about life  This information can help others choose the care that you would choose  Why are advance directives important? An advance directive helps you control your care  Although spoken wishes may be used, it is better to have your wishes written down  Spoken wishes can be misunderstood, or not followed  Treatments may be given even if you do not want them  An advance directive may make it easier for your family to make difficult choices about your care  Weight Management   Why it is important to manage your weight:  Being overweight increases your risk of health conditions such as heart disease, high blood pressure, type 2 diabetes, and certain types of cancer  It can also increase your risk for osteoarthritis, sleep apnea, and other respiratory problems  Aim for a slow, steady weight loss  Even a small amount of weight loss can lower your risk of health problems    How to lose weight safely:  A safe and healthy way to lose weight is to eat fewer calories and get regular exercise  You can lose up about 1 pound a week by decreasing the number of calories you eat by 500 calories each day  Healthy meal plan for weight management:  A healthy meal plan includes a variety of foods, contains fewer calories, and helps you stay healthy  A healthy meal plan includes the following:  · Eat whole-grain foods more often  A healthy meal plan should contain fiber  Fiber is the part of grains, fruits, and vegetables that is not broken down by your body  Whole-grain foods are healthy and provide extra fiber in your diet  Some examples of whole-grain foods are whole-wheat breads and pastas, oatmeal, brown rice, and bulgur  · Eat a variety of vegetables every day  Include dark, leafy greens such as spinach, kale, jay greens, and mustard greens  Eat yellow and orange vegetables such as carrots, sweet potatoes, and winter squash  · Eat a variety of fruits every day  Choose fresh or canned fruit (canned in its own juice or light syrup) instead of juice  Fruit juice has very little or no fiber  · Eat low-fat dairy foods  Drink fat-free (skim) milk or 1% milk  Eat fat-free yogurt and low-fat cottage cheese  Try low-fat cheeses such as mozzarella and other reduced-fat cheeses  · Choose meat and other protein foods that are low in fat  Choose beans or other legumes such as split peas or lentils  Choose fish, skinless poultry (chicken or turkey), or lean cuts of red meat (beef or pork)  Before you cook meat or poultry, cut off any visible fat  · Use less fat and oil  Try baking foods instead of frying them  Add less fat, such as margarine, sour cream, regular salad dressing and mayonnaise to foods  Eat fewer high-fat foods  Some examples of high-fat foods include french fries, doughnuts, ice cream, and cakes  · Eat fewer sweets  Limit foods and drinks that are high in sugar  This includes candy, cookies, regular soda, and sweetened drinks    Exercise:  Exercise at least 30 minutes per day on most days of the week  Some examples of exercise include walking, biking, dancing, and swimming  You can also fit in more physical activity by taking the stairs instead of the elevator or parking farther away from stores  Ask your healthcare provider about the best exercise plan for you  © Copyright [employer] 2018 Information is for End User's use only and may not be sold, redistributed or otherwise used for commercial purposes   All illustrations and images included in CareNotes® are the copyrighted property of [employer]  or [address] [place] [address] [employer]

## 2023-05-05 NOTE — ASSESSMENT & PLAN NOTE
-history of rheumatic fever, now with severe aortic stenosis  -2D echo completed May 2023-EF 60%  Moderate to severe AS with AV peak velocity 3 14M/S, mean gradient 26 mmHg, aortic valve area 0 88 cm2  Parameters appear to be slightly improved compared to prior echo  -Patient is now interested in potentially pursuing TAVR workup    I suggest he discuss this with his cardiologist

## 2023-05-05 NOTE — PROGRESS NOTES
Assessment and Plan:     Problem List Items Addressed This Visit        Respiratory    Pulmonary nodules     CT of the chest completed December 2022 shows slowly growing part solid nodule in left upper lobe now measuring 10 mm  Finding suspicious for slow-growing bronchogenic malignancy  -Findings discussed between patient and pulmonology  Plan for now is to check follow-up CT scan in 6 months            Cardiovascular and Mediastinum    Aortic stenosis, severe (Chronic)     -history of rheumatic fever, now with severe aortic stenosis  -2D echo completed May 2023-EF 60%  Moderate to severe AS with AV peak velocity 3 14M/S, mean gradient 26 mmHg, aortic valve area 0 88 cm2  Parameters appear to be slightly improved compared to prior echo  -Patient is now interested in potentially pursuing TAVR workup  I suggest he discuss this with his cardiologist         Chronic diastolic (congestive) heart failure ([facility] [address] )     -appears euvolemic today  -Currently on Lasix 20 mg daily  Per Cardiology note, when attempt was made to reduce Lasix to 20 mg every other day, patient decompensated                   Paroxysmal atrial fibrillation ([facility] [address] )     New diagnosis since our last appointment  Had adverse reaction to Eliquis which caused significant chest discomfort which is very gradually improving    Rate controlled on Toprol XL  On Xarelto for stroke prevention             Genitourinary    CKD (chronic kidney disease) stage 4, GFR 15-29 ml/min ([facility])     -follows with Nephrology  -baseline creatinine around 1 7 - 2 1  Creatinine has been ranging this year from around 2 0-2 4         BPH (benign prostatic hyperplasia)     Continue finasteride, tamsulosin  Stable        Other Visit Diagnoses     Medicare annual wellness visit, subsequent    -  Primary    Hyperlipidemia, unspecified hyperlipidemia type        Relevant Orders    Lipid Panel with Direct LDL reflex        BMI Counseling: Body mass index is 26 73 kg/m²   The BMI is above normal  Nutrition recommendations include decreasing portion sizes, encouraging healthy choices of fruits and vegetables, moderation in carbohydrate intake and increasing intake of lean protein  Exercise recommendations include moderate physical activity 150 minutes/week  Rationale for BMI follow-up plan is due to patient being overweight or obese  Depression Screening and Follow-up Plan: Patient was screened for depression during today's encounter  They screened negative with a PHQ-2 score of 0  Preventive health issues were discussed with patient, and age appropriate screening tests were ordered as noted in patient's After Visit Summary  Personalized health advice and appropriate referrals for health education or preventive services given if needed, as noted in patient's After Visit Summary       History of Present Illness:     Patient presents for a Medicare Wellness Visit    HPI   Patient Care Team:  Andrew Silverman DO as PCP - General (Internal Medicine)  Christopher Brower MD (Orthopedic Surgery)  Nancy Desai MD (Cardiology)  Mary Cantu MD (Nephrology)     Review of Systems:     Review of Systems     Problem List:     Patient Active Problem List   Diagnosis    Chronic anemia    Linda esophagus    Esophageal reflux    Hypertension    Spinal stenosis of lumbar region    Spondylolisthesis    PSVT (paroxysmal supraventricular tachycardia) (HCC)    Palpitations    Pure hypercholesterolemia    PVC (premature ventricular contraction)    CKD (chronic kidney disease) stage 4, GFR 15-29 ml/min (HCC)    Chronic diastolic (congestive) heart failure (HCC)    BPH (benign prostatic hyperplasia)    History of radiofrequency ablation  for SVT    Pulmonary nodules    Aortic stenosis, severe    Elevated troponin    Cigarette nicotine dependence in remission    Lesion of left lung    Angina pectoris, unstable ([facility] [address] ) New onset    Secondary hyperparathyroidism of renal origin ([facility] [address] )    Paroxysmal atrial fibrillation [facility])      Past Medical and Surgical History:     Past Medical History:   Diagnosis Date    Aortic stenosis, severe 2021    Atrial fibrillation (HCC)     Colon polyp     GERD (gastroesophageal reflux disease)     Hearing loss     last assessed 17    Hypertension     Rheumatic fever     Stenosis of aorta      Past Surgical History:   Procedure Laterality Date    APPENDECTOMY      BACK SURGERY      lower    CARDIAC SURGERY      ablation    CATARACT EXTRACTION      JOINT REPLACEMENT Left     knee    KNEE SURGERY Left     HI PROSTATE NEEDLE BIOPSY ANY APPROACH N/A 3/16/2021    Procedure: Transperineal prostate biopsy with biplanar transrectal ultrasound, using the perineal logic kit;   Surgeon: Zohaib Welch MD;  [facility];  Service: Urology    TONSILLECTOMY AND ADENOIDECTOMY        Family History:     Family History   Problem Relation Age of Onset   Maldonado Uriostegui Other Mother         old age   Maldonado Uriostegui Esophageal cancer Father    Maldonado Uriostegui Other Father         old age   Maldonado Uriostegui Alcohol abuse Son         alcoholism      Social History:     Social History     Socioeconomic History    Marital status: /Civil Union     Spouse name: None    Number of children: None    Years of education: None    Highest education level: None   Occupational History    None   Tobacco Use    Smoking status: Former     Packs/day: 1 00     Years: 16 00     Pack years: 16 00     Types: Cigarettes     Start date: 5     Quit date:      Years since quittin 4    Smokeless tobacco: Never   Vaping Use    Vaping Use: Never used   Substance and Sexual Activity    Alcohol use: Yes     Comment: occ    Drug use: No    Sexual activity: Yes     Partners: Female   Other Topics Concern    None   Social History Narrative    None     Social Determinants of Health     Financial Resource Strain: Low Risk     Difficulty of Paying Living Expenses: Not hard at all   Food Insecurity: Not on file Transportation Needs: No Transportation Needs    Lack of Transportation (Medical): No    Lack of Transportation (Non-Medical): No   Physical Activity: Not on file   Stress: Not on file   Social Connections: Not on file   Intimate Partner Violence: Not on file   Housing Stability: Not on file      Medications and Allergies:     Current Outpatient Medications   Medication Sig Dispense Refill    Cholecalciferol (VITAMIN D3) 125 MCG (5000 UT) CAPS 2 (two) times a week       finasteride (PROSCAR) 5 mg tablet Take 1 tablet (5 mg total) by mouth daily 90 tablet 1    furosemide (LASIX) 20 mg tablet Take 1 tablet (20 mg total) by mouth daily 90 tablet 2    metoprolol succinate (TOPROL-XL) 25 mg 24 hr tablet Take 1 tablet (25 mg total) by mouth daily 90 tablet 3    Multiple Vitamin (MULTI-VITAMIN DAILY) TABS Take by mouth      nitroglycerin (NITROSTAT) 0 4 mg SL tablet Place 1 tablet (0 4 mg total) under the tongue once as needed for chest pain for up to 1 dose Lay down supine before taking and wait 15 minutes to get up  Get up very slowly  If pain continues, call 911 25 tablet 2    omeprazole (PriLOSEC) 20 mg delayed release capsule Take 1 capsule (20 mg total) by mouth daily 90 capsule 1    rivaroxaban (Xarelto) 15 mg tablet Take 1 tablet (15 mg total) by mouth daily with dinner 30 tablet 5    rosuvastatin (CRESTOR) 5 mg tablet Take 1 tablet (5 mg total) by mouth daily 90 tablet 0    tamsulosin (FLOMAX) 0 4 mg Take 1 capsule (0 4 mg total) by mouth daily with dinner 30 capsule 5     No current facility-administered medications for this visit       No Known Allergies   Immunizations:     Immunization History   Administered Date(s) Administered    COVID-19 MODERNA VACC 0 5 ML IM 01/19/2021, 02/15/2021, 10/23/2021    Hep A, adult 02/01/2000, 08/15/2000    INFLUENZA 10/23/2009, 10/12/2010, 09/29/2011, 10/03/2012, 10/23/2013, 10/29/2014, 11/06/2015, 09/01/2016, 10/10/2016, 10/06/2017, 10/03/2018, 10/10/2018, 09/20/2019, 09/30/2019, 10/01/2019, 10/02/2019, 09/02/2020, 09/11/2020, 10/07/2020, 09/10/2021, 10/10/2022    Influenza Split High Dose Preservative Free IM 11/06/2015, 09/01/2016, 10/06/2017    Influenza, seasonal, injectable 11/15/2000, 10/26/2001, 10/28/2003, 12/01/2005, 11/01/2007, 10/03/2012, 10/23/2013    Pneumococcal Conjugate 13-Valent 11/21/2016    Tdap 09/18/2012    Zoster 09/01/2007    Zoster Vaccine Recombinant 02/22/2019, 05/03/2019    influenza, trivalent, adjuvanted 09/30/2019, 09/11/2020      Health Maintenance: There are no preventive care reminders to display for this patient  Topic Date Due    Hepatitis B Vaccine (1 of 3 - Risk 3-dose series) Never done    Pneumococcal Vaccine: 65+ Years (2 - PPSV23 if available, else PCV20) 11/21/2017    COVID-19 Vaccine (4 - Booster for Toya Abbey series) 12/18/2021      Medicare Screening Tests and Risk Assessments:     Parish Navarrete is here for his Subsequent Wellness visit  Health Risk Assessment:   Patient rates overall health as good  Patient feels that their physical health rating is same  Patient is satisfied with their life  Eyesight was rated as same  Hearing was rated as same  Patient feels that their emotional and mental health rating is same  Patients states they are never, rarely angry  Patient states they are sometimes unusually tired/fatigued  Pain experienced in the last 7 days has been some  Patient's pain rating has been 3/10  Patient states that he has experienced no weight loss or gain in last 6 months  Depression Screening:   PHQ-2 Score: 0      Fall Risk Screening: In the past year, patient has experienced: history of falling in past year      Home Safety:  Patient does not have trouble with stairs inside or outside of their home  Patient has working smoke alarms and has working carbon monoxide detector  Home safety hazards include: none  Nutrition:   Current diet is No Added Salt       Medications:   Patient is not currently taking any over-the-counter supplements  Patient is able to manage medications  Activities of Daily Living (ADLs)/Instrumental Activities of Daily Living (IADLs):   Walk and transfer into and out of bed and chair?: Yes  Dress and groom yourself?: Yes    Bathe or shower yourself?: Yes    Feed yourself? Yes  Do your laundry/housekeeping?: Yes  Manage your money, pay your bills and track your expenses?: Yes  Make your own meals?: Yes    Do your own shopping?: Yes    Durable Medical Equipment Suppliers  [employer]    Previous Hospitalizations:   Any hospitalizations or ED visits within the last 12 months?: No      Advance Care Planning:   Living will: Yes    Durable POA for healthcare: Yes    Advanced directive: Yes      Cognitive Screening:   Provider or family/friend/caregiver concerned regarding cognition?: No    PREVENTIVE SCREENINGS      Cardiovascular Screening:    General: Screening Not Indicated and History Lipid Disorder      Diabetes Screening:     General: Screening Current      Colorectal Cancer Screening:     General: Screening Not Indicated      Prostate Cancer Screening:    General: Screening Not Indicated      Osteoporosis Screening:    General: Screening Not Indicated      Abdominal Aortic Aneurysm (AAA) Screening:    Risk factors include: tobacco use        General: Screening Not Indicated      Lung Cancer Screening:     General: Screening Not Indicated      Hepatitis C Screening:    General: Screening Not Indicated    Screening, Brief Intervention, and Referral to Treatment (SBIRT)    Screening  Typical number of drinks in a day: 0  Typical number of drinks in a week: 0  Interpretation: Low risk drinking behavior  AUDIT-C Screenin) How often did you have a drink containing alcohol in the past year? monthly or less  2) How many drinks did you have on a typical day when you were drinking in the past year? 0  3) How often did you have 6 or more drinks on one occasion in the past year? "never    AUDIT-C Score: 1  Interpretation: Score 0-3 (male): Negative screen for alcohol misuse    Single Item Drug Screening:  How often have you used an illegal drug (including marijuana) or a prescription medication for non-medical reasons in the past year? never    Single Item Drug Screen Score: 0  Interpretation: Negative screen for possible drug use disorder    Brief Intervention  Alcohol & drug use screenings were reviewed  No concerns regarding substance use disorder identified  No results found       Physical Exam:     /74 (BP Location: Left arm, Patient Position: Sitting, Cuff Size: Standard)   Pulse 105   Temp 97 6 °F (36 4 °C)   Resp 18   Ht 5' 9\" (1 753 m)   Wt 82 1 kg (181 lb)   SpO2 100%   BMI 26 73 kg/m²     Physical Exam       "

## 2023-05-07 ENCOUNTER — DOCUMENTATION (OUTPATIENT)
Dept: CARDIOLOGY CLINIC | Facility: CLINIC | Age: 88
End: 2023-05-07

## 2023-05-07 NOTE — PROGRESS NOTES
See nephrology note of 5/2/2023 patient has decided that he wants to go ahead with TAVR and is willing to begin dialysis if need be

## 2023-05-08 ENCOUNTER — TELEPHONE (OUTPATIENT)
Dept: CARDIOLOGY CLINIC | Facility: CLINIC | Age: 88
End: 2023-05-08

## 2023-05-08 DIAGNOSIS — I35.0 NONRHEUMATIC AORTIC VALVE STENOSIS: Primary | ICD-10-CM

## 2023-05-19 ENCOUNTER — OFFICE VISIT (OUTPATIENT)
Dept: CARDIAC SURGERY | Facility: CLINIC | Age: 88
End: 2023-05-19

## 2023-05-19 VITALS
WEIGHT: 180.5 LBS | BODY MASS INDEX: 26.73 KG/M2 | HEIGHT: 69 IN | OXYGEN SATURATION: 98 % | TEMPERATURE: 96.9 F | SYSTOLIC BLOOD PRESSURE: 131 MMHG | HEART RATE: 90 BPM | DIASTOLIC BLOOD PRESSURE: 72 MMHG

## 2023-05-19 DIAGNOSIS — I47.1 PSVT (PAROXYSMAL SUPRAVENTRICULAR TACHYCARDIA) (HCC): ICD-10-CM

## 2023-05-19 DIAGNOSIS — I35.0 NONRHEUMATIC AORTIC VALVE STENOSIS: ICD-10-CM

## 2023-05-19 DIAGNOSIS — Z01.810 PRE-OPERATIVE CARDIOVASCULAR EXAMINATION: Primary | ICD-10-CM

## 2023-05-19 DIAGNOSIS — N18.4 CKD (CHRONIC KIDNEY DISEASE) STAGE 4, GFR 15-29 ML/MIN (HCC): ICD-10-CM

## 2023-05-19 DIAGNOSIS — I10 PRIMARY HYPERTENSION: ICD-10-CM

## 2023-05-19 NOTE — PROGRESS NOTES
Consultation - Cardiothoracic Surgery   Kristy Mulligan 80 y o  male MRN: [id number]    Physician Requesting Consult: Hannah Mena    Reason for Consult / Principal Problem: Aortic stenosis, Non-Rheumatic    History of Present Illness: Kristy Mulligan is a 80y o  year old male who presents for initial outpatient surgical consultation for severe symptomatic aortic stenosis  During interview today, he relates to me a history of a heart murmur that he acquired following rheumatic fever as a child  More recently he was referred to Dr Hannah Mena for evaluation and treatment  Serial echocardiography did identify significant aortic stenosis  His most recent study performed earlier this month demonstrated progression into the moderate to severe range  He subsequently been referred to our office for surgical discussion  During interview today his symptoms that he relates to me are exertional dyspnea  This is recently accelerated over the preceding several weeks  He also notes significant lower extremity edema  He denies any significant angina, lightheadedness, dizziness, or syncope  Past medical history is otherwise significant for atrial fibrillation, hypertension, hyperlipidemia, CKD stage IV with a baseline creatinine of 2 39 and a GFR of 23, hyperparathyroidism, anemia, GERD, BPH, ascending aortic aneurysm-4 5 cm, chronic diastolic congestive heart failure, SVT status post ablation in 2019      Past Medical History:  Past Medical History:   Diagnosis Date   • Aortic stenosis, severe 11/1/2021   • Atrial fibrillation [facility])    • Colon polyp    • GERD (gastroesophageal reflux disease)    • Hearing loss     last assessed 11/8/17   • Hypertension    • Rheumatic fever    • Stenosis of aorta          Past Surgical History:   Past Surgical History:   Procedure Laterality Date   • APPENDECTOMY     • BACK SURGERY      lower   • CARDIAC SURGERY      ablation   • CATARACT EXTRACTION     • JOINT REPLACEMENT Left     knee • KNEE SURGERY Left    • GA PROSTATE NEEDLE BIOPSY ANY APPROACH N/A 3/16/2021    Procedure: Transperineal prostate biopsy with biplanar transrectal ultrasound, using the perineal logic kit; Surgeon: Doc Mendez MD;  [facility];  Service: Urology   • TONSILLECTOMY AND ADENOIDECTOMY           Family History:  Family History   Problem Relation Age of Onset   • Other Mother         old age   • Esophageal cancer Father    • Other Father         old age   • Alcohol abuse Son         alcoholism         Social History:    Social History     Substance and Sexual Activity   Alcohol Use Yes    Comment: occ     Social History     Substance and Sexual Activity   Drug Use No     Social History     Tobacco Use   Smoking Status Former   • Packs/day: 1 00   • Years: 16    • Pack years:    • Types: Cigarettes   • Start date: 5   • Quit date:    • Years since quittin 4   Smokeless Tobacco Never       Home Medications:   Prior to Admission medications    Medication Sig Start Date End Date Taking? Authorizing Provider   Cholecalciferol (VITAMIN D3) 125 MCG (5000 UT) CAPS 2 (two) times a week     Historical Provider, MD   finasteride (PROSCAR) 5 mg tablet Take 1 tablet (5 mg total) by mouth daily 3/21/23   Max Tonya Marie DO   furosemide (LASIX) 20 mg tablet Take 1 tablet (20 mg total) by mouth daily 22   Max Tonya Marie DO   metoprolol succinate (TOPROL-XL) 25 mg 24 hr tablet Take 1 tablet (25 mg total) by mouth daily 3/6/23   Jamee Davalos MD   Multiple Vitamin (MULTI-VITAMIN DAILY) TABS Take by mouth    Historical Provider, MD   nitroglycerin (NITROSTAT) 0 4 mg SL tablet Place 1 tablet (0 4 mg total) under the tongue once as needed for chest pain for up to 1 dose Lay down supine before taking and wait 15 minutes to get up  Get up very slowly    If pain continues, call 911 2/15/23   Jamee Davalos MD   omeprazole (PriLOSEC) 20 mg delayed release capsule Take 1 capsule (20 mg total) by mouth "daily 1/14/20   Zenobia Granados MD   rivaroxaban (Xarelto) 15 mg tablet Take 1 tablet (15 mg total) by mouth daily with dinner 3/27/23   Bindu Choi MD   rosuvastatin (CRESTOR) 5 mg tablet Take 1 tablet (5 mg total) by mouth daily 3/29/23   Andrew Valentine DO   tamsulosin (FLOMAX) 0 4 mg Take 1 capsule (0 4 mg total) by mouth daily with dinner 2/3/23   Andrew Valentine DO       Allergies: Allergies   Allergen Reactions   • Apixaban Other (See Comments)       Review of Systems:     Review of Systems   Constitutional: Positive for activity change and fatigue  HENT: Negative  Eyes: Negative  Respiratory: Positive for shortness of breath  Negative for chest tightness  Cardiovascular: Positive for leg swelling  Negative for chest pain  Endocrine: Negative  Genitourinary: Negative  Musculoskeletal: Negative  Skin: Negative  Neurological: Negative  Psychiatric/Behavioral: Negative  Vital Signs:     Vitals:    05/19/23 1317 05/19/23 1320   BP: 131/78 131/72   BP Location: Left arm Right arm   Patient Position: Sitting Sitting   Cuff Size: Standard Standard   Pulse: 90    Temp: (!) 96 9 °F (36 1 °C)    TempSrc: Tympanic    SpO2: 98%    Weight: 81 9 kg (180 lb 8 oz)    Height: 5' 9\" (1 753 m)        Physical Exam:     Physical Exam  Constitutional:       Appearance: Normal appearance  He is well-developed  HENT:      Head: Normocephalic and atraumatic  Eyes:      Conjunctiva/sclera: Conjunctivae normal    Neck:      Thyroid: No thyromegaly  Vascular: No carotid bruit or JVD  Trachea: No tracheal deviation  Cardiovascular:      Rate and Rhythm: Normal rate and regular rhythm  Pulses:           Carotid pulses are 2+ on the right side and 2+ on the left side  Dorsalis pedis pulses are 2+ on the right side and 2+ on the left side  Posterior tibial pulses are 2+ on the right side and 2+ on the left side        Heart sounds: S1 normal and S2 normal  " Murmur heard  Pulmonary:      Effort: No accessory muscle usage or respiratory distress  Breath sounds: No wheezing or rales  Chest:      Chest wall: No tenderness  Abdominal:      General: Bowel sounds are normal       Palpations: Abdomen is soft  Tenderness: There is no abdominal tenderness  Musculoskeletal:         General: Normal range of motion  Cervical back: Full passive range of motion without pain and normal range of motion  Right lower leg: Edema present  Left lower leg: Edema present  Skin:     General: Skin is warm and dry  Neurological:      Mental Status: He is alert and oriented to person, place, and time  Cranial Nerves: No cranial nerve deficit  Sensory: No sensory deficit  Psychiatric:         Speech: Speech normal          Behavior: Behavior normal          Lab Results:               Invalid input(s): LABGLOM      No results found for: HGBA1C  Lab Results   Component Value Date    TROPONINI 0 03 08/11/2021       Imaging Studies:     Echocardiogram: EF 60%  Mild to moderate MR  Moderate to severe aortic stenosis  Mean gradient 26  Peak gradient 40  Aortic valve area estimated at 0 88 cm²   mild tricuspid regurgitation  Ascending aorta 4 cm at its greatest diameter  I have personally reviewed pertinent reports     and I have personally reviewed pertinent films in PACS    TAVR evaluation Assessment:     Tyson Proctor 122: III    STS Risk Score: 4 5 %, mortality risk    Aortic Stenosis Stage: D1    IURT-89 completed        Assessment:  Patient Active Problem List    Diagnosis Date Noted   • Paroxysmal atrial fibrillation ([facility] [address] ) 05/05/2023   • Secondary hyperparathyroidism of renal origin ([facility] [address] ) 05/02/2023   • Angina pectoris, unstable ([address] ) New onset 03/12/2023   • Lesion of left lung 01/05/2023   • Cigarette nicotine dependence in remission 12/16/2021   • Elevated troponin 11/10/2021   • Pulmonary nodules 11/01/2021   • Aortic stenosis, severe 11/01/2021   • History of radiofrequency ablation  for SVT 08/20/2021   • BPH (benign prostatic hyperplasia) 08/12/2021   • CKD (chronic kidney disease) stage 4, GFR 15-29 ml/min ([facility]) 08/11/2021   • [facility]congestive) [facility] 08/11/2021   • Pure hypercholesterolemia 08/22/2019   • PVC (premature ventricular contraction) 08/22/2019   • Palpitations 11/08/2018   • PSVT (paroxysmal supraventricular tachycardia) ([facility] ) 11/07/2018   • Spondylolisthesis 06/30/2016   • Chronic anemia 05/26/2016   • Spinal stenosis of lumbar region 05/02/2016   • 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   • Esophageal reflux 02/19/2013   • Hypertension 08/23/2012     Severe aortic stenosis; Ongoing TAVR workup    Plan:    Roseann Dumont has severe symptomatic aortic stenosis  However, he also has significant mitral regurgitation  This coupled with his chronic kidney disease stage IV and advanced age accelerate his risk stratification for any surgical intervention  After long discussion of risks and benefits, we have decided to recommend medical management  Roseann uDmont was comfortable with our recommendations, and their questions were answered to their satisfaction  We will continue to evaluate the patient, with a final surgical recommendation pending the above work up  Thank you for allowing us to participate in the care of this patient  Routine referral to gastroenterology for colonoscopy screening was not indicated, as the patient is over 76years old    SIGNATURE: Mahendra Phillips PA-C  DATE: May 19, 2023  TIME: 1:56 PM    * This note was completed in part utilizing [employer] direct voice recognition software  Grammatical errors, random word insertion, spelling mistakes, and incomplete sentences may be an occasional consequence of the system secondary to software limitations, ambient noise and hardware issues  At the time of dictation, efforts were made to edit, clarify and /or correct errors   Please read the chart carefully and recognize, using context, where substitutions have occurred  If you have any questions or concerns about the context, text or information contained within the body of this dictation, please contact myself, the provider, for further clarification

## 2023-05-30 ENCOUNTER — TELEPHONE (OUTPATIENT)
Dept: CARDIOLOGY CLINIC | Facility: CLINIC | Age: 88
End: 2023-05-30

## 2023-05-30 NOTE — TELEPHONE ENCOUNTER
Pt calling stating he is feeling lousy on the xarelto, states huffing and puffing, water in his ankles, chest pressure   the patient is looking for advisement   he held xarelto on own for 2 days and states seemed better  Please advise did see Otis Rodríguez who suggested he reach out to Dr Mark Greene   Please call patient

## 2023-05-31 ENCOUNTER — TELEPHONE (OUTPATIENT)
Dept: NEPHROLOGY | Facility: CLINIC | Age: 88
End: 2023-05-31

## 2023-05-31 NOTE — TELEPHONE ENCOUNTER
Patient called the office C/o Swelling of ankles and feet  He does not have any  weight gain nor shortness or breath at this time  Last medication change was 2 months ago ,Elelanieis switched to Xarelto but nothing new at this time   He is currently taking Furosemide 20 mg daily

## 2023-05-31 NOTE — TELEPHONE ENCOUNTER
Called and spoke with patient  Patient aware : to increase water pill to 2 tabs daily for 3-5 days and after than once swelling improves he can go back to previous dose of 1 tab daily      Advised patient to please call the office to let us know he received the message and if have any questions or concerns

## 2023-05-31 NOTE — TELEPHONE ENCOUNTER
Called patient and left a voicemail stating the following information: to increase water pill to 2 tabs daily for 3-5 days and after than once swelling improves he can go back to previous dose of 1 tab daily  Advised patient to please call the office to let us know he received the message and if have any questions or concerns

## 2023-06-30 DIAGNOSIS — E78.5 HYPERLIPIDEMIA, UNSPECIFIED HYPERLIPIDEMIA TYPE: ICD-10-CM

## 2023-06-30 RX ORDER — ROSUVASTATIN CALCIUM 5 MG/1
5 TABLET, COATED ORAL DAILY
Qty: 90 TABLET | Refills: 0 | Status: SHIPPED | OUTPATIENT
Start: 2023-06-30

## 2023-07-26 ENCOUNTER — APPOINTMENT (OUTPATIENT)
Dept: LAB | Facility: CLINIC | Age: 88
End: 2023-07-26
Payer: MEDICARE

## 2023-07-26 DIAGNOSIS — E78.5 HYPERLIPIDEMIA, UNSPECIFIED HYPERLIPIDEMIA TYPE: ICD-10-CM

## 2023-07-26 DIAGNOSIS — N18.4 CKD (CHRONIC KIDNEY DISEASE) STAGE 4, GFR 15-29 ML/MIN (HCC): ICD-10-CM

## 2023-07-26 LAB
ALBUMIN SERPL BCP-MCNC: 3.6 G/DL (ref 3.5–5)
ANION GAP SERPL CALCULATED.3IONS-SCNC: 4 MMOL/L
BUN SERPL-MCNC: 36 MG/DL (ref 5–25)
CALCIUM SERPL-MCNC: 9.9 MG/DL (ref 8.3–10.1)
CHLORIDE SERPL-SCNC: 109 MMOL/L (ref 96–108)
CHOLEST SERPL-MCNC: 111 MG/DL
CO2 SERPL-SCNC: 27 MMOL/L (ref 21–32)
CREAT SERPL-MCNC: 2.23 MG/DL (ref 0.6–1.3)
CREAT UR-MCNC: 41.7 MG/DL
ERYTHROCYTE [DISTWIDTH] IN BLOOD BY AUTOMATED COUNT: 15.5 % (ref 11.6–15.1)
GFR SERPL CREATININE-BSD FRML MDRD: 25 ML/MIN/1.73SQ M
GLUCOSE P FAST SERPL-MCNC: 104 MG/DL (ref 65–99)
HCT VFR BLD AUTO: 36 % (ref 36.5–49.3)
HDLC SERPL-MCNC: 53 MG/DL
HGB BLD-MCNC: 11.5 G/DL (ref 12–17)
LDLC SERPL CALC-MCNC: 48 MG/DL (ref 0–100)
MCH RBC QN AUTO: 28.3 PG (ref 26.8–34.3)
MCHC RBC AUTO-ENTMCNC: 31.9 G/DL (ref 31.4–37.4)
MCV RBC AUTO: 89 FL (ref 82–98)
PHOSPHATE SERPL-MCNC: 3.6 MG/DL (ref 2.3–4.1)
PLATELET # BLD AUTO: 226 THOUSANDS/UL (ref 149–390)
PMV BLD AUTO: 11 FL (ref 8.9–12.7)
POTASSIUM SERPL-SCNC: 4.3 MMOL/L (ref 3.5–5.3)
PROT UR-MCNC: 15 MG/DL
PROT/CREAT UR: 0.36 MG/G{CREAT} (ref 0–0.1)
PTH-INTACT SERPL-MCNC: 232.7 PG/ML (ref 12–88)
RBC # BLD AUTO: 4.07 MILLION/UL (ref 3.88–5.62)
SODIUM SERPL-SCNC: 140 MMOL/L (ref 135–147)
TRIGL SERPL-MCNC: 52 MG/DL
WBC # BLD AUTO: 7.3 THOUSAND/UL (ref 4.31–10.16)

## 2023-07-26 PROCEDURE — 36415 COLL VENOUS BLD VENIPUNCTURE: CPT

## 2023-07-26 PROCEDURE — 80061 LIPID PANEL: CPT

## 2023-07-26 PROCEDURE — 84156 ASSAY OF PROTEIN URINE: CPT

## 2023-07-26 PROCEDURE — 82570 ASSAY OF URINE CREATININE: CPT

## 2023-07-26 PROCEDURE — 85027 COMPLETE CBC AUTOMATED: CPT

## 2023-07-26 PROCEDURE — 83970 ASSAY OF PARATHORMONE: CPT

## 2023-07-26 PROCEDURE — 80069 RENAL FUNCTION PANEL: CPT

## 2023-07-31 ENCOUNTER — TELEPHONE (OUTPATIENT)
Dept: NEPHROLOGY | Facility: CLINIC | Age: 88
End: 2023-07-31

## 2023-07-31 DIAGNOSIS — N18.4 CKD (CHRONIC KIDNEY DISEASE) STAGE 4, GFR 15-29 ML/MIN (HCC): Primary | ICD-10-CM

## 2023-07-31 NOTE — TELEPHONE ENCOUNTER
Received call from pt with complaint of leg swelling for a week now, pt stated swelling is in both legs, left leg from knee down and right leg from hip down. Pt original appointment was scheduled for 8/25, I rescheduled pt for sooner on 8/9 and pt would like a call from Dr. Sujey Garvin to advise what to do until pt is seen.

## 2023-07-31 NOTE — TELEPHONE ENCOUNTER
Spoke with patient, unfortunately he was deemed a high risk candidate for valve replacement. Reports worsening leg swelling, currently taking furosemide 20 mg daily, last blood test last week kidney function is stable with creatinine in the low twos    Recommend to increase furosemide to 40 mg daily, follow low-salt diet, plan to repeat blood test next week before office visit.         CC patient's PCP and cardiologist to keep them updated

## 2023-08-02 ENCOUNTER — TELEPHONE (OUTPATIENT)
Dept: NEPHROLOGY | Facility: CLINIC | Age: 88
End: 2023-08-02

## 2023-08-04 NOTE — TELEPHONE ENCOUNTER
Called patient to remained to please complete labs before follow up appoountment. Pt will do lab on Monday.

## 2023-08-07 ENCOUNTER — LAB (OUTPATIENT)
Dept: LAB | Facility: CLINIC | Age: 88
End: 2023-08-07
Payer: MEDICARE

## 2023-08-07 DIAGNOSIS — N18.4 CKD (CHRONIC KIDNEY DISEASE) STAGE 4, GFR 15-29 ML/MIN (HCC): ICD-10-CM

## 2023-08-07 LAB
ANION GAP SERPL CALCULATED.3IONS-SCNC: 8 MMOL/L
BUN SERPL-MCNC: 36 MG/DL (ref 5–25)
CALCIUM SERPL-MCNC: 9.9 MG/DL (ref 8.3–10.1)
CHLORIDE SERPL-SCNC: 104 MMOL/L (ref 96–108)
CO2 SERPL-SCNC: 27 MMOL/L (ref 21–32)
CREAT SERPL-MCNC: 2.33 MG/DL (ref 0.6–1.3)
GFR SERPL CREATININE-BSD FRML MDRD: 24 ML/MIN/1.73SQ M
GLUCOSE P FAST SERPL-MCNC: 104 MG/DL (ref 65–99)
POTASSIUM SERPL-SCNC: 3.6 MMOL/L (ref 3.5–5.3)
SODIUM SERPL-SCNC: 139 MMOL/L (ref 135–147)

## 2023-08-07 PROCEDURE — 80048 BASIC METABOLIC PNL TOTAL CA: CPT

## 2023-08-07 PROCEDURE — 36415 COLL VENOUS BLD VENIPUNCTURE: CPT

## 2023-08-09 ENCOUNTER — OFFICE VISIT (OUTPATIENT)
Dept: NEPHROLOGY | Facility: CLINIC | Age: 88
End: 2023-08-09

## 2023-08-09 VITALS
BODY MASS INDEX: 25.77 KG/M2 | WEIGHT: 174 LBS | SYSTOLIC BLOOD PRESSURE: 114 MMHG | HEIGHT: 69 IN | DIASTOLIC BLOOD PRESSURE: 78 MMHG

## 2023-08-09 DIAGNOSIS — E78.00 PURE HYPERCHOLESTEROLEMIA: ICD-10-CM

## 2023-08-09 DIAGNOSIS — I50.32 CHRONIC DIASTOLIC (CONGESTIVE) HEART FAILURE (HCC): ICD-10-CM

## 2023-08-09 DIAGNOSIS — R33.9 INCOMPLETE BLADDER EMPTYING: ICD-10-CM

## 2023-08-09 DIAGNOSIS — N40.1 BPH WITH OBSTRUCTION/LOWER URINARY TRACT SYMPTOMS: ICD-10-CM

## 2023-08-09 DIAGNOSIS — N13.8 BPH WITH OBSTRUCTION/LOWER URINARY TRACT SYMPTOMS: ICD-10-CM

## 2023-08-09 DIAGNOSIS — N40.0 BENIGN PROSTATIC HYPERPLASIA WITHOUT LOWER URINARY TRACT SYMPTOMS: ICD-10-CM

## 2023-08-09 DIAGNOSIS — R39.12 WEAK URINARY STREAM: ICD-10-CM

## 2023-08-09 DIAGNOSIS — N25.81 SECONDARY HYPERPARATHYROIDISM OF RENAL ORIGIN (HCC): ICD-10-CM

## 2023-08-09 DIAGNOSIS — I35.0 AORTIC STENOSIS, SEVERE: Chronic | ICD-10-CM

## 2023-08-09 DIAGNOSIS — N18.4 CKD (CHRONIC KIDNEY DISEASE) STAGE 4, GFR 15-29 ML/MIN (HCC): Primary | ICD-10-CM

## 2023-08-09 DIAGNOSIS — D64.9 CHRONIC ANEMIA: ICD-10-CM

## 2023-08-09 RX ORDER — TAMSULOSIN HYDROCHLORIDE 0.4 MG/1
0.4 CAPSULE ORAL
Qty: 30 CAPSULE | Refills: 4 | Status: SHIPPED | OUTPATIENT
Start: 2023-08-09

## 2023-08-09 NOTE — PROGRESS NOTES
OFFICE FOLLOW UP - Nephrology   Tai Pena 80 y.o. male MRN: [id number]       ASSESSMENT and PLAN:  Whitney Penny was seen today for follow-up and chronic kidney disease. Diagnoses and all orders for this visit:    CKD (chronic kidney disease) stage 4, GFR 15-29 ml/min ([facility])  -     CBC; Future  -     Renal function panel; Future  -     PTH, intact; Future  -     Protein / creatinine ratio, urine; Future    Chronic diastolic (congestive) heart failure (HCC)    Aortic stenosis, severe    Secondary hyperparathyroidism of renal origin ([address])    Benign prostatic hyperplasia without lower urinary tract symptoms    Pure hypercholesterolemia    Chronic anemia        This is an 63-year-old gentleman with history of chronic kidney disease stage 4 previous creatinine in the high 1s to low 2s since 2017 who was initially seen for evaluation on 03/2022, also history of chronic diastolic heart failure, severe aortic stenosis, hypertension and hyperlipidemia, patient today returns to the office for CKD follow-up. 1. Chronic kidney disease stage 4 with previous creatinine around 1.7-2.1 since 2017. Creatinine since early 2022 in the low 2s  CKD likely multifactorial in the setting of hypertension, diastolic heart failure, severe aortic stenosis, possible component of cardiorenal syndrome as well as age-related nephron loss and atherosclerotic disease. Most recent creatinine 2.33 with an estimated GFR of 24  Patient now reports he is NOT interested on dialysis if needed. Continue with conservative management and close follow-up, I would like to see him back in the office in 4 months with repeat labs. We discussed about importance to follow low-salt diet, continue with diuretics    2. Hypertension, blood pressure acceptable on current regimen, no changes on his medication. Follow low-salt diet.     3. Chronic diastolic heart failure, severe aortic stenosis,MR, continues with lower extremity edema on and off worse over the right leg. Continue close follow-up with his cardiologist.  Was deemed high risk to have surgery or TAVR by cardiac surgery team.  Continue current diuretics, follow low salt diet. 4. 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, hemoglobin low but stable 11.5 , follow labs in 4 months. 5. 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 History of BPH, on Flomax. 7. Hyperlipidemia, continue with statins, most recent lipid panel well-controlled on 07/2023        Patient Instructions   As we discussed in the office visit and after reviewing recent blood test you have moderate to advanced chronic kidney disease stage IV but kidney function remains fairly stable. It is very important to continue following a low-salt diet less than 2 g of salt in a day. Continue with current diuretics. Follow daily weight. Remember to avoid NSAIDs (no ibuprofen, Motrin, Advil, Aleve, naproxen). Okay to take Tylenol or acetaminophen as needed for pain. As we discussed today in the office visit with your wife you have decided that you do not want any aggressive intervention in the future if your kidney function worsened including dialysis. For now we will continue with conservative management. I would like to see you back in the office in 4 months repeat labs. Continue close follow-up with your primary care doctor and your cardiologist.          HPI: Tai Pean is a 80 y.o. male who is here for Follow-up and Chronic Kidney Disease  . This is a patient history of chronic diastolic heart failure, severe aortic stenosis, hypertension, chronic kidney disease stage 4 was initially seen for evaluation on 03/2022. Last time patient was seen in our office was on 05/2023  Patient today returns to the office for follow-up with his wife Janae Foote.     Since last office visit patient was evaluated by cardiac surgeon, unfortunately was needing not a good candidate for open heart surgery nor TAVR given advanced age as well as significant kidney disease as well as AS and MR  Today in general is doing well, denies significant complaints other than bilateral lower extremity edema worse over the right leg. Patient denies any chest pain shortness of breath. Denies any abdominal pain, no nausea, vomiting, no diarrhea or constipation. Denies any urinary problems, no burning sensation or gross hematuria. Reports nocturia once or twice at night. After patient was told he high risk to have surgery and or TAVR, he understand it and now he has decided that he does not want dialysis in the future if needed. The last blood work was done on 08/07/2023, which we have reviewed together. ROS: All the systems were reviewed and were negative except as documented on the H&P. Allergies: Apixaban    Medications:   Current Outpatient Medications:   •  Cholecalciferol (VITAMIN D3) 125 MCG (5000 UT) CAPS, 2 (two) times a week , Disp: , Rfl:   •  finasteride (PROSCAR) 5 mg tablet, Take 1 tablet (5 mg total) by mouth daily, Disp: 90 tablet, Rfl: 1  •  furosemide (LASIX) 20 mg tablet, Take 1 tablet (20 mg total) by mouth daily, Disp: 90 tablet, Rfl: 2  •  metoprolol succinate (TOPROL-XL) 25 mg 24 hr tablet, Take 1 tablet (25 mg total) by mouth daily, Disp: 90 tablet, Rfl: 3  •  Multiple Vitamin (MULTI-VITAMIN DAILY) TABS, Take by mouth, Disp: , Rfl:   •  nitroglycerin (NITROSTAT) 0.4 mg SL tablet, Place 1 tablet (0.4 mg total) under the tongue once as needed for chest pain for up to 1 dose Lay down supine before taking and wait 15 minutes to get up. Get up very slowly.   If pain continues, call 911, Disp: 25 tablet, Rfl: 2  •  omeprazole (PriLOSEC) 20 mg delayed release capsule, Take 1 capsule (20 mg total) by mouth daily, Disp: 90 capsule, Rfl: 1  •  rivaroxaban (Xarelto) 15 mg tablet, Take 1 tablet (15 mg total) by mouth daily with dinner, Disp: 30 tablet, Rfl: 5  •  rosuvastatin (CRESTOR) 5 mg tablet, Take 1 tablet (5 mg total) by mouth daily, Disp: 90 tablet, Rfl: 0  •  tamsulosin (FLOMAX) 0.4 mg, Take 1 capsule (0.4 mg total) by mouth daily with dinner, Disp: 30 capsule, Rfl: 4    Past Medical History:   Diagnosis Date   • Aortic stenosis, severe 11/1/2021   • Atrial fibrillation (HCC)    • Colon polyp    • GERD (gastroesophageal reflux disease)    • Hearing loss     last assessed 11/8/17   • Hypertension    • Rheumatic fever    • Stenosis of aorta      Past Surgical History:   Procedure Laterality Date   • APPENDECTOMY     • BACK SURGERY      lower   • CARDIAC SURGERY      ablation   • CATARACT EXTRACTION     • JOINT REPLACEMENT Left     knee   • KNEE SURGERY Left    • OK PROSTATE NEEDLE BIOPSY ANY APPROACH N/A 3/16/2021    Procedure: Transperineal prostate biopsy with biplanar transrectal ultrasound, using the perineal logic kit; Surgeon: Gela Kim MD;  Location: [facility];  Service: Urology   • TONSILLECTOMY AND ADENOIDECTOMY       Family History   Problem Relation Age of Onset   • Other Mother         old age   • Esophageal cancer Father    • Other Father         old age   • Alcohol abuse Son         alcoholism      reports that he quit smoking about 54 years ago. His smoking use included cigarettes. He started smoking about 70 years ago. He has a 16.00 pack-year smoking history. He has never used smokeless tobacco. He reports current alcohol use. He reports that he does not use drugs. Physical Exam:   Vitals:    08/09/23 1421   BP: 114/78   BP Location: Left arm   Patient Position: Sitting   Cuff Size: Standard   Weight: 78.9 kg (174 lb)   Height: 5' 9" (1.753 m)     Body mass index is 25.7 kg/m².     General: conscious, cooperative, in not acute distress  Eyes: conjunctivae pink, anicteric sclerae  ENT: lips and mucous membranes moist  Neck: supple, no JVD  Chest: clear breath sounds bilateral, no crackles, ronchus or wheezings  CVS: distinct S1 & S2, normal rate, regular rhythm, positive systolic murmur   Abdomen: soft, non-tender, non-distended, normoactive bowel sounds  Back: no CVA tenderness  Extremities: 1+ edema of both legs worst over right leg  Skin: no rash  Neuro: awake, alert, oriented            Laboratory Results:  Lab Results   Component Value Date    WBC 7.30 07/26/2023    HGB 11.5 (L) 07/26/2023    HCT 36.0 (L) 07/26/2023    MCV 89 07/26/2023     07/26/2023     Lab Results   Component Value Date    SODIUM 139 08/07/2023    K 3.6 08/07/2023     08/07/2023    CO2 27 08/07/2023    BUN 36 (H) 08/07/2023    CREATININE 2.33 (H) 08/07/2023    GLUC 128 03/21/2023    CALCIUM 9.9 08/07/2023       Lab Results   Component Value Date    .7 (H) 07/26/2023    CALCIUM 9.9 08/07/2023    PHOS 3.6 07/26/2023             Portions of the record may have been created with voice recognition software. Occasional wrong word or "sound a like" substitutions may have occurred due to the inherent limitations of voice recognition software. Read the chart carefully and recognize, using context, where substitutions have occurred. If you have any questions, please contact the dictating provider.

## 2023-08-09 NOTE — PATIENT INSTRUCTIONS
As we discussed in the office visit and after reviewing recent blood test you have moderate to advanced chronic kidney disease stage IV but kidney function remains fairly stable. It is very important to continue following a low-salt diet less than 2 g of salt in a day. Continue with current diuretics. Follow daily weight. Remember to avoid NSAIDs (no ibuprofen, Motrin, Advil, Aleve, naproxen). Okay to take Tylenol or acetaminophen as needed for pain. As we discussed today in the office visit with your wife you have decided that you do not want any aggressive intervention in the future if your kidney function worsened including dialysis. For now we will continue with conservative management. I would like to see you back in the office in 4 months repeat labs.   Continue close follow-up with your primary care doctor and your cardiologist.

## 2023-08-29 ENCOUNTER — RA CDI HCC (OUTPATIENT)
Dept: OTHER | Facility: HOSPITAL | Age: 88
End: 2023-08-29

## 2023-08-29 NOTE — PROGRESS NOTES
[address] [facility] coding opportunities     I13.0     Chart Reviewed number of suggestions sent to Provider: 1     Patients Insurance     Medicare Insurance: [employer]

## 2023-09-05 ENCOUNTER — OFFICE VISIT (OUTPATIENT)
Dept: INTERNAL MEDICINE CLINIC | Facility: CLINIC | Age: 88
End: 2023-09-05
Payer: MEDICARE

## 2023-09-05 VITALS
HEART RATE: 95 BPM | WEIGHT: 175 LBS | RESPIRATION RATE: 18 BRPM | HEIGHT: 69 IN | BODY MASS INDEX: 25.92 KG/M2 | DIASTOLIC BLOOD PRESSURE: 72 MMHG | SYSTOLIC BLOOD PRESSURE: 118 MMHG | OXYGEN SATURATION: 95 % | TEMPERATURE: 97.2 F

## 2023-09-05 DIAGNOSIS — I50.32 CHRONIC DIASTOLIC (CONGESTIVE) HEART FAILURE (HCC): ICD-10-CM

## 2023-09-05 DIAGNOSIS — N18.4 CKD (CHRONIC KIDNEY DISEASE) STAGE 4, GFR 15-29 ML/MIN (HCC): ICD-10-CM

## 2023-09-05 DIAGNOSIS — R91.8 PULMONARY NODULES: Primary | ICD-10-CM

## 2023-09-05 DIAGNOSIS — I35.0 AORTIC STENOSIS, SEVERE: Chronic | ICD-10-CM

## 2023-09-05 DIAGNOSIS — I48.0 PAROXYSMAL ATRIAL FIBRILLATION (HCC): ICD-10-CM

## 2023-09-05 PROCEDURE — 99214 OFFICE O/P EST MOD 30 MIN: CPT | Performed by: INTERNAL MEDICINE

## 2023-09-05 NOTE — ASSESSMENT & PLAN NOTE
-history of rheumatic fever, now with severe aortic stenosis  -Evaluated by cardiothoracic surgery and he is not felt to be a surgical candidate given his age, renal function, comorbidities, and associated mitral regurgitation

## 2023-09-05 NOTE — PROGRESS NOTES
INTERNAL MEDICINE OFFICE VISIT  [facility]- [place]    NAME: Luciana Cotton  AGE: 80 y.o. SEX: male    DATE OF ENCOUNTER: 9/5/2023    Assessment and Plan/History of Present Illness     Here today for follow-up. Medical history of rheumatic fever with resulting severe aortic stenosis, hypertension, hyperlipidemia, BPH, paroxysmal SVT, chronic diastolic CHF, CKD stage 4, spinal stenosis, left knee replacement, lung nodule      1. Pulmonary nodules  Assessment & Plan:  CT completed April 2023 showing irregular mixed density nodule left upper lobe, essentially stable in size compared to prior CT December 2022. Slow-growing bronchogenic malignancy remains in the differential.  Radiology has suggested 6-month follow-up CT of the chest      2. Paroxysmal atrial fibrillation (HCC)  Assessment & Plan:  Rate controlled on Toprol XL  On Xarelto for stroke prevention       3. Chronic diastolic (congestive) heart failure (HCC)  Assessment & Plan:  -Right greater than left pitting edema on exam today, weight actually down about 5 pounds since her last appointment remains on Lasix 20 mg daily. Per Cardiology note, when attempt was made to reduce Lasix to 20 mg every other day, patient decompensated  -Continue with current dose of Lasix for now                    4. Aortic stenosis, severe  Assessment & Plan:  -history of rheumatic fever, now with severe aortic stenosis  -Evaluated by cardiothoracic surgery and he is not felt to be a surgical candidate given his age, renal function, comorbidities, and associated mitral regurgitation      5. CKD (chronic kidney disease) stage 4, GFR 15-29 ml/min ([facility])  Assessment & Plan:  -follows with Nephrology  -Baseline creatinine seems to be around the low to mid twos. Stable                No orders of the defined types were placed in this encounter.       Chief Complaint     Chief Complaint   Patient presents with   • Follow-up     4 month        Review of Systems 10 point ROS negative except per HPI    The following portions of the patient's history were reviewed and updated as appropriate: allergies, current medications, past family history, past medical history, past social history, past surgical history and problem list.    Active Problem List     Patient Active Problem List   Diagnosis   • Chronic anemia   • Linda esophagus   • Esophageal reflux   • Hypertension   • Spinal stenosis of lumbar region   • Spondylolisthesis   • PSVT (paroxysmal supraventricular tachycardia) ([facility])   • Palpitations   • Pure hypercholesterolemia   • PVC (premature ventricular contraction)   • CKD (chronic kidney disease) stage 4, GFR 15-29 ml/min ([facility])   • Chronic diastolic (congestive) heart failure (HCC)   • BPH (benign prostatic hyperplasia)   • History of radiofrequency ablation  for SVT   • Pulmonary nodules   • Aortic stenosis, severe   • Elevated troponin   • Cigarette nicotine dependence in remission   • Lesion of left lung   • Angina pectoris, unstable ([address]) New onset   • Secondary hyperparathyroidism of renal origin ([address])   • Paroxysmal atrial fibrillation ([facility])       Objective     /72 (BP Location: Left arm, Patient Position: Sitting, Cuff Size: Standard)   Pulse 95   Temp (!) 97.2 °F (36.2 °C)   Resp 18   Ht 5' 9" (1.753 m)   Wt 79.4 kg (175 lb)   SpO2 95%   BMI 25.84 kg/m²     Physical Exam  Cardiovascular:      Rate and Rhythm: Rhythm irregular. Heart sounds: Murmur heard. Pulmonary:      Effort: Pulmonary effort is normal.      Breath sounds: Normal breath sounds. No wheezing or rales. Pertinent Laboratory/Diagnostic Studies:  No results found.     Images and diagnostics reviewed     Current Medications     Current Outpatient Medications:   •  Cholecalciferol (VITAMIN D3) 125 MCG (5000 UT) CAPS, 2 (two) times a week , Disp: , Rfl:   •  finasteride (PROSCAR) 5 mg tablet, Take 1 tablet (5 mg total) by mouth daily, Disp: 90 tablet, Rfl: 1  • furosemide (LASIX) 20 mg tablet, Take 1 tablet (20 mg total) by mouth daily, Disp: 90 tablet, Rfl: 2  •  metoprolol succinate (TOPROL-XL) 25 mg 24 hr tablet, Take 1 tablet (25 mg total) by mouth daily, Disp: 90 tablet, Rfl: 3  •  Multiple Vitamin (MULTI-VITAMIN DAILY) TABS, Take by mouth, Disp: , Rfl:   •  nitroglycerin (NITROSTAT) 0.4 mg SL tablet, Place 1 tablet (0.4 mg total) under the tongue once as needed for chest pain for up to 1 dose Lay down supine before taking and wait 15 minutes to get up. Get up very slowly.   If pain continues, call 911, Disp: 25 tablet, Rfl: 2  •  omeprazole (PriLOSEC) 20 mg delayed release capsule, Take 1 capsule (20 mg total) by mouth daily, Disp: 90 capsule, Rfl: 1  •  rivaroxaban (Xarelto) 15 mg tablet, Take 1 tablet (15 mg total) by mouth daily with dinner, Disp: 30 tablet, Rfl: 5  •  rosuvastatin (CRESTOR) 5 mg tablet, Take 1 tablet (5 mg total) by mouth daily, Disp: 90 tablet, Rfl: 0  •  tamsulosin (FLOMAX) 0.4 mg, Take 1 capsule (0.4 mg total) by mouth daily with dinner, Disp: 30 capsule, Rfl: 4    Health Maintenance     Health Maintenance   Topic Date Due   • Hepatitis B Vaccine (1 of 3 - Risk 3-dose series) Never done   • Pneumococcal Vaccine: 65+ Years (2 - PPSV23 if available, else PCV20) 01/16/2017   • COVID-19 Vaccine (4 - Moderna series) 12/18/2021   • Influenza Vaccine (1) 09/01/2023   • Fall Risk  05/05/2024   • Depression Screening  05/05/2024   • Medicare Annual Wellness Visit (AWV)  05/05/2024   • BMI: Followup Plan  05/05/2024   • BMI: Adult  09/05/2024   • Hepatitis A Vaccine  Completed   • HIB Vaccine  Aged Out   • IPV Vaccine  Aged Out   • Meningococcal ACWY Vaccine  Aged Out   • HPV Vaccine  Aged Out     Immunization History   Administered Date(s) Administered   • COVID-19 MODERNA VACC 0.5 ML IM 01/19/2021, 02/15/2021, 10/23/2021   • Hep A, adult 02/01/2000, 08/15/2000   • INFLUENZA 10/23/2009, 10/12/2010, 09/29/2011, 10/03/2012, 10/23/2013, 10/29/2014, 11/06/2015, 09/01/2016, 10/10/2016, 10/06/2017, 10/03/2018, 10/10/2018, 09/20/2019, 09/30/2019, 10/01/2019, 10/02/2019, 09/02/2020, 09/11/2020, 10/07/2020, 09/10/2021, 10/10/2022   • Influenza Split High Dose Preservative Free IM 11/06/2015, 09/01/2016, 10/06/2017   • Influenza, seasonal, injectable 11/15/2000, 10/26/2001, 10/28/2003, 12/01/2005, 11/01/2007, 10/03/2012, 10/23/2013   • Pneumococcal Conjugate 13-Valent 11/21/2016, 11/21/2016   • Tdap 09/18/2012   • Zoster 09/01/2007   • Zoster Vaccine Recombinant 02/22/2019, 05/03/2019   • influenza, trivalent, adjuvanted 09/30/2019, 09/11/2020       Andrew Hernandez D.O.   [facility] [place] [address], [facility] [address]  [facility], [address] [place] [address]  Office: [phone]  Fax: [phone]

## 2023-09-05 NOTE — ASSESSMENT & PLAN NOTE
-Right greater than left pitting edema on exam today, weight actually down about 5 pounds since her last appointment remains on Lasix 20 mg daily.   Per Cardiology note, when attempt was made to reduce Lasix to 20 mg every other day, patient decompensated  -Continue with current dose of Lasix for now

## 2023-09-05 NOTE — ASSESSMENT & PLAN NOTE
CT completed April 2023 showing irregular mixed density nodule left upper lobe, essentially stable in size compared to prior CT December 2022.   Slow-growing bronchogenic malignancy remains in the differential.  Radiology has suggested 6-month follow-up CT of the chest

## 2023-09-14 DIAGNOSIS — R33.9 INCOMPLETE BLADDER EMPTYING: ICD-10-CM

## 2023-09-14 DIAGNOSIS — N40.1 BPH WITH OBSTRUCTION/LOWER URINARY TRACT SYMPTOMS: ICD-10-CM

## 2023-09-14 DIAGNOSIS — N13.8 BPH WITH OBSTRUCTION/LOWER URINARY TRACT SYMPTOMS: ICD-10-CM

## 2023-09-14 DIAGNOSIS — I50.9 ACUTE CONGESTIVE HEART FAILURE, UNSPECIFIED HEART FAILURE TYPE (HCC): ICD-10-CM

## 2023-09-14 RX ORDER — FINASTERIDE 5 MG/1
5 TABLET, FILM COATED ORAL DAILY
Qty: 90 TABLET | Refills: 1 | Status: SHIPPED | OUTPATIENT
Start: 2023-09-14

## 2023-09-14 RX ORDER — FUROSEMIDE 20 MG/1
20 TABLET ORAL DAILY
Qty: 90 TABLET | Refills: 1 | Status: SHIPPED | OUTPATIENT
Start: 2023-09-14

## 2023-09-20 PROBLEM — I20.9 ANGINA PECTORIS (HCC): Status: ACTIVE | Noted: 2023-09-20

## 2023-09-20 PROBLEM — I20.0 ANGINA PECTORIS, UNSTABLE (HCC): Status: RESOLVED | Noted: 2023-03-12 | Resolved: 2023-09-20

## 2023-09-25 DIAGNOSIS — I48.0 PAROXYSMAL ATRIAL FIBRILLATION (HCC): ICD-10-CM

## 2023-10-09 DIAGNOSIS — E78.5 HYPERLIPIDEMIA, UNSPECIFIED HYPERLIPIDEMIA TYPE: ICD-10-CM

## 2023-10-09 RX ORDER — ROSUVASTATIN CALCIUM 5 MG/1
5 TABLET, COATED ORAL DAILY
Qty: 90 TABLET | Refills: 0 | Status: SHIPPED | OUTPATIENT
Start: 2023-10-09

## 2023-10-10 ENCOUNTER — HOSPITAL ENCOUNTER (EMERGENCY)
Facility: HOSPITAL | Age: 88
Discharge: HOME | End: 2023-10-10
Attending: EMERGENCY MEDICINE
Payer: MEDICARE

## 2023-10-10 ENCOUNTER — APPOINTMENT (EMERGENCY)
Dept: RADIOLOGY | Facility: HOSPITAL | Age: 88
End: 2023-10-10
Payer: MEDICARE

## 2023-10-10 VITALS
DIASTOLIC BLOOD PRESSURE: 86 MMHG | TEMPERATURE: 97.9 F | HEART RATE: 86 BPM | BODY MASS INDEX: 27.2 KG/M2 | OXYGEN SATURATION: 95 % | WEIGHT: 183.64 LBS | SYSTOLIC BLOOD PRESSURE: 138 MMHG | RESPIRATION RATE: 20 BRPM | HEIGHT: 69 IN

## 2023-10-10 DIAGNOSIS — I50.9 CHF EXACERBATION (HCC): Primary | ICD-10-CM

## 2023-10-10 DIAGNOSIS — R06.00 DYSPNEA: ICD-10-CM

## 2023-10-10 LAB
ALBUMIN SERPL BCP-MCNC: 3.9 G/DL (ref 3.5–5)
ALP SERPL-CCNC: 109 U/L (ref 34–104)
ALT SERPL W P-5'-P-CCNC: 30 U/L (ref 7–52)
ANION GAP SERPL CALCULATED.3IONS-SCNC: 6 MMOL/L
AST SERPL W P-5'-P-CCNC: 23 U/L (ref 13–39)
ATRIAL RATE: 75 BPM
BASOPHILS # BLD AUTO: 0.06 THOUSANDS/ÂΜL (ref 0–0.1)
BASOPHILS NFR BLD AUTO: 1 % (ref 0–1)
BILIRUB SERPL-MCNC: 0.6 MG/DL (ref 0.2–1)
BNP SERPL-MCNC: 1043 PG/ML (ref 0–100)
BUN SERPL-MCNC: 41 MG/DL (ref 5–25)
CALCIUM SERPL-MCNC: 10 MG/DL (ref 8.4–10.2)
CHLORIDE SERPL-SCNC: 98 MMOL/L (ref 96–108)
CO2 SERPL-SCNC: 30 MMOL/L (ref 21–32)
CREAT SERPL-MCNC: 2.49 MG/DL (ref 0.6–1.3)
EOSINOPHIL # BLD AUTO: 0.47 THOUSAND/ÂΜL (ref 0–0.61)
EOSINOPHIL NFR BLD AUTO: 6 % (ref 0–6)
ERYTHROCYTE [DISTWIDTH] IN BLOOD BY AUTOMATED COUNT: 14.7 % (ref 11.6–15.1)
GFR SERPL CREATININE-BSD FRML MDRD: 22 ML/MIN/1.73SQ M
GLUCOSE SERPL-MCNC: 108 MG/DL (ref 65–140)
HCT VFR BLD AUTO: 38.9 % (ref 36.5–49.3)
HGB BLD-MCNC: 11.9 G/DL (ref 12–17)
IMM GRANULOCYTES # BLD AUTO: 0.02 THOUSAND/UL (ref 0–0.2)
IMM GRANULOCYTES NFR BLD AUTO: 0 % (ref 0–2)
LYMPHOCYTES # BLD AUTO: 2 THOUSANDS/ÂΜL (ref 0.6–4.47)
LYMPHOCYTES NFR BLD AUTO: 24 % (ref 14–44)
MAGNESIUM SERPL-MCNC: 2 MG/DL (ref 1.9–2.7)
MCH RBC QN AUTO: 27.9 PG (ref 26.8–34.3)
MCHC RBC AUTO-ENTMCNC: 30.6 G/DL (ref 31.4–37.4)
MCV RBC AUTO: 91 FL (ref 82–98)
MONOCYTES # BLD AUTO: 0.88 THOUSAND/ÂΜL (ref 0.17–1.22)
MONOCYTES NFR BLD AUTO: 11 % (ref 4–12)
NEUTROPHILS # BLD AUTO: 4.94 THOUSANDS/ÂΜL (ref 1.85–7.62)
NEUTS SEG NFR BLD AUTO: 58 % (ref 43–75)
NRBC BLD AUTO-RTO: 0 /100 WBCS
PLATELET # BLD AUTO: 239 THOUSANDS/UL (ref 149–390)
PMV BLD AUTO: 11 FL (ref 8.9–12.7)
POTASSIUM SERPL-SCNC: 4.2 MMOL/L (ref 3.5–5.3)
PROT SERPL-MCNC: 7.3 G/DL (ref 6.4–8.4)
QRS AXIS: -42 DEGREES
QRSD INTERVAL: 104 MS
QT INTERVAL: 402 MS
QTC INTERVAL: 478 MS
RBC # BLD AUTO: 4.27 MILLION/UL (ref 3.88–5.62)
SODIUM SERPL-SCNC: 134 MMOL/L (ref 135–147)
T WAVE AXIS: 59 DEGREES
VENTRICULAR RATE: 85 BPM
WBC # BLD AUTO: 8.37 THOUSAND/UL (ref 4.31–10.16)

## 2023-10-10 PROCEDURE — 80053 COMPREHEN METABOLIC PANEL: CPT

## 2023-10-10 PROCEDURE — 93010 ELECTROCARDIOGRAM REPORT: CPT

## 2023-10-10 PROCEDURE — 36415 COLL VENOUS BLD VENIPUNCTURE: CPT

## 2023-10-10 PROCEDURE — 99285 EMERGENCY DEPT VISIT HI MDM: CPT

## 2023-10-10 PROCEDURE — 85025 COMPLETE CBC W/AUTO DIFF WBC: CPT

## 2023-10-10 PROCEDURE — 96374 THER/PROPH/DIAG INJ IV PUSH: CPT

## 2023-10-10 PROCEDURE — 83735 ASSAY OF MAGNESIUM: CPT

## 2023-10-10 PROCEDURE — 93005 ELECTROCARDIOGRAM TRACING: CPT

## 2023-10-10 PROCEDURE — 71046 X-RAY EXAM CHEST 2 VIEWS: CPT

## 2023-10-10 PROCEDURE — 83880 ASSAY OF NATRIURETIC PEPTIDE: CPT

## 2023-10-10 RX ORDER — FUROSEMIDE 10 MG/ML
40 INJECTION INTRAMUSCULAR; INTRAVENOUS ONCE
Status: COMPLETED | OUTPATIENT
Start: 2023-10-10 | End: 2023-10-10

## 2023-10-10 RX ADMIN — FUROSEMIDE 40 MG: 10 INJECTION, SOLUTION INTRAVENOUS at 20:38

## 2023-10-10 NOTE — ED PROVIDER NOTES
History  Chief Complaint   Patient presents with   • Shortness of Breath     Pt arrives via EMS. Pt has increased SOB and tiredness. Pt hx of CHF. Pt ankles edematous. Pt denies chest pain and dizziness. 42-year-old male with PMH of aortic stenosis, A-fib, CHF, HTN, mitral valve regurgitation presents for evaluation of worsening SOB and fatigue for the last couple days. Worsened with exertion. Explains that he has been "playing with his Lasix lately". When asked to elaborate, he tells me that he takes Lasix 20 mg daily but when his lower extremities become increasingly edematous, his cardiologist tells him to take 4 to 5 days of 40 mg instead. States he did this a couple weeks ago, is currently back on his normal 20 mg daily. No recent illness, travel, trauma. Denies chest pain, syncope, palpitations, cough, congestion, fever. Prior to Admission Medications   Prescriptions Last Dose Informant Patient Reported? Taking? Cholecalciferol (VITAMIN D3) 125 MCG (5000 UT) CAPS Past Week Self Yes Yes   Si (two) times a week    Multiple Vitamin (MULTI-VITAMIN DAILY) TABS 10/10/2023 Self Yes Yes   Sig: Take by mouth   finasteride (PROSCAR) 5 mg tablet 10/10/2023  No Yes   Sig: Take 1 tablet (5 mg total) by mouth daily   furosemide (LASIX) 20 mg tablet 10/10/2023  No Yes   Sig: Take 1 tablet (20 mg total) by mouth daily   metoprolol succinate (TOPROL-XL) 25 mg 24 hr tablet 10/10/2023 Self No Yes   Sig: Take 1 tablet (25 mg total) by mouth daily   nitroglycerin (NITROSTAT) 0.4 mg SL tablet Unknown Self No No   Sig: Place 1 tablet (0.4 mg total) under the tongue once as needed for chest pain for up to 1 dose Lay down supine before taking and wait 15 minutes to get up. Get up very slowly.   If pain continues, call 911   omeprazole (PriLOSEC) 20 mg delayed release capsule 10/10/2023 Self No Yes   Sig: Take 1 capsule (20 mg total) by mouth daily   rivaroxaban (Xarelto) 15 mg tablet 10/10/2023  No Yes Sig: Take 1 tablet (15 mg total) by mouth daily with dinner   rosuvastatin (CRESTOR) 5 mg tablet 10/10/2023  No Yes   Sig: Take 1 tablet (5 mg total) by mouth daily   tamsulosin (FLOMAX) 0.4 mg 10/10/2023  No Yes   Sig: Take 1 capsule (0.4 mg total) by mouth daily with dinner      Facility-Administered Medications: None       Past Medical History:   Diagnosis Date   • Aortic stenosis, severe 2021   • Atrial fibrillation (HCC)    • CHF (congestive heart failure) (HCC)    • Colon polyp    • GERD (gastroesophageal reflux disease)    • Hearing loss     last assessed 17   • Hypertension    • Rheumatic fever    • Stenosis of aorta        Past Surgical History:   Procedure Laterality Date   • APPENDECTOMY     • BACK SURGERY      lower   • CARDIAC SURGERY      ablation   • CATARACT EXTRACTION     • JOINT REPLACEMENT Left     knee   • KNEE SURGERY Left    • DE PROSTATE NEEDLE BIOPSY ANY APPROACH N/A 3/16/2021    Procedure: Transperineal prostate biopsy with biplanar transrectal ultrasound, using the perineal logic kit; Surgeon: Keith Cunha MD;  Location: [facility];  Service: Urology   • TONSILLECTOMY AND ADENOIDECTOMY         Family History   Problem Relation Age of Onset   • Other Mother         old age   • Esophageal cancer Father    • Other Father         old age   • Alcohol abuse Son         alcoholism     I have reviewed and agree with the history as documented. E-Cigarette/Vaping   • E-Cigarette Use Never User      E-Cigarette/Vaping Substances   • Nicotine No    • THC No    • CBD No    • Flavoring No    • Other No    • Unknown No      Social History     Tobacco Use   • Smoking status: Former     Packs/day: 1.00     Years: 16.00     Total pack years: 16.00     Types: Cigarettes     Start date: 5     Quit date:      Years since quittin.8   • Smokeless tobacco: Never   Vaping Use   • Vaping Use: Never used   Substance Use Topics   • Alcohol use: Yes     Comment: occ   • Drug use:  No Review of Systems   Constitutional: Positive for fatigue. Negative for chills and fever. HENT: Negative for ear pain and sore throat. Eyes: Negative for pain and visual disturbance. Respiratory: Positive for shortness of breath. Negative for cough. Cardiovascular: Negative for chest pain and palpitations. Gastrointestinal: Negative for abdominal pain and vomiting. Genitourinary: Negative for dysuria and hematuria. Musculoskeletal: Negative for arthralgias and back pain. Skin: Negative for color change and rash. Neurological: Negative for seizures and syncope. All other systems reviewed and are negative. Physical Exam  Physical Exam  Vitals and nursing note reviewed. Constitutional:       General: He is not in acute distress. Appearance: He is well-developed. He is not ill-appearing. Comments: Saturating well on RA. HENT:      Head: Normocephalic and atraumatic. Eyes:      Conjunctiva/sclera: Conjunctivae normal.   Cardiovascular:      Rate and Rhythm: Normal rate and regular rhythm. Heart sounds: No murmur heard. Pulmonary:      Effort: Pulmonary effort is normal. No respiratory distress. Breath sounds: Examination of the right-lower field reveals rales. Examination of the left-lower field reveals rales. Rales present. Abdominal:      Palpations: Abdomen is soft. Tenderness: There is no abdominal tenderness. Musculoskeletal:         General: No swelling. Cervical back: Neck supple. Skin:     General: Skin is warm and dry. Capillary Refill: Capillary refill takes less than 2 seconds. Neurological:      Mental Status: He is alert.    Psychiatric:         Mood and Affect: Mood normal.         Vital Signs  ED Triage Vitals   Temperature Pulse Respirations Blood Pressure SpO2   10/10/23 1847 10/10/23 1846 10/10/23 1846 10/10/23 1846 10/10/23 1846   97.9 °F (36.6 °C) 90 22 138/92 98 %      Temp Source Heart Rate Source Patient Position - Orthostatic VS BP Location FiO2 (%)   10/10/23 1847 10/10/23 1846 10/10/23 1846 10/10/23 1846 --   Oral Monitor Lying Right arm       Pain Score       10/10/23 1846       No Pain           Vitals:    10/10/23 1846 10/10/23 1930 10/10/23 2039 10/10/23 2200   BP: 138/92 117/75 166/100 138/86   Pulse: 90 88 86 86   Patient Position - Orthostatic VS: Lying Lying Lying Lying         Visual Acuity  Visual Acuity    Flowsheet Row Most Recent Value   L Pupil Size (mm) 4   R Pupil Size (mm) 4          ED Medications  Medications   furosemide (LASIX) injection 40 mg (40 mg Intravenous Given 10/10/23 2038)       Diagnostic Studies  Results Reviewed     Procedure Component Value Units Date/Time    B-Type Natriuretic Peptide(BNP) [[id number]]  (Abnormal) Collected: 10/10/23 1922    Lab Status: Final result Specimen: Blood from Arm, Left Updated: 10/10/23 1958     BNP 1,043 pg/mL     Comprehensive metabolic panel [[id number]]  (Abnormal) Collected: 10/10/23 1922    Lab Status: Final result Specimen: Blood from Arm, Left Updated: 10/10/23 1955     Sodium 134 mmol/L      Potassium 4.2 mmol/L      Chloride 98 mmol/L      CO2 30 mmol/L      ANION GAP 6 mmol/L      BUN 41 mg/dL      Creatinine 2.49 mg/dL      Glucose 108 mg/dL      Calcium 10.0 mg/dL      AST 23 U/L      ALT 30 U/L      Alkaline Phosphatase 109 U/L      Total Protein 7.3 g/dL      Albumin 3.9 g/dL      Total Bilirubin 0.60 mg/dL      eGFR 22 ml/min/1.73sq m     Narrative:      [place] guidelines for Chronic Kidney Disease (CKD):   •  Stage 1 with normal or high GFR (GFR > 90 mL/min/1.73 square meters)  •  Stage 2 Mild CKD (GFR = 60-89 mL/min/1.73 square meters)  •  Stage 3A Moderate CKD (GFR = 45-59 mL/min/1.73 square meters)  •  Stage 3B Moderate CKD (GFR = 30-44 mL/min/1.73 square meters)  •  Stage 4 Severe CKD (GFR = 15-29 mL/min/1.73 square meters)  •  Stage 5 End Stage CKD (GFR <15 mL/min/1.73 square meters)  Note: GFR calculation is accurate only with a steady state creatinine    Magnesium [[id number]]  (Normal) Collected: 10/10/23 1922    Lab Status: Final result Specimen: Blood from Arm, Left Updated: 10/10/23 1955     Magnesium 2.0 mg/dL     CBC and differential [[id number]]  (Abnormal) Collected: 10/10/23 1922    Lab Status: Final result Specimen: Blood from Arm, Left Updated: 10/10/23 1929     WBC 8.37 Thousand/uL      RBC 4.27 Million/uL      Hemoglobin 11.9 g/dL      Hematocrit 38.9 %      MCV 91 fL      MCH 27.9 pg      MCHC 30.6 g/dL      RDW 14.7 %      MPV 11.0 fL      Platelets 445 Thousands/uL      nRBC 0 /100 WBCs      Neutrophils Relative 58 %      Immat GRANS % 0 %      Lymphocytes Relative 24 %      Monocytes Relative 11 %      Eosinophils Relative 6 %      Basophils Relative 1 %      Neutrophils Absolute 4.94 Thousands/µL      Immature Grans Absolute 0.02 Thousand/uL      Lymphocytes Absolute 2.00 Thousands/µL      Monocytes Absolute 0.88 Thousand/µL      Eosinophils Absolute 0.47 Thousand/µL      Basophils Absolute 0.06 Thousands/µL                  XR chest 2 views   ED Interpretation by Jacklyn oTth PA-C (10/10 2047)   ED wet read: vascular congestion, very small bilateral pleural effusions. Procedures  Procedures         ED Course                               SBIRT 20yo+    Flowsheet Row Most Recent Value   Initial Alcohol Screen: US AUDIT-C     1. How often do you have a drink containing alcohol? 0 Filed at: 10/10/2023 1849   2. How many drinks containing alcohol do you have on a typical day you are drinking? 0 Filed at: 10/10/2023 1849   3a. Male UNDER 65: How often do you have five or more drinks on one occasion? 0 Filed at: 10/10/2023 1849   3b. FEMALE Any Age, or MALE 65+: How often do you have 4 or more drinks on one occassion? 0 Filed at: 10/10/2023 1849   Audit-C Score 0 Filed at: 10/10/2023 1849   [place]: How many times in the past year have you. ..     Used an illegal drug or used a prescription medication for non-medical reasons? Never Filed at: 10/10/2023 [address] [facility]                    Medical Decision Making  51-year-old male with extensive cardiac history presents for evaluation of worsening SOB and fatigue. Also states his lower extremities have been more swollen lately. Occasionally takes double dose of Lasix when instructed by his cardiologist.  Exam: Patient in NAD, AOx3, vitals WNL and saturating well on room air. No respiratory distress. Rales noted in bilateral lower lung fields. Heart irregularly irregular, obvious murmurs noted in multiple areas. +1 pitting edema bilateral lower legs. Most likely CHF exacerbation. Work-up: CBC, CMP, magnesium, BNP. CXR, EKG. CXR demonstrated vascular congestion and very small bilateral pleural effusions. Creatinine slightly elevated above baseline but no SUREKHA. BNP elevated above 1000. Patient's work-up and clinical presentation consistent with CHF exacerbation. We will give him a dose of Lasix 40 mg IV. On reevaluation patient is feeling well, saturating well on room air, normal respiratory effort. Still has some Rales in his lower lung fields. Tells me that he got up and walked all the way to the bathroom and back without significant difficulty, was on room air. Patient requesting to go home. Has remained hemodynamically stable throughout his visit. Considering patient is very compliant with his medications and his follow-ups, I think he is stable to return home with outpatient follow-up. Advised him to call his cardiologist first thing tomorrow to discuss his recent ER visit. Encouraged patient to return to ER if condition is acutely worsening. Patient expresses understanding of the condition, treatment plan, follow-up instructions, and return precautions. Amount and/or Complexity of Data Reviewed  Labs: ordered. Radiology: ordered and independent interpretation performed.       Risk  Prescription drug management. Disposition  Final diagnoses:   CHF exacerbation ([address])   Dyspnea     Time reflects when diagnosis was documented in both MDM as applicable and the Disposition within this note     Time User Action Codes Description Comment    10/10/2023  9:56 PM Tory Malavea Add [I50.9] CHF exacerbation ([address])     10/10/2023  9:56 PM Tory Malavechente Add [R06.00] Dyspnea       ED Disposition     ED Disposition   Discharge    Condition   Stable    Date/Time   Tue Oct 10, 2023  9:56 PM    Comment   Luciana Yury discharge to home/self care.                Follow-up Information     Follow up With Specialties Details Why Contact Info Additional Information    Berlin Farooq MD Cardiology Call   [address] [place] [address] Emergency Department Emergency Medicine  If symptoms worsen [address] [id number]  [address] [facility] Emergency Department, [address] [place] [address], [facility]          Discharge Medication List as of 10/10/2023  9:58 PM      CONTINUE these medications which have NOT CHANGED    Details   Cholecalciferol (VITAMIN D3) 125 MCG (5000 UT) CAPS 2 (two) times a week , Historical Med      finasteride (PROSCAR) 5 mg tablet Take 1 tablet (5 mg total) by mouth daily, Starting Thu 9/14/2023, Normal      furosemide (LASIX) 20 mg tablet Take 1 tablet (20 mg total) by mouth daily, Starting Thu 9/14/2023, Normal      metoprolol succinate (TOPROL-XL) 25 mg 24 hr tablet Take 1 tablet (25 mg total) by mouth daily, Starting Mon 3/6/2023, Normal      Multiple Vitamin (MULTI-VITAMIN DAILY) TABS Take by mouth, Historical Med      omeprazole (PriLOSEC) 20 mg delayed release capsule Take 1 capsule (20 mg total) by mouth daily, Starting Tue 1/14/2020, Normal      rivaroxaban (Xarelto) 15 mg tablet Take 1 tablet (15 mg total) by mouth daily with dinner, Starting Mon 9/25/2023, Normal rosuvastatin (CRESTOR) 5 mg tablet Take 1 tablet (5 mg total) by mouth daily, Starting Mon 10/9/2023, Normal      tamsulosin (FLOMAX) 0.4 mg Take 1 capsule (0.4 mg total) by mouth daily with dinner, Starting Wed 8/9/2023, Normal      nitroglycerin (NITROSTAT) 0.4 mg SL tablet Place 1 tablet (0.4 mg total) under the tongue once as needed for chest pain for up to 1 dose Lay down supine before taking and wait 15 minutes to get up. Get up very slowly. If pain continues, call 911, Starting Wed 2/15/2023, Normal             No discharge procedures on file.     PDMP Review     None          ED Provider  Electronically Signed by           Seng Aceves PA-C  10/11/23 6327

## 2023-10-11 ENCOUNTER — TELEPHONE (OUTPATIENT)
Dept: NEPHROLOGY | Facility: CLINIC | Age: 88
End: 2023-10-11

## 2023-10-11 ENCOUNTER — TELEPHONE (OUTPATIENT)
Dept: CARDIOLOGY CLINIC | Facility: CLINIC | Age: 88
End: 2023-10-11

## 2023-10-11 ENCOUNTER — VBI (OUTPATIENT)
Dept: INTERNAL MEDICINE CLINIC | Facility: CLINIC | Age: 88
End: 2023-10-11

## 2023-10-11 DIAGNOSIS — N18.4 CKD (CHRONIC KIDNEY DISEASE) STAGE 4, GFR 15-29 ML/MIN (HCC): Primary | ICD-10-CM

## 2023-10-11 DIAGNOSIS — I50.32 CHRONIC DIASTOLIC (CONGESTIVE) HEART FAILURE (HCC): ICD-10-CM

## 2023-10-11 NOTE — TELEPHONE ENCOUNTER
Pt was seen in the ED last night for CHF/given IV lasix and discharged home. Pt calling to question what he should do with his lasix dosing today. Please advise. .Dr Garret Richmond is off so will review with Dr Clau Vázquez.

## 2023-10-11 NOTE — TELEPHONE ENCOUNTER
Pt called asking to discuss his Lasix prescription with Dr Marcio Sena. He stated he was in the ER last night and diagnosed with congestive heart failure. Please call pt to discuss.

## 2023-10-11 NOTE — TELEPHONE ENCOUNTER
Return phone call to the patient to discuss diuretic dosing. He informs that he discussed with his nephrologist Dr. Terra Liz this morning and he has been advised to increase the frequency of  furosemide to twice daily for 5 days followed by once daily again. He is going to do that and have follow-up blood work and follow-up with his nephrologist.  He reports he is currently feeling fine. -- Advised him to reach out to us if he has any recurrence of symptoms.     Zulema Bowden MD

## 2023-10-11 NOTE — TELEPHONE ENCOUNTER
10/11/23 2:56 PM    Patient contacted post ED visit, [facility] department spoke with patient/caregiver and outreach was successful. Thank you.   Gertrude Thomson MA  [facility]

## 2023-10-11 NOTE — DISCHARGE INSTRUCTIONS
Your labs and x-ray showed further evidence of an acute CHF exacerbation. I gave you an additional dose of 40 mg Lasix through your IV to help take off some of the excess fluid. Please call your cardiologist tomorrow to discuss your recent ER visit. They need to reevaluate your symptoms and discuss your medical management. Please do not hesitate to return to an emergency department for reevaluation if your condition is worsening.

## 2023-10-11 NOTE — TELEPHONE ENCOUNTER
Called patient, went to the ED yesterday due to shortness of breath as well as worsening leg swelling. Received Lasix 40 mg IV x1, his symptoms significantly improved. Noted creatinine yesterday at 2.4    Currently he is taking furosemide 20 mg daily. Recommend to increase to furosemide 40 mg (2 tabs of 20 mg) twice a day for 3 to 5 days, once leg swelling and shortness of breath improved, then he should decrease to furosemide 40 mg (2 tabs of 20 mg) once a day. Follow daily weight and low salt diet. Recommend to have repeat blood test early next week to make sure kidney function remains stable (order placed). Patient expressed understanding.         CC patient's PCP to keep him updated

## 2023-10-18 ENCOUNTER — APPOINTMENT (OUTPATIENT)
Dept: LAB | Facility: CLINIC | Age: 88
End: 2023-10-18
Payer: MEDICARE

## 2023-10-18 DIAGNOSIS — I50.32 CHRONIC DIASTOLIC (CONGESTIVE) HEART FAILURE (HCC): ICD-10-CM

## 2023-10-18 DIAGNOSIS — N18.4 CKD (CHRONIC KIDNEY DISEASE) STAGE 4, GFR 15-29 ML/MIN (HCC): ICD-10-CM

## 2023-10-18 LAB
ANION GAP SERPL CALCULATED.3IONS-SCNC: 11 MMOL/L
BUN SERPL-MCNC: 46 MG/DL (ref 5–25)
CALCIUM SERPL-MCNC: 9.7 MG/DL (ref 8.4–10.2)
CHLORIDE SERPL-SCNC: 97 MMOL/L (ref 96–108)
CO2 SERPL-SCNC: 30 MMOL/L (ref 21–32)
CREAT SERPL-MCNC: 2.25 MG/DL (ref 0.6–1.3)
GFR SERPL CREATININE-BSD FRML MDRD: 25 ML/MIN/1.73SQ M
GLUCOSE SERPL-MCNC: 99 MG/DL (ref 65–140)
POTASSIUM SERPL-SCNC: 3.8 MMOL/L (ref 3.5–5.3)
SODIUM SERPL-SCNC: 138 MMOL/L (ref 135–147)

## 2023-10-18 PROCEDURE — 36415 COLL VENOUS BLD VENIPUNCTURE: CPT

## 2023-10-18 PROCEDURE — 80048 BASIC METABOLIC PNL TOTAL CA: CPT

## 2023-10-19 ENCOUNTER — TELEPHONE (OUTPATIENT)
Dept: NEPHROLOGY | Facility: CLINIC | Age: 88
End: 2023-10-19

## 2023-10-19 NOTE — TELEPHONE ENCOUNTER
Recent blood test after increasing diuretics reviewed, renal function is stable with a creatinine of 2.2, electrolytes stable. I have called patient, no answer, left message. Renal function is stable, continue with increased diuretic dose, he should be taking furosemide 40 mg daily    Please contact patient to make sure he received my message, IF his shortness of breath and leg swelling improved, continue with furosemide 40 mg daily (2 tablets of 20 mg) and plan to follow-up as scheduled before with repeat labs.   Thanks,        I cc patient's PCP to keep him updated

## 2023-10-19 NOTE — TELEPHONE ENCOUNTER
Pt called back office to notify that he received Dr. Javi Arredondo message and that everything is ok. Nothing further needed and he will call office if needed.

## 2023-10-31 DIAGNOSIS — I50.9 ACUTE CONGESTIVE HEART FAILURE, UNSPECIFIED HEART FAILURE TYPE (HCC): ICD-10-CM

## 2023-10-31 RX ORDER — FUROSEMIDE 40 MG/1
40 TABLET ORAL DAILY
Qty: 90 TABLET | Refills: 1 | Status: SHIPPED | OUTPATIENT
Start: 2023-10-31

## 2023-11-15 ENCOUNTER — OFFICE VISIT (OUTPATIENT)
Dept: CARDIOLOGY CLINIC | Facility: CLINIC | Age: 88
End: 2023-11-15
Payer: MEDICARE

## 2023-11-15 VITALS
DIASTOLIC BLOOD PRESSURE: 70 MMHG | HEART RATE: 64 BPM | SYSTOLIC BLOOD PRESSURE: 130 MMHG | HEIGHT: 69 IN | BODY MASS INDEX: 25.77 KG/M2 | WEIGHT: 174 LBS | OXYGEN SATURATION: 98 %

## 2023-11-15 DIAGNOSIS — I35.0 AORTIC STENOSIS, SEVERE: Chronic | ICD-10-CM

## 2023-11-15 DIAGNOSIS — I50.32 CHRONIC DIASTOLIC (CONGESTIVE) HEART FAILURE (HCC): ICD-10-CM

## 2023-11-15 DIAGNOSIS — Z98.890 HISTORY OF RADIOFREQUENCY ABLATION PROCEDURE FOR CARDIAC ARRHYTHMIA: ICD-10-CM

## 2023-11-15 DIAGNOSIS — D64.9 CHRONIC ANEMIA: ICD-10-CM

## 2023-11-15 DIAGNOSIS — E78.00 PURE HYPERCHOLESTEROLEMIA: ICD-10-CM

## 2023-11-15 DIAGNOSIS — I47.10 PSVT (PAROXYSMAL SUPRAVENTRICULAR TACHYCARDIA): ICD-10-CM

## 2023-11-15 DIAGNOSIS — I10 PRIMARY HYPERTENSION: ICD-10-CM

## 2023-11-15 DIAGNOSIS — F17.211 CIGARETTE NICOTINE DEPENDENCE IN REMISSION: ICD-10-CM

## 2023-11-15 DIAGNOSIS — N18.4 CKD (CHRONIC KIDNEY DISEASE) STAGE 4, GFR 15-29 ML/MIN (HCC): ICD-10-CM

## 2023-11-15 DIAGNOSIS — I48.0 PAROXYSMAL ATRIAL FIBRILLATION (HCC): ICD-10-CM

## 2023-11-15 DIAGNOSIS — I20.9 ANGINA PECTORIS (HCC): Primary | ICD-10-CM

## 2023-11-15 DIAGNOSIS — I49.3 PVC (PREMATURE VENTRICULAR CONTRACTION): ICD-10-CM

## 2023-11-15 DIAGNOSIS — R91.1 LESION OF LEFT LUNG: ICD-10-CM

## 2023-11-15 PROCEDURE — 99214 OFFICE O/P EST MOD 30 MIN: CPT | Performed by: INTERNAL MEDICINE

## 2023-11-15 NOTE — PROGRESS NOTES
[facility]  [address] [facility] [address]  [address], [address] [facility] [address]  Phone#  [phone]   Fax#  [phone]  *-*-*-*-*-*-*-*-*-*-*-*-*-*-*-*-*-*-*-*-*-*-*-*-*-*-*-*-*-*-*-*-*-*-*-*-*-*-*-*-*-*-*-*-*-*-*-*-*-*-*-*-*-*                                   Cardiology Follow Up      ENCOUNTER DATE: 11/15/23 4:02 PM  PATIENT NAME: Dexter Reynoso   : 1935    MRN: [id number]  AGE:88 y.o. SEX: male  [address], [place]     PRIMARY CARE PHYSICIAN: Andrew Cheng DO    ACTIVE DIAGNOSIS THIS VISIT  1. Angina pectoris ([address])        2. Aortic stenosis, severe        3. Chronic diastolic (congestive) heart failure (HCC)        4. Paroxysmal atrial fibrillation ([address])        5. PSVT (paroxysmal supraventricular tachycardia)        6. Pure hypercholesterolemia        7. PVC (premature ventricular contraction)        8. Chronic anemia        9. Cigarette nicotine dependence in remission        10. CKD (chronic kidney disease) stage 4, GFR 15-29 ml/min (HCC)        11. History of radiofrequency ablation  for SVT        12. Primary hypertension        13.  Lesion of left lung          ACTIVE PROBLEM LIST  Patient Active Problem List   Diagnosis    Chronic anemia    Linda esophagus    Esophageal reflux    Hypertension    Spinal stenosis of lumbar region    Spondylolisthesis    PSVT (paroxysmal supraventricular tachycardia)    Palpitations    Pure hypercholesterolemia    PVC (premature ventricular contraction)    CKD (chronic kidney disease) stage 4, GFR 15-29 ml/min (HCC)    Chronic diastolic (congestive) heart failure (HCC)    BPH (benign prostatic hyperplasia)    History of radiofrequency ablation  for SVT    Pulmonary nodules    Aortic stenosis, severe    Elevated troponin    Cigarette nicotine dependence in remission    Lesion of left lung    Secondary hyperparathyroidism of renal origin ([address])    Paroxysmal atrial fibrillation (HCC)    Angina pectoris ([address])       CARDIOLOGY SPECIALTY Shalini Bwoden is a 80 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 Symptoms were suggestive of bronchitis and chest x-ray was negative in the ED. He was diagnosed with cardio renal syndrome with fluid overload. Diuresis was limited by his renal function. His respiratory status has returned back to baseline and he is no longer short of breath. 11/10-11/11/2021 hospitalized [facility] with hemoptysis and right lower lobe pneumonia    11/22/2021 ECHOCARDIOGRAM LIMITED:   Normal left ventricular systolic and diastolic function, EF 81%. Moderate to severe aortic stenosis with moderate aortic regurgitation. The aortic valve regurgitant jet pressure half time was 374 MS. The aortic valve mean gradient is 34.5 mmHg. The valve area is 0.8 cm2. The left ventricular stroke index is 41.1 mL/m2. By my calculation, the dimensionless index was 0.27 which is close to severe (severe less than 0.25)    08/19/2022 [facility] right leg swelling    10/05/2022 echocardiogram: Normal left ventricular systolic function, EF 66% grade 2 diastolic dysfunction. Mild left atrial enlargement. Severe aortic stenosis with mild-to-moderate aortic regurgitation. Aortic valve maximum velocity 3.9 M/S. Aortic valve mean gradient 40 mmHg. MARLY 0.65 cm2. LVOT stroke volume index 33.3 mL/M2, DVI 0. 19. Mild-to-moderate MR with moderate TR. PASP 60 mmHg. Aortic root mildly dilated. 5/1/2023 echocardiogram: Normal systolic function, LVEF 48%. Mild concentric LVH. Mild left atrial enlargement. Aortic stenosis with peak velocity 3.1M/S, mean gradient 26 mmHg, aortic valve area 0.9 cm² moderate to severe mitral regurgitation. Mild tricuspid regurgitation with PASP 56 mmHg.   Ascending aorta is 4 cm    10/10/2023  [facility] congestive heart failure  BNP 1043    INTERVAL HISTORY:        Patient was very aortic stenosis and stage V kidney disease returns. He is not decided not to undergo TAVR are because it would leave him on dialysis and that its not the lifestyle that he wants. He denies chest discomfort. He has dyspnea on exertion and cannot walk very far. He has 2 falls in the last week which may be presyncopal events his aortic stenosis. Went over in detail different activities and situations he needs to be very careful about.     DISCUSSION/PLAN:          Return in 6 months    Lab Studies:    Lab Results   Component Value Date    CHOLESTEROL 111 07/26/2023    CHOLESTEROL 132 05/27/2022    CHOLESTEROL 146 09/03/2020     Lab Results   Component Value Date    TRIG 52 07/26/2023    TRIG 75 05/27/2022    TRIG 69 09/03/2020     Lab Results   Component Value Date    HDL 53 07/26/2023    HDL 50 05/27/2022    HDL 52 09/03/2020     Lab Results   Component Value Date    LDLCALC 48 07/26/2023    LDLCALC 67 05/27/2022    LDLCALC 80 09/03/2020         Lab Results   Component Value Date    NTBNP 4,038 (H) 05/27/2022    NTBNP 5,024 (H) 11/10/2021    NTBNP 2,942 (H) 08/11/2021       Lab Results   Component Value Date    EGFR 25 10/18/2023    EGFR 22 10/10/2023    EGFR 24 08/07/2023    SODIUM 138 10/18/2023    SODIUM 134 (L) 10/10/2023    SODIUM 139 08/07/2023    K 3.8 10/18/2023    K 4.2 10/10/2023    K 3.6 08/07/2023    CL 97 10/18/2023    CL 98 10/10/2023     08/07/2023    CO2 30 10/18/2023    CO2 30 10/10/2023    CO2 27 08/07/2023    ANIONGAP 7 05/15/2015    ANIONGAP 8 05/09/2014    BUN 46 (H) 10/18/2023    BUN 41 (H) 10/10/2023    BUN 36 (H) 08/07/2023    CREATININE 2.25 (H) 10/18/2023    CREATININE 2.49 (H) 10/10/2023    CREATININE 2.33 (H) 08/07/2023     Lab Results   Component Value Date    WBC 8.37 10/10/2023    WBC 7.30 07/26/2023    WBC 8.45 04/28/2023    HGB 11.9 (L) 10/10/2023    HGB 11.5 (L) 07/26/2023    HGB 10.2 (L) 04/28/2023    HCT 38.9 10/10/2023    HCT 36.0 (L) 07/26/2023    HCT 33.2 (L) 04/28/2023    MCV 91 10/10/2023    MCV 89 07/26/2023    MCV 92 04/28/2023    MCH 27.9 10/10/2023    MCH 28.3 07/26/2023    MCH 28.3 04/28/2023    MCHC 30.6 (L) 10/10/2023    MCHC 31.9 07/26/2023    MCHC 30.7 (L) 04/28/2023     10/10/2023     07/26/2023     04/28/2023      Lab Results   Component Value Date    GLUCOSE 100 05/15/2015    GLUCOSE 97 05/09/2014    CALCIUM 9.7 10/18/2023    CALCIUM 10.0 10/10/2023    CALCIUM 9.9 08/07/2023    AST 23 10/10/2023    AST 21 11/17/2021    AST 11 11/10/2021    ALT 30 10/10/2023    ALT 25 11/17/2021    ALT 19 11/10/2021    ALKPHOS 109 (H) 10/10/2023    ALKPHOS 84 11/17/2021    ALKPHOS 79 11/10/2021    PROT 7.4 05/15/2015    PROT 7.4 05/09/2014    BILITOT 0.40 05/15/2015    BILITOT 0.4 05/09/2014    MG 2.0 10/10/2023    MG 2.1 10/31/2021    MG 2.1 02/27/2021       Lab Results   Component Value Date    DDIMER 1.48 (H) 11/10/2021       Lab Results   Component Value Date    FERRITIN 240 11/17/2021    IRON 52 (L) 11/17/2021    TIBC 194 (L) 11/17/2021       No results found for this visit on 11/15/23. Current Outpatient Medications:     Cholecalciferol (VITAMIN D3) 125 MCG (5000 UT) CAPS, 2 (two) times a week , Disp: , Rfl:     finasteride (PROSCAR) 5 mg tablet, Take 1 tablet (5 mg total) by mouth daily, Disp: 90 tablet, Rfl: 1    furosemide (LASIX) 40 mg tablet, Take 1 tablet (40 mg total) by mouth daily, Disp: 90 tablet, Rfl: 1    metoprolol succinate (TOPROL-XL) 25 mg 24 hr tablet, Take 1 tablet (25 mg total) by mouth daily, Disp: 90 tablet, Rfl: 3    Multiple Vitamin (MULTI-VITAMIN DAILY) TABS, Take by mouth, Disp: , Rfl:     nitroglycerin (NITROSTAT) 0.4 mg SL tablet, Place 1 tablet (0.4 mg total) under the tongue once as needed for chest pain for up to 1 dose Lay down supine before taking and wait 15 minutes to get up. Get up very slowly.   If pain continues, call 911, Disp: 25 tablet, Rfl: 2    omeprazole (PriLOSEC) 20 mg delayed release capsule, Take 1 capsule (20 mg total) by mouth daily, Disp: 90 capsule, Rfl: 1    rivaroxaban (Xarelto) 15 mg tablet, Take 1 tablet (15 mg total) by mouth daily with dinner, Disp: 30 tablet, Rfl: 5    rosuvastatin (CRESTOR) 5 mg tablet, Take 1 tablet (5 mg total) by mouth daily, Disp: 90 tablet, Rfl: 0    tamsulosin (FLOMAX) 0.4 mg, Take 1 capsule (0.4 mg total) by mouth daily with dinner, Disp: 30 capsule, Rfl: 4  Allergies   Allergen Reactions    Apixaban Other (See Comments)       Past Medical History:   Diagnosis Date    Aortic stenosis, severe 2021    Atrial fibrillation (HCC)     CHF (congestive heart failure) (HCC)     Colon polyp     GERD (gastroesophageal reflux disease)     Hearing loss     last assessed 17    Hypertension     Rheumatic fever     Stenosis of aorta      Social History     Socioeconomic History    Marital status: /Civil Union     Spouse name: Not on file    Number of children: Not on file    Years of education: Not on file    Highest education level: Not on file   Occupational History    Not on file   Tobacco Use    Smoking status: Former     Packs/day: 1.00     Years: 16.00     Total pack years: 16.00     Types: Cigarettes     Start date: 5     Quit date:      Years since quittin.9    Smokeless tobacco: Never   Vaping Use    Vaping Use: Never used   Substance and Sexual Activity    Alcohol use: Yes     Comment: occ    Drug use: No    Sexual activity: Yes     Partners: Female   Other Topics Concern    Not on file   Social History Narrative    Not on file     Social Determinants of Health     Financial Resource Strain: Low Risk  (2023)    Overall Financial Resource Strain (CARDIA)     Difficulty of Paying Living Expenses: Not hard at all   Food Insecurity: Not on file   Transportation Needs: No Transportation Needs (2023)    PRAPARE - Transportation     Lack of Transportation (Medical):  No Lack of Transportation (Non-Medical): No   Physical Activity: Not on file   Stress: Not on file   Social Connections: Not on file   Intimate Partner Violence: Not on file   Housing Stability: Not on file      Family History   Problem Relation Age of Onset    Other Mother         old age    Esophageal cancer Father     Other Father         old age    Alcohol abuse Son         alcoholism     Past Surgical History:   Procedure Laterality Date    APPENDECTOMY      BACK SURGERY      lower    CARDIAC SURGERY      ablation    CATARACT EXTRACTION      JOINT REPLACEMENT Left     knee    KNEE SURGERY Left     AK PROSTATE NEEDLE BIOPSY ANY APPROACH N/A 3/16/2021    Procedure: Transperineal prostate biopsy with biplanar transrectal ultrasound, using the perineal logic kit; Surgeon: Marichuy Nichols MD;  Location: [facility];  Service: Urology    TONSILLECTOMY AND ADENOIDECTOMY         PREVIOUS WEIGHTS:   Wt Readings from Last 10 Encounters:   11/15/23 78.9 kg (174 lb)   10/10/23 83.3 kg (183 lb 10.3 oz)   09/05/23 79.4 kg (175 lb)   08/09/23 78.9 kg (174 lb)   05/19/23 81.9 kg (180 lb 8 oz)   05/05/23 82.1 kg (181 lb)   05/02/23 81.2 kg (179 lb)   05/01/23 81.6 kg (180 lb)   04/19/23 81.6 kg (180 lb)   03/15/23 82 kg (180 lb 12.8 oz)        Review of Systems:  Review of Systems   Constitutional:  Negative for activity change. Respiratory:  Positive for shortness of breath. Negative for cough, choking, chest tightness, wheezing and stridor. Cardiovascular:  Positive for leg swelling. Negative for chest pain and palpitations. Musculoskeletal:  Negative for gait problem. Skin:  Negative for color change. Neurological:  Positive for light-headedness. Negative for dizziness, tremors, syncope, weakness and headaches. Psychiatric/Behavioral:  Negative for agitation and confusion.         Physical Exam:  /70   Pulse 64   Ht 5' 9" (1.753 m)   Wt 78.9 kg (174 lb)   SpO2 98%   BMI 25.70 kg/m²     Physical Exam  Vitals reviewed. Constitutional:       General: He is not in acute distress. Appearance: He is well-developed. HENT:      Head: Normocephalic and atraumatic. Neck:      Thyroid: No thyromegaly. Vascular: No carotid bruit or JVD. Trachea: No tracheal deviation. Cardiovascular:      Rate and Rhythm: Normal rate. Rhythm irregularly irregular. Pulses: Normal pulses. Heart sounds: Murmur heard. Crescendo decrescendo systolic murmur is present with a grade of 3/6. No friction rub. No gallop. Pulmonary:      Effort: Pulmonary effort is normal. No respiratory distress. Breath sounds: Normal breath sounds. No wheezing, rhonchi or rales. Chest:      Chest wall: No tenderness. Musculoskeletal:      Cervical back: Normal range of motion and neck supple. Right lower leg: 3+ Pitting Edema present. Left lower le+ Pitting Edema present. Skin:     General: Skin is warm and dry. Neurological:      General: No focal deficit present. Mental Status: He is alert and oriented to person, place, and time. Psychiatric:         Mood and Affect: Mood normal.         Behavior: Behavior normal.         Thought Content: Thought content normal.         Judgment: Judgment normal.       ======================================================  Imaging:   I have personally reviewed pertinent reports. I spent 30 minutes on the patient's office visit. This time was spent on the day of the visit. I had direct contact with the patient in the office on the day of the visit. Greater than 50% of the total time was spent obtaining a history, examining patient, answering all patient questions, arranging and discussing plan of care with patient as well as directly providing instructions. Additional time then spent on orders and office chart. Portions of the record may have been created with voice recognition software.  Occasional wrong word or "sound a like" substitutions may have occurred due to the inherent limitations of voice recognition software. Read the chart carefully and recognize, using context, where substitutions have occurred.     SIGNATURES:   Nida Collins MD

## 2023-12-11 ENCOUNTER — APPOINTMENT (OUTPATIENT)
Dept: LAB | Facility: CLINIC | Age: 88
End: 2023-12-11
Payer: MEDICARE

## 2023-12-11 DIAGNOSIS — N18.4 CKD (CHRONIC KIDNEY DISEASE) STAGE 4, GFR 15-29 ML/MIN (HCC): ICD-10-CM

## 2023-12-11 LAB
ALBUMIN SERPL BCP-MCNC: 3.8 G/DL (ref 3.5–5)
ANION GAP SERPL CALCULATED.3IONS-SCNC: 10 MMOL/L
BUN SERPL-MCNC: 48 MG/DL (ref 5–25)
CALCIUM SERPL-MCNC: 9.7 MG/DL (ref 8.4–10.2)
CHLORIDE SERPL-SCNC: 100 MMOL/L (ref 96–108)
CO2 SERPL-SCNC: 28 MMOL/L (ref 21–32)
CREAT SERPL-MCNC: 2.23 MG/DL (ref 0.6–1.3)
CREAT UR-MCNC: 76.9 MG/DL
ERYTHROCYTE [DISTWIDTH] IN BLOOD BY AUTOMATED COUNT: 15.6 % (ref 11.6–15.1)
GFR SERPL CREATININE-BSD FRML MDRD: 25 ML/MIN/1.73SQ M
GLUCOSE P FAST SERPL-MCNC: 96 MG/DL (ref 65–99)
HCT VFR BLD AUTO: 36.8 % (ref 36.5–49.3)
HGB BLD-MCNC: 11.8 G/DL (ref 12–17)
MCH RBC QN AUTO: 28.6 PG (ref 26.8–34.3)
MCHC RBC AUTO-ENTMCNC: 32.1 G/DL (ref 31.4–37.4)
MCV RBC AUTO: 89 FL (ref 82–98)
PHOSPHATE SERPL-MCNC: 3.8 MG/DL (ref 2.3–4.1)
PLATELET # BLD AUTO: 243 THOUSANDS/UL (ref 149–390)
PMV BLD AUTO: 11.4 FL (ref 8.9–12.7)
POTASSIUM SERPL-SCNC: 4 MMOL/L (ref 3.5–5.3)
PROT UR-MCNC: 30 MG/DL
PROT/CREAT UR: 0.39 MG/G{CREAT} (ref 0–0.1)
PTH-INTACT SERPL-MCNC: 277.3 PG/ML (ref 12–88)
RBC # BLD AUTO: 4.13 MILLION/UL (ref 3.88–5.62)
SODIUM SERPL-SCNC: 138 MMOL/L (ref 135–147)
WBC # BLD AUTO: 7.15 THOUSAND/UL (ref 4.31–10.16)

## 2023-12-11 PROCEDURE — 85027 COMPLETE CBC AUTOMATED: CPT

## 2023-12-11 PROCEDURE — 82570 ASSAY OF URINE CREATININE: CPT

## 2023-12-11 PROCEDURE — 84156 ASSAY OF PROTEIN URINE: CPT

## 2023-12-11 PROCEDURE — 80069 RENAL FUNCTION PANEL: CPT

## 2023-12-11 PROCEDURE — 36415 COLL VENOUS BLD VENIPUNCTURE: CPT

## 2023-12-11 PROCEDURE — 83970 ASSAY OF PARATHORMONE: CPT

## 2023-12-13 ENCOUNTER — TELEPHONE (OUTPATIENT)
Dept: CARDIOLOGY CLINIC | Facility: CLINIC | Age: 88
End: 2023-12-13

## 2023-12-13 NOTE — TELEPHONE ENCOUNTER
Pt calling would like to speak with Dr Bill Garibay stating he had a minor nose bleed and would like to discuss this since he in on the xarelto. Please call patient.

## 2024-01-03 DIAGNOSIS — N13.8 BPH WITH OBSTRUCTION/LOWER URINARY TRACT SYMPTOMS: ICD-10-CM

## 2024-01-03 DIAGNOSIS — R39.12 WEAK URINARY STREAM: ICD-10-CM

## 2024-01-03 DIAGNOSIS — R33.9 INCOMPLETE BLADDER EMPTYING: ICD-10-CM

## 2024-01-03 DIAGNOSIS — N40.1 BPH WITH OBSTRUCTION/LOWER URINARY TRACT SYMPTOMS: ICD-10-CM

## 2024-01-03 RX ORDER — TAMSULOSIN HYDROCHLORIDE 0.4 MG/1
0.4 CAPSULE ORAL
Qty: 30 CAPSULE | Refills: 2 | Status: SHIPPED | OUTPATIENT
Start: 2024-01-03

## 2024-01-04 ENCOUNTER — OFFICE VISIT (OUTPATIENT)
Dept: NEPHROLOGY | Facility: CLINIC | Age: 89
End: 2024-01-04
Payer: MEDICARE

## 2024-01-04 VITALS
BODY MASS INDEX: 25.33 KG/M2 | DIASTOLIC BLOOD PRESSURE: 84 MMHG | SYSTOLIC BLOOD PRESSURE: 122 MMHG | HEIGHT: 69 IN | WEIGHT: 171 LBS

## 2024-01-04 DIAGNOSIS — N18.4 CKD (CHRONIC KIDNEY DISEASE) STAGE 4, GFR 15-29 ML/MIN (HCC): Primary | ICD-10-CM

## 2024-01-04 DIAGNOSIS — I10 PRIMARY HYPERTENSION: ICD-10-CM

## 2024-01-04 DIAGNOSIS — I50.33 ACUTE ON CHRONIC DIASTOLIC (CONGESTIVE) HEART FAILURE (HCC): ICD-10-CM

## 2024-01-04 DIAGNOSIS — N25.81 SECONDARY HYPERPARATHYROIDISM OF RENAL ORIGIN (HCC): ICD-10-CM

## 2024-01-04 DIAGNOSIS — D64.9 CHRONIC ANEMIA: ICD-10-CM

## 2024-01-04 DIAGNOSIS — I35.0 AORTIC STENOSIS, SEVERE: Chronic | ICD-10-CM

## 2024-01-04 DIAGNOSIS — I50.32 CHRONIC DIASTOLIC (CONGESTIVE) HEART FAILURE (HCC): ICD-10-CM

## 2024-01-04 PROCEDURE — 99214 OFFICE O/P EST MOD 30 MIN: CPT | Performed by: INTERNAL MEDICINE

## 2024-01-04 NOTE — PATIENT INSTRUCTIONS
As we discussed in the office visit after reviewing your most recent blood test your kidney function remains fairly stable for the last couple of years.  You have moderate to advanced chronic kidney disease stage IV.  Remember to follow a low-salt diet.  No changes in your medication at this moment.  Given your symptoms of productive cough, worsening shortness of breath, recommend to contact your primary care doctor for further recommendations.  Continue close follow-up with your primary care doctor and your cardiologist.  I would like to see you back in the office in 4 months with repeat labs.  Remember to avoid NSAIDs (no ibuprofen, Motrin, Advil, Aleve, naproxen).  Okay to take Tylenol or acetaminophen as needed for pain.

## 2024-01-04 NOTE — PROGRESS NOTES
OFFICE FOLLOW UP - Nephrology   Pro Steele 88 y.o. male MRN: [id number]       ASSESSMENT and PLAN:  Manuel was seen today for follow-up and chronic kidney disease.    Diagnoses and all orders for this visit:    CKD (chronic kidney disease) stage 4, GFR 15-29 ml/min (HCC)  -     CBC; Future  -     Renal function panel; Future  -     PTH, intact; Future  -     Protein / creatinine ratio, urine; Future    Chronic diastolic (congestive) heart failure (HCC)    Secondary hyperparathyroidism of renal origin (HCC)    Acute on chronic diastolic (congestive) heart failure (HCC)    Primary hypertension    Aortic stenosis, severe    Chronic anemia        This is an 88-year-old gentleman with history of chronic kidney disease stage 4 previous creatinine in the high 1s to low 2s since 2017 who was initially seen for evaluation on 03/2022, also history of chronic diastolic heart failure, severe aortic stenosis, hypertension and hyperlipidemia, patient today returns to the office for CKD follow-up with his wife and son.      1. Chronic kidney disease stage 4 with previous creatinine around 1.7-2.1 since 2017.    Creatinine since early 2022 in the low 2s  CKD likely multifactorial in the setting of hypertension, diastolic heart failure, severe aortic stenosis, possible component of cardiorenal syndrome as well as age-related nephron loss and atherosclerotic disease.    Recent blood test were reviewed with patient and with his family, most recent creatinine 2.23 with an estimated GFR of 25 on 12/11/2023  Patient again confirmed that he is NOT interested on dialysis if needed.  Continue with conservative management and close follow-up, I would like to see him back in the office in 4 months with repeat labs.  Once again discussed about importance to follow a low-salt diet, continue with same diuretics as per cardiology's, follow daily weight and avoid NSAIDs    2. Hypertension, blood pressure acceptable on current regimen, no  changes on his medication.  Follow low-salt diet.    3. Chronic diastolic heart failure, severe aortic stenosis,MR, continues with lower extremity edema on and off worse over the right leg.    Continue close follow-up with his cardiologist.  Was deemed high risk to have surgery or TAVR by cardiac surgery team.  Continue current diuretics, follow low salt diet.    4. 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, hemoglobin low but stable 11.8 , follow labs in 4 months.      5. 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 History of BPH, on Flomax.      7. Hyperlipidemia, continue with statins, most recent lipid panel well-controlled on 07/2023    8. respiratory infection, patient reports got a cold since around Geri and continue with productive cough, advised to contact his primary care doctor for further evaluation        Patient Instructions   As we discussed in the office visit after reviewing your most recent blood test your kidney function remains fairly stable for the last couple of years.  You have moderate to advanced chronic kidney disease stage IV.  Remember to follow a low-salt diet.  No changes in your medication at this moment.  Given your symptoms of productive cough, worsening shortness of breath, recommend to contact your primary care doctor for further recommendations.  Continue close follow-up with your primary care doctor and your cardiologist.  I would like to see you back in the office in 4 months with repeat labs.  Remember to avoid NSAIDs (no ibuprofen, Motrin, Advil, Aleve, naproxen).  Okay to take Tylenol or acetaminophen as needed for pain.        HPI: Pro Steele is a 88 y.o. male who is here for Follow-up and Chronic Kidney Disease  .    This is a patient history of chronic diastolic heart failure, severe aortic stenosis, hypertension, chronic kidney disease stage 4 was initially  seen for evaluation on 03/2022.  Previously evaluated by cardiac surgeon, unfortunately was deemed not a good candidate for open heart surgery nor TAVR given advanced age as well as significant kidney disease as well as AS and MR.    Last time patient was seen in our office was on 08/2023.    Patient today returns to the office for follow-up with his wife Lizy his son.    Today reports he is feeling lousy, reports he got a cold since around Geri and continue with productive cough since then.  Patient denies any chest pain, no significant shortness of breath.   Continues with chronic leg edema worse over right leg  Denies any abdominal pain, no nausea, vomiting, no diarrhea or constipation.  Denies any urinary problems, no burning sensation or gross hematuria.  Reports nocturia once or twice at night.    The last blood work was done on 12/11/2023, which we have reviewed together.      ROS: All the systems were reviewed and were negative except as documented on the H&P.        Allergies: Apixaban    Medications:   Current Outpatient Medications:     Cholecalciferol (VITAMIN D3) 125 MCG (5000 UT) CAPS, 2 (two) times a week , Disp: , Rfl:     finasteride (PROSCAR) 5 mg tablet, Take 1 tablet (5 mg total) by mouth daily, Disp: 90 tablet, Rfl: 1    furosemide (LASIX) 40 mg tablet, Take 1 tablet (40 mg total) by mouth daily, Disp: 90 tablet, Rfl: 1    metoprolol succinate (TOPROL-XL) 25 mg 24 hr tablet, Take 1 tablet (25 mg total) by mouth daily, Disp: 90 tablet, Rfl: 3    Multiple Vitamin (MULTI-VITAMIN DAILY) TABS, Take by mouth, Disp: , Rfl:     nitroglycerin (NITROSTAT) 0.4 mg SL tablet, Place 1 tablet (0.4 mg total) under the tongue once as needed for chest pain for up to 1 dose Lay down supine before taking and wait 15 minutes to get up.  Get up very slowly.  If pain continues, call 911, Disp: 25 tablet, Rfl: 2    omeprazole (PriLOSEC) 20 mg delayed release capsule, Take 1 capsule (20 mg total) by mouth  "daily, Disp: 90 capsule, Rfl: 1    rivaroxaban (Xarelto) 15 mg tablet, Take 1 tablet (15 mg total) by mouth daily with dinner, Disp: 30 tablet, Rfl: 5    rosuvastatin (CRESTOR) 5 mg tablet, Take 1 tablet (5 mg total) by mouth daily, Disp: 90 tablet, Rfl: 0    tamsulosin (FLOMAX) 0.4 mg, Take 1 capsule (0.4 mg total) by mouth daily with dinner, Disp: 30 capsule, Rfl: 2    Past Medical History:   Diagnosis Date    Aortic stenosis, severe 11/01/2021    Atrial fibrillation (HCC)     CHF (congestive heart failure) (HCC)     Colon polyp     GERD (gastroesophageal reflux disease)     Hearing loss     last assessed 11/8/17    Hypertension     Rheumatic fever     Stenosis of aorta      Past Surgical History:   Procedure Laterality Date    APPENDECTOMY      BACK SURGERY      lower    CARDIAC SURGERY      ablation    CATARACT EXTRACTION      JOINT REPLACEMENT Left     knee    KNEE SURGERY Left     LA PROSTATE NEEDLE BIOPSY ANY APPROACH N/A 3/16/2021    Procedure: Transperineal prostate biopsy with biplanar transrectal ultrasound, using the perineal logic kit;  Surgeon: Derrick Jackson MD;  Location: [facility];  Service: Urology    TONSILLECTOMY AND ADENOIDECTOMY       Family History   Problem Relation Age of Onset    Other Mother         old age    Esophageal cancer Father     Other Father         old age    Alcohol abuse Son         alcoholism      reports that he quit smoking about 55 years ago. His smoking use included cigarettes. He started smoking about 71 years ago. He has a 16.0 pack-year smoking history. He has never used smokeless tobacco. He reports current alcohol use. He reports that he does not use drugs.      Physical Exam:   Vitals:    01/04/24 1153   BP: 122/84   BP Location: Left arm   Patient Position: Sitting   Cuff Size: Adult   Weight: 77.6 kg (171 lb)   Height: 5' 9\" (1.753 m)     Body mass index is 25.25 kg/m².    General: conscious, cooperative, in not acute distress  Eyes: conjunctivae pink, anicteric " "sclerae  ENT: lips and mucous membranes moist  Neck: supple, no JVD  Chest: clear breath sounds bilateral, no crackles, ronchus or wheezings  CVS: distinct S1 & S2, normal rate, regular rhythm  Abdomen: soft, non-tender, non-distended, normoactive bowel sounds  Back: no CVA tenderness  Extremities: 1+ edema of both legs worse over right leg  Skin: no rash  Neuro: awake, alert, oriented            Laboratory Results:  Lab Results   Component Value Date    WBC 7.15 12/11/2023    HGB 11.8 (L) 12/11/2023    HCT 36.8 12/11/2023    MCV 89 12/11/2023     12/11/2023     Lab Results   Component Value Date    SODIUM 138 12/11/2023    K 4.0 12/11/2023     12/11/2023    CO2 28 12/11/2023    BUN 48 (H) 12/11/2023    CREATININE 2.23 (H) 12/11/2023    GLUC 99 10/18/2023    CALCIUM 9.7 12/11/2023       Lab Results   Component Value Date    .3 (H) 12/11/2023    CALCIUM 9.7 12/11/2023    PHOS 3.8 12/11/2023             Portions of the record may have been created with voice recognition software. Occasional wrong word or \"sound a like\" substitutions may have occurred due to the inherent limitations of voice recognition software. Read the chart carefully and recognize, using context, where substitutions have occurred.If you have any questions, please contact the dictating provider.  "

## 2024-01-05 ENCOUNTER — TELEMEDICINE (OUTPATIENT)
Dept: INTERNAL MEDICINE CLINIC | Facility: CLINIC | Age: 89
End: 2024-01-05
Payer: MEDICARE

## 2024-01-05 DIAGNOSIS — J06.9 UPPER RESPIRATORY TRACT INFECTION, UNSPECIFIED TYPE: Primary | ICD-10-CM

## 2024-01-05 PROCEDURE — 99214 OFFICE O/P EST MOD 30 MIN: CPT | Performed by: INTERNAL MEDICINE

## 2024-01-05 RX ORDER — AZITHROMYCIN 250 MG/1
TABLET, FILM COATED ORAL DAILY
Qty: 6 TABLET | Refills: 0 | Status: SHIPPED | OUTPATIENT
Start: 2024-01-05 | End: 2024-01-10

## 2024-01-05 NOTE — PROGRESS NOTES
Virtual Regular Visit    Verification of patient location:    Patient is located at Home in the following state in which I hold an active license [place]      Assessment/Plan:      Medical history of rheumatic fever with resulting severe aortic stenosis, hypertension, hyperlipidemia, BPH, paroxysmal SVT, chronic diastolic CHF, CKD stage 4, spinal stenosis, left knee replacement, lung nodule     Patient reports cold symptoms for the past 5 or 6 days.  Cough productive of phlegm.  Denies any fevers or shortness of breath.  Tested negative for COVID.  Has been using Robitussin over-the-counter.  Saw his nephrologist yesterday who suggested he follow-up with PCP and maybe consider an antibiotic    Plan:  -Acute upper respiratory infection.  Discussed self-limiting nature of symptoms  -After discussion, we will send prescription for Z-Louis to pharmacy  -May continue Robitussin or Robitussin DM as needed  -Continue supportive care    Problem List Items Addressed This Visit    None  Visit Diagnoses     Upper respiratory tract infection, unspecified type    -  Primary    Relevant Medications    azithromycin (Zithromax) 250 mg tablet               Reason for visit is No chief complaint on file.       Encounter provider Andrew Schroeder DO    Provider located at [address] [place] [address] INTERNAL MEDICINE [address] [id number]      Recent Visits  No visits were found meeting these conditions.  Showing recent visits within past 7 days and meeting all other requirements  Today's Visits  Date Type Provider Dept   01/05/24 Telemedicine Andrew Schroeder DO  Internal Med [place]   Showing today's visits and meeting all other requirements  Future Appointments  No visits were found meeting these conditions.  Showing future appointments within next 150 days and meeting all other requirements       The patient was identified by name and date of birth. Pro Steele was informed that this  is a telemedicine visit and that the visit is being conducted through the Epic Embedded platform. He agrees to proceed..  My office door was closed. No one else was in the room.  He acknowledged consent and understanding of privacy and security of the video platform. The patient has agreed to participate and understands they can discontinue the visit at any time.    Patient is aware this is a billable service.     Subjective  Pro Steele is a 88 y.o. male  .    HPI     Past Medical History:   Diagnosis Date   • Aortic stenosis, severe 11/01/2021   • Atrial fibrillation (HCC)    • CHF (congestive heart failure) (HCC)    • Colon polyp    • GERD (gastroesophageal reflux disease)    • Hearing loss     last assessed 11/8/17   • Hypertension    • Rheumatic fever    • Stenosis of aorta        Past Surgical History:   Procedure Laterality Date   • APPENDECTOMY     • BACK SURGERY      lower   • CARDIAC SURGERY      ablation   • CATARACT EXTRACTION     • JOINT REPLACEMENT Left     knee   • KNEE SURGERY Left    • VT PROSTATE NEEDLE BIOPSY ANY APPROACH N/A 3/16/2021    Procedure: Transperineal prostate biopsy with biplanar transrectal ultrasound, using the perineal logic kit;  Surgeon: Derrick Jackson MD;  Location: [facility];  Service: Urology   • TONSILLECTOMY AND ADENOIDECTOMY         Current Outpatient Medications   Medication Sig Dispense Refill   • azithromycin (Zithromax) 250 mg tablet Take 2 tablets (500 mg total) by mouth daily for 1 day, THEN 1 tablet (250 mg total) daily for 4 days. 6 tablet 0   • Cholecalciferol (VITAMIN D3) 125 MCG (5000 UT) CAPS 2 (two) times a week      • finasteride (PROSCAR) 5 mg tablet Take 1 tablet (5 mg total) by mouth daily 90 tablet 1   • furosemide (LASIX) 40 mg tablet Take 1 tablet (40 mg total) by mouth daily 90 tablet 1   • metoprolol succinate (TOPROL-XL) 25 mg 24 hr tablet Take 1 tablet (25 mg total) by mouth daily 90 tablet 3   • Multiple Vitamin (MULTI-VITAMIN DAILY) TABS Take by  mouth     • nitroglycerin (NITROSTAT) 0.4 mg SL tablet Place 1 tablet (0.4 mg total) under the tongue once as needed for chest pain for up to 1 dose Lay down supine before taking and wait 15 minutes to get up.  Get up very slowly.  If pain continues, call 911 25 tablet 2   • omeprazole (PriLOSEC) 20 mg delayed release capsule Take 1 capsule (20 mg total) by mouth daily 90 capsule 1   • rivaroxaban (Xarelto) 15 mg tablet Take 1 tablet (15 mg total) by mouth daily with dinner 30 tablet 5   • rosuvastatin (CRESTOR) 5 mg tablet Take 1 tablet (5 mg total) by mouth daily 90 tablet 0   • tamsulosin (FLOMAX) 0.4 mg Take 1 capsule (0.4 mg total) by mouth daily with dinner 30 capsule 2     No current facility-administered medications for this visit.        Allergies   Allergen Reactions   • Apixaban Other (See Comments)       Review of Systems    Video Exam    There were no vitals filed for this visit.    Physical Exam     Visit Time  Total Visit Duration:

## 2024-01-10 DIAGNOSIS — E78.5 HYPERLIPIDEMIA, UNSPECIFIED HYPERLIPIDEMIA TYPE: ICD-10-CM

## 2024-01-10 RX ORDER — ROSUVASTATIN CALCIUM 5 MG/1
5 TABLET, COATED ORAL DAILY
Qty: 90 TABLET | Refills: 1 | Status: SHIPPED | OUTPATIENT
Start: 2024-01-10

## 2024-01-10 NOTE — TELEPHONE ENCOUNTER
Reason for call:   [x] Refill   [] Prior Auth  [] Other:     Office:   [x] PCP/Provider - Andrew Schroeder, DO   [] Specialty/Provider -     Medication: rosuvastatin (CRESTOR)     Dose/Frequency:     5 mg, Oral, Daily       Quantity: 90    Pharmacy: [facility] - [place], [place] - [address] [place] [address]     Does the patient have enough for 3 days?   [x] Yes   [] No - Send as HP to POD

## 2024-01-11 ENCOUNTER — TELEPHONE (OUTPATIENT)
Age: 89
End: 2024-01-11

## 2024-01-11 NOTE — TELEPHONE ENCOUNTER
Patient called asking for a letter to be sent to [employer] for a lift chair being installed from the 1st floor to the second. This would save him 6%. Also, the antibiotic worked. Please fax to [phone]    Thank you

## 2024-01-12 NOTE — TELEPHONE ENCOUNTER
Patient returned a call back. He  stated he is having trouble with MyChart and requested the letter to be faxed to [phone]

## 2024-01-24 ENCOUNTER — RA CDI HCC (OUTPATIENT)
Dept: OTHER | Facility: HOSPITAL | Age: 89
End: 2024-01-24

## 2024-01-24 NOTE — PROGRESS NOTES
HCC coding opportunities          Chart Reviewed number of suggestions sent to Provider: 1     Patients Insurance   I13.0  Medicare Insurance: Medicare

## 2024-01-30 ENCOUNTER — OFFICE VISIT (OUTPATIENT)
Dept: INTERNAL MEDICINE CLINIC | Facility: CLINIC | Age: 89
End: 2024-01-30
Payer: MEDICARE

## 2024-01-30 VITALS
HEART RATE: 76 BPM | WEIGHT: 181 LBS | HEIGHT: 69 IN | DIASTOLIC BLOOD PRESSURE: 72 MMHG | TEMPERATURE: 97.3 F | RESPIRATION RATE: 18 BRPM | BODY MASS INDEX: 26.81 KG/M2 | OXYGEN SATURATION: 95 % | SYSTOLIC BLOOD PRESSURE: 120 MMHG

## 2024-01-30 DIAGNOSIS — I50.32 CHRONIC DIASTOLIC (CONGESTIVE) HEART FAILURE (HCC): ICD-10-CM

## 2024-01-30 DIAGNOSIS — R91.8 PULMONARY NODULES: Primary | ICD-10-CM

## 2024-01-30 DIAGNOSIS — I35.0 AORTIC STENOSIS, SEVERE: Chronic | ICD-10-CM

## 2024-01-30 DIAGNOSIS — I48.0 PAROXYSMAL ATRIAL FIBRILLATION (HCC): ICD-10-CM

## 2024-01-30 DIAGNOSIS — I20.9 ANGINA PECTORIS (HCC): ICD-10-CM

## 2024-01-30 PROCEDURE — 99214 OFFICE O/P EST MOD 30 MIN: CPT | Performed by: INTERNAL MEDICINE

## 2024-01-30 NOTE — ASSESSMENT & PLAN NOTE
-Remains on Lasix  -Increased right greater than left pitting edema on exam today, patient reports he has increased his Lasix to 80 mg daily for the past couple days at direction of nephrology

## 2024-01-30 NOTE — ASSESSMENT & PLAN NOTE
-history of rheumatic fever, now with severe aortic stenosis.  Also finding of moderate to severe mitral regurgitation on most recent 2D echo  -Evaluated by cardiothoracic surgery and he is not felt to be a surgical candidate given his age, renal function, comorbidities, and associated mitral regurgitation  -Functional status appears to be stable at this time, does not report any severe MIXON

## 2024-01-30 NOTE — PROGRESS NOTES
INTERNAL MEDICINE OFFICE VISIT  [facility] Internal Medicine- [place]    NAME: Pro Steele  AGE: 88 y.o. SEX: male    DATE OF ENCOUNTER: 1/30/2024    Assessment and Plan/History of Present Illness     Here today for follow up  Medical history of rheumatic fever with resulting severe aortic stenosis, hypertension, hyperlipidemia, BPH, paroxysmal SVT, chronic diastolic CHF, CKD stage 4, spinal stenosis, left knee replacement, lung nodule     He has had some difficulty with insomnia.  Often wakes up around 3 AM, wide-awake until 5 AM.  He does nap during the day.  Discouraged daytime naps to encourage sleep at bedtime.  Reviewed healthy sleep hygiene practices, provided with handout on healthy sleep hygiene.  He does not wish to take any pharmacotherapy for sleep      1. Pulmonary nodules  Assessment & Plan:  CT completed April 2023 showing irregular mixed density nodule left upper lobe, essentially stable in size compared to prior CT December 2022.  Slow-growing bronchogenic malignancy remains in the differential.  Radiology has suggested 6-month follow-up CT of the chest    This finding was again reviewed today. At this time, Manuel would like to defer any additional imaging which I think is reasonable in light of his age and other comorbidities      2. Paroxysmal atrial fibrillation (HCC)  Assessment & Plan:  Rate controlled on Toprol XL  On Xarelto for stroke prevention       3. Angina pectoris (HCC)    4. Aortic stenosis, severe  Assessment & Plan:  -history of rheumatic fever, now with severe aortic stenosis.  Also finding of moderate to severe mitral regurgitation on most recent 2D echo  -Evaluated by cardiothoracic surgery and he is not felt to be a surgical candidate given his age, renal function, comorbidities, and associated mitral regurgitation  -Functional status appears to be stable at this time, does not report any severe MIXON      5. Chronic diastolic (congestive) heart failure (HCC)  Assessment &  "Plan:  -Remains on Lasix  -Increased right greater than left pitting edema on exam today, patient reports he has increased his Lasix to 80 mg daily for the past couple days at direction of nephrology                                     No orders of the defined types were placed in this encounter.      Chief Complaint     Chief Complaint   Patient presents with   • Follow-up     4 month        Review of Systems     10 point ROS negative except per HPI    The following portions of the patient's history were reviewed and updated as appropriate: allergies, current medications, past family history, past medical history, past social history, past surgical history and problem list.    Active Problem List     Patient Active Problem List   Diagnosis   • Chronic anemia   • Linda esophagus   • Esophageal reflux   • Hypertension   • Spinal stenosis of lumbar region   • Spondylolisthesis   • PSVT (paroxysmal supraventricular tachycardia)   • Palpitations   • Pure hypercholesterolemia   • PVC (premature ventricular contraction)   • CKD (chronic kidney disease) stage 4, GFR 15-29 ml/min (HCC)   • Chronic diastolic (congestive) heart failure (HCC)   • BPH (benign prostatic hyperplasia)   • History of radiofrequency ablation  for SVT   • Pulmonary nodules   • Aortic stenosis, severe   • Elevated troponin   • Cigarette nicotine dependence in remission   • Lesion of left lung   • Secondary hyperparathyroidism of renal origin (HCC)   • Paroxysmal atrial fibrillation (HCC)       Objective     /72 (BP Location: Left arm, Patient Position: Sitting, Cuff Size: Standard)   Pulse 76   Temp (!) 97.3 °F (36.3 °C)   Resp 18   Ht 5' 9\" (1.753 m)   Wt 82.1 kg (181 lb)   SpO2 95%   BMI 26.73 kg/m²     Physical Exam  Cardiovascular:      Rate and Rhythm: Normal rate. Rhythm irregular.      Heart sounds: Murmur heard.   Pulmonary:      Effort: Pulmonary effort is normal.      Breath sounds: Normal breath sounds. No wheezing, rhonchi " or rales.   Musculoskeletal:      Right lower leg: Edema present.      Left lower leg: Edema present.         Pertinent Laboratory/Diagnostic Studies:  XR chest 2 views    Result Date: 10/11/2023  Impression: Vascular congestion and small bilateral pleural effusions. Right basilar atelectasis versus developing pneumonia. Resident: TORSTEN LAY I, the attending radiologist, have reviewed the images and agree with the final report above. Workstation performed: [id number]       Images and diagnostics reviewed     Current Medications     Current Outpatient Medications:   •  Cholecalciferol (VITAMIN D3) 125 MCG (5000 UT) CAPS, 2 (two) times a week , Disp: , Rfl:   •  finasteride (PROSCAR) 5 mg tablet, Take 1 tablet (5 mg total) by mouth daily, Disp: 90 tablet, Rfl: 1  •  furosemide (LASIX) 40 mg tablet, Take 1 tablet (40 mg total) by mouth daily, Disp: 90 tablet, Rfl: 1  •  metoprolol succinate (TOPROL-XL) 25 mg 24 hr tablet, Take 1 tablet (25 mg total) by mouth daily, Disp: 90 tablet, Rfl: 3  •  Multiple Vitamin (MULTI-VITAMIN DAILY) TABS, Take by mouth, Disp: , Rfl:   •  nitroglycerin (NITROSTAT) 0.4 mg SL tablet, Place 1 tablet (0.4 mg total) under the tongue once as needed for chest pain for up to 1 dose Lay down supine before taking and wait 15 minutes to get up.  Get up very slowly.  If pain continues, call 911, Disp: 25 tablet, Rfl: 2  •  omeprazole (PriLOSEC) 20 mg delayed release capsule, Take 1 capsule (20 mg total) by mouth daily, Disp: 90 capsule, Rfl: 1  •  rivaroxaban (Xarelto) 15 mg tablet, Take 1 tablet (15 mg total) by mouth daily with dinner, Disp: 30 tablet, Rfl: 5  •  rosuvastatin (CRESTOR) 5 mg tablet, Take 1 tablet (5 mg total) by mouth daily, Disp: 90 tablet, Rfl: 1  •  tamsulosin (FLOMAX) 0.4 mg, Take 1 capsule (0.4 mg total) by mouth daily with dinner, Disp: 30 capsule, Rfl: 2    Health Maintenance     Health Maintenance   Topic Date Due   • Pneumococcal Vaccine: 65+ Years (2 - PPSV23 or  PCV20) 01/16/2017   • COVID-19 Vaccine (4 - 2023-24 season) 09/01/2023   • Depression Screening  05/05/2024   • Fall Risk  05/05/2024   • Medicare Annual Wellness Visit (AWV)  05/05/2024   • Zoster Vaccine  Completed   • Hepatitis A Vaccine  Completed   • Influenza Vaccine  Completed   • HIB Vaccine  Aged Out   • IPV Vaccine  Aged Out   • Meningococcal ACWY Vaccine  Aged Out   • HPV Vaccine  Aged Out     Immunization History   Administered Date(s) Administered   • COVID-19 MODERNA VACC 0.5 ML IM 01/19/2021, 02/15/2021, 10/23/2021   • Hep A, adult 02/01/2000, 08/15/2000   • INFLUENZA 10/23/2009, 10/12/2010, 09/29/2011, 10/03/2012, 10/23/2013, 10/29/2014, 11/06/2015, 09/01/2016, 10/10/2016, 10/06/2017, 10/03/2018, 10/10/2018, 09/20/2019, 09/30/2019, 10/01/2019, 10/02/2019, 09/02/2020, 09/11/2020, 10/07/2020, 09/10/2021, 10/10/2022   • Influenza Split High Dose Preservative Free IM 11/06/2015, 09/01/2016, 10/06/2017   • Influenza, seasonal, injectable 11/15/2000, 10/26/2001, 10/28/2003, 12/01/2005, 11/01/2007, 10/03/2012, 10/23/2013   • Pneumococcal Conjugate 13-Valent 11/21/2016, 11/21/2016   • Tdap 09/18/2012   • Zoster 09/01/2007   • Zoster Vaccine Recombinant 02/22/2019, 05/03/2019   • influenza, trivalent, adjuvanted 09/30/2019, 09/11/2020       Andrew Schroeder D.O.  [facility] Internal Medicine - [address] [place] [id number]  Office: [phone]  Fax: [phone]

## 2024-01-30 NOTE — ASSESSMENT & PLAN NOTE
CT completed April 2023 showing irregular mixed density nodule left upper lobe, essentially stable in size compared to prior CT December 2022.  Slow-growing bronchogenic malignancy remains in the differential.  Radiology has suggested 6-month follow-up CT of the chest    This finding was again reviewed today. At this time, Manuel would like to defer any additional imaging which I think is reasonable in light of his age and other comorbidities

## 2024-02-21 ENCOUNTER — APPOINTMENT (EMERGENCY)
Dept: CT IMAGING | Facility: HOSPITAL | Age: 89
DRG: 377 | End: 2024-02-21
Payer: MEDICARE

## 2024-02-21 ENCOUNTER — APPOINTMENT (EMERGENCY)
Dept: RADIOLOGY | Facility: HOSPITAL | Age: 89
DRG: 377 | End: 2024-02-21
Payer: MEDICARE

## 2024-02-21 ENCOUNTER — HOSPITAL ENCOUNTER (INPATIENT)
Facility: HOSPITAL | Age: 89
LOS: 5 days | Discharge: HOME HEALTH | DRG: 377 | End: 2024-02-26
Attending: EMERGENCY MEDICINE | Admitting: INTERNAL MEDICINE
Payer: MEDICARE

## 2024-02-21 ENCOUNTER — APPOINTMENT (INPATIENT)
Dept: NON INVASIVE DIAGNOSTICS | Facility: HOSPITAL | Age: 89
DRG: 377 | End: 2024-02-21
Payer: MEDICARE

## 2024-02-21 DIAGNOSIS — D68.9 COAGULOPATHY (HCC): ICD-10-CM

## 2024-02-21 DIAGNOSIS — R79.89 ELEVATED TROPONIN: ICD-10-CM

## 2024-02-21 DIAGNOSIS — I35.0 AORTIC STENOSIS, SEVERE: ICD-10-CM

## 2024-02-21 DIAGNOSIS — K57.90 DIVERTICULOSIS: ICD-10-CM

## 2024-02-21 DIAGNOSIS — I50.32 CHRONIC DIASTOLIC (CONGESTIVE) HEART FAILURE (HCC): ICD-10-CM

## 2024-02-21 DIAGNOSIS — N28.9 RENAL INSUFFICIENCY: ICD-10-CM

## 2024-02-21 DIAGNOSIS — I10 PRIMARY HYPERTENSION: ICD-10-CM

## 2024-02-21 DIAGNOSIS — K92.2 ACUTE GI BLEEDING: Primary | ICD-10-CM

## 2024-02-21 PROBLEM — R05.9 COUGH: Status: RESOLVED | Noted: 2024-02-21 | Resolved: 2024-02-21

## 2024-02-21 PROBLEM — K62.5 BRBPR (BRIGHT RED BLOOD PER RECTUM): Status: ACTIVE | Noted: 2024-02-21

## 2024-02-21 PROBLEM — R05.9 COUGH: Status: ACTIVE | Noted: 2024-02-21

## 2024-02-21 LAB
2HR DELTA HS TROPONIN: -6 NG/L
4HR DELTA HS TROPONIN: -23 NG/L
ABO GROUP BLD: NORMAL
ALBUMIN SERPL BCP-MCNC: 3.4 G/DL (ref 3.5–5)
ALP SERPL-CCNC: 97 U/L (ref 34–104)
ALT SERPL W P-5'-P-CCNC: 29 U/L (ref 7–52)
ANION GAP SERPL CALCULATED.3IONS-SCNC: 8 MMOL/L
AORTIC ROOT: 3.3 CM
AORTIC VALVE MEAN VELOCITY: 27.5 M/S
APICAL FOUR CHAMBER EJECTION FRACTION: 44 %
APTT PPP: 36 SECONDS (ref 23–37)
ASCENDING AORTA: 3 CM
AST SERPL W P-5'-P-CCNC: 33 U/L (ref 13–39)
ATRIAL RATE: 102 BPM
ATRIAL RATE: 75 BPM
ATRIAL RATE: 93 BPM
AV AREA BY CONTINUOUS VTI: 0.5 CM2
AV AREA PEAK VELOCITY: 0.6 CM2
AV LVOT MEAN GRADIENT: 1 MMHG
AV LVOT PEAK GRADIENT: 1 MMHG
AV MEAN GRADIENT: 33 MMHG
AV PEAK GRADIENT: 53 MMHG
AV VALVE AREA: 0.54 CM2
AV VELOCITY RATIO: 0.16
BASOPHILS # BLD AUTO: 0.06 THOUSANDS/ÂΜL (ref 0–0.1)
BASOPHILS NFR BLD AUTO: 1 % (ref 0–1)
BILIRUB SERPL-MCNC: 0.91 MG/DL (ref 0.2–1)
BLD GP AB SCN SERPL QL: NEGATIVE
BNP SERPL-MCNC: 1168 PG/ML (ref 0–100)
BSA FOR ECHO PROCEDURE: 1.95 M2
BUN SERPL-MCNC: 63 MG/DL (ref 5–25)
CALCIUM ALBUM COR SERPL-MCNC: 10.1 MG/DL (ref 8.3–10.1)
CALCIUM SERPL-MCNC: 9.6 MG/DL (ref 8.4–10.2)
CARDIAC TROPONIN I PNL SERPL HS: 103 NG/L
CARDIAC TROPONIN I PNL SERPL HS: 120 NG/L
CARDIAC TROPONIN I PNL SERPL HS: 126 NG/L
CHLORIDE SERPL-SCNC: 103 MMOL/L (ref 96–108)
CO2 SERPL-SCNC: 27 MMOL/L (ref 21–32)
CREAT SERPL-MCNC: 2.53 MG/DL (ref 0.6–1.3)
DOP CALC AO PEAK VEL: 3.61 M/S
DOP CALC AO VTI: 80.47 CM
DOP CALC LVOT AREA: 3.46 CM2
DOP CALC LVOT CARDIAC INDEX: 2.4 L/MIN/M2
DOP CALC LVOT CARDIAC OUTPUT: 4.69 L/MIN
DOP CALC LVOT DIAMETER: 2.1 CM
DOP CALC LVOT PEAK VEL VTI: 12.54 CM
DOP CALC LVOT PEAK VEL: 0.58 M/S
DOP CALC LVOT STROKE INDEX: 22.6 ML/M2
DOP CALC LVOT STROKE VOLUME: 43.41
E WAVE DECELERATION TIME: 163 MS
E/A RATIO: 3.34
EOSINOPHIL # BLD AUTO: 0.45 THOUSAND/ÂΜL (ref 0–0.61)
EOSINOPHIL NFR BLD AUTO: 5 % (ref 0–6)
ERYTHROCYTE [DISTWIDTH] IN BLOOD BY AUTOMATED COUNT: 16.7 % (ref 11.6–15.1)
FRACTIONAL SHORTENING: 23 (ref 28–44)
GFR SERPL CREATININE-BSD FRML MDRD: 21 ML/MIN/1.73SQ M
GLUCOSE SERPL-MCNC: 101 MG/DL (ref 65–140)
HCT VFR BLD AUTO: 34.1 % (ref 36.5–49.3)
HCT VFR BLD AUTO: 36.6 % (ref 36.5–49.3)
HCT VFR BLD AUTO: 39.2 % (ref 36.5–49.3)
HGB BLD-MCNC: 10.9 G/DL (ref 12–17)
HGB BLD-MCNC: 11.2 G/DL (ref 12–17)
HGB BLD-MCNC: 12.2 G/DL (ref 12–17)
IMM GRANULOCYTES # BLD AUTO: 0.04 THOUSAND/UL (ref 0–0.2)
IMM GRANULOCYTES NFR BLD AUTO: 1 % (ref 0–2)
INR PPP: 2.49 (ref 0.84–1.19)
INTERVENTRICULAR SEPTUM IN DIASTOLE (PARASTERNAL SHORT AXIS VIEW): 1.9 CM
INTERVENTRICULAR SEPTUM: 1.6 CM (ref 0.6–1.1)
IVC: 24 MM
LAAS-AP2: 28.2 CM2
LAAS-AP4: 23.1 CM2
LEFT ATRIUM SIZE: 5.2 CM
LEFT ATRIUM VOLUME (MOD BIPLANE): 91 ML
LEFT ATRIUM VOLUME INDEX (MOD BIPLANE): 46.7 ML/M2
LEFT INTERNAL DIMENSION IN SYSTOLE: 3.3 CM (ref 2.1–4)
LEFT VENTRICULAR INTERNAL DIMENSION IN DIASTOLE: 4.3 CM (ref 3.5–6)
LEFT VENTRICULAR POSTERIOR WALL IN END DIASTOLE: 2 CM
LEFT VENTRICULAR STROKE VOLUME: 40 ML
LVSV (TEICH): 40 ML
LYMPHOCYTES # BLD AUTO: 1.29 THOUSANDS/ÂΜL (ref 0.6–4.47)
LYMPHOCYTES NFR BLD AUTO: 16 % (ref 14–44)
MAGNESIUM SERPL-MCNC: 2.1 MG/DL (ref 1.9–2.7)
MCH RBC QN AUTO: 28.1 PG (ref 26.8–34.3)
MCHC RBC AUTO-ENTMCNC: 31.1 G/DL (ref 31.4–37.4)
MCV RBC AUTO: 90 FL (ref 82–98)
MONOCYTES # BLD AUTO: 1.06 THOUSAND/ÂΜL (ref 0.17–1.22)
MONOCYTES NFR BLD AUTO: 13 % (ref 4–12)
MV E'TISSUE VEL-LAT: 7 CM/S
MV E'TISSUE VEL-SEP: 5 CM/S
MV PEAK A VEL: 0.35 M/S
MV PEAK E VEL: 117 CM/S
MV STENOSIS PRESSURE HALF TIME: 47 MS
MV VALVE AREA P 1/2 METHOD: 4.68
NEUTROPHILS # BLD AUTO: 5.43 THOUSANDS/ÂΜL (ref 1.85–7.62)
NEUTS SEG NFR BLD AUTO: 64 % (ref 43–75)
NRBC BLD AUTO-RTO: 0 /100 WBCS
PLATELET # BLD AUTO: 199 THOUSANDS/UL (ref 149–390)
PMV BLD AUTO: 11.1 FL (ref 8.9–12.7)
POTASSIUM SERPL-SCNC: 4.5 MMOL/L (ref 3.5–5.3)
PROCALCITONIN SERPL-MCNC: 0.09 NG/ML
PROT SERPL-MCNC: 6.8 G/DL (ref 6.4–8.4)
PROTHROMBIN TIME: 27 SECONDS (ref 11.6–14.5)
QRS AXIS: 220 DEGREES
QRS AXIS: 237 DEGREES
QRS AXIS: 269 DEGREES
QRSD INTERVAL: 100 MS
QRSD INTERVAL: 100 MS
QRSD INTERVAL: 102 MS
QT INTERVAL: 374 MS
QT INTERVAL: 380 MS
QT INTERVAL: 402 MS
QTC INTERVAL: 482 MS
QTC INTERVAL: 485 MS
QTC INTERVAL: 505 MS
RA PRESSURE ESTIMATED: 15 MMHG
RBC # BLD AUTO: 4.34 MILLION/UL (ref 3.88–5.62)
RH BLD: NEGATIVE
RIGHT ATRIUM AREA SYSTOLE A4C: 20 CM2
RIGHT VENTRICLE ID DIMENSION: 4.9 CM
RV PSP: 65 MMHG
SL CV LEFT ATRIUM LENGTH A2C: 6 CM
SL CV LV EF: 33
SL CV PED ECHO LEFT VENTRICLE DIASTOLIC VOLUME (MOD BIPLANE) 2D: 84 ML
SL CV PED ECHO LEFT VENTRICLE SYSTOLIC VOLUME (MOD BIPLANE) 2D: 43 ML
SODIUM SERPL-SCNC: 138 MMOL/L (ref 135–147)
SPECIMEN EXPIRATION DATE: NORMAL
T WAVE AXIS: 15 DEGREES
T WAVE AXIS: 25 DEGREES
T WAVE AXIS: 8 DEGREES
TR MAX PG: 50 MMHG
TR PEAK VELOCITY: 3.5 M/S
TRICUSPID ANNULAR PLANE SYSTOLIC EXCURSION: 1.5 CM
TRICUSPID VALVE PEAK REGURGITATION VELOCITY: 3.53 M/S
VENTRICULAR RATE: 100 BPM
VENTRICULAR RATE: 95 BPM
VENTRICULAR RATE: 98 BPM
WBC # BLD AUTO: 8.33 THOUSAND/UL (ref 4.31–10.16)

## 2024-02-21 PROCEDURE — 99291 CRITICAL CARE FIRST HOUR: CPT | Performed by: EMERGENCY MEDICINE

## 2024-02-21 PROCEDURE — 96365 THER/PROPH/DIAG IV INF INIT: CPT

## 2024-02-21 PROCEDURE — 99222 1ST HOSP IP/OBS MODERATE 55: CPT | Performed by: INTERNAL MEDICINE

## 2024-02-21 PROCEDURE — 36415 COLL VENOUS BLD VENIPUNCTURE: CPT | Performed by: EMERGENCY MEDICINE

## 2024-02-21 PROCEDURE — 99223 1ST HOSP IP/OBS HIGH 75: CPT | Performed by: INTERNAL MEDICINE

## 2024-02-21 PROCEDURE — 85018 HEMOGLOBIN: CPT | Performed by: EMERGENCY MEDICINE

## 2024-02-21 PROCEDURE — 99223 1ST HOSP IP/OBS HIGH 75: CPT | Performed by: STUDENT IN AN ORGANIZED HEALTH CARE EDUCATION/TRAINING PROGRAM

## 2024-02-21 PROCEDURE — 86901 BLOOD TYPING SEROLOGIC RH(D): CPT | Performed by: EMERGENCY MEDICINE

## 2024-02-21 PROCEDURE — C9113 INJ PANTOPRAZOLE SODIUM, VIA: HCPCS | Performed by: EMERGENCY MEDICINE

## 2024-02-21 PROCEDURE — 93306 TTE W/DOPPLER COMPLETE: CPT | Performed by: INTERNAL MEDICINE

## 2024-02-21 PROCEDURE — 84145 PROCALCITONIN (PCT): CPT | Performed by: INTERNAL MEDICINE

## 2024-02-21 PROCEDURE — 71045 X-RAY EXAM CHEST 1 VIEW: CPT

## 2024-02-21 PROCEDURE — 86900 BLOOD TYPING SEROLOGIC ABO: CPT | Performed by: EMERGENCY MEDICINE

## 2024-02-21 PROCEDURE — 84484 ASSAY OF TROPONIN QUANT: CPT | Performed by: EMERGENCY MEDICINE

## 2024-02-21 PROCEDURE — 93010 ELECTROCARDIOGRAM REPORT: CPT | Performed by: INTERNAL MEDICINE

## 2024-02-21 PROCEDURE — 93005 ELECTROCARDIOGRAM TRACING: CPT

## 2024-02-21 PROCEDURE — 85014 HEMATOCRIT: CPT | Performed by: INTERNAL MEDICINE

## 2024-02-21 PROCEDURE — 99285 EMERGENCY DEPT VISIT HI MDM: CPT

## 2024-02-21 PROCEDURE — 85730 THROMBOPLASTIN TIME PARTIAL: CPT | Performed by: EMERGENCY MEDICINE

## 2024-02-21 PROCEDURE — 80053 COMPREHEN METABOLIC PANEL: CPT | Performed by: EMERGENCY MEDICINE

## 2024-02-21 PROCEDURE — 83880 ASSAY OF NATRIURETIC PEPTIDE: CPT | Performed by: EMERGENCY MEDICINE

## 2024-02-21 PROCEDURE — 85610 PROTHROMBIN TIME: CPT | Performed by: EMERGENCY MEDICINE

## 2024-02-21 PROCEDURE — 86850 RBC ANTIBODY SCREEN: CPT | Performed by: EMERGENCY MEDICINE

## 2024-02-21 PROCEDURE — 85025 COMPLETE CBC W/AUTO DIFF WBC: CPT | Performed by: EMERGENCY MEDICINE

## 2024-02-21 PROCEDURE — 93306 TTE W/DOPPLER COMPLETE: CPT

## 2024-02-21 PROCEDURE — 85018 HEMOGLOBIN: CPT | Performed by: INTERNAL MEDICINE

## 2024-02-21 PROCEDURE — 83735 ASSAY OF MAGNESIUM: CPT | Performed by: EMERGENCY MEDICINE

## 2024-02-21 PROCEDURE — 85014 HEMATOCRIT: CPT | Performed by: EMERGENCY MEDICINE

## 2024-02-21 RX ORDER — PANTOPRAZOLE SODIUM 40 MG/10ML
40 INJECTION, POWDER, LYOPHILIZED, FOR SOLUTION INTRAVENOUS ONCE
Status: COMPLETED | OUTPATIENT
Start: 2024-02-21 | End: 2024-02-21

## 2024-02-21 RX ORDER — TAMSULOSIN HYDROCHLORIDE 0.4 MG/1
0.4 CAPSULE ORAL
Status: DISCONTINUED | OUTPATIENT
Start: 2024-02-21 | End: 2024-02-26 | Stop reason: HOSPADM

## 2024-02-21 RX ORDER — FUROSEMIDE 10 MG/ML
40 INJECTION INTRAMUSCULAR; INTRAVENOUS 2 TIMES DAILY
Status: DISCONTINUED | OUTPATIENT
Start: 2024-02-21 | End: 2024-02-23

## 2024-02-21 RX ORDER — CEFTRIAXONE 1 G/50ML
1000 INJECTION, SOLUTION INTRAVENOUS EVERY 24 HOURS
Status: DISCONTINUED | OUTPATIENT
Start: 2024-02-21 | End: 2024-02-22

## 2024-02-21 RX ORDER — FINASTERIDE 5 MG/1
5 TABLET, FILM COATED ORAL DAILY
Status: DISCONTINUED | OUTPATIENT
Start: 2024-02-21 | End: 2024-02-26 | Stop reason: HOSPADM

## 2024-02-21 RX ORDER — PRAVASTATIN SODIUM 20 MG
20 TABLET ORAL
Status: DISCONTINUED | OUTPATIENT
Start: 2024-02-21 | End: 2024-02-26 | Stop reason: HOSPADM

## 2024-02-21 RX ORDER — METOPROLOL SUCCINATE 25 MG/1
25 TABLET, EXTENDED RELEASE ORAL DAILY
Status: DISCONTINUED | OUTPATIENT
Start: 2024-02-21 | End: 2024-02-23

## 2024-02-21 RX ADMIN — METOPROLOL SUCCINATE 25 MG: 25 TABLET, EXTENDED RELEASE ORAL at 12:15

## 2024-02-21 RX ADMIN — SODIUM CHLORIDE, SODIUM LACTATE, POTASSIUM CHLORIDE, AND CALCIUM CHLORIDE 500 ML: .6; .31; .03; .02 INJECTION, SOLUTION INTRAVENOUS at 07:09

## 2024-02-21 RX ADMIN — FINASTERIDE 5 MG: 5 TABLET, FILM COATED ORAL at 12:15

## 2024-02-21 RX ADMIN — CEFTRIAXONE 1000 MG: 1 INJECTION, SOLUTION INTRAVENOUS at 11:56

## 2024-02-21 RX ADMIN — FUROSEMIDE 40 MG: 10 INJECTION, SOLUTION INTRAMUSCULAR; INTRAVENOUS at 18:37

## 2024-02-21 RX ADMIN — PANTOPRAZOLE SODIUM 40 MG: 40 INJECTION, POWDER, FOR SOLUTION INTRAVENOUS at 08:54

## 2024-02-21 RX ADMIN — FUROSEMIDE 40 MG: 10 INJECTION, SOLUTION INTRAMUSCULAR; INTRAVENOUS at 11:40

## 2024-02-21 NOTE — WOUND OSTOMY CARE
Consult Note - Wound   Pro Steele 88 y.o. male MRN: [id number]  Unit/Bed#: [place] [address] -01 Encounter: [id number]      History and Present Illness:  88 year old male presented to the hospital with abdominal cramping and bloody in stool.  Patient's history significant for CHF, atrial fibrillation, aortic stenosis.    Assessment Findings:   Patient agreeable to assessment.  He is able to turn in bed with minimal assist.  He is normally continent of bowel and bladder, but is currently incontinent of mitch blood and clots on exam. Layne-anal care provided, primary RN made aware.  Bilateral lower extremities with severe pitting edema.  Heels intact without redness, elevated off of bed with pillows.  Skin folds intact.   Present on admission unstageable pressure injury to left buttock--wound bed obscured with pale yellow fibrinous tissue.  Layne-wound intact with hyperpigmentation.  Patient reports he has had wound for awhile.  Has not been treating it with anything at home.  There is scant serous drainage.     See flowsheet for wound details.    Wound Care Plan:   1-Hydraguard lotion to bilateral heels twice daily and as needed.  2-Elevate lower extremities for edema management.  Ensure heels float off of bed/chair surface to offload pressure.  3-Offloading air cushion in chair when out of bed.  4-Moisturize skin daily with skin nourishing lotion.  5-Turn/reposition every 2 hours while in bed; and weight shift frequently while in chair for pressure re-distribution on skin.   6-Left buttock--cleanse with soap and water, pat dry.  Apply Triad paste to open area on left buttock.  Then apply Remedy Silicone Prevent lotion/Hydraguard to remainder of buttocks and sacrum three times daily and as needed with incontinence care.    Wound care team to follow.  Plan of care reviewed with primary RN.    Wound 02/21/24 Pressure Injury Buttocks Left (Active)   Wound Image   02/21/24 1512   Wound Description Pale;Yellow 02/21/24  1512   Pressure Injury Stage U 02/21/24 1512   Layne-wound Assessment Hyperpigmented 02/21/24 1512   Wound Length (cm) 0.7 cm 02/21/24 1512   Wound Width (cm) 0.7 cm 02/21/24 1512   Wound Depth (cm) 0.1 cm 02/21/24 1512   Wound Surface Area (cm^2) 0.49 cm^2 02/21/24 1512   Wound Volume (cm^3) 0.049 cm^3 02/21/24 1512   Calculated Wound Volume (cm^3) 0.05 cm^3 02/21/24 1512   Drainage Amount Scant 02/21/24 1512   Drainage Description Serous 02/21/24 1512   Treatments Cleansed 02/21/24 1512   Dressing Other (Comment) 02/21/24 1512   Patient Tolerance Tolerated well 02/21/24 1512       Christa Martinez BSN, RN, CWON

## 2024-02-21 NOTE — ASSESSMENT & PLAN NOTE
History of atrial fibrillation AS BPH and CKD 4 presents with 4 episodes of BRBPR  Denies any previous episode.  Last colonoscopy 2021 diverticuli without bleeding  Likely diverticular bleed.  Holding Xarelto.  Discussed with GI.  N.p.o. until further evaluation.    Results from last 7 days   Lab Units 02/21/24  1010 02/21/24  0638   HEMOGLOBIN g/dL 11.2* 12.2

## 2024-02-21 NOTE — ASSESSMENT & PLAN NOTE
Declined TAVR due to concerns for workup causing worsening renal dysfunction.  Follows with cardiology as an outpatient

## 2024-02-21 NOTE — ASSESSMENT & PLAN NOTE
Secondary to CHF versus URI.  Question of right lower lobe infiltrate.  Check procalcitonin.  Empiric ceftriaxone.

## 2024-02-21 NOTE — ED PROVIDER NOTES
History  Chief Complaint   Patient presents with    Rectal Bleeding     Pt brought in by EMS, Pt states having an episode of abdominal cramping and noticed bright red blood in stool twice. Pt is on xarelto. Per EMS pt was given nitro paste due to high blood pressure and swelling in legs.      HPI  Patient is coming in with rectal bleeding.  He states he was not feeling well but cannot describe it yesterday and then at about 3:00 in the morning started with rectal bleeding and has had multiple episodes.  He is on Xarelto.  He is having generalized abdominal cramping but no specific pain.  He said no fever.  There is no nausea or vomiting.  He has no urinary complaints.  He has never had this before.  Per my external review he had a colonoscopy in  which showed internal hemorrhoids as well as diverticular disease but no other abnormality.  He also had an endoscopy which showed Linda's esophagus.  He has a history of CHF and atrial fibrillation as well as severe aortic stenosis as well as chronic renal insufficiency and appears his creatinine is in the mid twos with a GFR in the 20s.  He has had about 3 episodes of blood.  He is not nauseous and has having no upper abdominal pain.  Prior to Admission Medications   Prescriptions Last Dose Informant Patient Reported? Taking?   Cholecalciferol (VITAMIN D3) 125 MCG (5000 UT) CAPS  Self Yes No   Si (two) times a week    Multiple Vitamin (MULTI-VITAMIN DAILY) TABS  Self Yes No   Sig: Take by mouth   finasteride (PROSCAR) 5 mg tablet  Self No No   Sig: Take 1 tablet (5 mg total) by mouth daily   furosemide (LASIX) 40 mg tablet  Self No No   Sig: Take 1 tablet (40 mg total) by mouth daily   metoprolol succinate (TOPROL-XL) 25 mg 24 hr tablet  Self No No   Sig: Take 1 tablet (25 mg total) by mouth daily   nitroglycerin (NITROSTAT) 0.4 mg SL tablet  Self No No   Sig: Place 1 tablet (0.4 mg total) under the tongue once as needed for chest pain for up to 1 dose Lay down  supine before taking and wait 15 minutes to get up.  Get up very slowly.  If pain continues, call 911   omeprazole (PriLOSEC) 20 mg delayed release capsule  Self No No   Sig: Take 1 capsule (20 mg total) by mouth daily   rivaroxaban (Xarelto) 15 mg tablet  Self No No   Sig: Take 1 tablet (15 mg total) by mouth daily with dinner   rosuvastatin (CRESTOR) 5 mg tablet   No No   Sig: Take 1 tablet (5 mg total) by mouth daily   tamsulosin (FLOMAX) 0.4 mg   No No   Sig: Take 1 capsule (0.4 mg total) by mouth daily with dinner      Facility-Administered Medications: None       Past Medical History:   Diagnosis Date    Aortic stenosis, severe 11/01/2021    Atrial fibrillation (HCC)     CHF (congestive heart failure) (HCC)     Colon polyp     GERD (gastroesophageal reflux disease)     Hearing loss     last assessed 11/8/17    Hypertension     Rheumatic fever     Stenosis of aorta        Past Surgical History:   Procedure Laterality Date    APPENDECTOMY      BACK SURGERY      lower    CARDIAC SURGERY      ablation    CATARACT EXTRACTION      JOINT REPLACEMENT Left     knee    KNEE SURGERY Left     [place] PROSTATE NEEDLE BIOPSY ANY APPROACH N/A 3/16/2021    Procedure: Transperineal prostate biopsy with biplanar transrectal ultrasound, using the perineal logic kit;  Surgeon: Derrick Jackson MD;  Location: [facility];  Service: Urology    TONSILLECTOMY AND ADENOIDECTOMY         Family History   Problem Relation Age of Onset    Other Mother         old age    Esophageal cancer Father     Other Father         old age    Alcohol abuse Son         alcoholism     I have reviewed and agree with the history as documented.    E-Cigarette/Vaping    E-Cigarette Use Never User      E-Cigarette/Vaping Substances    Nicotine No     THC No     CBD No     Flavoring No     Other No     Unknown No      Social History     Tobacco Use    Smoking status: Former     Current packs/day: 0.00     Average packs/day: 1 pack/day for 16.0 years (16.0 ttl pk-yrs)      Types: Cigarettes     Start date:      Quit date:      Years since quittin.1    Smokeless tobacco: Never   Vaping Use    Vaping status: Never Used   Substance Use Topics    Alcohol use: Yes     Comment: occ    Drug use: No       Review of Systems    Physical Exam  Physical Exam  Vitals and nursing note reviewed.   Constitutional:       General: He is not in acute distress.     Appearance: Normal appearance. He is well-developed. He is not ill-appearing, toxic-appearing or diaphoretic.   HENT:      Head: Normocephalic and atraumatic.      Right Ear: Hearing normal. No drainage or swelling.      Left Ear: Hearing normal. No drainage or swelling.   Eyes:      General: Lids are normal.         Right eye: No discharge.         Left eye: No discharge.      Conjunctiva/sclera: Conjunctivae normal.   Neck:      Vascular: No JVD.      Trachea: Trachea normal.   Cardiovascular:      Rate and Rhythm: Normal rate. Rhythm irregular.      Pulses: Normal pulses.      Heart sounds: Normal heart sounds. No murmur heard.     No friction rub. No gallop.   Pulmonary:      Effort: Pulmonary effort is normal. No respiratory distress.      Breath sounds: No stridor. Wheezing present. No rales.   Chest:      Chest wall: No tenderness.   Abdominal:      Palpations: Abdomen is soft.      Tenderness: There is no abdominal tenderness. There is no guarding or rebound.   Musculoskeletal:         General: Normal range of motion.      Cervical back: Normal range of motion.      Right lower leg: Edema present.      Left lower leg: Edema present.   Skin:     General: Skin is warm and dry.      Coloration: Skin is not pale.   Neurological:      General: No focal deficit present.      Mental Status: He is alert.      GCS: GCS eye subscore is 4. GCS verbal subscore is 5. GCS motor subscore is 6.      Sensory: No sensory deficit.      Motor: No weakness or abnormal muscle tone.   Psychiatric:         Mood and Affect: Mood normal.          Speech: Speech normal.         Behavior: Behavior is cooperative.         Vital Signs  ED Triage Vitals   Temperature Pulse Respirations Blood Pressure SpO2   02/21/24 0636 02/21/24 0636 02/21/24 0636 02/21/24 0636 02/21/24 0636   97.6 °F (36.4 °C) 99 17 130/97 95 %      Temp Source Heart Rate Source Patient Position - Orthostatic VS BP Location FiO2 (%)   02/21/24 0636 02/21/24 0636 02/21/24 0636 02/21/24 0636 --   Oral Monitor Lying Right arm       Pain Score       02/21/24 0730       No Pain           Vitals:    02/21/24 0636 02/21/24 0730 02/21/24 0800   BP: 130/97 122/79 115/88   Pulse: 99 93 99   Patient Position - Orthostatic VS: Lying  Lying         Visual Acuity      ED Medications  Medications   lactated ringers bolus 500 mL (0 mL Intravenous Stopped 2/21/24 0825)   pantoprazole (PROTONIX) injection 40 mg (40 mg Intravenous Given 2/21/24 0854)       Diagnostic Studies  Results Reviewed       Procedure Component Value Units Date/Time    HS Troponin I 2hr [[id number]] Collected: 02/21/24 0847    Lab Status: In process Specimen: Blood from Arm, Right Updated: 02/21/24 0850    Hemoglobin and hematocrit, blood [[id number]]     Lab Status: No result Specimen: Blood     HS Troponin I 4hr [[id number]]     Lab Status: No result Specimen: Blood     HS Troponin 0hr (reflex protocol) [[id number]]  (Abnormal) Collected: 02/21/24 0638    Lab Status: Final result Specimen: Blood from Arm, Left Updated: 02/21/24 0754     hs TnI 0hr 126 ng/L     B-Type Natriuretic Peptide(BNP) [[id number]]  (Abnormal) Collected: 02/21/24 0722    Lab Status: Final result Specimen: Blood from Arm, Left Updated: 02/21/24 0752     BNP 1,168 pg/mL     Comprehensive metabolic panel [[id number]]  (Abnormal) Collected: 02/21/24 0638    Lab Status: Final result Specimen: Blood from Arm, Left Updated: 02/21/24 0747     Sodium 138 mmol/L      Potassium 4.5 mmol/L      Chloride 103 mmol/L      CO2 27 mmol/L      ANION GAP 8 mmol/L      BUN 63 mg/dL       Creatinine 2.53 mg/dL      Glucose 101 mg/dL      Calcium 9.6 mg/dL      Corrected Calcium 10.1 mg/dL      AST 33 U/L      ALT 29 U/L      Alkaline Phosphatase 97 U/L      Total Protein 6.8 g/dL      Albumin 3.4 g/dL      Total Bilirubin 0.91 mg/dL      eGFR 21 ml/min/1.73sq m     Narrative:      National Kid[employer] Foundation guidelines for Chronic Kidney Disease (CKD):     Stage 1 with normal or high GFR (GFR > 90 mL/min/1.73 square meters)    Stage 2 Mild CKD (GFR = 60-89 mL/min/1.73 square meters)    Stage 3A Moderate CKD (GFR = 45-59 mL/min/1.73 square meters)    Stage 3B Moderate CKD (GFR = 30-44 mL/min/1.73 square meters)    Stage 4 Severe CKD (GFR = 15-29 mL/min/1.73 square meters)    Stage 5 End Stage CKD (GFR <15 mL/min/1.73 square meters)  Note: GFR calculation is accurate only with a steady state creatinine    Magnesium [[id number]]  (Normal) Collected: 02/21/24 0638    Lab Status: Final result Specimen: Blood from Arm, Left Updated: 02/21/24 0747     Magnesium 2.1 mg/dL     APTT [[id number]]  (Normal) Collected: 02/21/24 0638    Lab Status: Final result Specimen: Blood from Arm, Left Updated: 02/21/24 0746     PTT 36 seconds     Protime-INR [[id number]]  (Abnormal) Collected: 02/21/24 0638    Lab Status: Final result Specimen: Blood from Arm, Left Updated: 02/21/24 0746     Protime 27.0 seconds      INR 2.49    CBC and differential [[id number]]  (Abnormal) Collected: 02/21/24 0638    Lab Status: Final result Specimen: Blood from Arm, Left Updated: 02/21/24 0731     WBC 8.33 Thousand/uL      RBC 4.34 Million/uL      Hemoglobin 12.2 g/dL      Hematocrit 39.2 %      MCV 90 fL      MCH 28.1 pg      MCHC 31.1 g/dL      RDW 16.7 %      MPV 11.1 fL      Platelets 199 Thousands/uL      nRBC 0 /100 WBCs      Neutrophils Relative 64 %      Immat GRANS % 1 %      Lymphocytes Relative 16 %      Monocytes Relative 13 %      Eosinophils Relative 5 %      Basophils Relative 1 %      Neutrophils Absolute 5.43  Thousands/µL      Immature Grans Absolute 0.04 Thousand/uL      Lymphocytes Absolute 1.29 Thousands/µL      Monocytes Absolute 1.06 Thousand/µL      Eosinophils Absolute 0.45 Thousand/µL      Basophils Absolute 0.06 Thousands/µL                    XR chest 1 view portable   ED Interpretation by Jordan Nayak MD (02/21 5954)   I have reviewed the film, per my independent interpretation : Mild vascular congestion and possibly an effusion on the right.  No free air visualized.  Formal read per radiology.        CT high volume bleeding scan abdomen pelvis    (Results Pending)              Procedures  ECG 12 Lead Documentation Only    Date/Time: 2/21/2024 8:42 AM    Performed by: Jordan Nayak MD  Authorized by: Jordan Nayak MD    Indications / Diagnosis:  Rectal bleeding  ECG reviewed by me, the ED Provider: yes    Patient location:  ED  Interpretation:     Interpretation: non-specific    Rate:     ECG rate assessment: normal    Rhythm:     Rhythm: atrial fibrillation    QRS:     QRS axis:  Right    QRS intervals:  Normal  ST segments:     ST segments:  Non-specific  T waves:     T waves: non-specific    ECG 12 Lead Documentation Only    Date/Time: 2/21/2024 8:53 AM    Performed by: Jordan Nayak MD  Authorized by: Jordan Nayak MD    Indications / Diagnosis:  Gi bleeding, elevated trop  Patient location:  ED  Previous ECG:     Previous ECG:  Compared to current    Similarity:  No change  Interpretation:     Interpretation: non-specific    Rhythm:     Rhythm: atrial fibrillation    CriticalCare Time    Date/Time: 2/21/2024 8:54 AM    Performed by: Jordan Nayak MD  Authorized by: Jordan Nayak MD    Critical care provider statement:     Critical care time (minutes):  40    Critical care time was exclusive of:  Separately billable procedures and treating other patients and teaching time    Critical care was necessary to treat or  prevent imminent or life-threatening deterioration of the following conditions: GI bleeding, IV Protonix, discussion with nephrology, consultation of gastroenterology, discussion with internal medicine regarding admission.    Critical care was time spent personally by me on the following activities:  Obtaining history from patient or surrogate, development of treatment plan with patient or surrogate, discussions with consultants, evaluation of patient's response to treatment, examination of patient, review of old charts, re-evaluation of patient's condition, ordering and review of radiographic studies and ordering and review of laboratory studies    I assumed direction of critical care for this patient from another provider in my specialty: no             ED Course  ED Course as of 02/21/24 0855   Wed Feb 21, 2024   0739 CBC and differential(!)  The patient's hemoglobin is normal at the moment.  Platelet count is also normal at the moment.   0753 Comprehensive metabolic panel(!)  BUN slightly higher than what he had in the past with a stable creatinine so has chronic renal insufficiency his GFR is the same at about 21.   0753 BNP(!): 1,168  BNP is about stable may be a little higher.  I did give him a little bit of IV fluid given the BUN.  He does have some lower extremity edema but I do not think he has any CHF present as he has not been short of breath.   0836 Had a long conversation with the patient and the patient's nephrologist.  Due to the patient's problem with his kidneys he would prefer not to get the dye study now.  He is currently hemodynamically stable and it sounds like the bleeding may have slowed although he did have 1 additional bowel movement here at this time.  I will talk to internal medicine regarding admission and repeat hemoglobins and if his condition changes could always do the bleeding study later.   0839 hs TnI 0hr(!): 126  Opponent is elevated might be due to his renal insufficiency as he  is having no chest pain or shortness of breath at the moment and there was no acute changes on his EKG.                               SBIRT 20yo+      Flowsheet Row Most Recent Value   Initial Alcohol Screen: US AUDIT-C     1. How often do you have a drink containing alcohol? 0 Filed at: 02/21/2024 0636   2. How many drinks containing alcohol do you have on a typical day you are drinking?  0 Filed at: 02/21/2024 0636   3a. Male UNDER 65: How often do you have five or more drinks on one occasion? 0 Filed at: 02/21/2024 0641   3b. FEMALE Any Age, or MALE 65+: How often do you have 4 or more drinks on one occassion? 0 Filed at: 02/21/2024 0641   Audit-C Score 0 Filed at: 02/21/2024 0641   [place]: How many times in the past year have you...    Used an illegal drug or used a prescription medication for non-medical reasons? Never Filed at: 02/21/2024 0636                      Medical Decision Making  Given the patient's history of diverticular disease I suspect that he has diverticular bleeding which is exacerbated being on Xarelto.  Currently is hemodynamically stable.  His hemoglobin was normal but I have a repeat ordered at 10 AM.  Patient will clearly need to be admitted.  After discussion with the patient and with the patient's nephrologist is electing not to have the bleeding scan done secondary to the IV dye load as he does not want ever to be on dialysis.  In light of his hemodynamic stability and first normal hemoglobin we will hold off on that study knowing that the circumstances could change possibly requiring that.  I have consulted GI to be involved in the patient's case.  Discussion with internal medicine regarding admission to the hospital.  He does have an elevated troponin which could be due to his renal insufficiency and acute issue at the moment.  He has no chest pain or shortness of breath.  He has no acute ischemic changes on his EKG but this will need to be trended.  He does have a history of atrial  fibrillation and severe aortic stenosis which could be contributing as well.  Patient is aware that if condition changes he may need the bleeding scan and it is possible that he may need further intervention in the hospital like a colonoscopy or interventional radiology if the situation warrants.    Amount and/or Complexity of Data Reviewed  Labs: ordered. Decision-making details documented in ED Course.  Radiology: ordered and independent interpretation performed.             Disposition  Final diagnoses:   Acute GI bleeding   Coagulopathy (HCC)   Elevated troponin   Renal insufficiency   Diverticulosis     Time reflects when diagnosis was documented in both MDM as applicable and the Disposition within this note       Time User Action Codes Description Comment    2/21/2024  8:45 AM Jordan Nayak [K92.2] Acute GI bleeding     2/21/2024  8:45 AM Jordan Nayak Add [D68.9] Coagulopathy (HCC)     2/21/2024  8:45 AM Jordan Nayak Add [R79.89] Elevated troponin     2/21/2024  8:45 AM Jordan Nayak Add [N28.9] Renal insufficiency     2/21/2024  8:46 AM Jordan Nayak Add [K57.90] Diverticulosis           ED Disposition       ED Disposition   Admit    Condition   Stable    Date/Time   Wed Feb 21, 2024 0845    Comment   Case was discussed with [facility] and the patient's admission status was agreed to be Admission Status: inpatient status to the service of Dr. Chamorro .               Follow-up Information    None         Patient's Medications   Discharge Prescriptions    No medications on file       No discharge procedures on file.    PDMP Review       None            ED Provider  Electronically Signed by             Jordan Nayak MD  02/21/24 5500

## 2024-02-21 NOTE — DISCHARGE INSTR - OTHER ORDERS
Wound Care Plan:   1-Hydraguard lotion to bilateral heels twice daily and as needed.  2-Elevate lower extremities for edema management.  Ensure heels float off of bed/chair surface to offload pressure.  3-Offloading air cushion in chair when out of bed.  4-Moisturize skin daily with skin nourishing lotion.  5-Turn/reposition every 2 hours while in bed; and weight shift frequently while in chair for pressure re-distribution on skin.   6-Left buttock--cleanse with soap and water, pat dry.  Apply non-adherent bordered foam dressing to left buttock for treatment.  Change dressing every other day and as needed with soilage.

## 2024-02-21 NOTE — ASSESSMENT & PLAN NOTE
Continue metoprolol.  Prior to admission on Xarelto.    On hold due to rectal bleeding.  May need to change to alternate agent given renal dysfunction

## 2024-02-21 NOTE — ED NOTES
Pt assisted onto and off of bedpan by ED tech. BM noted for large amount of bright red blood.      Francie Lemus RN  02/21/24 0810

## 2024-02-21 NOTE — H&P
[facility]  H&P  Name: Pro Steele 88 y.o. male I MRN: [id number]  Unit/Bed#: ED-11 I Date of Admission: 2/21/2024   Date of Service: 2/21/2024 I Hospital Day: 0      Assessment/Plan   * BRBPR (bright red blood per rectum)  Assessment & Plan  History of atrial fibrillation AS BPH and CKD 4 presents with 4 episodes of BRBPR  Denies any previous episode.  Last colonoscopy 2021 diverticuli without bleeding  Likely diverticular bleed.  Holding Xarelto.  Discussed with GI.  N.p.o. until further evaluation.    Results from last 7 days   Lab Units 02/21/24  1010 02/21/24  0638   HEMOGLOBIN g/dL 11.2* 12.2       Chronic diastolic (congestive) heart failure (HCC)  Assessment & Plan  Wt Readings from Last 3 Encounters:   01/30/24 82.1 kg (181 lb)   01/04/24 77.6 kg (171 lb)   11/15/23 78.9 kg (174 lb)     Probable acute component of chronic diastolic CHF  Follows with Dr. Pollack.  Patient believes he is up about 8 to 10 pounds.  Has been more sedentary.  Increased work of breathing with cough and worsening lower extremity swelling.  Start IV furosemide and consult cardiology.      Cough  Assessment & Plan  Secondary to CHF versus URI.  Question of right lower lobe infiltrate.  Check procalcitonin.  Empiric ceftriaxone.    Paroxysmal atrial fibrillation (HCC)  Assessment & Plan  Continue metoprolol.  Prior to admission on Xarelto.    On hold due to rectal bleeding.  May need to change to alternate agent given renal dysfunction    Elevated troponin  Assessment & Plan  Elevated troponin secondary to blood loss anemia CKD and CHF    Results from last 7 days   Lab Units 02/21/24  0847 02/21/24  0638   HS TNI 0HR ng/L  --  126*   HS TNI 2HR ng/L 120*  --        Aortic stenosis, severe  Assessment & Plan  Declined TAVR due to concerns for workup causing worsening renal dysfunction.  Follows with cardiology as an outpatient    BPH (benign prostatic hyperplasia)  Assessment & Plan  No urinary symptoms.   Continue finasteride and tamsulosin    CKD (chronic kidney disease) stage 4, GFR 15-29 ml/min (Formerly [facility])  Assessment & Plan  Lab Results   Component Value Date    EGFR 21 02/21/2024    EGFR 25 12/11/2023    EGFR 25 10/18/2023    CREATININE 2.53 (H) 02/21/2024    CREATININE 2.23 (H) 12/11/2023    CREATININE 2.25 (H) 10/18/2023     Follows with Dr. Kincaid as an outpatient.  Appears to be at baseline    VTE Pharmacologic Prophylaxis:   Moderate Risk (Score 3-4) - Pharmacological DVT Prophylaxis Contraindicated. Sequential Compression Devices Ordered.  Code Status: Level 3 - DNAR and DNI  Discussion with family: Updated  (wife and son) at bedside.    Anticipated Length of Stay: Patient will be admitted on an inpatient basis with an anticipated length of stay of greater than 2 midnights secondary to anemia CHF.    Total Time Spent on Date of Encounter in care of patient: This time was spent on one or more of the following: performing physical exam; counseling and coordination of care; obtaining or reviewing history; documenting in the medical record; reviewing/ordering tests, medications or procedures; communicating with other healthcare professionals and discussing with patient's family/caregivers.    Chief Complaint:     Rectal Bleeding (Pt brought in by EMS, Pt states having an episode of abdominal cramping and noticed bright red blood in stool twice. Pt is on xarelto. Per EMS pt was given nitro paste due to high blood pressure and swelling in legs. )    History of Present Illness:    Pro Steele is a 88 y.o. male with a past medical history of aortic stenosis CKD 4 BPH atrial fibrillation diastolic CHF who presents with rectal bleeding.  He was in his usual state of health.  He came to the hospital after having 4 episodes of BRBPR.  He denies any abdominal pain.  During examination he was coughing.  He states that throughout the week he has had nonproductive cough.  He has significant edema on  examination and upon questioning he reports he has had about an 8 pound weight gain.  He denies any chest pain.    Review of Systems:  Review of Systems   Constitutional:  Negative for chills and fever.   HENT:  Negative for facial swelling and trouble swallowing.    Eyes:  Negative for visual disturbance.   Respiratory:  Positive for cough and shortness of breath.    Cardiovascular:  Negative for chest pain.   Gastrointestinal:  Positive for blood in stool. Negative for abdominal distention, abdominal pain, diarrhea, nausea and vomiting.   Genitourinary:  Negative for hematuria.   Musculoskeletal:  Negative for back pain and myalgias.   Skin:  Negative for rash.   Neurological:  Negative for seizures, speech difficulty and numbness.   Psychiatric/Behavioral:  The patient is not nervous/anxious.    All other systems reviewed and are negative.        Past Medical and Surgical History:   Past Medical History:   Diagnosis Date    Aortic stenosis, severe 11/01/2021    Atrial fibrillation (HCC)     CHF (congestive heart failure) (HCC)     Colon polyp     GERD (gastroesophageal reflux disease)     Hearing loss     last assessed 11/8/17    Hypertension     Rheumatic fever     Stenosis of aorta      Past Surgical History:   Procedure Laterality Date    APPENDECTOMY      BACK SURGERY      lower    CARDIAC SURGERY      ablation    CATARACT EXTRACTION      JOINT REPLACEMENT Left     knee    KNEE SURGERY Left     NM PROSTATE NEEDLE BIOPSY ANY APPROACH N/A 3/16/2021    Procedure: Transperineal prostate biopsy with biplanar transrectal ultrasound, using the perineal logic kit;  Surgeon: Derrick Jackson MD;  Location: [facility];  Service: Urology    TONSILLECTOMY AND ADENOIDECTOMY       Meds/Allergies:  Allergies:   Allergies   Allergen Reactions    Apixaban Other (See Comments)     Prior to Admission Medications   Prescriptions Last Dose Informant Patient Reported? Taking?   Cholecalciferol (VITAMIN D3) 125 MCG (5000 UT) CAPS  Self  Yes Yes   Si (two) times a week    Multiple Vitamin (MULTI-VITAMIN DAILY) TABS  Self Yes Yes   Sig: Take by mouth   finasteride (PROSCAR) 5 mg tablet  Self No Yes   Sig: Take 1 tablet (5 mg total) by mouth daily   furosemide (LASIX) 40 mg tablet  Self No Yes   Sig: Take 1 tablet (40 mg total) by mouth daily   metoprolol succinate (TOPROL-XL) 25 mg 24 hr tablet  Self No Yes   Sig: Take 1 tablet (25 mg total) by mouth daily   nitroglycerin (NITROSTAT) 0.4 mg SL tablet  Self No Yes   Sig: Place 1 tablet (0.4 mg total) under the tongue once as needed for chest pain for up to 1 dose Lay down supine before taking and wait 15 minutes to get up.  Get up very slowly.  If pain continues, call 911   omeprazole (PriLOSEC) 20 mg delayed release capsule  Self No Yes   Sig: Take 1 capsule (20 mg total) by mouth daily   rivaroxaban (Xarelto) 15 mg tablet  Self No Yes   Sig: Take 1 tablet (15 mg total) by mouth daily with dinner   rosuvastatin (CRESTOR) 5 mg tablet   No Yes   Sig: Take 1 tablet (5 mg total) by mouth daily   tamsulosin (FLOMAX) 0.4 mg   No Yes   Sig: Take 1 capsule (0.4 mg total) by mouth daily with dinner      Facility-Administered Medications: None     Social History:     Social History     Socioeconomic History    Marital status: /Civil Union     Spouse name: Not on file    Number of children: Not on file    Years of education: Not on file    Highest education level: Not on file   Occupational History    Not on file   Tobacco Use    Smoking status: Former     Current packs/day: 0.00     Average packs/day: 1 pack/day for 16.0 years (16.0 ttl pk-yrs)     Types: Cigarettes     Start date:      Quit date:      Years since quittin.1    Smokeless tobacco: Never   Vaping Use    Vaping status: Never Used   Substance and Sexual Activity    Alcohol use: Yes     Comment: occ    Drug use: No    Sexual activity: Yes     Partners: Female   Other Topics Concern    Not on file   Social History Narrative     Not on file     Social Determinants of Health     Financial Resource Strain: Low Risk  (5/4/2023)    Overall Financial Resource Strain (CARDIA)     Difficulty of Paying Living Expenses: Not hard at all   Food Insecurity: Not on file   Transportation Needs: No Transportation Needs (5/4/2023)    PRAPARE - Transportation     Lack of Transportation (Medical): No     Lack of Transportation (Non-Medical): No   Physical Activity: Not on file   Stress: Not on file   Social Connections: Not on file   Intimate Partner Violence: Not on file   Housing Stability: Not on file     Patient Pre-hospital Living Situation:   Patient Pre-hospital Level of Mobility:   Patient Pre-hospital Diet Restrictions:     Family History:  Family History   Problem Relation Age of Onset    Other Mother         old age    Esophageal cancer Father     Other Father         old age    Alcohol abuse Son         alcoholism     Physical Exam:   Vitals:   Blood Pressure: 136/75 (02/21/24 1015)  Pulse: 86 (02/21/24 1015)  Temperature: 97.6 °F (36.4 °C) (02/21/24 0636)  Temp Source: Oral (02/21/24 0636)  Respirations: 18 (02/21/24 1015)  SpO2: 96 % (02/21/24 1015)    Physical Exam  Vitals reviewed.   Constitutional:       General: He is not in acute distress.  HENT:      Head: Atraumatic.   Eyes:      General: No scleral icterus.     Extraocular Movements: Extraocular movements intact.   Cardiovascular:      Rate and Rhythm: Normal rate. Rhythm irregular.      Heart sounds: Normal heart sounds.   Pulmonary:      Effort: Pulmonary effort is normal.      Breath sounds: Decreased breath sounds and rales (Crackles bibasilar right greater than left) present. No wheezing.   Abdominal:      General: Bowel sounds are normal.      Palpations: Abdomen is soft.      Tenderness: There is no abdominal tenderness. There is no rebound.   Musculoskeletal:         General: No swelling.      Cervical back: Normal range of motion.      Right lower leg: Edema present.      Left  lower leg: Edema present.   Skin:     General: Skin is warm and dry.   Neurological:      General: No focal deficit present.      Mental Status: He is alert.   Psychiatric:         Mood and Affect: Mood normal.       Lab Results:   Results from last 7 days   Lab Units 02/21/24  1010 02/21/24  0638   WBC Thousand/uL  --  8.33   HEMOGLOBIN g/dL 11.2* 12.2   HEMATOCRIT % 36.6 39.2   PLATELETS Thousands/uL  --  199   NEUTROS PCT %  --  64   LYMPHS PCT %  --  16   MONOS PCT %  --  13*   EOS PCT %  --  5     Results from last 7 days   Lab Units 02/21/24  0638   SODIUM mmol/L 138   POTASSIUM mmol/L 4.5   CHLORIDE mmol/L 103   CO2 mmol/L 27   ANION GAP mmol/L 8   BUN mg/dL 63*   CREATININE mg/dL 2.53*   CALCIUM mg/dL 9.6   ALBUMIN g/dL 3.4*   TOTAL BILIRUBIN mg/dL 0.91   ALK PHOS U/L 97   ALT U/L 29   AST U/L 33   EGFR ml/min/1.73sq m 21   GLUCOSE RANDOM mg/dL 101     Results from last 7 days   Lab Units 02/21/24  0638   INR  2.49*         Results from last 7 days   Lab Units 02/21/24  0847 02/21/24  0638   HS TNI 0HR ng/L  --  126*   HS TNI 2HR ng/L 120*  --                           Lines/Drains  Invasive Devices       Peripheral Intravenous Line  Duration             Peripheral IV 02/21/24 Distal;Dorsal (posterior);Left Forearm <1 day    Peripheral IV 02/21/24 Distal;Dorsal (posterior);Right Forearm <1 day                    Imaging: I have personally reviewed pertinent films in PACS  XR chest portable.  Probable right lower lobe infiltrate, pulmonary edema    EKG, Pathology, and Other Studies Reviewed on Admission:   EKG  Result Date: 02/21/24  Personally reviewed strips with impression of: Atrial fibrillation 100 bpm    ** Please Note: This note has been constructed using a voice recognition system. **

## 2024-02-21 NOTE — CONSULTS
Consultation    Nephrology   Pro Steele 88 y.o. male MRN: [id number]  Unit/Bed#: [place] [address] -01 Encounter: [id number]    History of Present Illness   Physician Requesting Consult: Isaac Chamorro DO  Reason for Consult : -Abnormal renal parameters    ASSESSMENT:   88 y.o.  male with medical history of chronic anemia, hypertension, hyperlipidemia, CKD stage IV, A-fib on anticoagulation and severe aortic stenosis who was admitted for bright red blood per rectum on 2/21/2024. Nephrology has been consulted for nausea and management of abnormal renal parameters.     CKD Stage IV:   At risk for SUREKHA most likely secondary to ischemia secondary to GI bleed plus cardiorenal syndrome with underlying AAS with underlying comorbidities.  After review of records it appears that the patient has a baseline Creatinine of 2-2.5mg/dL since January 2022.  Admission creatinine of 2.53 mg/dL on 2/21/2024.  Creatinine today is at 2.53 mg/dL.  check BMP, magnesium, phosphorus in a.m.  Continue Lasix 40 mg IV twice daily for now  Place on a renal diet when allowed diet order.   Strict I/O.  Daily weights  Urinary retention protocol  Avoid nephrotoxins, adjust meds to appropriate GFR.  Likely has underlying CKD secondary to hypertensive nephrosclerosis plus cardiorenal syndrome plus age-related nephron loss  Outpatient for nephrology follows up with Dr. Kincaid  As per prior discussions patient is NOT interested in dialysis if the need arises    H&H/anemia:  most recent hemoglobin at 11.2 g/dL  Maintain hemoglobin greater than 8 grams/deciliter  Management primary team consider PRBC transfusion if hemoglobin continues to drop  Follow-up with GI due to concerns for GI bleed  Check iron studies to see if patient may benefit from IV iron while in the hospital    Acid-base electrolytes:  Acid-base electrolytes stable for now continue to monitor      Blood pressure/hypertension:   Optimize hemodynamic status to avoid delay in renal  recovery.  Maintain MAP > 65mmHg  Avoid BP fluctuations.  Home medications: Lasix 40 mg p.o. daily, Toprol-XL 25 mg p.o. daily  Current medications: Lasix 40 mg IV twice daily, Toprol-XL 25 mg p.o. daily  Monitor daily for diuretic adjustments    Other medical problems:  GI bleed/red blood per rectum:  Management per primary team.  Follow-up with GI.  Current plans to hold off on colonoscopy unless urgent bleeding  Severe aortic stenosis/A-fib:  Management per primary team.  Follow-up with cardiology.  As per cardiology note patient does not want TAVR as he is afraid of being on dialysis and he is not interested in dialysis.  Currently on Lasix 40 IV twice daily monitor closely for dose adjustments as clinically significantly overloaded  [place]: Management primary team on empiric Rocephin      Plan below is what was discussed with the primary team that they agree with:  check BMP, magnesium, phosphorus in a.m.  Follow-up with cardiology  No Acute indication for dialysis  Patient not interested in dialysis if the need arises  Continue diuretics with 40 IV twice daily Lasix today  Monitor closely for diuretic adjustment cautious diuresis in light of severe AS  Follow-up with GI for GI bleed   closely monitor CBC for need for PRBC transfusion  Check iron studies to see if patient may benefit from IV iron      Thanks for the consult  Will continue to follow  Please call with questions/ concerns.      Carisa Zafar MD, [facility], 2/21/2024, 2:53 PM          HISTORY OF PRESENT ILLNESS:   Pro Steele is a 88 y.o.  male with medical history of chronic anemia, hypertension, hyperlipidemia, CKD stage IV, A-fib on anticoagulation and severe aortic stenosis who was admitted for bright red blood per rectum on 2/21/2024. Nephrology has been consulted for nausea and management of abnormal renal parameters.  Review of record shows baseline creatinine more recently around 2 to 2.5 mg/dL.  As an outpatient follows up with   [facility] for nephrology.  As an outpatient he has stated he is not interested in dialysis if the need arises.  Patient seen and examined in his room.  Family at bedside has been having significant episodes of blood per rectum multiple in the last 24 to 48 hours.  Has been having increasing lower extremity edema the last 24 hours.  No decreased urine output no NSAID use not interested in dialysis or CT with IV contrast due to high risk for dialysis.  No overt uremic symptoms.  No shortness of breath at this time.  History obtained from chart review and the patient    Inpatient consult to Nephrology  Consult performed by: Carisa Zafar MD  Consult ordered by: Rocky Pollack MD          Review of Systems   Constitutional:  Positive for fatigue. Negative for chills.   HENT:  Negative for congestion.    Respiratory:  Negative for cough, shortness of breath and wheezing.    Cardiovascular:  Positive for leg swelling.   Gastrointestinal:  Positive for anal bleeding. Negative for abdominal pain and diarrhea.   Genitourinary:  Negative for decreased urine volume and dysuria.   Musculoskeletal:  Negative for back pain.   Neurological:  Negative for headaches.   Psychiatric/Behavioral:  Negative for agitation and confusion.    All other systems reviewed and are negative.       Historical Information   Patient Active Problem List   Diagnosis    Chronic anemia    Linda esophagus    Esophageal reflux    Hypertension    Spinal stenosis of lumbar region    Spondylolisthesis    PSVT (paroxysmal supraventricular tachycardia)    Palpitations    Pure hypercholesterolemia    PVC (premature ventricular contraction)    CKD (chronic kidney disease) stage 4, GFR 15-29 ml/min (HCC)    Chronic diastolic (congestive) heart failure (HCC)    BPH (benign prostatic hyperplasia)    History of radiofrequency ablation  for SVT    Pulmonary nodules    Aortic stenosis, severe    Elevated troponin    Cigarette nicotine dependence in remission     Lesion of left lung    Secondary hyperparathyroidism of renal origin (HCC)    Paroxysmal atrial fibrillation (HCC)    BRBPR (bright red blood per rectum)    Cough     Past Medical History:   Diagnosis Date    Aortic stenosis, severe 2021    Atrial fibrillation (HCC)     CHF (congestive heart failure) (HCC)     Colon polyp     GERD (gastroesophageal reflux disease)     Hearing loss     last assessed 17    Hypertension     Rheumatic fever     Stenosis of aorta      Past Surgical History:   Procedure Laterality Date    APPENDECTOMY      BACK SURGERY      lower    CARDIAC SURGERY      ablation    CATARACT EXTRACTION      JOINT REPLACEMENT Left     knee    KNEE SURGERY Left     IN PROSTATE NEEDLE BIOPSY ANY APPROACH N/A 3/16/2021    Procedure: Transperineal prostate biopsy with biplanar transrectal ultrasound, using the perineal logic kit;  Surgeon: Derrick Jackson MD;  Location: [facility];  Service: Urology    TONSILLECTOMY AND ADENOIDECTOMY       Social History   Social History     Substance and Sexual Activity   Alcohol Use Yes    Comment: occ     Social History     Substance and Sexual Activity   Drug Use No     Social History     Tobacco Use   Smoking Status Former    Current packs/day: 0.00    Average packs/day: 1 pack/day for 16.0 years (16.0 ttl pk-yrs)    Types: Cigarettes    Start date:     Quit date:     Years since quittin.1   Smokeless Tobacco Never     Family History   Problem Relation Age of Onset    Other Mother         old age    Esophageal cancer Father     Other Father         old age    Alcohol abuse Son         alcoholism       Meds/Allergies   current meds:   Current Facility-Administered Medications   Medication Dose Route Frequency    cefTRIAXone (ROCEPHIN) IVPB (premix in dextrose) 1,000 mg 50 mL  1,000 mg Intravenous Q24H    finasteride (PROSCAR) tablet 5 mg  5 mg Oral Daily    furosemide (LASIX) injection 40 mg  40 mg Intravenous BID    metoprolol succinate (TOPROL-XL) 24 hr  tablet 25 mg  25 mg Oral Daily    pravastatin (PRAVACHOL) tablet 20 mg  20 mg Oral Daily With Dinner    tamsulosin (FLOMAX) capsule 0.4 mg  0.4 mg Oral Daily With Dinner    and PTA meds:   Prior to Admission Medications   Prescriptions Last Dose Informant Patient Reported? Taking?   Cholecalciferol (VITAMIN D3) 125 MCG (5000 UT) CAPS  Self Yes Yes   Si (two) times a week    Multiple Vitamin (MULTI-VITAMIN DAILY) TABS  Self Yes Yes   Sig: Take by mouth   finasteride (PROSCAR) 5 mg tablet  Self No Yes   Sig: Take 1 tablet (5 mg total) by mouth daily   furosemide (LASIX) 40 mg tablet  Self No Yes   Sig: Take 1 tablet (40 mg total) by mouth daily   metoprolol succinate (TOPROL-XL) 25 mg 24 hr tablet  Self No Yes   Sig: Take 1 tablet (25 mg total) by mouth daily   nitroglycerin (NITROSTAT) 0.4 mg SL tablet  Self No Yes   Sig: Place 1 tablet (0.4 mg total) under the tongue once as needed for chest pain for up to 1 dose Lay down supine before taking and wait 15 minutes to get up.  Get up very slowly.  If pain continues, call 911   omeprazole (PriLOSEC) 20 mg delayed release capsule  Self No Yes   Sig: Take 1 capsule (20 mg total) by mouth daily   rivaroxaban (Xarelto) 15 mg tablet  Self No Yes   Sig: Take 1 tablet (15 mg total) by mouth daily with dinner   rosuvastatin (CRESTOR) 5 mg tablet   No Yes   Sig: Take 1 tablet (5 mg total) by mouth daily   tamsulosin (FLOMAX) 0.4 mg   No Yes   Sig: Take 1 capsule (0.4 mg total) by mouth daily with dinner      Facility-Administered Medications: None       Scheduled Meds:  Current Facility-Administered Medications   Medication Dose Route Frequency Provider Last Rate    cefTRIAXone  1,000 mg Intravenous Q24H Isaac Chamorro DO Stopped (24 1226)    finasteride  5 mg Oral Daily Isaac Chamorro DO      furosemide  40 mg Intravenous BID Isaac Chamorro DO      metoprolol succinate  25 mg Oral Daily Isaac Chamorro DO      pravastatin  20 mg Oral Daily With Dinner Isaac Chamorro DO       tamsulosin  0.4 mg Oral Daily With Dinner Isaac Chamorro DO         PRN Meds:.    Continuous Infusions:     Allergies   Allergen Reactions    Apixaban Other (See Comments)         Invasive Devices:     Invasive Devices       Peripheral Intravenous Line  Duration             Peripheral IV 24 Distal;Dorsal (posterior);Left Forearm <1 day    Peripheral IV 24 Distal;Dorsal (posterior);Right Forearm <1 day                      PHYSICAL EXAM  /81   Pulse 87   Temp (!) 96.2 °F (35.7 °C)   Resp 16   Wt 79 kg (174 lb 2.6 oz)   SpO2 96%   BMI 25.72 kg/m²   Temp (24hrs), Av.9 °F (36.1 °C), Min:96.2 °F (35.7 °C), Max:97.6 °F (36.4 °C)        Intake/Output Summary (Last 24 hours) at 2024 1453  Last data filed at 2024 1344  Gross per 24 hour   Intake 550 ml   Output 400 ml   Net 150 ml       I/O last 24 hours:  In: 550 [IV Piggyback:550]  Out: 400 [Urine:400]          Current Weight: Weight - Scale: 79 kg (174 lb 2.6 oz)  First Weight: Weight - Scale: 79 kg (174 lb 2.6 oz)  Physical Exam  Vitals and nursing note reviewed.   Constitutional:       General: He is not in acute distress.     Appearance: Normal appearance. He is normal weight. He is not ill-appearing, toxic-appearing or diaphoretic.   HENT:      Head: Normocephalic and atraumatic.      Mouth/Throat:      Mouth: Mucous membranes are moist.      Pharynx: No oropharyngeal exudate.   Eyes:      General: No scleral icterus.  Cardiovascular:      Rate and Rhythm: Normal rate.      Heart sounds: Murmur heard.   Pulmonary:      Effort: No respiratory distress.      Breath sounds: No wheezing.   Abdominal:      General: There is no distension.      Palpations: Abdomen is soft. There is no mass.      Tenderness: There is no abdominal tenderness.   Musculoskeletal:         General: Swelling present.      Cervical back: No rigidity.      Comments: +3 edema bilateral lower extremities   Skin:     Coloration: Skin is not jaundiced.  "  Neurological:      General: No focal deficit present.      Mental Status: He is alert and oriented to person, place, and time.   Psychiatric:         Mood and Affect: Mood normal.         Behavior: Behavior normal.           LABORATORY:    Results from last 7 days   Lab Units 02/21/24  1010 02/21/24  0638   WBC Thousand/uL  --  8.33   HEMOGLOBIN g/dL 11.2* 12.2   HEMATOCRIT % 36.6 39.2   PLATELETS Thousands/uL  --  199   POTASSIUM mmol/L  --  4.5   CHLORIDE mmol/L  --  103   CO2 mmol/L  --  27   BUN mg/dL  --  63*   CREATININE mg/dL  --  2.53*   CALCIUM mg/dL  --  9.6   MAGNESIUM mg/dL  --  2.1      rest all reviewed    RADIOLOGY:  XR chest 1 view portable   ED Interpretation by Jordan Nayak MD (02/21 3541)   I have reviewed the film, per my independent interpretation : Mild vascular congestion and possibly an effusion on the right.  No free air visualized.  Formal read per radiology.        Final Result by Rohan Cole MD (02/21 1350)      Small right pleural effusion. No focal consolidation.            Workstation performed: [id number]           Rest all reviewed    Portions of the record may have been created with voice recognition software. Occasional wrong word or \"sound a like\" substitutions may have occurred due to the inherent limitations of voice recognition software. Read the chart carefully and recognize, using context, where substitutions have occurred.If you have any questions, please contact the dictating provider.    "

## 2024-02-21 NOTE — ASSESSMENT & PLAN NOTE
Elevated troponin secondary to blood loss anemia CKD and CHF    Results from last 7 days   Lab Units 02/21/24  0847 02/21/24  0638   HS TNI 0HR ng/L  --  126*   HS TNI 2HR ng/L 120*  --

## 2024-02-21 NOTE — ASSESSMENT & PLAN NOTE
Wt Readings from Last 3 Encounters:   01/30/24 82.1 kg (181 lb)   01/04/24 77.6 kg (171 lb)   11/15/23 78.9 kg (174 lb)     Probable acute component of chronic diastolic CHF  Follows with Dr. Pollack.  Patient believes he is up about 8 to 10 pounds.  Has been more sedentary.  Increased work of breathing with cough and worsening lower extremity swelling.  Start IV furosemide and consult cardiology.

## 2024-02-21 NOTE — CONSULTS
Consultation - Cardiology   Pro Steele 88 y.o. male MRN: [id number]  Unit/Bed#: ED-11 Encounter: [id number]    Physician Requesting Consult: Isaac Chamorro DO  Reason for Consult / Principal Problem:      Aortic stenosis  Consulting physician:  Rocky Pollack MD      Assessment/Plan     Assessment:    Bright red blood per rectum  Chronic diastolic heart failure secondary to severe aortic stenosis and fluid overload from chronic kidney disease  Bilateral pleural effusions worse on the right  Cough  Longstanding persistent atrial fibrillation  Non myocardial ischemic troponin elevation  Severe aortic stenosis  Chronic kidney disease stage IV  BPH  Mild hypoalbuminemia    Plan:    Hold Xarelto until bleeding is controlled  Agree with furosemide 40 mg IV 2 times daily  After reducing patient's volume status, would consider IV albumin to help mobilize fluid  Will consult nephrology to help manage diuretics, patient does not want to be on dialysis  Patient does not want TAVR since it will most likely place him on dialysis.  repeat echo cardiogram  Monitor volume status, electrolytes and renal function closely    History of Present Illness   HPI: Pro Steele is a 88 y.o. year old male who presents with history of severe aortic stenosis, longstanding persistent atrial fibrillation on Xarelto anticoagulation and chronic kidney disease stage IV presents with bright red blood per rectum.    Patient Active Problem List    Diagnosis Date Noted    Elevated troponin 11/10/2021    Pulmonary nodules 11/01/2021    Aortic stenosis, severe 11/01/2021    BPH (benign prostatic hyperplasia) 08/12/2021    CKD (chronic kidney disease) stage 4, GFR 15-29 ml/min ([facility]) 08/11/2021    Chronic diastolic (congestive) heart failure (HCC) 08/11/2021    Pure hypercholesterolemia 08/22/2019    PVC (premature ventricular contraction) 08/22/2019    Palpitations 11/08/2018    PSVT (paroxysmal supraventricular tachycardia) 11/07/2018     Spondylolisthesis 06/30/2016    Chronic anemia 05/26/2016    Spinal stenosis of lumbar region 05/02/2016    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    Esophageal reflux 02/19/2013    Hypertension 08/23/2012    BRBPR (bright red blood per rectum) 02/21/2024    Cough 02/21/2024    Paroxysmal atrial fibrillation (HCC) 05/05/2023    Secondary hyperparathyroidism of renal origin (HCC) 05/02/2023    Lesion of left lung 01/05/2023    Cigarette nicotine dependence in remission 12/16/2021    History of radiofrequency ablation  for SVT 08/20/2021       Vitals: /96 (BP Location: Right arm)   Pulse 81   Temp 97.6 °F (36.4 °C) (Oral)   Resp 14   Wt 79 kg (174 lb 2.6 oz)   SpO2 96%   BMI 25.72 kg/m²   PREVIOUS WEIGHTS:   Wt Readings from Last 5 Encounters:   02/21/24 79 kg (174 lb 2.6 oz)   01/30/24 82.1 kg (181 lb)   01/04/24 77.6 kg (171 lb)   11/15/23 78.9 kg (174 lb)   10/10/23 83.3 kg (183 lb 10.3 oz)       Labs/Data:        Results from last 7 days   Lab Units 02/21/24  1010 02/21/24  0638   WBC Thousand/uL  --  8.33   HEMOGLOBIN g/dL 11.2* 12.2   HEMATOCRIT % 36.6 39.2   MCV fL  --  90   MCH pg  --  28.1   MCHC g/dL  --  31.1*   PLATELETS Thousands/uL  --  199     Results from last 7 days   Lab Units 02/21/24  0638   EGFR ml/min/1.73sq m 21   SODIUM mmol/L 138   POTASSIUM mmol/L 4.5   CHLORIDE mmol/L 103   CO2 mmol/L 27   BUN mg/dL 63*   CREATININE mg/dL 2.53*     Results from last 7 days   Lab Units 02/21/24  0638   CALCIUM mg/dL 9.6   AST U/L 33   ALT U/L 29   ALK PHOS U/L 97   MAGNESIUM mg/dL 2.1         Lab Results   Component Value Date    NTBNP 4,038 (H) 05/27/2022    NTBNP 5,024 (H) 11/10/2021    NTBNP 2,942 (H) 08/11/2021     Lab Results   Component Value Date    CHOLESTEROL 111 07/26/2023    TRIG 52 07/26/2023    HDL 53 07/26/2023    LDLCALC 48 07/26/2023     Results from last 7 days   Lab Units 02/21/24  0638   INR  2.49*   PROTIME seconds 27.0*          Current Facility-Administered Medications:     cefTRIAXone  (ROCEPHIN) IVPB (premix in dextrose) 1,000 mg 50 mL, 1,000 mg, Intravenous, Q24H, Isaac Chamorro DO, Last Rate: 100 mL/hr at 02/21/24 1156, 1,000 mg at 02/21/24 1156    finasteride (PROSCAR) tablet 5 mg, 5 mg, Oral, Daily, Isaac Chamorro DO, 5 mg at 02/21/24 1215    furosemide (LASIX) injection 40 mg, 40 mg, Intravenous, BID, Isaac Chamorro DO, 40 mg at 02/21/24 1140    metoprolol succinate (TOPROL-XL) 24 hr tablet 25 mg, 25 mg, Oral, Daily, Isaac Chamorro DO, 25 mg at 02/21/24 1215    pravastatin (PRAVACHOL) tablet 20 mg, 20 mg, Oral, Daily With Dinner, Isaac Chamorro DO    tamsulosin (FLOMAX) capsule 0.4 mg, 0.4 mg, Oral, Daily With Dinner, Isaac Chamorro DO    Current Outpatient Medications:     Cholecalciferol (VITAMIN D3) 125 MCG (5000 UT) CAPS, 2 (two) times a week , Disp: , Rfl:     finasteride (PROSCAR) 5 mg tablet, Take 1 tablet (5 mg total) by mouth daily, Disp: 90 tablet, Rfl: 1    furosemide (LASIX) 40 mg tablet, Take 1 tablet (40 mg total) by mouth daily, Disp: 90 tablet, Rfl: 1    metoprolol succinate (TOPROL-XL) 25 mg 24 hr tablet, Take 1 tablet (25 mg total) by mouth daily, Disp: 90 tablet, Rfl: 3    Multiple Vitamin (MULTI-VITAMIN DAILY) TABS, Take by mouth, Disp: , Rfl:     nitroglycerin (NITROSTAT) 0.4 mg SL tablet, Place 1 tablet (0.4 mg total) under the tongue once as needed for chest pain for up to 1 dose Lay down supine before taking and wait 15 minutes to get up.  Get up very slowly.  If pain continues, call 911, Disp: 25 tablet, Rfl: 2    omeprazole (PriLOSEC) 20 mg delayed release capsule, Take 1 capsule (20 mg total) by mouth daily, Disp: 90 capsule, Rfl: 1    rivaroxaban (Xarelto) 15 mg tablet, Take 1 tablet (15 mg total) by mouth daily with dinner, Disp: 30 tablet, Rfl: 5    rosuvastatin (CRESTOR) 5 mg tablet, Take 1 tablet (5 mg total) by mouth daily, Disp: 90 tablet, Rfl: 1    tamsulosin (FLOMAX) 0.4 mg, Take 1 capsule (0.4 mg total) by mouth daily with dinner, Disp: 30 capsule, Rfl:  2  Allergies   Allergen Reactions    Apixaban Other (See Comments)       Historical Information   Past Medical History:   Diagnosis Date    Aortic stenosis, severe 2021    Atrial fibrillation (HCC)     CHF (congestive heart failure) (HCC)     Colon polyp     GERD (gastroesophageal reflux disease)     Hearing loss     last assessed 17    Hypertension     Rheumatic fever     Stenosis of aorta      Past Surgical History:   Procedure Laterality Date    APPENDECTOMY      BACK SURGERY      lower    CARDIAC SURGERY      ablation    CATARACT EXTRACTION      JOINT REPLACEMENT Left     knee    KNEE SURGERY Left     MT PROSTATE NEEDLE BIOPSY ANY APPROACH N/A 3/16/2021    Procedure: Transperineal prostate biopsy with biplanar transrectal ultrasound, using the perineal logic kit;  Surgeon: Derrick Jackson MD;  Location: [facility];  Service: Urology    TONSILLECTOMY AND ADENOIDECTOMY       Past Surgical History:   Procedure Laterality Date    APPENDECTOMY      BACK SURGERY      lower    CARDIAC SURGERY      ablation    CATARACT EXTRACTION      JOINT REPLACEMENT Left     knee    KNEE SURGERY Left     MT PROSTATE NEEDLE BIOPSY ANY APPROACH N/A 3/16/2021    Procedure: Transperineal prostate biopsy with biplanar transrectal ultrasound, using the perineal logic kit;  Surgeon: Derrick Jackson MD;  Location: [facility];  Service: Urology    TONSILLECTOMY AND ADENOIDECTOMY       Social History:  Social History     Substance and Sexual Activity   Alcohol Use Yes    Comment: occ     Social History     Substance and Sexual Activity   Drug Use No     Social History     Tobacco Use   Smoking Status Former    Current packs/day: 0.00    Average packs/day: 1 pack/day for 16.0 years (16.0 ttl pk-yrs)    Types: Cigarettes    Start date:     Quit date:     Years since quittin.1   Smokeless Tobacco Never     Social History     Socioeconomic History    Marital status: /Civil Union     Spouse name: Not on file    Number of  children: Not on file    Years of education: Not on file    Highest education level: Not on file   Occupational History    Not on file   Tobacco Use    Smoking status: Former     Current packs/day: 0.00     Average packs/day: 1 pack/day for 16.0 years (16.0 ttl pk-yrs)     Types: Cigarettes     Start date:      Quit date:      Years since quittin.1    Smokeless tobacco: Never   Vaping Use    Vaping status: Never Used   Substance and Sexual Activity    Alcohol use: Yes     Comment: occ    Drug use: No    Sexual activity: Yes     Partners: Female   Other Topics Concern    Not on file   Social History Narrative    Not on file     Social Determinants of Health     Financial Resource Strain: Low Risk  (2023)    Overall Financial Resource Strain (CARDIA)     Difficulty of Paying Living Expenses: Not hard at all   Food Insecurity: Not on file   Transportation Needs: No Transportation Needs (2023)    PRAPARE - Transportation     Lack of Transportation (Medical): No     Lack of Transportation (Non-Medical): No   Physical Activity: Not on file   Stress: Not on file   Social Connections: Not on file   Intimate Partner Violence: Not on file   Housing Stability: Not on file      Family History:   family history includes Alcohol abuse in his son; Esophageal cancer in his father; Other in his father and mother.      Intake/Output Summary (Last 24 hours) at 2024 1314  Last data filed at 2024 1226  Gross per 24 hour   Intake 500 ml   Output 200 ml   Net 300 ml       Invasive Devices       Peripheral Intravenous Line  Duration             Peripheral IV 24 Distal;Dorsal (posterior);Left Forearm <1 day    Peripheral IV 24 Distal;Dorsal (posterior);Right Forearm <1 day                    Review of Systems   Constitutional:  Positive for fatigue.   HENT: Negative.     Eyes: Negative.    Respiratory:  Positive for cough. Negative for choking, chest tightness, shortness of breath, wheezing and  stridor.    Cardiovascular:  Positive for leg swelling. Negative for chest pain and palpitations.   Gastrointestinal: Negative.    Endocrine: Negative.    Musculoskeletal: Negative.    Skin: Negative.    Allergic/Immunologic: Negative.    Neurological:  Positive for weakness.   Hematological: Negative.    Psychiatric/Behavioral: Negative.         Physical Exam  Vitals reviewed.   Constitutional:       General: He is not in acute distress.     Appearance: He is well-developed.   HENT:      Head: Normocephalic and atraumatic.   Neck:      Thyroid: No thyromegaly.      Vascular: JVD present. No carotid bruit.      Trachea: No tracheal deviation.   Cardiovascular:      Rate and Rhythm: Normal rate and regular rhythm.      Pulses: Normal pulses.      Heart sounds: Murmur heard.      Crescendo decrescendo systolic murmur is present with a grade of 2/6.      No friction rub. No gallop.   Pulmonary:      Effort: Pulmonary effort is normal. No respiratory distress.      Breath sounds: Examination of the right-middle field reveals rales. Examination of the left-middle field reveals rales. Examination of the right-lower field reveals decreased breath sounds. Examination of the left-lower field reveals decreased breath sounds. Decreased breath sounds and rales present. No wheezing or rhonchi.   Chest:      Chest wall: No tenderness.   Abdominal:      General: Bowel sounds are normal. There is no distension.      Palpations: Abdomen is soft.      Tenderness: There is no abdominal tenderness.   Musculoskeletal:      Cervical back: Normal range of motion and neck supple.      Right lower leg: 3+ Pitting Edema present.      Left lower leg: 3+ Pitting Edema present.   Skin:     General: Skin is warm and dry.   Neurological:      General: No focal deficit present.      Mental Status: He is alert and oriented to person, place, and time.      Gait: Gait normal.   Psychiatric:         Mood and Affect: Mood normal.         Behavior:  "Behavior normal.         Thought Content: Thought content normal.         Judgment: Judgment normal.         --------------------------------------------------------------------------------  ECHO  2023  [facility]  [address] [place] [id number] [phone]  Name: Pro Steele                       : 1935  MRN: [id number]                       Age: 88 y.o.  Patient Status: Outpatient          Gender: male  Echo complete    Height: 5' 9\" (1.753 m)   Weight: 81.6 kg (180 lb)   BSA: 1.97 m²   Blood Pressure: 130/90    Date of Study: 23   Ordering Provider: Rocky Pollack MD   Clinical Indications: Aortic stenosis, severe [I35.0 (ICD-10-CM)]       Reading Physicians  Performing Staff   Cardiology: Christoph Kraus MD    Tech: [facility],         Vitals    Height Weight BSA (Calculated - m2) BP Pulse   5' 9\" (1.753 m) 81.6 kg (180 lb) 1.98 sq meters 130/90 80     PACS Images     Show images for Echo complete w/ contrast if indicated  Study Details    This transthoracic echocardiogram was performed in the echo lab. This was a routine, outpatient study. Study quality was adequate. A complete 2D, color flow Doppler, spectral Doppler, 2D, color flow Doppler and spectral Doppler transthoracic echocardiogram was performed.  The apical, parasternal, subcostal and suprasternal views were obtained.     History    Aortic stenosis, HTN, CHF, GERD, PSVT, PVC's, History of Rheumatic fever     Interpretation Summary         Left Ventricle: Left ventricular cavity size is normal. There is mild concentric hypertrophy. The left ventricular ejection fraction is 60%. Systolic function is normal. Wall motion is normal.    Left Atrium: The atrium is mildly dilated.    Aortic Valve: The leaflets are moderately calcified. There is moderately reduced mobility. There is mild to moderate regurgitation. There is moderate to severe stenosis. The aortic valve peak velocity is " 3.14 m/s. The aortic valve mean gradient is 26 mmHg. The aortic valve area is 0.88 cm2.    Mitral Valve: There is annular calcification. There is moderate to severe regurgitation with a centrally directed jet.    Tricuspid Valve: There is mild regurgitation. The right ventricular systolic pressure is mildly to moderately elevated. The estimated right ventricular systolic pressure is 56.00 mmHg.    Aorta: The ascending aorta is mildly dilated. The ascending aorta is 4 cm.    Velocity across the aortic valve is improved when comared to 10/5/2022.     Findings    Left Ventricle Left ventricular cavity size is normal. Wall thickness is increased. There is mild concentric hypertrophy. The left ventricular ejection fraction is 60%. Systolic function is normal.  Wall motion is normal. Unable to assess diastolic function due to atrial fibrillation.   Right Ventricle Right ventricular cavity size is upper normal. Systolic function is normal. Normal tricuspid annular plane systolic excursion (TAPSE) > 1.7 cm.   Left Atrium The atrium is mildly dilated.   Right Atrium The atrium is upper normal in size.   Aortic Valve The leaflets are moderately calcified. There is moderately reduced mobility. There is mild to moderate regurgitation. There is moderate to severe stenosis. The aortic valve peak velocity is 3.14 m/s. The aortic valve mean gradient is 26 mmHg. The aortic valve area is 0.88 cm2.   Mitral Valve There is annular calcification. There is moderate to severe regurgitation with a centrally directed jet. There is no evidence of stenosis.   Tricuspid Valve Tricuspid valve structure is normal. There is mild regurgitation. There is no evidence of stenosis. The right ventricular systolic pressure is mildly to moderately elevated. The estimated right ventricular systolic pressure is 56.00 mmHg.   Pulmonic Valve The pulmonic valve was not well visualized. There is trace regurgitation. There is no evidence of stenosis.    Ascending Aorta The ascending aorta is mildly dilated. The ascending aorta is 4 cm.   IVC/SVC The inferior vena cava size is 2.3 mm. The right atrial pressure is estimated at 10.0 mmHg. The inferior vena cava is dilated.   Pericardium There is no pericardial effusion.     Left Ventricle Measurements    Function/Volumes   A4C EF 41 %         LVOT stroke volume 57.81         LVOT stroke volume index 28.3 ml/m2         Dimensions   LVIDd 4.3 cm         LVIDS 2.9 cm         IVSd 0.9 cm         LVPWd 1 cm         LVOT area 3.46 cm2         FS 33         Diastolic Filling   MV E' Tissue Velocity Septal 8 cm/s         E wave deceleration time 129 ms         MV Peak E Justen 140 cm/s         MV Peak A Justen 0.45 m/s          Report Measurements   AV LVOT peak gradient 2 mmHg              Interventricular Septum Measurements    Shunt Ratio   LVOT peak VTI 16.7 cm         LVOT peak justen 0.78 m/s              Right Ventricle Measurements    Dimensions   RVID d 2.3 cm         Tricuspid annular plane systolic excursion 1.8 cm               Left Atrium Measurements    Dimensions   LA size 5.6 cm         LA length (A2C) 6.7 cm               Right Atrium Measurements    Dimensions   RAA A4C 19.3 cm2               Atrial Septum Measurements    Shunt Ratio   LVOT peak VTI 16.7 cm         LVOT peak justen 0.78 m/s               Aortic Valve Measurements    Stenosis   Aortic valve peak velocity 3.14 m/s         LVOT peak justen 0.78 m/s         Ao VTI 66.06 cm         LVOT peak VTI 16.7 cm         AV mean gradient 26 mmHg         LVOT mn grad 1 mmHg         AV peak gradient 40 mmHg         AV LVOT peak gradient 2 mmHg         Regurgitation   AV peak gradient 58 mmHg         AV Deceleration Time 3,076 ms         AV regurgitation pressure 1/2 time 892 ms         Area/Dimensions   DVI 0.25         AV valve area 0.88 cm2         AV area by cont VTI 0.9 cm2         AV area peak justen 0.9 cm2         LVOT diameter 2.1 cm         LVOT area 3.46 cm2        "        Mitral Valve Measurements    Stenosis   MV mean gradient antegrade 3 mmHg         MV peak gradient antegrade 9 mmHg         MV VTI 27.54 cm         MV VTI RETROGRADE 173.7 cm         MV stenosis pressure 1/2 time 38 ms         MV valve area p 1/2 method 5.79         MV valve area by continuity eq 2.1 cm2         MV mean gradient retrograde 76 mmHg         MR  mmHg               Tricuspid Valve Measurements    RVSP Parameters   TR Peak Justen 3.4 m/s         Est. RA pres 10 mmHg         Triscuspid Valve Regurgitation Peak Gradient 46 mmHg         Right Ventricular Peak Systolic Pressure 56 mmHg               Aorta Measurements    Aortic Dimensions   Ao root 3.4 cm         Asc Ao 4 cm               IVC/SVC Measurements    IVC/SVC   IVC 2.3 mm         Est. RA pres 10 mmHg              Exam Details    Performed Procedure Technologist Supporting Staff Performing Physician   Echo complete DMITRY Cade           Appointment Date/Status Modality Department    5/1/2023     Completed CA ECHO 1 CA CAR NON INV           Begin Exam End Exam  End Exam Questionnaires   5/1/2023  2:05 PM 5/1/2023  2:58 PM  PATIENT EDUCATION            All Reviewers List    Rocky Pollack MD on 5/4/2023  5:42 AM     Signed    Electronically signed by Christoph Kraus MD on 5/1/23 at 1646 EDT   ======================================================     Imaging:   I have personally reviewed pertinent reports.        EKG: Atrial fibrillation with premature ventricular or aberrantly conducted complexes  Right superior axis deviation  Septal infarct (cited on or before 27-FEB-2021)  Abnormal ECG    Portions of the record may have been created with voice recognition software. Occasional wrong word or \"sound a like\" substitutions may have occurred due to the inherent limitations of voice recognition software. Read the chart carefully and recognize, using context, where substitutions have occurred.    Rocky Pollack MD    "

## 2024-02-21 NOTE — ASSESSMENT & PLAN NOTE
Lab Results   Component Value Date    EGFR 21 02/21/2024    EGFR 25 12/11/2023    EGFR 25 10/18/2023    CREATININE 2.53 (H) 02/21/2024    CREATININE 2.23 (H) 12/11/2023    CREATININE 2.25 (H) 10/18/2023     Follows with Dr. Kincaid as an outpatient.  Appears to be at baseline

## 2024-02-22 LAB
ANION GAP SERPL CALCULATED.3IONS-SCNC: 6 MMOL/L
BUN SERPL-MCNC: 58 MG/DL (ref 5–25)
CALCIUM SERPL-MCNC: 9.3 MG/DL (ref 8.4–10.2)
CHLORIDE SERPL-SCNC: 104 MMOL/L (ref 96–108)
CO2 SERPL-SCNC: 32 MMOL/L (ref 21–32)
CREAT SERPL-MCNC: 2.23 MG/DL (ref 0.6–1.3)
ERYTHROCYTE [DISTWIDTH] IN BLOOD BY AUTOMATED COUNT: 16.8 % (ref 11.6–15.1)
FERRITIN SERPL-MCNC: 54 NG/ML (ref 24–336)
GFR SERPL CREATININE-BSD FRML MDRD: 25 ML/MIN/1.73SQ M
GLUCOSE SERPL-MCNC: 85 MG/DL (ref 65–140)
HCT VFR BLD AUTO: 31.4 % (ref 36.5–49.3)
HCT VFR BLD AUTO: 33.9 % (ref 36.5–49.3)
HGB BLD-MCNC: 10.8 G/DL (ref 12–17)
HGB BLD-MCNC: 9.9 G/DL (ref 12–17)
IRON SATN MFR SERPL: 9 % (ref 15–50)
IRON SERPL-MCNC: 21 UG/DL (ref 50–212)
MCH RBC QN AUTO: 27.8 PG (ref 26.8–34.3)
MCHC RBC AUTO-ENTMCNC: 31.5 G/DL (ref 31.4–37.4)
MCV RBC AUTO: 88 FL (ref 82–98)
PHOSPHATE SERPL-MCNC: 3.5 MG/DL (ref 2.3–4.1)
PLATELET # BLD AUTO: 192 THOUSANDS/UL (ref 149–390)
PMV BLD AUTO: 10.5 FL (ref 8.9–12.7)
POTASSIUM SERPL-SCNC: 3.9 MMOL/L (ref 3.5–5.3)
PROCALCITONIN SERPL-MCNC: 0.11 NG/ML
RBC # BLD AUTO: 3.56 MILLION/UL (ref 3.88–5.62)
SODIUM SERPL-SCNC: 142 MMOL/L (ref 135–147)
TIBC SERPL-MCNC: 246 UG/DL (ref 250–450)
UIBC SERPL-MCNC: 225 UG/DL (ref 155–355)
WBC # BLD AUTO: 7.14 THOUSAND/UL (ref 4.31–10.16)

## 2024-02-22 PROCEDURE — 83550 IRON BINDING TEST: CPT

## 2024-02-22 PROCEDURE — 85018 HEMOGLOBIN: CPT

## 2024-02-22 PROCEDURE — 99232 SBSQ HOSP IP/OBS MODERATE 35: CPT | Performed by: STUDENT IN AN ORGANIZED HEALTH CARE EDUCATION/TRAINING PROGRAM

## 2024-02-22 PROCEDURE — 80048 BASIC METABOLIC PNL TOTAL CA: CPT | Performed by: INTERNAL MEDICINE

## 2024-02-22 PROCEDURE — 84145 PROCALCITONIN (PCT): CPT | Performed by: INTERNAL MEDICINE

## 2024-02-22 PROCEDURE — 85014 HEMATOCRIT: CPT

## 2024-02-22 PROCEDURE — 97167 OT EVAL HIGH COMPLEX 60 MIN: CPT

## 2024-02-22 PROCEDURE — 85027 COMPLETE CBC AUTOMATED: CPT | Performed by: INTERNAL MEDICINE

## 2024-02-22 PROCEDURE — 99233 SBSQ HOSP IP/OBS HIGH 50: CPT | Performed by: INTERNAL MEDICINE

## 2024-02-22 PROCEDURE — 99232 SBSQ HOSP IP/OBS MODERATE 35: CPT

## 2024-02-22 PROCEDURE — 84100 ASSAY OF PHOSPHORUS: CPT | Performed by: INTERNAL MEDICINE

## 2024-02-22 PROCEDURE — 82728 ASSAY OF FERRITIN: CPT

## 2024-02-22 PROCEDURE — 83540 ASSAY OF IRON: CPT

## 2024-02-22 PROCEDURE — 97163 PT EVAL HIGH COMPLEX 45 MIN: CPT | Performed by: PHYSICAL THERAPIST

## 2024-02-22 RX ADMIN — FUROSEMIDE 40 MG: 10 INJECTION, SOLUTION INTRAMUSCULAR; INTRAVENOUS at 17:35

## 2024-02-22 RX ADMIN — PRAVASTATIN SODIUM 20 MG: 20 TABLET ORAL at 17:29

## 2024-02-22 RX ADMIN — FINASTERIDE 5 MG: 5 TABLET, FILM COATED ORAL at 09:41

## 2024-02-22 RX ADMIN — METOPROLOL SUCCINATE 25 MG: 25 TABLET, EXTENDED RELEASE ORAL at 09:41

## 2024-02-22 RX ADMIN — FUROSEMIDE 40 MG: 10 INJECTION, SOLUTION INTRAMUSCULAR; INTRAVENOUS at 09:42

## 2024-02-22 RX ADMIN — TAMSULOSIN HYDROCHLORIDE 0.4 MG: 0.4 CAPSULE ORAL at 17:29

## 2024-02-22 NOTE — ASSESSMENT & PLAN NOTE
Elevated troponins secondary to blood loss anemia CKD and CHF  Cardiology consulted   Denies chest pain    Results from last 7 days   Lab Units 02/21/24  1056 02/21/24  0847 02/21/24  0638   HS TNI 0HR ng/L  --   --  126*   HS TNI 2HR ng/L  --  120*  --    HS TNI 4HR ng/L 103*  --   --

## 2024-02-22 NOTE — ASSESSMENT & PLAN NOTE
Wt Readings from Last 3 Encounters:   02/22/24 78.3 kg (172 lb 9.9 oz)   01/30/24 82.1 kg (181 lb)   01/04/24 77.6 kg (171 lb)     Probable acute component of chronic diastolic CHF  Follows with Dr. Pollack.  Patient believes he is up about 8 to 10 pounds.  Has been more sedentary.  Continue IV furosemide   Cardiology and nephrology following  to assist with diuresis

## 2024-02-22 NOTE — OCCUPATIONAL THERAPY NOTE
Occupational Therapy Evaluation     Patient Name: Pro Steele  Today's Date: 2/22/2024  Problem List  Principal Problem:    BRBPR (bright red blood per rectum)  Active Problems:    Linda esophagus    CKD (chronic kidney disease) stage 4, GFR 15-29 ml/min (HCC)    Chronic diastolic (congestive) heart failure (HCC)    BPH (benign prostatic hyperplasia)    Aortic stenosis, severe    Elevated troponin    Paroxysmal atrial fibrillation (HCC)    Past Medical History  Past Medical History:   Diagnosis Date    Aortic stenosis, severe 11/01/2021    Atrial fibrillation (HCC)     CHF (congestive heart failure) (HCC)     Colon polyp     GERD (gastroesophageal reflux disease)     Hearing loss     last assessed 11/8/17    Hypertension     Rheumatic fever     Stenosis of aorta      Past Surgical History  Past Surgical History:   Procedure Laterality Date    APPENDECTOMY      BACK SURGERY      lower    CARDIAC SURGERY      ablation    CATARACT EXTRACTION      JOINT REPLACEMENT Left     knee    KNEE SURGERY Left     CT PROSTATE NEEDLE BIOPSY ANY APPROACH N/A 3/16/2021    Procedure: Transperineal prostate biopsy with biplanar transrectal ultrasound, using the perineal logic kit;  Surgeon: Derrick Jackson MD;  Location: [facility];  Service: Urology    TONSILLECTOMY AND ADENOIDECTOMY          02/22/24 1213   OT Last Visit   OT Visit Date 02/22/24   Note Type   Note type Evaluation   Additional Comments Pt greeted supine in bed, agreeable to OT evaluation.   Pain Assessment   Pain Assessment Tool 0-10   Pain Score No Pain   Restrictions/Precautions   Weight Bearing Precautions Per Order No   Other Precautions Telemetry;Fall Risk;Multiple lines   Home Living   Type of Home House   Home Layout Multi-level;Bed/bath upstairs;Stairs to enter with rails  (5 MARY from font, 4 MARY from back. stair glide to 2nd floor)   Bathroom Shower/Tub Walk-in shower   Bathroom Toilet Raised   Bathroom Equipment Grab bars in shower   Bathroom  Accessibility Accessible   Home Equipment Walker;Cane;Stair glide;Grab bars;Other (Comment)  (Rollator)   Additional Comments Pt reports he uses RW on one level and rollator on another level of the house.   Prior Function   Level of [place] Independent with ADLs;Independent with functional mobility;Independent with IADLS  (with RW or rollator)   Lives With Spouse   Receives Help From Family   IADLs Independent with driving;Independent with meal prep;Independent with medication management   Falls in the last 6 months 1 to 4  (4x)   Vocational Retired  (retired pharmacist)   Lifestyle   [place] Independent with all ADLs/IADLs, use of rollator and RW for assistance with functional mobility   Reciprocal Relationships Spouse and Children   Service to Others Retired   Intrinsic Gratification puzzles, walking around house   General   Family/Caregiver Present No   ADL   Where Assessed Edge of bed   Eating Assistance 5  Supervision/Setup   Grooming Assistance 5  Supervision/Setup   UB Bathing Assistance 4  Minimal Assistance   LB Bathing Assistance 4  Minimal Assistance   UB Dressing Assistance 5  Supervision/Setup   LB Dressing Assistance 4  Minimal Assistance   Toileting Assistance  5  Supervision/Setup   Toileting Deficit Setup;Supervison/safety;Use of bedpan/urinal setup  (use of urinal at chair standing with RW for stability.)   Bed Mobility   Supine to Sit 5  Supervision   Additional items HOB elevated;Bedrails;Increased time required;Verbal cues   Sit to Supine Unable to assess   Additional Comments Pt greeted in supine, BP /92. Pt OOB in chair at end of session   Transfers   Sit to Stand 4  Minimal assistance   Additional items Assist x 1;Bedrails;Increased time required;Verbal cues  (RW)   Stand to Sit 4  Minimal assistance   Additional items Assist x 1;Armrests;Increased time required;Verbal cues  (RW)   Additional Comments cues for hand placement and safe technique   Functional Mobility   Functional  Mobility 4  Minimal assistance   Additional Comments minAx1 with RW in short in room distances   Additional items Rolling walker   Balance   Static Sitting Good   Dynamic Sitting Fair +   Static Standing Fair -   Dynamic Standing Poor +   Ambulatory Poor +   Activity Tolerance   Activity Tolerance Patient tolerated treatment well   Medical Staff Made Aware PT Susanna, Pt seen for co-evaluation with skilled Physical Therapy due to clinically unstable presentation , medical complexity, fall risk, functional balance, limited activity tolerance whish is a decline from PLOF and may impact overall safety.   Nurse Made Aware RN Herlett   RUE Assessment   RUE Assessment WFL   LUE Assessment   LUE Assessment WFL   Hand Function   Gross Motor Coordination Functional   Fine Motor Coordination Functional   Cognition   Overall Cognitive Status WFL   Arousal/Participation Alert;Cooperative   Attention Within functional limits   Orientation Level Oriented X4   Memory Within functional limits   Following Commands Follows one step commands without difficulty   Comments Cooperative and Pleasant   Assessment   Limitation Decreased ADL status;Decreased UE strength;Decreased endurance;Decreased self-care trans;Decreased high-level ADLs   Prognosis Good   Assessment Pt is a 88 y.o. male seen for OT evaluation s/p adm to [facility] on [facility]/21/2024 w/ BRBPR (bright red blood per rectum) . Comorbidities affecting pt’s functional performance include a significant PMH of CKD, congestive heart failure, BPH, aortic stenosis, elevated troponin, A-fib, anemia, HTN, PSVT, PVC. Pt with active OT orders and spoke to nursing, able to ambulate pt. Pt lives in a house with 3 stories and 5 MARY with handrails. Pt lives with spouse. Pt mainly lives on 2nd floor with stair glide. Pt has walk-in shower, grab bars in shower, and raised toilet. (+) .  At baseline, pt was independent with all ADLs/IADLs. Pt completed bed mobility with supervision.  Pt completed transfers with RW and Scottie. Pt completed in room distances with RW functional mobility with min verbal cues for safe technique. Use of bedpan/urinal setup, use of urinal at chair with RW for stability ~2 min standing balance. Upon evaluation, pt currently requires Scottie for UB ADLs, Scottie for LB ADLs, supervision for toileting, supervision for bed mobility, Scottie for functional mobility, and Scottie for transfers 2* the following deficits impacting occupational performance: weakness, decreased strength , decreased balance, decreased activity tolerance, and impaired coordination. These impairments, as well at pt’s personal factors of: MARY home environment, steps within home environment, difficulty performing ADLs, difficulty performing IADLs, difficulty performing transfers/mobility, fall risk , functional decline , and new use of AD for functional transfers/mobility limit pt’s ability to safely engage in all baseline areas of occupation. Based on the aforementioned OT evaluation, functional performance deficits, and assessments, pt has been identified as a high complexity evaluation. Pt to continue to benefit from continued acute OT services during hospital stay to address defined deficits and to maximize level of functional independence in the following Occupational Performance areas: eating, grooming, bathing/shower, toilet hygiene, dressing, health maintenance, functional mobility, community mobility, clothing management, and household maintenance. From OT standpoint, recommend minimum resource intensity upon D/C. OT will continue to follow pt 2-3x/wk.   Goals   STG Time Frame 3-5   Short Term Goal #1 Pt will improve activity tolerance to G for min 30 min treatment sessions for increase engagement in functional tasks   Short Term Goal #2 Pt will complete bed mobility at a mod I level w/ G balance/safety demonstrated to decrease caregiver assistance required   Short Term Goal  Pt will complete LB  dressing/self care w/ mod I using adaptive device and DME as needed   LTG Time Frame 10-14   Long Term Goal #1 Pt will complete toileting w/ mod I w/ G hygiene/thoroughness using DME as needed   Long Term Goal #2 Pt will improve functional transfers to mod I on/off all surfaces using DME as needed w/ G balance/safety   Long Term Goal Pt will improve functional mobility during ADL/IADL/leisure tasks to mod I using DME as needed w/ G balance/safety   Plan   Treatment Interventions ADL retraining;Functional transfer training;UE strengthening/ROM;Endurance training;Patient/family training;Equipment evaluation/education;Compensatory technique education;Continued evaluation;Energy conservation;Activityengagement   Goal Expiration Date 03/07/24   OT Treatment Day 0   OT Frequency 2-3x/wk   Discharge Recommendation   Rehab Resource Intensity Level, OT III (Minimum Resource Intensity)   Additional Comments  The patient's raw score on the AM-PAC Daily Activity Inpatient Short Form is 19 . A raw score of greater than or equal to 19 suggests the patient may benefit from discharge to home. Please refer to the recommendation of the Occupational Therapist for safe discharge planning.   AM-PAC Daily Activity Inpatient   Lower Body Dressing 3   Bathing 3   Toileting 3   Upper Body Dressing 3   Grooming 3   Eating 4   Daily Activity Raw Score 19   Daily Activity Standardized Score (Calc for Raw Score >=11) 40.22   AM-PAC Applied Cognition Inpatient   Following a Speech/Presentation 4   Understanding Ordinary Conversation 4   Taking Medications 4   Remembering Where Things Are Placed or Put Away 4   Remembering List of 4-5 Errands 4   Taking Care of Complicated Tasks 4   Applied Cognition Raw Score 24   Applied Cognition Standardized Score 62.21   End of Consult   Education Provided Yes   Patient Position at End of Consult Bedside chair;All needs within reach   Nurse Communication Nurse aware of consult   End of Consult Comments Pt  seated OOB in chair at end of session. Call bell and phone within reach. All needs met and pt reports no further questions for OT at this time.   Mary Ann Seanz, OT

## 2024-02-22 NOTE — ASSESSMENT & PLAN NOTE
Lab Results   Component Value Date    EGFR 25 02/22/2024    EGFR 21 02/21/2024    EGFR 25 12/11/2023    CREATININE 2.23 (H) 02/22/2024    CREATININE 2.53 (H) 02/21/2024    CREATININE 2.23 (H) 12/11/2023     Follows with Dr. Kincaid as an outpatient.  Appears to be at baseline  Continue iv lasix   Nephrology following appreciate recommendations

## 2024-02-22 NOTE — PLAN OF CARE
Problem: PHYSICAL THERAPY ADULT  Goal: Performs mobility at highest level of function for planned discharge setting.  See evaluation for individualized goals.  Description: Treatment/Interventions: Functional transfer training, LE strengthening/ROM, Elevations, Therapeutic exercise, Endurance training, Patient/family training, Bed mobility, Gait training, Spoke to nursing, OT  Equipment Recommended: Walker (pt owns)       See flowsheet documentation for full assessment, interventions and recommendations.  Note: Prognosis: Good  Problem List: Decreased strength, Decreased endurance, Impaired balance, Decreased mobility  Assessment: Pt. 88 y.o.male presents with rectal bleeding. Past medical hx includes aortic stenosis, afib, CKD. Pt admitted for BRBPR (bright red blood per rectum) w/ Coagulopathy (HCC) (D68.9)  Diverticulosis (K57.90)  Rectal bleeding (K62.5)  Acute GI bleeding (K92.2)  Renal insufficiency (N28.9)  Elevated troponin (R79.89)  Aortic stenosis, severe (I35.0)  Chronic diastolic (congestive) heart failure (HCC) (I50.32). Pt referred to PT for functional mobility evaluation & D/C planning w/ RN approval to get pt OOB to chair. PTA, pt reports being (+) hx of falls  and I w/ RWand rollator. During evaluation, deficits included dec mobility, balance, ambulation. Required S for supine to sit, minAx1 for sit<>stand, and ambulation. Pt was able to ambulate 10' with RW in room with minAx1. Forward flexed posture with dec gait speed and step through gait. No gross LOB noted. Inc R knee crepitus. Pt was able to tolerate standing at bedside chair with urinal for ~1-2 min while voiding without UE support; RW placed in front pt for inc safety. Cues throughout session for hand placement and RW management. Pt demonstrated dec endurance and tolerance to activity. Denies reports of dizziness or SOB t/o session. Pt was educated on fall precautions and reinforced w/ good understanding. Pt would benefit from continued  PT to address deficits as defined above and maximize level of independence with functional mobility and safety. Based on pt presentation and impaired function, pt would benefit from level III, (minimum resource intensity) at D/C. The patient's AM-PAC Basic Mobility Inpatient Short Form Raw Score is 17. A Raw score of greater than 16 suggests the patient may benefit from discharge to home. Please also refer to the recommendation of the Physical Therapist for safe discharge planning. CM to follow. Nsg staff to continue to mobilized pt (OOB in chair for all meals & ambulate in room/unit) as tolerated to prevent further decline in function. Nsg notified. Co-eval performed to complete this PT evaluation for the pts best interest given pts medical complexity and functional level.        Rehab Resource Intensity Level, PT: III (Minimum Resource Intensity)    See flowsheet documentation for full assessment.

## 2024-02-22 NOTE — PHYSICAL THERAPY NOTE
PT EVALUATION    Pt. Name: Pro Steele  Pt. Age: 88 y.o.  MRN: [id number]  LENGTH OF STAY: 1    Patient Active Problem List   Diagnosis    Chronic anemia    Linda esophagus    Esophageal reflux    Hypertension    Spinal stenosis of lumbar region    Spondylolisthesis    PSVT (paroxysmal supraventricular tachycardia)    Palpitations    Pure hypercholesterolemia    PVC (premature ventricular contraction)    CKD (chronic kidney disease) stage 4, GFR 15-29 ml/min (HCC)    Chronic diastolic (congestive) heart failure (HCC)    BPH (benign prostatic hyperplasia)    History of radiofrequency ablation  for SVT    Pulmonary nodules    Aortic stenosis, severe    Elevated troponin    Cigarette nicotine dependence in remission    Lesion of left lung    Secondary hyperparathyroidism of renal origin (HCC)    Paroxysmal atrial fibrillation (HCC)    BRBPR (bright red blood per rectum)       Admitting Diagnoses:   Coagulopathy (HCC) [D68.9]  Diverticulosis [K57.90]  Rectal bleeding [K62.5]  Acute GI bleeding [K92.2]  Renal insufficiency [N28.9]  Elevated troponin [R79.89]  Aortic stenosis, severe [I35.0]  Chronic diastolic (congestive) heart failure (HCC) [I50.32]    Past Medical History:   Diagnosis Date    Aortic stenosis, severe 11/01/2021    Atrial fibrillation (HCC)     CHF (congestive heart failure) (HCC)     Colon polyp     GERD (gastroesophageal reflux disease)     Hearing loss     last assessed 11/8/17    Hypertension     Rheumatic fever     Stenosis of aorta        Past Surgical History:   Procedure Laterality Date    APPENDECTOMY      BACK SURGERY      lower    CARDIAC SURGERY      ablation    CATARACT EXTRACTION      JOINT REPLACEMENT Left     knee    KNEE SURGERY Left     AL PROSTATE NEEDLE BIOPSY ANY APPROACH N/A 3/16/2021    Procedure: Transperineal prostate biopsy with biplanar transrectal ultrasound, using the perineal logic kit;  Surgeon: Derrick Jackson MD;  [facility]: [facility];  Service: Urology     TONSILLECTOMY AND ADENOIDECTOMY         Imaging Studies:  XR chest 1 view portable   ED Interpretation by Jordan Nayak MD (02/21 0146)   I have reviewed the film, per my independent interpretation : Mild vascular congestion and possibly an effusion on the right.  No free air visualized.  Formal read per radiology.        Final Result by Rohan Cole MD (02/21 2325)      Small right pleural effusion. No focal consolidation.            Workstation performed: [id number]              02/22/24 1233   PT Last Visit   PT Visit Date 02/22/24   Note Type   Note type Evaluation   Pain Assessment   Pain Assessment Tool 0-10   Pain Score No Pain   Restrictions/Precautions   Weight Bearing Precautions Per Order No   Other Precautions Telemetry;Fall Risk   Home Living   Type of Home House   Home Layout Multi-level;Bed/bath upstairs;1/2 bath on main level  (5 MARY from front with hand rails; 4 MARY from back with hand rails. chair lift to 2nd floor.)   Bathroom Shower/Tub Walk-in shower   Bathroom Toilet Raised   Bathroom Equipment Grab bars in shower   Home Equipment Walker;Cane;Stair glide;Other (Comment)  (RW and Rollator)   Additional Comments Pt reports he uses the RW on one level, and rollator on another level of the house.   Prior Function   Level of [place] Independent with ADLs;Independent with functional mobility;Independent with IADLS  (w/ RW and Rollator)   Lives With Spouse   Receives Help From Family   IADLs Independent with driving;Independent with meal prep;Independent with medication management   Falls in the last 6 months 1 to 4  (4x)   Vocational Retired   Comments PTA, pt reports independent with ADLs, IADLs, and functional mobility with use of RW or rollator. (+) drive. Supportive family.   General   Family/Caregiver Present Yes  (spouse and children)   Cognition   Overall Cognitive Status WFL   Arousal/Participation Alert   Orientation Level Oriented X4   Following Commands  Follows one step commands without difficulty   Comments Cooperative and pleasant   Subjective   Subjective I would like to get out of bed.   RUE Assessment   RUE Assessment   (refer to OT)   LUE Assessment   LUE Assessment   (refer to OT)   RLE Assessment   RLE Assessment WFL  (4/5 grossly)   LLE Assessment   LLE Assessment WFL  (4/5 grossly)   Sharp/Dull   RLE Sharp/Dull Grossly intact   LLE Sharp/Dull Grossly intact   Bed Mobility   Supine to Sit 5  Supervision   Additional items HOB elevated;Bedrails;Increased time required;Verbal cues   Sit to Supine Unable to assess   Additional Comments Pt greeted supine. BP /92. Pt OOB in chair post session.   Transfers   Sit to Stand 4  Minimal assistance   Additional items Assist x 1;Bedrails;Increased time required;Verbal cues  (w/ RW)   Stand to Sit 4  Minimal assistance   Additional items Assist x 1;Armrests;Increased time required;Verbal cues  (w/ RW)   Additional Comments cues for hand placement   Ambulation/Elevation   Gait pattern Forward Flexion;Decreased foot clearance;Foward flexed;Short stride;Excessively slow;Step through pattern   Gait Assistance 4  Minimal assist   Additional items Assist x 1;Verbal cues   Assistive Device Rolling walker   Distance 10'x1   Ambulation/Elevation Additional Comments cues for RW management   Balance   Static Sitting Good   Dynamic Sitting Fair +   Static Standing Fair -  (w/ RW)   Dynamic Standing Poor +  (w/ RW)   Ambulatory Poor +  (w/ RW)   Endurance Deficit   Endurance Deficit Yes   Endurance Deficit Description fatigue   Activity Tolerance   Activity Tolerance Patient tolerated treatment well   Medical Staff Made Aware OT Mary Ann   Nurse Made Aware RN Herlett   Assessment   Prognosis Good   Problem List Decreased strength;Decreased endurance;Impaired balance;Decreased mobility   Assessment Pt. 88 y.o.male presents with rectal bleeding. Past medical hx includes aortic stenosis, afib, CKD. Pt admitted for BRBPR (bright red  blood per rectum) w/ Coagulopathy (HCC) (D68.9)  Diverticulosis (K57.90)  Rectal bleeding (K62.5)  Acute GI bleeding (K92.2)  Renal insufficiency (N28.9)  Elevated troponin (R79.89)  Aortic stenosis, severe (I35.0)  Chronic diastolic (congestive) heart failure (HCC) (I50.32). Pt referred to PT for functional mobility evaluation & D/C planning w/ RN approval to get pt OOB to chair. PTA, pt reports being (+) hx of falls  and I w/ RWand rollator. During evaluation, deficits included dec mobility, balance, ambulation. Required S for supine to sit, minAx1 for sit<>stand, and ambulation. Pt was able to ambulate 10' with RW in room with minAx1. Forward flexed posture with dec gait speed and step through gait. No gross LOB noted. Inc R knee crepitus. Pt was able to tolerate standing at bedside chair with urinal for ~1-2 min while voiding without UE support; RW placed in front pt for inc safety. Cues throughout session for hand placement and RW management. Pt demonstrated dec endurance and tolerance to activity. Denies reports of dizziness or SOB t/o session. Pt was educated on fall precautions and reinforced w/ good understanding. Pt would benefit from continued PT to address deficits as defined above and maximize level of independence with functional mobility and safety. Based on pt presentation and impaired function, pt would benefit from level III, (minimum resource intensity) at D/C. The patient's AM-PAC Basic Mobility Inpatient Short Form Raw Score is 17. A Raw score of greater than 16 suggests the patient may benefit from discharge to home. Please also refer to the recommendation of the Physical Therapist for safe discharge planning. CM to follow. Nsg staff to continue to mobilized pt (OOB in chair for all meals & ambulate in room/unit) as tolerated to prevent further decline in function. Nsg notified. Co-eval performed to complete this PT evaluation for the pts best interest given pts medical complexity and  functional level.   Goals   Patient Goals to sit in chair   STG Expiration Date 03/07/24   Short Term Goal #1 1) Inc overall LE strength by 1/2 MMT grade to improve functional mobility; 2) Pt will demonstrate improved bed mobility with S to dec caregiver burden; 3) Pt will demonstrate improved transfers w/ S for inc safety; 4) Pt will be able to amb w/ S >150' w/ RW for household distances to inc safety and dec caregiver burden; 5) Pt will be able to navigate stairs with S to dec caregiver burden and inc safety with functional mobility; 6) Improve general balance by 1 grade to inc safety; 7) PT for ongoing patient and caregiver education   PT Treatment Day 0   Plan   Treatment/Interventions Functional transfer training;LE strengthening/ROM;Elevations;Therapeutic exercise;Endurance training;Patient/family training;Bed mobility;Gait training;Spoke to nursing;OT   PT Frequency 3-5x/wk   Discharge Recommendation   Rehab Resource Intensity Level, PT III (Minimum Resource Intensity)   Equipment Recommended Walker  (pt owns)   AM-PAC Basic Mobility Inpatient   Turning in Flat Bed Without Bedrails 3   Lying on Back to Sitting on Edge of Flat Bed Without Bedrails 3   Moving Bed to Chair 3   Standing Up From Chair Using Arms 3   Walk in Room 3   Climb 3-5 Stairs With Railing 2   Basic Mobility Inpatient Raw Score 17   Basic Mobility Standardized Score 39.67   Highest Level Of Mobility   JH-HLM Goal 5: Stand one or more mins   JH-HLM Achieved 6: Walk 10 steps or more   End of Consult   Patient Position at End of Consult Bedside chair;All needs within reach   End of Consult Comments Pt in stable condition at end of session. RN notified.   Hx/personal factors: co-morbidities, inaccessible home, advanced age, telemetry, use of AD, h/o of falls, and fall risk, coping styles, social background, past experience, behavior pattern  Examination: dec mobility, dec balance, dec endurance, dec amb, risk for falls, assessed body system,  balance, endurance, amb, D/C disposition & fall risk, impairements in locomotion, musculoskeletal, balance, endurance, posture, coordination, assessed cognition, impairments in systems including multiple body structures involved; musculoskeletal (ROM, strength, posture, BMI), neuromuscular (balance,locomotion, gait, transfers, motor control and learning, sensation), joint integrity, integumentary (skin integrity, presence of scars or wounds), cardiopulmonary (vitals, edema); activity limitations (difficulties executing an action); participation restrictions (problems associated w involvement in life situations)  Clinical: unpredictable (ongoing medical status, abnormal lab values, and risk for falls)  Complexity: high      Susanna Montemayor, PT

## 2024-02-22 NOTE — ASSESSMENT & PLAN NOTE
History of atrial fibrillation AS BPH and CKD 4 presents with 4 episodes of BRBPR  Denies any previous episode.  Last colonoscopy 2021 diverticuli without bleeding  Likely diverticular bleed.  Holding Xarelto.  GI following holding off on colonoscopy unless bleeding persists  Continue to monitor H&H q12     Results from last 7 days   Lab Units 02/22/24  0557 02/21/24  1836 02/21/24  1010 02/21/24  0638   HEMOGLOBIN g/dL 9.9* 10.9* 11.2* 12.2

## 2024-02-22 NOTE — ASSESSMENT & PLAN NOTE
Declined TAVR due to concerns for workup causing worsening renal dysfunction.  Follows with cardiology as an outpatient  Repeat ECHO showing EF 33%. Systolic function severely reduced. Aortic valve showing critical aortic stenosis

## 2024-02-22 NOTE — PROGRESS NOTES
Progress Note - Cardiology   Pro Steele 88 y.o. male MRN: [id number]  Unit/Bed#: [place] [address] -01 Encounter: [id number]    Assessment:    Bright red blood per rectum on admission  Diastolic heart failure secondary to severe aortic stenosis, atrial fibrillation and fluid overload from chronic kidney disease  Bilateral pleural effusions worse on the right  Chronic cough  Longstanding persistent atrial fibrillation  Nonmyocardial infarction troponin elevation  Severe aortic stenosis  Chronic kidney disease stage IV  BPH  Mild hypoalbuminemia    Plan:    Need to make a decision on whether to resume Xarelto or not.  May discuss with him resuming Xarelto at 10 mg  Continue furosemide 40 mg IV 2 times a day  Continue better albumin in the future  Appreciate nephrology's help  Monitor volume status electrolytes and renal function closely      Interval history:  Weight down 1.5 pounds.  Creatinine down 2.53 -> 2.23.  Therapeutic on xarelto which is on hold.  Will need need to discuss with patient whether to resume Xarelto or not.  Upon noticing his INR, it may be beneficial to resume Xarelto at 10 mg daily since INR on admission was 2.5 and he presented with rectal bleeding    PROBLEM LIST:    Patient Active Problem List    Diagnosis Date Noted    Elevated troponin 11/10/2021    Pulmonary nodules 11/01/2021    Aortic stenosis, severe 11/01/2021    BPH (benign prostatic hyperplasia) 08/12/2021    CKD (chronic kidney disease) stage 4, GFR 15-29 ml/min ([facility]) 08/11/2021    Chronic diastolic (congestive) heart failure (HCC) 08/11/2021    Pure hypercholesterolemia 08/22/2019    PVC (premature ventricular contraction) 08/22/2019    Palpitations 11/08/2018    PSVT (paroxysmal supraventricular tachycardia) 11/07/2018    Spondylolisthesis 06/30/2016    Chronic anemia 05/26/2016    Spinal stenosis of lumbar region 05/02/2016    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    Esophageal reflux 02/19/2013    Hypertension 08/23/2012    BRBPR  "(bright red blood per rectum) 02/21/2024    Paroxysmal atrial fibrillation (HCC) 05/05/2023    Secondary hyperparathyroidism of renal origin (HCC) 05/02/2023    Lesion of left lung 01/05/2023    Cigarette nicotine dependence in remission 12/16/2021    History of radiofrequency ablation  for SVT 08/20/2021       Vitals: /90   Pulse 93   Temp (!) 97.4 °F (36.3 °C)   Resp 18   Ht 5' 9\" (1.753 m)   Wt 78.3 kg (172 lb 9.9 oz)   SpO2 93%   BMI 25.49 kg/m²   PREVIOUS WEIGHTS:   Weight (last 2 days)       Date/Time Weight    02/22/24 0546 78.3 (172.62)    02/21/24 1624 78.9 (174)    02/21/24 1258 79 (174.16)    02/21/24 1103 78.9 (173.94)            Wt Readings from Last 2 Encounters:   02/22/24 78.3 kg (172 lb 9.9 oz)   01/30/24 82.1 kg (181 lb)       Labs/Data:        Results from last 7 days   Lab Units 02/22/24  1211 02/22/24  0557 02/21/24  1836 02/21/24  1010 02/21/24  0638   WBC Thousand/uL  --  7.14  --   --  8.33   HEMOGLOBIN g/dL 10.8* 9.9* 10.9*   < > 12.2   HEMATOCRIT % 33.9* 31.4* 34.1*   < > 39.2   MCV fL  --  88  --   --  90   MCH pg  --  27.8  --   --  28.1   MCHC g/dL  --  31.5  --   --  31.1*   PLATELETS Thousands/uL  --  192  --   --  199    < > = values in this interval not displayed.     Results from last 7 days   Lab Units 02/22/24  0557 02/21/24  0638   EGFR ml/min/1.73sq m 25 21   SODIUM mmol/L 142 138   POTASSIUM mmol/L 3.9 4.5   CHLORIDE mmol/L 104 103   CO2 mmol/L 32 27   BUN mg/dL 58* 63*   CREATININE mg/dL 2.23* 2.53*     Results from last 7 days   Lab Units 02/22/24  0557 02/21/24  0638   CALCIUM mg/dL 9.3 9.6   AST U/L  --  33   ALT U/L  --  29   ALK PHOS U/L  --  97   MAGNESIUM mg/dL  --  2.1     Lab Results   Component Value Date    NTBNP 4,038 (H) 05/27/2022    NTBNP 5,024 (H) 11/10/2021    NTBNP 2,942 (H) 08/11/2021     Lab Results   Component Value Date    CHOLESTEROL 111 07/26/2023    TRIG 52 07/26/2023    HDL 53 07/26/2023    LDLCALC 48 07/26/2023       Results from last 7 " days   Lab Units 02/21/24  0638   INR  2.49*   PROTIME seconds 27.0*          Current Facility-Administered Medications:     finasteride (PROSCAR) tablet 5 mg, 5 mg, Oral, Daily, Isaac Chamorro, , 5 mg at 02/22/24 0941    furosemide (LASIX) injection 40 mg, 40 mg, Intravenous, BID, Isaac Chamorro, DO, 40 mg at 02/22/24 0942    metoprolol succinate (TOPROL-XL) 24 hr tablet 25 mg, 25 mg, Oral, Daily, Isaac Chamorro, DO, 25 mg at 02/22/24 0941    pravastatin (PRAVACHOL) tablet 20 mg, 20 mg, Oral, Daily With Dinner, Isaac Chamorro DO    tamsulosin (FLOMAX) capsule 0.4 mg, 0.4 mg, Oral, Daily With Dinner, Isaac Chamorro, DO  Allergies   Allergen Reactions    Apixaban Other (See Comments)         Intake/Output Summary (Last 24 hours) at 2/22/2024 1616  Last data filed at 2/22/2024 0900  Gross per 24 hour   Intake --   Output 875 ml   Net -875 ml       Invasive Devices       Peripheral Intravenous Line  Duration             Peripheral IV 02/22/24 Left Antecubital <1 day                    Review of Systems   Constitutional:  Negative for activity change.   Respiratory:  Positive for cough. Negative for chest tightness, shortness of breath and wheezing.    Cardiovascular:  Negative for chest pain, palpitations and leg swelling.   Musculoskeletal:  Negative for gait problem.   Skin:  Negative for color change.   Neurological:  Positive for weakness. Negative for dizziness, tremors, syncope, light-headedness and headaches.   Psychiatric/Behavioral:  Negative for agitation and confusion.        Physical Exam  Vitals reviewed.   Constitutional:       General: He is not in acute distress.     Appearance: He is well-developed.   HENT:      Head: Normocephalic and atraumatic.   Neck:      Thyroid: No thyromegaly.      Vascular: No carotid bruit or JVD.      Trachea: No tracheal deviation.   Cardiovascular:      Rate and Rhythm: Normal rate. Rhythm irregularly irregular.      Pulses: Normal pulses.      Heart sounds: Murmur heard.     "  Crescendo decrescendo systolic murmur is present with a grade of 2/6.      No friction rub. No gallop.   Pulmonary:      Effort: Pulmonary effort is normal. No respiratory distress.      Breath sounds: Normal breath sounds. No wheezing, rhonchi or rales.   Chest:      Chest wall: No tenderness.   Musculoskeletal:      Cervical back: Normal range of motion and neck supple.      Right lower le+ Pitting Edema present.      Left lower le+ Pitting Edema present.   Skin:     General: Skin is warm and dry.   Neurological:      General: No focal deficit present.      Mental Status: He is alert and oriented to person, place, and time.   Psychiatric:         Mood and Affect: Mood normal.         Behavior: Behavior normal.         Thought Content: Thought content normal.         Judgment: Judgment normal.         ======================================================     Imaging:   I have personally reviewed pertinent reports.        EKG: Atrial fibrillation with rate at 90 to 100 bpm.  Frequent ventricular ectopy with couplets and triplets.      Portions of the record may have been created with voice recognition software. Occasional wrong word or \"sound a like\" substitutions may have occurred due to the inherent limitations of voice recognition software. Read the chart carefully and recognize, using context, where substitutions have occurred.    "

## 2024-02-22 NOTE — PLAN OF CARE
Problem: OCCUPATIONAL THERAPY ADULT  Goal: Performs self-care activities at highest level of function for planned discharge setting.  See evaluation for individualized goals.  Description: Treatment Interventions: ADL retraining, Functional transfer training, UE strengthening/ROM, Endurance training, Patient/family training, Equipment evaluation/education, Compensatory technique education, Continued evaluation, Energy conservation, Activityengagement          See flowsheet documentation for full assessment, interventions and recommendations.   Note: Limitation: Decreased ADL status, Decreased UE strength, Decreased endurance, Decreased self-care trans, Decreased high-level ADLs  Prognosis: Good  Assessment: Pt is a 88 y.o. male seen for OT evaluation s/p adm to [facility] on 2/21/2024 w/ BRBPR (bright red blood per rectum) . Comorbidities affecting pt’s functional performance include a significant PMH of CKD, congestive heart failure, BPH, aortic stenosis, elevated troponin, A-fib, anemia, HTN, PSVT, PVC. Pt with active OT orders and spoke to nursing, able to ambulate pt. Pt lives in a house with 3 stories and 5 MARY with handrails. Pt lives with spouse. Pt mainly lives on 2nd floor with stair glide. Pt has walk-in shower, grab bars in shower, and raised toilet. (+) .  At baseline, pt was independent with all ADLs/IADLs. Pt completed bed mobility with supervision. Pt completed transfers with RW and Scottie. Pt completed in room distances with RW functional mobility with min verbal cues for safe technique. Use of bedpan/urinal setup, use of urinal at chair with RW for stability ~2 min standing balance. Upon evaluation, pt currently requires Scottie for UB ADLs, Scottie for LB ADLs, supervision for toileting, supervision for bed mobility, Scottie for functional mobility, and Scottie for transfers 2* the following deficits impacting occupational performance: weakness, decreased strength , decreased balance, decreased  activity tolerance, and impaired coordination. These impairments, as well at pt’s personal factors of: MARY home environment, steps within home environment, difficulty performing ADLs, difficulty performing IADLs, difficulty performing transfers/mobility, fall risk , functional decline , and new use of AD for functional transfers/mobility limit pt’s ability to safely engage in all baseline areas of occupation. Based on the aforementioned OT evaluation, functional performance deficits, and assessments, pt has been identified as a high complexity evaluation. Pt to continue to benefit from continued acute OT services during hospital stay to address defined deficits and to maximize level of functional independence in the following Occupational Performance areas: eating, grooming, bathing/shower, toilet hygiene, dressing, health maintenance, functional mobility, community mobility, clothing management, and household maintenance. From OT standpoint, recommend minimum resource intensity upon D/C. OT will continue to follow pt 2-3x/wk.     Rehab Resource Intensity Level, OT: III (Minimum Resource Intensity)

## 2024-02-22 NOTE — ASSESSMENT & PLAN NOTE
Secondary to CHF versus URI.    Chest x ray unremarkable   Procal neagtive x 2   Discontinue rocephin

## 2024-02-22 NOTE — PROGRESS NOTES
Replaced by [facility]  Progress Note  Name: Pro Steele I  MRN: [id number]  Unit/Bed#: [place] [address] -01 I Date of Admission: 2/21/2024   Date of Service: 2/22/2024 I Hospital Day: 1    Assessment/Plan   * BRBPR (bright red blood per rectum)  Assessment & Plan  History of atrial fibrillation AS BPH and CKD 4 presents with 4 episodes of BRBPR  Denies any previous episode.  Last colonoscopy 2021 diverticuli without bleeding  Likely diverticular bleed.  Holding Xarelto.  GI following holding off on colonoscopy unless bleeding persists  Continue to monitor H&H q12     Results from last 7 days   Lab Units 02/22/24  0557 02/21/24  1836 02/21/24  1010 02/21/24  0638   HEMOGLOBIN g/dL 9.9* 10.9* 11.2* 12.2         Cough-resolved as of 2/21/2024  Assessment & Plan  Secondary to CHF versus URI.    Chest x ray unremarkable   Procal neagtive x 2   Discontinue rocephin     Paroxysmal atrial fibrillation (HCC)  Assessment & Plan  Continue metoprolol.  Prior to admission on Xarelto.  Previously on eliquis but developed chest discomfort   Xarelto on hold due to rectal bleeding   Cardiology following     Elevated troponin  Assessment & Plan  Elevated troponins secondary to blood loss anemia CKD and CHF  Cardiology consulted   Denies chest pain    Results from last 7 days   Lab Units 02/21/24  1056 02/21/24  0847 02/21/24  0638   HS TNI 0HR ng/L  --   --  126*   HS TNI 2HR ng/L  --  120*  --    HS TNI 4HR ng/L 103*  --   --          Aortic stenosis, severe  Assessment & Plan  Declined TAVR due to concerns for workup causing worsening renal dysfunction.  Follows with cardiology as an outpatient  Repeat ECHO showing EF 33%. Systolic function severely reduced. Aortic valve showing critical aortic stenosis    CKD (chronic kidney disease) stage 4, GFR 15-29 ml/min ([facility])  Assessment & Plan  Lab Results   Component Value Date    EGFR 25 02/22/2024    EGFR 21 02/21/2024    EGFR 25 12/11/2023    CREATININE 2.23 (H)  02/22/2024    CREATININE 2.53 (H) 02/21/2024    CREATININE 2.23 (H) 12/11/2023     Follows with Dr. Kincaid as an outpatient.  Appears to be at baseline  Continue iv lasix   Nephrology following appreciate recommendations     BPH (benign prostatic hyperplasia)  Assessment & Plan  No urinary symptoms.  Continue finasteride and tamsulosin    Chronic diastolic (congestive) heart failure (HCC)  Assessment & Plan  Wt Readings from Last 3 Encounters:   02/22/24 78.3 kg (172 lb 9.9 oz)   01/30/24 82.1 kg (181 lb)   01/04/24 77.6 kg (171 lb)     Probable acute component of chronic diastolic CHF  Follows with Dr. Pollack.  Patient believes he is up about 8 to 10 pounds.  Has been more sedentary.  Continue IV furosemide   Cardiology and nephrology following  to assist with diuresis              VTE Pharmacologic Prophylaxis:   Moderate Risk (Score 3-4) - Pharmacological DVT Prophylaxis Contraindicated. Sequential Compression Devices Ordered.    Mobility:   Basic Mobility Inpatient Raw Score: 16  JH-HLM Goal: 5: Stand one or more mins  JH-HLM Achieved: 1: Laying in bed  HLM Goal achieved. Continue to encourage appropriate mobility.    Patient Centered Rounds: I performed bedside rounds with nursing staff today.   Discussions with Specialists or Other Care Team Provider: cm    Education and Discussions with Family / Patient: Attempted to update  (wife) via phone. Left voicemail.  Called both house phone and cellphone and left voice messages     Total Time Spent on Date of Encounter in care of patient: 30 mins. This time was spent on one or more of the following: performing physical exam; counseling and coordination of care; obtaining or reviewing history; documenting in the medical record; reviewing/ordering tests, medications or procedures; communicating with other healthcare professionals and discussing with patient's family/caregivers.    Current Length of Stay: 1 day(s)  Current Patient Status: Inpatient    Certification Statement: The patient will continue to require additional inpatient hospital stay due to GI bleed, volume overload  Discharge Plan: Anticipate discharge in 48-72 hrs to discharge location to be determined pending rehab evaluations.    Code Status: Level 3 - DNAR and DNI    Subjective:   Patient was seen laying in bed today. He reported feeling well. He did not sleep well last night. He denies any bloody stools since yesterday afternoon. He has not had a bowel movement since yesterday afternoon. He denies any chest pain, sob, nausea, vomiting, lightheadedness, difficulty urinating     Objective:     Vitals:   Temp (24hrs), Av.3 °F (36.3 °C), Min:96.2 °F (35.7 °C), Max:98 °F (36.7 °C)    Temp:  [96.2 °F (35.7 °C)-98 °F (36.7 °C)] 97.2 °F (36.2 °C)  HR:  [79-95] 86  Resp:  [14-20] 16  BP: (116-140)/(66-96) 125/85  SpO2:  [91 %-97 %] 91 %  Body mass index is 25.49 kg/m².     Input and Output Summary (last 24 hours):     Intake/Output Summary (Last 24 hours) at 2024 0928  Last data filed at 2024 0245  Gross per 24 hour   Intake 50 ml   Output 1225 ml   Net -1175 ml       Physical Exam:   Physical Exam  Vitals and nursing note reviewed.   Constitutional:       Appearance: Normal appearance.   HENT:      Head: Normocephalic and atraumatic.   Eyes:      General: No scleral icterus.  Cardiovascular:      Rate and Rhythm: Normal rate. Rhythm irregular.      Heart sounds: Murmur heard.   Pulmonary:      Effort: Pulmonary effort is normal.      Comments: Diminished   Abdominal:      General: Abdomen is flat. Bowel sounds are normal.      Palpations: Abdomen is soft.   Musculoskeletal:      Right lower leg: Edema present.      Left lower leg: Edema present.   Skin:     General: Skin is warm and dry.   Neurological:      Mental Status: He is alert. Mental status is at baseline.   Psychiatric:         Mood and Affect: Mood normal.         Behavior: Behavior normal.          Additional Data:      Labs:  Results from last 7 days   Lab Units 24  0557 24  1010 24  0638   WBC Thousand/uL 7.14  --  8.33   HEMOGLOBIN g/dL 9.9*   < > 12.2   HEMATOCRIT % 31.4*   < > 39.2   PLATELETS Thousands/uL 192  --  199   NEUTROS PCT %  --   --  64   LYMPHS PCT %  --   --  16   MONOS PCT %  --   --  13*   EOS PCT %  --   --  5    < > = values in this interval not displayed.     Results from last 7 days   Lab Units 24  0557 24  0638   SODIUM mmol/L 142 138   POTASSIUM mmol/L 3.9 4.5   CHLORIDE mmol/L 104 103   CO2 mmol/L 32 27   BUN mg/dL 58* 63*   CREATININE mg/dL 2.23* 2.53*   ANION GAP mmol/L 6 8   CALCIUM mg/dL 9.3 9.6   ALBUMIN g/dL  --  3.4*   TOTAL BILIRUBIN mg/dL  --  0.91   ALK PHOS U/L  --  97   ALT U/L  --  29   AST U/L  --  33   GLUCOSE RANDOM mg/dL 85 101     Results from last 7 days   Lab Units 24  0638   INR  2.49*             Results from last 7 days   Lab Units 24  0557 24  0638   PROCALCITONIN ng/ml 0.11 0.09       Lines/Drains:  Invasive Devices       Peripheral Intravenous Line  Duration             Peripheral IV 24 Left Antecubital <1 day                      Telemetry:  Telemetry Orders (From admission, onward)               24 Hour Telemetry Monitoring  Continuous x 24 Hours (Telem)           Question:  Reason for 24 Hour Telemetry  Answer:  PCI/EP study (including pacer and ICD implementation), Cardiac surgery, MI, abnormal cardiac cath, and chest pain- rule out MI                     Telemetry Reviewed: Atrial fibrillation. HR averaging   Indication for Continued Telemetry Use: Acute CHF on >200 mg lasix/day or equivalent dose or with new reduced EF.              Imaging: Reviewed radiology reports from this admission including: chest xray    Recent Cultures (last 7 days):         Last 24 Hours Medication List:   Current Facility-Administered Medications   Medication Dose Route Frequency Provider Last Rate    finasteride  5 mg Oral  Daily Isaac Chamorro,       furosemide  40 mg Intravenous BID Isaac Chamorro,       metoprolol succinate  25 mg Oral Daily Isaac Chamorro DO      pravastatin  20 mg Oral Daily With Dinner Isaac Chamorro, DO      tamsulosin  0.4 mg Oral Daily With Dinner Isaac Chamorro, DO          Today, Patient Was Seen By: Tatiana Cheema PA-C    **Please Note: This note may have been constructed using a voice recognition system.**

## 2024-02-22 NOTE — PROGRESS NOTES
Progress Note- Pro Steele 88 y.o. male MRN: [id number]    Unit/Bed#: [place] [address] -01 Encounter: [id number]      Assessment and Plan:  1) hematochezia suspect diverticular bleed  2) hx of diverticulosis   3) hx of sandoval's esophagus  4) SUREKHA with azotemia  5) chronic anemia in the setting of CKD stage IV     Patient is an 88 y/o M with a PMHx of severe aortic stenosis, afib on xarelto, CKD, who presented to the ED this morning after 2 episodes of hematochezia. Last EGD/Colon in 11/2021 for hematochezia with sandoval's esophagus, diverticula, and small non bleeding internal hemorrhoids. Hb upon arrival of 12 (baseline 10-11) with SUREKHA and azotemia. Patient is normotensive and hemodynamically stable.      Suspect diverticular bleed as painless, acute onset bleeding in the setting of known diverticula and hemodynamic stability. Diverticular bleeding likely to improve without intervention. As patient is high risk with multiple co-morbidities including severe AS, current CHF exacerbation, and CKD IV shared decision making made with patient and family at bedside to hold off on urgent colonoscopy unless bleeding persists.     Hematochezia improving; last episodes was 1900 hrs 2/21/24     Plan:  Continue to Hold xarelto for now  Monitor Hb qd  Transfuse for hb <7  Monitor hematochezia    Patient will need risk vs benefit discussion on DOAC in setting of afib with cardiology   Start diet    ______________________________________________________________________    Subjective:   Patient seen and examined with family (wife and 2 sons) at bedside. Patient and nurse states that yesterday he had 3 episodes of hematochezia; last episode was 1900 hrs last night. He denies any vomiting, nausea, or abdominal pain. States his cough is still present but SOB is improving.     Medication Administration - last 24 hours from 02/21/2024 1121 to 02/22/2024 1121         Date/Time Order Dose Route Action Action by     02/22/2024 0941 EST  "finasteride (PROSCAR) tablet 5 mg 5 mg Oral Given [facility]     02/21/2024 1215 EST finasteride (PROSCAR) tablet 5 mg 5 mg Oral Given Francie Lemus RN     02/22/2024 0941 EST metoprolol succinate (TOPROL-XL) 24 hr tablet 25 mg 25 mg Oral Given [facility]     02/21/2024 1215 EST metoprolol succinate (TOPROL-XL) 24 hr tablet 25 mg 25 mg Oral Given Francie Lemus RN     02/21/2024 1837 EST pravastatin (PRAVACHOL) tablet 20 mg 20 mg Oral Not Given [facility]     02/21/2024 1837 EST tamsulosin (FLOMAX) capsule 0.4 mg 0.4 mg Oral Not Given [facility]     02/22/2024 0942 EST furosemide (LASIX) injection 40 mg 40 mg Intravenous Given [facility]     02/21/2024 1837 EST furosemide (LASIX) injection 40 mg 40 mg Intravenous Given [facility]     02/21/2024 1140 EST furosemide (LASIX) injection 40 mg 40 mg Intravenous Given Francie Lemus RN     02/21/2024 1226 EST cefTRIAXone (ROCEPHIN) IVPB (premix in dextrose) 1,000 mg 50 mL 0 mg Intravenous Stopped Francie Lemus RN     02/21/2024 1156 EST cefTRIAXone (ROCEPHIN) IVPB (premix in dextrose) 1,000 mg 50 mL 1,000 mg Intravenous New Bag Francie Lemus RN            Objective:     Vitals: Blood pressure 125/85, pulse 86, temperature (!) 97.2 °F (36.2 °C), temperature source Oral, resp. rate 16, height 5' 9\" (1.753 m), weight 78.3 kg (172 lb 9.9 oz), SpO2 91%.,Body mass index is 25.49 kg/m².      Intake/Output Summary (Last 24 hours) at 2/22/2024 1121  Last data filed at 2/22/2024 0245  Gross per 24 hour   Intake 50 ml   Output 1225 ml   Net -1175 ml       Physical Exam:   General Appearance: Awake and alert, in no acute distress  Abdomen: Soft, non-tender, non-distended; bowel sounds normal; no masses or no organomegaly    Invasive Devices       Peripheral Intravenous Line  Duration             Peripheral IV 02/22/24 Left Antecubital <1 day                    Lab Results:  Admission on 02/21/2024   Component Date Value    Ventricular Rate " 02/21/2024 98     Atrial Rate 02/21/2024 93     QRSD Interval 02/21/2024 100     QT Interval 02/21/2024 380     QTC Interval 02/21/2024 485     QRS Axis 02/21/2024 269     T Wave Axis 02/21/2024 8     WBC 02/21/2024 8.33     RBC 02/21/2024 4.34     Hemoglobin 02/21/2024 12.2     Hematocrit 02/21/2024 39.2     MCV 02/21/2024 90     MCH 02/21/2024 28.1     MCHC 02/21/2024 31.1 (L)     RDW 02/21/2024 16.7 (H)     MPV 02/21/2024 11.1     Platelets 02/21/2024 199     nRBC 02/21/2024 0     Neutrophils Relative 02/21/2024 64     Immat GRANS % 02/21/2024 1     Lymphocytes Relative 02/21/2024 16     Monocytes Relative 02/21/2024 13 (H)     Eosinophils Relative 02/21/2024 5     Basophils Relative 02/21/2024 1     Neutrophils Absolute 02/21/2024 5.43     Immature Grans Absolute 02/21/2024 0.04     Lymphocytes Absolute 02/21/2024 1.29     Monocytes Absolute 02/21/2024 1.06     Eosinophils Absolute 02/21/2024 0.45     Basophils Absolute 02/21/2024 0.06     Protime 02/21/2024 27.0 (H)     INR 02/21/2024 2.49 (H)     PTT 02/21/2024 36     ABO Grouping 02/21/2024 O     Rh Factor 02/21/2024 Negative     Antibody Screen 02/21/2024 Negative     Specimen Expiration Date 02/21/2024 [id number]     Sodium 02/21/2024 138     Potassium 02/21/2024 4.5     Chloride 02/21/2024 103     CO2 02/21/2024 27     ANION GAP 02/21/2024 8     BUN 02/21/2024 63 (H)     Creatinine 02/21/2024 2.53 (H)     Glucose 02/21/2024 101     Calcium 02/21/2024 9.6     Corrected Calcium 02/21/2024 10.1     AST 02/21/2024 33     ALT 02/21/2024 29     Alkaline Phosphatase 02/21/2024 97     Total Protein 02/21/2024 6.8     Albumin 02/21/2024 3.4 (L)     Total Bilirubin 02/21/2024 0.91     eGFR 02/21/2024 21     Magnesium 02/21/2024 2.1     hs TnI 0hr 02/21/2024 126 (H)     BNP 02/21/2024 1,168 (H)     hs TnI 2hr 02/21/2024 120 (H)     Delta 2hr hsTnI 02/21/2024 -6     hs TnI 4hr 02/21/2024 103 (H)     Delta 4hr hsTnI 02/21/2024 -23     Hemoglobin 02/21/2024 11.2 (L)      Hematocrit 02/21/2024 36.6     Ventricular Rate 02/21/2024 95     Atrial Rate 02/21/2024 75     QRSD Interval 02/21/2024 102     QT Interval 02/21/2024 402     QTC Interval 02/21/2024 505     QRS Axis 02/21/2024 220     T Wave Bryan 02/21/2024 15     Ventricular Rate 02/21/2024 100     Atrial Rate 02/21/2024 102     QRSD Interval 02/21/2024 100     QT Interval 02/21/2024 374     QTC Interval 02/21/2024 482     QRS Axis 02/21/2024 237     T Wave Axis 02/21/2024 25     Procalcitonin 02/21/2024 0.09     Triscuspid Valve Regurgi* 02/21/2024 50.0     RAA A4C 02/21/2024 20     LA Volume Index (BP) 02/21/2024 46.7     MV Peak A Justen 02/21/2024 0.35     MV stenosis pressure 1/2* 02/21/2024 47     MV Peak E Justen 02/21/2024 117     AV peak gradient 02/21/2024 53     LVOT stroke volume 02/21/2024 43.41     Ao VTI 02/21/2024 80.47     Aortic valve peak veloci* 02/21/2024 3.61     LVOT peak VTI 02/21/2024 12.54     LVOT peak justen 02/21/2024 0.58     LVOT diameter 02/21/2024 2.1     E wave deceleration time 02/21/2024 163     E/A ratio 02/21/2024 3.34     MV valve area p 1/2 meth* 02/21/2024 4.68     AV LVOT peak gradient 02/21/2024 1     AV mean gradient 02/21/2024 33     TR Peak Justen 02/21/2024 3.5     AV area peak justen 02/21/2024 0.6     AV area by cont VTI 02/21/2024 0.5     LVOT mn grad 02/21/2024 1.0     RVID d 02/21/2024 4.9     A4C EF 02/21/2024 44     Aortic valve mean veloci* 02/21/2024 27.50     Tricuspid valve peak reg* 02/21/2024 3.53     Left ventricular stroke * 02/21/2024 40.00     IVSd 02/21/2024 1.90     Tricuspid annular plane * 02/21/2024 1.50     Ao root 02/21/2024 3.30     LVPWd 02/21/2024 2.00     LA size 02/21/2024 5.2     Asc Ao 02/21/2024 3     LA volume (BP) 02/21/2024 91     FS 02/21/2024 23     LVIDS 02/21/2024 3.30     IVS 02/21/2024 1.6     LVIDd 02/21/2024 4.30     LA length (A2C) 02/21/2024 6.00     LEFT VENTRICLE SYSTOLIC * 02/21/2024 43     LV DIASTOLIC VOLUME (MOD* 02/21/2024 84     LVOT Cardiac  Index 02/21/2024 2.40     LVOT stroke volume index 02/21/2024 22.60     LVOT Cardiac Output 02/21/2024 4.69     Left Atrium Area-systoli* 02/21/2024 23.1     Left Atrium Area-systoli* 02/21/2024 28.2     MV E' Tissue Velocity La* 02/21/2024 7     MV E' Tissue Velocity Se* 02/21/2024 5     LVSV, 2D 02/21/2024 40     BSA 02/21/2024 1.95     LVOT area 02/21/2024 3.46     DVI 02/21/2024 0.16     AV valve area 02/21/2024 0.54     LV EF 02/21/2024 33     Est. RA pres 02/21/2024 15.0     Right Ventricular Peak S* 02/21/2024 65.00     IVC 02/21/2024 24.0     Hemoglobin 02/21/2024 10.9 (L)     Hematocrit 02/21/2024 34.1 (L)     WBC 02/22/2024 7.14     RBC 02/22/2024 3.56 (L)     Hemoglobin 02/22/2024 9.9 (L)     Hematocrit 02/22/2024 31.4 (L)     MCV 02/22/2024 88     MCH 02/22/2024 27.8     MCHC 02/22/2024 31.5     RDW 02/22/2024 16.8 (H)     Platelets 02/22/2024 192     MPV 02/22/2024 10.5     Sodium 02/22/2024 142     Potassium 02/22/2024 3.9     Chloride 02/22/2024 104     CO2 02/22/2024 32     ANION GAP 02/22/2024 6     BUN 02/22/2024 58 (H)     Creatinine 02/22/2024 2.23 (H)     Glucose 02/22/2024 85     Calcium 02/22/2024 9.3     eGFR 02/22/2024 25     Procalcitonin 02/22/2024 0.11     Phosphorus 02/22/2024 3.5        Imaging Studies: I have personally reviewed pertinent imaging studies.

## 2024-02-22 NOTE — ASSESSMENT & PLAN NOTE
Continue metoprolol.  Prior to admission on Xarelto.  Previously on eliquis but developed chest discomfort   Xarelto on hold due to rectal bleeding   Cardiology following

## 2024-02-23 ENCOUNTER — HOME HEALTH ADMISSION (OUTPATIENT)
Dept: HOME HEALTH SERVICES | Facility: HOME HEALTHCARE | Age: 89
End: 2024-02-23
Payer: MEDICARE

## 2024-02-23 PROBLEM — R41.82 ALTERED MENTAL STATUS: Status: ACTIVE | Noted: 2024-02-23

## 2024-02-23 LAB
ANION GAP SERPL CALCULATED.3IONS-SCNC: 7 MMOL/L
BILIRUB UR QL STRIP: NEGATIVE
BUN SERPL-MCNC: 61 MG/DL (ref 5–25)
CALCIUM SERPL-MCNC: 9.4 MG/DL (ref 8.4–10.2)
CHLORIDE SERPL-SCNC: 103 MMOL/L (ref 96–108)
CLARITY UR: CLEAR
CO2 SERPL-SCNC: 31 MMOL/L (ref 21–32)
COLOR UR: NORMAL
CREAT SERPL-MCNC: 2.43 MG/DL (ref 0.6–1.3)
ERYTHROCYTE [DISTWIDTH] IN BLOOD BY AUTOMATED COUNT: 16.8 % (ref 11.6–15.1)
FLUAV RNA RESP QL NAA+PROBE: NEGATIVE
FLUBV RNA RESP QL NAA+PROBE: NEGATIVE
GFR SERPL CREATININE-BSD FRML MDRD: 22 ML/MIN/1.73SQ M
GLUCOSE SERPL-MCNC: 100 MG/DL (ref 65–140)
GLUCOSE SERPL-MCNC: 119 MG/DL (ref 65–140)
GLUCOSE UR STRIP-MCNC: NEGATIVE MG/DL
HCT VFR BLD AUTO: 32.7 % (ref 36.5–49.3)
HGB BLD-MCNC: 10.4 G/DL (ref 12–17)
HGB UR QL STRIP.AUTO: NEGATIVE
KETONES UR STRIP-MCNC: NEGATIVE MG/DL
LEUKOCYTE ESTERASE UR QL STRIP: NEGATIVE
MAGNESIUM SERPL-MCNC: 1.9 MG/DL (ref 1.9–2.7)
MCH RBC QN AUTO: 28.3 PG (ref 26.8–34.3)
MCHC RBC AUTO-ENTMCNC: 31.8 G/DL (ref 31.4–37.4)
MCV RBC AUTO: 89 FL (ref 82–98)
NITRITE UR QL STRIP: NEGATIVE
PH UR STRIP.AUTO: 5.5 [PH]
PLATELET # BLD AUTO: 204 THOUSANDS/UL (ref 149–390)
PMV BLD AUTO: 11.3 FL (ref 8.9–12.7)
POTASSIUM SERPL-SCNC: 3.8 MMOL/L (ref 3.5–5.3)
PROT UR STRIP-MCNC: NEGATIVE MG/DL
RBC # BLD AUTO: 3.68 MILLION/UL (ref 3.88–5.62)
RSV RNA RESP QL NAA+PROBE: NEGATIVE
SARS-COV-2 RNA RESP QL NAA+PROBE: NEGATIVE
SODIUM SERPL-SCNC: 141 MMOL/L (ref 135–147)
SP GR UR STRIP.AUTO: 1.01 (ref 1–1.03)
UROBILINOGEN UR STRIP-ACNC: <2 MG/DL
WBC # BLD AUTO: 7.88 THOUSAND/UL (ref 4.31–10.16)

## 2024-02-23 PROCEDURE — 99232 SBSQ HOSP IP/OBS MODERATE 35: CPT

## 2024-02-23 PROCEDURE — 81003 URINALYSIS AUTO W/O SCOPE: CPT

## 2024-02-23 PROCEDURE — 99232 SBSQ HOSP IP/OBS MODERATE 35: CPT | Performed by: INTERNAL MEDICINE

## 2024-02-23 PROCEDURE — 0241U HB NFCT DS VIR RESP RNA 4 TRGT: CPT

## 2024-02-23 PROCEDURE — 82948 REAGENT STRIP/BLOOD GLUCOSE: CPT

## 2024-02-23 PROCEDURE — 80048 BASIC METABOLIC PNL TOTAL CA: CPT

## 2024-02-23 PROCEDURE — 85027 COMPLETE CBC AUTOMATED: CPT

## 2024-02-23 PROCEDURE — 83735 ASSAY OF MAGNESIUM: CPT

## 2024-02-23 RX ORDER — ALBUMIN (HUMAN) 12.5 G/50ML
12.5 SOLUTION INTRAVENOUS 2 TIMES DAILY
Status: COMPLETED | OUTPATIENT
Start: 2024-02-23 | End: 2024-02-23

## 2024-02-23 RX ORDER — FUROSEMIDE 10 MG/ML
40 INJECTION INTRAMUSCULAR; INTRAVENOUS DAILY
Status: DISCONTINUED | OUTPATIENT
Start: 2024-02-24 | End: 2024-02-25

## 2024-02-23 RX ORDER — METOPROLOL SUCCINATE 25 MG/1
25 TABLET, EXTENDED RELEASE ORAL 2 TIMES DAILY
Status: DISCONTINUED | OUTPATIENT
Start: 2024-02-23 | End: 2024-02-26 | Stop reason: HOSPADM

## 2024-02-23 RX ADMIN — ALBUMIN (HUMAN) 12.5 G: 0.25 INJECTION, SOLUTION INTRAVENOUS at 16:23

## 2024-02-23 RX ADMIN — PRAVASTATIN SODIUM 20 MG: 20 TABLET ORAL at 16:24

## 2024-02-23 RX ADMIN — FUROSEMIDE 40 MG: 10 INJECTION, SOLUTION INTRAMUSCULAR; INTRAVENOUS at 08:24

## 2024-02-23 RX ADMIN — METOPROLOL SUCCINATE 25 MG: 25 TABLET, EXTENDED RELEASE ORAL at 08:24

## 2024-02-23 RX ADMIN — METOPROLOL SUCCINATE 25 MG: 25 TABLET, EXTENDED RELEASE ORAL at 21:41

## 2024-02-23 RX ADMIN — ALBUMIN (HUMAN) 12.5 G: 0.25 INJECTION, SOLUTION INTRAVENOUS at 10:21

## 2024-02-23 RX ADMIN — FINASTERIDE 5 MG: 5 TABLET, FILM COATED ORAL at 08:24

## 2024-02-23 RX ADMIN — RIVAROXABAN 10 MG: 10 TABLET, FILM COATED ORAL at 16:24

## 2024-02-23 RX ADMIN — TAMSULOSIN HYDROCHLORIDE 0.4 MG: 0.4 CAPSULE ORAL at 16:24

## 2024-02-23 NOTE — ASSESSMENT & PLAN NOTE
Lab Results   Component Value Date    EGFR 22 02/23/2024    EGFR 25 02/22/2024    EGFR 21 02/21/2024    CREATININE 2.43 (H) 02/23/2024    CREATININE 2.23 (H) 02/22/2024    CREATININE 2.53 (H) 02/21/2024     Follows with Dr. Kincaid as an outpatient.  Appears to be at baseline  Continue iv lasix   Nephrology following appreciate recommendations

## 2024-02-23 NOTE — ASSESSMENT & PLAN NOTE
History of atrial fibrillation AS BPH and CKD 4 presents with 4 episodes of BRBPR  Denies any previous episode.  Last colonoscopy 2021 diverticuli without bleeding  Likely diverticular bleed.  GI signed off today, bleeding resolved hemoglobin stable  Discussed risk and benefits of restarting Xarelto family and patient would like to restart, agreeable lower dose of Xaretlo 10 mg daily per cardiology recommendations   Monitor stool output and hemoglobin     Results from last 7 days   Lab Units 02/23/24  0432 02/22/24  1211 02/22/24  0557 02/21/24  1836   HEMOGLOBIN g/dL 10.4* 10.8* 9.9* 10.9*

## 2024-02-23 NOTE — ASSESSMENT & PLAN NOTE
Family reports increased confusion since coming into the hospital. Patient does report continued cough   Procal negative x 2   Chest x ray showing with consolidation  Check UA, COVID/FLU/RSV

## 2024-02-23 NOTE — PROGRESS NOTES
Progress Note - Cardiology   Pro Steele 88 y.o. male MRN: [id number]  Unit/Bed#: [place] [address] -01 Encounter: [id number]    Assessment:    Bright red blood per rectum on admission  Diastolic heart failure secondary to severe aortic stenosis, atrial fibrillation and fluid overload from chronic kidney disease  Bilateral pleural effusions worse on the right  Chronic cough  Longstanding persistent atrial fibrillation  non myocardial infarction troponin elevation  Severe aortic stenosis  Chronic kidney disease stage IV  BPH  Mild hypoalbuminemia    Plan:    Will decrease furosemide to 40 mg IV once daily  Will give albumin 12.5 g 2 times daily today  Will increase metoprolol succinate to 25 mg twice daily to improve rate control of atrial fibrillation.      Interval history:  Weight down 3 pounds.  Creatinine up 2.23 - > 2.43 and GFR down 25 - > 22.    PROBLEM LIST:    Patient Active Problem List    Diagnosis Date Noted    Elevated troponin 11/10/2021    Pulmonary nodules 11/01/2021    Aortic stenosis, severe 11/01/2021    BPH (benign prostatic hyperplasia) 08/12/2021    CKD (chronic kidney disease) stage 4, GFR 15-29 ml/min (HCC) 08/11/2021    Chronic diastolic (congestive) heart failure (HCC) 08/11/2021    Pure hypercholesterolemia 08/22/2019    PVC (premature ventricular contraction) 08/22/2019    Palpitations 11/08/2018    PSVT (paroxysmal supraventricular tachycardia) 11/07/2018    Spondylolisthesis 06/30/2016    Chronic anemia 05/26/2016    Spinal stenosis of lumbar region 05/02/2016    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    Esophageal reflux 02/19/2013    Hypertension 08/23/2012    BRBPR (bright red blood per rectum) 02/21/2024    Paroxysmal atrial fibrillation (HCC) 05/05/2023    Secondary hyperparathyroidism of renal origin (HCC) 05/02/2023    Lesion of left lung 01/05/2023    Cigarette nicotine dependence in remission 12/16/2021    History of radiofrequency ablation  for SVT 08/20/2021       Vitals: /100  "(BP Location: Right arm)   Pulse 78   Temp 97.5 °F (36.4 °C) (Oral)   Resp 20   Ht 5' 9\" (1.753 m)   Wt 77 kg (169 lb 12.1 oz)   SpO2 92%   BMI 25.07 kg/m²   PREVIOUS WEIGHTS:   Weight (last 2 days)       Date/Time Weight    02/23/24 0542 77 (169.75)    02/22/24 0546 78.3 (172.62)    02/21/24 1624 78.9 (174)    02/21/24 1258 79 (174.16)    02/21/24 1103 78.9 (173.94)            Wt Readings from Last 2 Encounters:   02/23/24 77 kg (169 lb 12.1 oz)   01/30/24 82.1 kg (181 lb)       Labs/Data:        Results from last 7 days   Lab Units 02/23/24  0432 02/22/24  1211 02/22/24  0557 02/21/24  1010 02/21/24  0638   WBC Thousand/uL 7.88  --  7.14  --  8.33   HEMOGLOBIN g/dL 10.4* 10.8* 9.9*   < > 12.2   HEMATOCRIT % 32.7* 33.9* 31.4*   < > 39.2   MCV fL 89  --  88  --  90   MCH pg 28.3  --  27.8  --  28.1   MCHC g/dL 31.8  --  31.5  --  31.1*   PLATELETS Thousands/uL 204  --  192  --  199    < > = values in this interval not displayed.     Results from last 7 days   Lab Units 02/23/24 0432 02/22/24  0557 02/21/24  0638   EGFR ml/min/1.73sq m 22 25 21   SODIUM mmol/L 141 142 138   POTASSIUM mmol/L 3.8 3.9 4.5   CHLORIDE mmol/L 103 104 103   CO2 mmol/L 31 32 27   BUN mg/dL 61* 58* 63*   CREATININE mg/dL 2.43* 2.23* 2.53*     Results from last 7 days   Lab Units 02/23/24  0432 02/22/24  0557 02/21/24  0638   CALCIUM mg/dL 9.4 9.3 9.6   AST U/L  --   --  33   ALT U/L  --   --  29   ALK PHOS U/L  --   --  97   MAGNESIUM mg/dL 1.9  --  2.1     Lab Results   Component Value Date    NTBNP 4,038 (H) 05/27/2022    NTBNP 5,024 (H) 11/10/2021    NTBNP 2,942 (H) 08/11/2021     Lab Results   Component Value Date    CHOLESTEROL 111 07/26/2023    TRIG 52 07/26/2023    HDL 53 07/26/2023    LDLCALC 48 07/26/2023       Results from last 7 days   Lab Units 02/21/24  0638   INR  2.49*   PROTIME seconds 27.0*          Current Facility-Administered Medications:     finasteride (PROSCAR) tablet 5 mg, 5 mg, Oral, Daily, Isaac Chamorro DO, " 5 mg at 02/23/24 0824    furosemide (LASIX) injection 40 mg, 40 mg, Intravenous, BID, Isaac Chamorro, DO, 40 mg at 02/23/24 0824    metoprolol succinate (TOPROL-XL) 24 hr tablet 25 mg, 25 mg, Oral, Daily, Isaac Curtisng, DO, 25 mg at 02/23/24 0824    pravastatin (PRAVACHOL) tablet 20 mg, 20 mg, Oral, Daily With Dinner, Isaac Curtisng, DO, 20 mg at 02/22/24 1729    tamsulosin (FLOMAX) capsule 0.4 mg, 0.4 mg, Oral, Daily With Dinner, Isaac Curtisng, DO, 0.4 mg at 02/22/24 1729  Allergies   Allergen Reactions    Apixaban Other (See Comments)         Intake/Output Summary (Last 24 hours) at 2/23/2024 1002  Last data filed at 2/23/2024 0913  Gross per 24 hour   Intake 390 ml   Output 400 ml   Net -10 ml       Invasive Devices       Peripheral Intravenous Line  Duration             Peripheral IV 02/22/24 Left Antecubital 1 day                    Review of Systems   Constitutional:  Negative for activity change.   Respiratory:  Positive for cough. Negative for chest tightness, shortness of breath and wheezing.    Cardiovascular:  Negative for chest pain, palpitations and leg swelling.   Musculoskeletal:  Negative for gait problem.   Skin:  Negative for color change.   Neurological:  Positive for weakness. Negative for dizziness, tremors, syncope, light-headedness and headaches.   Psychiatric/Behavioral:  Negative for agitation and confusion.        Physical Exam  Vitals reviewed.   Constitutional:       General: He is not in acute distress.     Appearance: He is well-developed.   HENT:      Head: Normocephalic and atraumatic.   Neck:      Thyroid: No thyromegaly.      Vascular: JVD present. No carotid bruit.      Trachea: No tracheal deviation.   Cardiovascular:      Rate and Rhythm: Normal rate. Rhythm irregularly irregular.      Pulses: Normal pulses.      Heart sounds: Murmur heard.      Crescendo decrescendo systolic murmur is present with a grade of 2/6.      No friction rub. No gallop.   Pulmonary:      Effort: Pulmonary  "effort is normal. No respiratory distress.      Breath sounds: Normal breath sounds. No wheezing, rhonchi or rales.   Chest:      Chest wall: No tenderness.   Musculoskeletal:      Cervical back: Normal range of motion and neck supple.      Right lower le+ Pitting Edema present.      Left lower le+ Pitting Edema present.   Skin:     General: Skin is warm and dry.   Neurological:      General: No focal deficit present.      Mental Status: He is alert and oriented to person, place, and time.   Psychiatric:         Mood and Affect: Mood normal.         Behavior: Behavior normal.         Thought Content: Thought content normal.         Judgment: Judgment normal.         ======================================================     Imaging:   I have personally reviewed pertinent reports.        EKG: Atrial fibrillation      Portions of the record may have been created with voice recognition software. Occasional wrong word or \"sound a like\" substitutions may have occurred due to the inherent limitations of voice recognition software. Read the chart carefully and recognize, using context, where substitutions have occurred.    "

## 2024-02-23 NOTE — PLAN OF CARE
Problem: Prexisting or High Potential for Compromised Skin Integrity  Goal: Skin integrity is maintained or improved  Description: INTERVENTIONS:  - Identify patients at risk for skin breakdown  - Assess and monitor skin integrity  - Assess and monitor nutrition and hydration status  - Monitor labs   - Assess for incontinence   - Turn and reposition patient  - Assist with mobility/ambulation  - Relieve pressure over bony prominences  - Avoid friction and shearing  - Provide appropriate hygiene as needed including keeping skin clean and dry  - Evaluate need for skin moisturizer/barrier cream  - Collaborate with interdisciplinary team   - Patient/family teaching  - Consider wound care consult   Outcome: Progressing     Problem: PAIN - ADULT  Goal: Verbalizes/displays adequate comfort level or baseline comfort level  Description: Interventions:  - Encourage patient to monitor pain and request assistance  - Assess pain using appropriate pain scale  - Administer analgesics based on type and severity of pain and evaluate response  - Implement non-pharmacological measures as appropriate and evaluate response  - Consider cultural and social influences on pain and pain management  - Notify physician/advanced practitioner if interventions unsuccessful or patient reports new pain  Outcome: Progressing     Problem: INFECTION - ADULT  Goal: Absence or prevention of progression during hospitalization  Description: INTERVENTIONS:  - Assess and monitor for signs and symptoms of infection  - Monitor lab/diagnostic results  - Monitor all insertion sites, i.e. indwelling lines, tubes, and drains  - Monitor endotracheal if appropriate and nasal secretions for changes in amount and color  - [place] appropriate cooling/warming therapies per order  - Administer medications as ordered  - Instruct and encourage patient and family to use good hand hygiene technique  - Identify and instruct in appropriate isolation precautions for  identified infection/condition  Outcome: Progressing  Goal: Absence of fever/infection during neutropenic period  Description: INTERVENTIONS:  - Monitor WBC    Outcome: Progressing     Problem: SAFETY ADULT  Goal: Patient will remain free of falls  Description: INTERVENTIONS:  - Educate patient/family on patient safety including physical limitations  - Instruct patient to call for assistance with activity   - Consult OT/PT to assist with strengthening/mobility   - Keep Call bell within reach  - Keep bed low and locked with side rails adjusted as appropriate  - Keep care items and personal belongings within reach  - Initiate and maintain comfort rounds  - Make Fall Risk Sign visible to staff  - Offer Toileting every Hours, in advance of need  - Initiate/Maintalarm  - Obtain necessary fall risk management equipment:   - Apply yellow socks and bracelet for high fall risk patients  - Consider moving patient to room near nurses station  Outcome: Progressing  Goal: Maintain or return to baseline ADL function  Description: INTERVENTIONS:  -  Assess patient's ability to carry out ADLs; assess patient's baseline for ADL function and identify physical deficits which impact ability to perform ADLs (bathing, care of mouth/teeth, toileting, grooming, dressing, etc.)  - Assess/evaluate cause of self-care deficits   - Assess range of motion  - Assess patient's mobility; develop plan if impaired  - Assess patient's need for assistive devices and provide as appropriate  - Encourage maximum independence but intervene and supervise when necessary  - Involve family in performance of ADLs  - Assess for home care needs following discharge   - Consider OT consult to assist with ADL evaluation and planning for discharge  - Provide patient education as appropriate  Outcome: Progressing  Goal: Maintains/Returns to pre admission functional level  Description: INTERVENTIONS:  - Perform AM-PAC 6 Click Basic Mobility/ Daily Activity assessment  daily.  - Set and communicate daily mobility goal to care team and patient/family/caregiver.   - Collaborate with rehabilitation services on mobility goals if consulted  - Perform Range of Motion  times a day.  - Reposition patient every  hours.  - Dangle patient  times a day  - Stand patient  times a day  - Ambulate patient  times a day  - Out of bed to chair  times a day   - Out of bed for meals  times a day  - Out of bed for toileting  - Record patient progress and toleration of activity level   Outcome: Progressing     Problem: DISCHARGE PLANNING  Goal: Discharge to home or other facility with appropriate resources  Description: INTERVENTIONS:  - Identify barriers to discharge w/patient and caregiver  - Arrange for needed discharge resources and transportation as appropriate  - Identify discharge learning needs (meds, wound care, etc.)  - Arrange for interpretive services to assist at discharge as needed  - Refer to Case Management Department for coordinating discharge planning if the patient needs post-hospital services based on physician/advanced practitioner order or complex needs related to functional status, cognitive ability, or social support system  Outcome: Progressing     Problem: Knowledge Deficit  Goal: Patient/family/caregiver demonstrates understanding of disease process, treatment plan, medications, and discharge instructions  Description: Complete learning assessment and assess knowledge base.  Interventions:  - Provide teaching at level of understanding  - Provide teaching via preferred learning methods  Outcome: Progressing

## 2024-02-23 NOTE — NURSING NOTE
Telemetry monitor read 9-beat run of v-tach at approx 2300. Pt denied s/s. No c/o. SLIM on-call made aware. No new orders noted at this time. Pt resting comfortably.

## 2024-02-23 NOTE — QUICK NOTE
Patient is an 88 y/o M with a PMHx of severe aortic stenosis, afib on xarelto, CKD, who presented to the ED for diverticular bleed that has since self resolved; no further episodes of hematochezia. Hb stable. Recommend risks vs benefits for further holding anticoagulation with patient's high risk for further endoscopic procedures if re-bleeding should occur. GI will sign off for now; please reach out for further questions or concerns.

## 2024-02-23 NOTE — RESTORATIVE TECHNICIAN NOTE
Restorative Technician Note      Patient Name: Pro Steele     Restorative Tech Visit Date: 02/23/24  Note Type: Mobility  Patient Position Upon Consult: Supine  Activity Performed: Ambulated; Transferred  Assistive Device: Roller walker  Patient Position at End of Consult: Bedside chair; All needs within reach; Bed/Chair alarm activated

## 2024-02-23 NOTE — PLAN OF CARE
Problem: Prexisting or High Potential for Compromised Skin Integrity  Goal: Skin integrity is maintained or improved  Description: INTERVENTIONS:  - Identify patients at risk for skin breakdown  - Assess and monitor skin integrity  - Assess and monitor nutrition and hydration status  - Monitor labs   - Assess for incontinence   - Turn and reposition patient  - Assist with mobility/ambulation  - Relieve pressure over bony prominences  - Avoid friction and shearing  - Provide appropriate hygiene as needed including keeping skin clean and dry  - Evaluate need for skin moisturizer/barrier cream  - Collaborate with interdisciplinary team   - Patient/family teaching  - Consider wound care consult   Outcome: Progressing     Problem: PAIN - ADULT  Goal: Verbalizes/displays adequate comfort level or baseline comfort level  Description: Interventions:  - Encourage patient to monitor pain and request assistance  - Assess pain using appropriate pain scale  - Administer analgesics based on type and severity of pain and evaluate response  - Implement non-pharmacological measures as appropriate and evaluate response  - Consider cultural and social influences on pain and pain management  - Notify physician/advanced practitioner if interventions unsuccessful or patient reports new pain  Outcome: Progressing     Problem: INFECTION - ADULT  Goal: Absence or prevention of progression during hospitalization  Description: INTERVENTIONS:  - Assess and monitor for signs and symptoms of infection  - Monitor lab/diagnostic results  - Monitor all insertion sites, i.e. indwelling lines, tubes, and drains  - Monitor endotracheal if appropriate and nasal secretions for changes in amount and color  - [place] appropriate cooling/warming therapies per order  - Administer medications as ordered  - Instruct and encourage patient and family to use good hand hygiene technique  - Identify and instruct in appropriate isolation precautions for  identified infection/condition  Outcome: Progressing  Goal: Absence of fever/infection during neutropenic period  Description: INTERVENTIONS:  - Monitor WBC    Outcome: Progressing     Problem: SAFETY ADULT  Goal: Patient will remain free of falls  Description: INTERVENTIONS:  - Educate patient/family on patient safety including physical limitations  - Instruct patient to call for assistance with activity   - Consult OT/PT to assist with strengthening/mobility   - Keep Call bell within reach  - Keep bed low and locked with side rails adjusted as appropriate  - Keep care items and personal belongings within reach  - Initiate and maintain comfort rounds  - Make Fall Risk Sign visible to staff  - Offer Toileting every  Hours, in advance of need  - Initiate/Maintain alarm  - Obtain necessary fall risk management equipment:   - Apply yellow socks and bracelet for high fall risk patients  - Consider moving patient to room near nurses station  Outcome: Progressing  Goal: Maintain or return to baseline ADL function  Description: INTERVENTIONS:  -  Assess patient's ability to carry out ADLs; assess patient's baseline for ADL function and identify physical deficits which impact ability to perform ADLs (bathing, care of mouth/teeth, toileting, grooming, dressing, etc.)  - Assess/evaluate cause of self-care deficits   - Assess range of motion  - Assess patient's mobility; develop plan if impaired  - Assess patient's need for assistive devices and provide as appropriate  - Encourage maximum independence but intervene and supervise when necessary  - Involve family in performance of ADLs  - Assess for home care needs following discharge   - Consider OT consult to assist with ADL evaluation and planning for discharge  - Provide patient education as appropriate  Outcome: Progressing  Goal: Maintains/Returns to pre admission functional level  Description: INTERVENTIONS:  - Perform AM-PAC 6 Click Basic Mobility/ Daily Activity  assessment daily.  - Set and communicate daily mobility goal to care team and patient/family/caregiver.   - Collaborate with rehabilitation services on mobility goals if consulted  - Perform Range of Motion  times a day.  - Reposition patient every  hours.  - Dangle patient  times a day  - Stand patient  times a day  - Ambulate patient  times a day  - Out of bed to chair  times a day   - Out of bed for sejal times a day  - Out of bed for toileting  - Record patient progress and toleration of activity level   Outcome: Progressing     Problem: DISCHARGE PLANNING  Goal: Discharge to home or other facility with appropriate resources  Description: INTERVENTIONS:  - Identify barriers to discharge w/patient and caregiver  - Arrange for needed discharge resources and transportation as appropriate  - Identify discharge learning needs (meds, wound care, etc.)  - Arrange for interpretive services to assist at discharge as needed  - Refer to Case Management Department for coordinating discharge planning if the patient needs post-hospital services based on physician/advanced practitioner order or complex needs related to functional status, cognitive ability, or social support system  Outcome: Progressing     Problem: Knowledge Deficit  Goal: Patient/family/caregiver demonstrates understanding of disease process, treatment plan, medications, and discharge instructions  Description: Complete learning assessment and assess knowledge base.  Interventions:  - Provide teaching at level of understanding  - Provide teaching via preferred learning methods  Outcome: Progressing

## 2024-02-23 NOTE — ASSESSMENT & PLAN NOTE
Continue metoprolol.  Prior to admission on Xarelto.  Previously on eliquis but developed chest discomfort   Restarting Xarelto at lower dose of 10 mg per cardiology recommendations today after discussion with family/patient   Cardiology following

## 2024-02-23 NOTE — PROGRESS NOTES
[facility]  Progress Note  Name: Pro Steele I  MRN: [id number]  Unit/Bed#: [place] [address] -01 I Date of Admission: 2/21/2024   Date of Service: 2/23/2024 I Hospital Day: 2    Assessment/Plan   * BRBPR (bright red blood per rectum)  Assessment & Plan  History of atrial fibrillation AS BPH and CKD 4 presents with 4 episodes of BRBPR  Denies any previous episode.  Last colonoscopy 2021 diverticuli without bleeding  Likely diverticular bleed.  GI signed off today, bleeding resolved hemoglobin stable  Discussed risk and benefits of restarting Xarelto family and patient would like to restart, agreeable lower dose of Xaretlo 10 mg daily per cardiology recommendations   Monitor stool output and hemoglobin     Results from last 7 days   Lab Units 02/23/24  0432 02/22/24  1211 02/22/24  0557 02/21/24  1836   HEMOGLOBIN g/dL 10.4* 10.8* 9.9* 10.9*         Altered mental status  Assessment & Plan  Family reports increased confusion since coming into the hospital. Patient does report continued cough   Procal negative x 2   Chest x ray showing with consolidation  Check UA, COVID/FLU/RSV    Paroxysmal atrial fibrillation (HCC)  Assessment & Plan  Continue metoprolol.  Prior to admission on Xarelto.  Previously on eliquis but developed chest discomfort   Restarting Xarelto at lower dose of 10 mg per cardiology recommendations today after discussion with family/patient   Cardiology following     Elevated troponin  Assessment & Plan  Elevated troponins secondary to blood loss anemia CKD and CHF  Cardiology consulted   Denies chest pain    Results from last 7 days   Lab Units 02/21/24  1056 02/21/24  0847 02/21/24  0638   HS TNI 0HR ng/L  --   --  126*   HS TNI 2HR ng/L  --  120*  --    HS TNI 4HR ng/L 103*  --   --          Aortic stenosis, severe  Assessment & Plan  Declined TAVR due to concerns for workup causing worsening renal dysfunction.  Follows with cardiology as an outpatient  Repeat ECHO  showing EF 33%. Systolic function severely reduced. Aortic valve showing critical aortic stenosis    CKD (chronic kidney disease) stage 4, GFR 15-29 ml/min ([facility])  Assessment & Plan  Lab Results   Component Value Date    EGFR 22 02/23/2024    EGFR 25 02/22/2024    EGFR 21 02/21/2024    CREATININE 2.43 (H) 02/23/2024    CREATININE 2.23 (H) 02/22/2024    CREATININE 2.53 (H) 02/21/2024     Follows with Dr. Kincaid as an outpatient.  Appears to be at baseline  Continue iv lasix   Nephrology following appreciate recommendations     BPH (benign prostatic hyperplasia)  Assessment & Plan  No urinary symptoms.  Continue finasteride and tamsulosin    Chronic diastolic (congestive) heart failure (HCC)  Assessment & Plan  Wt Readings from Last 3 Encounters:   02/23/24 77 kg (169 lb 12.1 oz)   01/30/24 82.1 kg (181 lb)   01/04/24 77.6 kg (171 lb)     Probable acute component of chronic diastolic CHF  Follows with Dr. Pollack.   Continue IV furosemide decreased to 40 mg BID  Cardiology and nephrology following  to assist with diuresis                  VTE Pharmacologic Prophylaxis:   Moderate Risk (Score 3-4) - Pharmacological DVT Prophylaxis Ordered: rivaroxaban (Xarelto).    Mobility:   Basic Mobility Inpatient Raw Score: 17  JH-HLM Goal: 5: Stand one or more mins  JH-HLM Achieved: 2: Bed activities/Dependent transfer  HLM Goal achieved. Continue to encourage appropriate mobility.    Patient Centered Rounds: I performed bedside rounds with nursing staff today.   Discussions with Specialists or Other Care Team Provider: cm    Education and Discussions with Family / Patient: Updated  (son) at bedside.    Total Time Spent on Date of Encounter in care of patient: 30 mins. This time was spent on one or more of the following: performing physical exam; counseling and coordination of care; obtaining or reviewing history; documenting in the medical record; reviewing/ordering tests, medications or procedures; communicating  with other healthcare professionals and discussing with patient's family/caregivers.    Current Length of Stay: 2 day(s)  Current Patient Status: Inpatient   Certification Statement: The patient will continue to require additional inpatient hospital stay due to chf exacerbation and GI bleed now resolved requiring monitoring while restarting xaretlo and IV diuresis   Discharge Plan: Anticipate discharge in 48-72 hrs to discharge location to be determined pending rehab evaluations.    Code Status: Level 3 - DNAR and DNI    Subjective:   Patient was seen laying in bed today. He reports feeling well. He reports continued cough. He denies any chest pain, sob, abdominal pain. Family reports increased confusion. Patient reports he has felt like he was looking down on his body at times. He hasn't been sleeping well and has been in and out of sleep during the day    Objective:     Vitals:   Temp (24hrs), Av.6 °F (36.4 °C), Min:97.4 °F (36.3 °C), Max:97.8 °F (36.6 °C)    Temp:  [97.4 °F (36.3 °C)-97.8 °F (36.6 °C)] 97.5 °F (36.4 °C)  HR:  [] 78  Resp:  [18-20] 20  BP: (116-139)/() 116/100  SpO2:  [89 %-95 %] 92 %  Body mass index is 25.07 kg/m².     Input and Output Summary (last 24 hours):     Intake/Output Summary (Last 24 hours) at 2024 1219  Last data filed at 2024 0913  Gross per 24 hour   Intake 390 ml   Output 400 ml   Net -10 ml       Physical Exam:   Physical Exam  Vitals and nursing note reviewed.   Constitutional:       Appearance: Normal appearance.   HENT:      Head: Normocephalic and atraumatic.   Eyes:      General: No scleral icterus.  Cardiovascular:      Rate and Rhythm: Normal rate and regular rhythm.      Heart sounds: Murmur heard.   Pulmonary:      Effort: Pulmonary effort is normal.      Breath sounds: Normal breath sounds.   Abdominal:      General: Abdomen is flat. Bowel sounds are normal.      Palpations: Abdomen is soft.   Musculoskeletal:      Right lower leg: Edema  present.      Left lower leg: Edema present.   Skin:     General: Skin is warm and dry.   Neurological:      Mental Status: He is alert. Mental status is at baseline.   Psychiatric:         Mood and Affect: Mood normal.         Behavior: Behavior normal.         Additional Data:     Labs:  Results from last 7 days   Lab Units 02/23/24  0432 02/21/24  1010 02/21/24  0638   WBC Thousand/uL 7.88   < > 8.33   HEMOGLOBIN g/dL 10.4*   < > 12.2   HEMATOCRIT % 32.7*   < > 39.2   PLATELETS Thousands/uL 204   < > 199   NEUTROS PCT %  --   --  64   LYMPHS PCT %  --   --  16   MONOS PCT %  --   --  13*   EOS PCT %  --   --  5    < > = values in this interval not displayed.     Results from last 7 days   Lab Units 02/23/24  0432 02/22/24  0557 02/21/24  0638   SODIUM mmol/L 141   < > 138   POTASSIUM mmol/L 3.8   < > 4.5   CHLORIDE mmol/L 103   < > 103   CO2 mmol/L 31   < > 27   BUN mg/dL 61*   < > 63*   CREATININE mg/dL 2.43*   < > 2.53*   ANION GAP mmol/L 7   < > 8   CALCIUM mg/dL 9.4   < > 9.6   ALBUMIN g/dL  --   --  3.4*   TOTAL BILIRUBIN mg/dL  --   --  0.91   ALK PHOS U/L  --   --  97   ALT U/L  --   --  29   AST U/L  --   --  33   GLUCOSE RANDOM mg/dL 100   < > 101    < > = values in this interval not displayed.     Results from last 7 days   Lab Units 02/21/24  0638   INR  2.49*             Results from last 7 days   Lab Units 02/22/24  0557 02/21/24  0638   PROCALCITONIN ng/ml 0.11 0.09       Lines/Drains:  Invasive Devices       Peripheral Intravenous Line  Duration             Peripheral IV 02/22/24 Left Antecubital 1 day                      Telemetry:  Telemetry Orders (From admission, onward)               24 Hour Telemetry Monitoring  Continuous x 24 Hours (Telem)        Question:  Reason for 24 Hour Telemetry  Answer:  Decompensated CHF- and any one of the following: continuous diuretic infusion or total diuretic dose >200 mg daily, associated electrolyte derangement (I.e. K < 3.0), ionotropic drip (continuous  infusion), hx of ventricular arrhythmia, or new EF < 35%                     Telemetry Reviewed: Atrial fibrillation. HR averaging 90  Indication for Continued Telemetry Use: Acute CHF on >200 mg lasix/day or equivalent dose or with new reduced EF.              Imaging: No pertinent imaging reviewed.    Recent Cultures (last 7 days):         Last 24 Hours Medication List:   Current Facility-Administered Medications   Medication Dose Route Frequency Provider Last Rate    Albumin 25%  12.5 g Intravenous BID Rocky Pollack MD      finasteride  5 mg Oral Daily Isaac Chamorro DO      [START ON 2/24/2024] furosemide  40 mg Intravenous Daily Rocky Pollack MD      metoprolol succinate  25 mg Oral BID Rocky Pollack MD      pravastatin  20 mg Oral Daily With Dinner Isaac Chamorro DO      rivaroxaban  10 mg Oral Daily With Dinner Tatiana Cheema PA-C      tamsulosin  0.4 mg Oral Daily With Dinner Isaac Chamorro DO          Today, Patient Was Seen By: Tatiana Cheema PA-C    **Please Note: This note may have been constructed using a voice recognition system.**

## 2024-02-23 NOTE — PROGRESS NOTES
Patient:    MRN:  [id number]    Shira Request ID:  [id number]    Level of care reserved:  Hospice    Partner Reserved:  [facility] [address] [phone]    Clinical needs requested:    Geography searched:  [id number]    Start of Service:    Request sent:  11:27am EST on 2/23/2024 by Tea Ordaz    Partner reserved:  4:24pm EST on 2/23/2024 by Tea Ordaz    Choice list shared:  4:24pm EST on 2/23/2024 by Tea Ordaz

## 2024-02-23 NOTE — PROGRESS NOTES
Patient:    MRN:  [id number]    Shira Request ID:  [id number]    Level of care reserved:  Home Health Agency    Partner Reserved:  [facility], [place] [address] [phone]    Clinical needs requested:    Geography searched:  [id number]    Start of Service:    Request sent:  6:50am EST on 2/23/2024 by Tea Ordaz    Partner reserved:  11:24am EST on 2/23/2024 by Tea Ordaz    Choice list shared:  11:24am EST on 2/23/2024 by Tea Ordaz

## 2024-02-23 NOTE — ASSESSMENT & PLAN NOTE
Wt Readings from Last 3 Encounters:   02/23/24 77 kg (169 lb 12.1 oz)   01/30/24 82.1 kg (181 lb)   01/04/24 77.6 kg (171 lb)     Probable acute component of chronic diastolic CHF  Follows with Dr. Pollack.   Continue IV furosemide decreased to 40 mg BID  Cardiology and nephrology following  to assist with diuresis

## 2024-02-24 LAB
ANION GAP SERPL CALCULATED.3IONS-SCNC: 8 MMOL/L
BUN SERPL-MCNC: 59 MG/DL (ref 5–25)
CALCIUM SERPL-MCNC: 9.4 MG/DL (ref 8.4–10.2)
CHLORIDE SERPL-SCNC: 102 MMOL/L (ref 96–108)
CO2 SERPL-SCNC: 31 MMOL/L (ref 21–32)
CREAT SERPL-MCNC: 2.41 MG/DL (ref 0.6–1.3)
ERYTHROCYTE [DISTWIDTH] IN BLOOD BY AUTOMATED COUNT: 16.6 % (ref 11.6–15.1)
GFR SERPL CREATININE-BSD FRML MDRD: 23 ML/MIN/1.73SQ M
GLUCOSE SERPL-MCNC: 106 MG/DL (ref 65–140)
HCT VFR BLD AUTO: 33.9 % (ref 36.5–49.3)
HGB BLD-MCNC: 10.9 G/DL (ref 12–17)
MCH RBC QN AUTO: 28.5 PG (ref 26.8–34.3)
MCHC RBC AUTO-ENTMCNC: 32.2 G/DL (ref 31.4–37.4)
MCV RBC AUTO: 89 FL (ref 82–98)
PLATELET # BLD AUTO: 208 THOUSANDS/UL (ref 149–390)
PMV BLD AUTO: 10.3 FL (ref 8.9–12.7)
POTASSIUM SERPL-SCNC: 3.5 MMOL/L (ref 3.5–5.3)
RBC # BLD AUTO: 3.83 MILLION/UL (ref 3.88–5.62)
SODIUM SERPL-SCNC: 141 MMOL/L (ref 135–147)
WBC # BLD AUTO: 8.93 THOUSAND/UL (ref 4.31–10.16)

## 2024-02-24 PROCEDURE — 85027 COMPLETE CBC AUTOMATED: CPT

## 2024-02-24 PROCEDURE — 99232 SBSQ HOSP IP/OBS MODERATE 35: CPT | Performed by: INTERNAL MEDICINE

## 2024-02-24 PROCEDURE — 80048 BASIC METABOLIC PNL TOTAL CA: CPT

## 2024-02-24 PROCEDURE — 99232 SBSQ HOSP IP/OBS MODERATE 35: CPT

## 2024-02-24 RX ORDER — ALBUMIN (HUMAN) 12.5 G/50ML
12.5 SOLUTION INTRAVENOUS 2 TIMES DAILY
Status: COMPLETED | OUTPATIENT
Start: 2024-02-24 | End: 2024-02-24

## 2024-02-24 RX ADMIN — ALBUMIN (HUMAN) 12.5 G: 0.25 INJECTION, SOLUTION INTRAVENOUS at 16:41

## 2024-02-24 RX ADMIN — METOPROLOL SUCCINATE 25 MG: 25 TABLET, EXTENDED RELEASE ORAL at 21:28

## 2024-02-24 RX ADMIN — PRAVASTATIN SODIUM 20 MG: 20 TABLET ORAL at 16:41

## 2024-02-24 RX ADMIN — FINASTERIDE 5 MG: 5 TABLET, FILM COATED ORAL at 07:41

## 2024-02-24 RX ADMIN — ALBUMIN (HUMAN) 12.5 G: 0.25 INJECTION, SOLUTION INTRAVENOUS at 08:54

## 2024-02-24 RX ADMIN — TAMSULOSIN HYDROCHLORIDE 0.4 MG: 0.4 CAPSULE ORAL at 16:41

## 2024-02-24 RX ADMIN — METOPROLOL SUCCINATE 25 MG: 25 TABLET, EXTENDED RELEASE ORAL at 07:41

## 2024-02-24 RX ADMIN — RIVAROXABAN 10 MG: 10 TABLET, FILM COATED ORAL at 16:41

## 2024-02-24 RX ADMIN — FUROSEMIDE 40 MG: 10 INJECTION, SOLUTION INTRAMUSCULAR; INTRAVENOUS at 07:41

## 2024-02-24 NOTE — PROGRESS NOTES
Progress Note - Cardiology   Pro Steele 88 y.o. male MRN: [id number]  Unit/Bed#: [place] [address] -01 Encounter: [id number]    Assessment:    Diastolic heart failure secondary to severe aortic stenosis and fluid overload from chronic kidney disease  Bilateral pleural effusions worse on right  Chronic cough  Longstanding persistent atrial fibrillation  Non myocardial infarction troponin elevation  Severe aortic stenosis  Chronic kidney disease stage IV  BPH  Mild hypoalbuminemia  Bright red blood per rectum on admission  Tolerating increased metoprolol succinate    Plan:    Continue furosemide 40 mgs IV daily  Repeat albumin 12.5 mgs 2 times a day today  Check albumin with am labs tomorrow  Check chest xray tomorrow      Interval history:  Weight down 2 lbs since yesterday despite decrease in diuretic with IV Albumin.     PROBLEM LIST:    Patient Active Problem List    Diagnosis Date Noted    Elevated troponin 11/10/2021    Pulmonary nodules 11/01/2021    Aortic stenosis, severe 11/01/2021    BPH (benign prostatic hyperplasia) 08/12/2021    CKD (chronic kidney disease) stage 4, GFR 15-29 ml/min (HCC) 08/11/2021    Chronic diastolic (congestive) heart failure (HCC) 08/11/2021    Pure hypercholesterolemia 08/22/2019    PVC (premature ventricular contraction) 08/22/2019    Palpitations 11/08/2018    PSVT (paroxysmal supraventricular tachycardia) 11/07/2018    Spondylolisthesis 06/30/2016    Chronic anemia 05/26/2016    Spinal stenosis of lumbar region 05/02/2016    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    Esophageal reflux 02/19/2013    Hypertension 08/23/2012    Altered mental status 02/23/2024    BRBPR (bright red blood per rectum) 02/21/2024    Paroxysmal atrial fibrillation (HCC) 05/05/2023    Secondary hyperparathyroidism of renal origin (HCC) 05/02/2023    Lesion of left lung 01/05/2023    Cigarette nicotine dependence in remission 12/16/2021    History of radiofrequency ablation  for SVT 08/20/2021       Vitals: BP  "139/88   Pulse 69   Temp (!) 97.3 °F (36.3 °C)   Resp 15   Ht 5' 9\" (1.753 m)   Wt 76.2 kg (167 lb 15.9 oz)   SpO2 94%   BMI 24.81 kg/m²   PREVIOUS WEIGHTS:   Weight (last 2 days)       Date/Time Weight    02/24/24 0556 76.2 (167.99)    02/23/24 0542 77 (169.75)    02/22/24 0546 78.3 (172.62)            Wt Readings from Last 2 Encounters:   02/24/24 76.2 kg (167 lb 15.9 oz)   01/30/24 82.1 kg (181 lb)       Labs/Data:        Results from last 7 days   Lab Units 02/23/24  0432 02/22/24  1211 02/22/24  0557 02/21/24  1010 02/21/24  0638   WBC Thousand/uL 7.88  --  7.14  --  8.33   HEMOGLOBIN g/dL 10.4* 10.8* 9.9*   < > 12.2   HEMATOCRIT % 32.7* 33.9* 31.4*   < > 39.2   MCV fL 89  --  88  --  90   MCH pg 28.3  --  27.8  --  28.1   MCHC g/dL 31.8  --  31.5  --  31.1*   PLATELETS Thousands/uL 204  --  192  --  199    < > = values in this interval not displayed.     Results from last 7 days   Lab Units 02/23/24  0432 02/22/24  0557 02/21/24  0638   EGFR ml/min/1.73sq m 22 25 21   SODIUM mmol/L 141 142 138   POTASSIUM mmol/L 3.8 3.9 4.5   CHLORIDE mmol/L 103 104 103   CO2 mmol/L 31 32 27   BUN mg/dL 61* 58* 63*   CREATININE mg/dL 2.43* 2.23* 2.53*     Results from last 7 days   Lab Units 02/23/24  0432 02/22/24  0557 02/21/24  0638   CALCIUM mg/dL 9.4 9.3 9.6   AST U/L  --   --  33   ALT U/L  --   --  29   ALK PHOS U/L  --   --  97   MAGNESIUM mg/dL 1.9  --  2.1     Lab Results   Component Value Date    NTBNP 4,038 (H) 05/27/2022    NTBNP 5,024 (H) 11/10/2021    NTBNP 2,942 (H) 08/11/2021     Lab Results   Component Value Date    CHOLESTEROL 111 07/26/2023    TRIG 52 07/26/2023    HDL 53 07/26/2023    LDLCALC 48 07/26/2023       Results from last 7 days   Lab Units 02/21/24  0638   INR  2.49*   PROTIME seconds 27.0*          Current Facility-Administered Medications:     albumin human (FLEXBUMIN) 25 % injection 12.5 g, 12.5 g, Intravenous, BID, Rocky Pollack MD    finasteride (PROSCAR) tablet 5 mg, 5 mg, Oral, " Daily, Isaac Chamorro DO, 5 mg at 02/24/24 0741    furosemide (LASIX) injection 40 mg, 40 mg, Intravenous, Daily, Rocky Pollack MD, 40 mg at 02/24/24 0741    metoprolol succinate (TOPROL-XL) 24 hr tablet 25 mg, 25 mg, Oral, BID, Rocky Pollack MD, 25 mg at 02/24/24 0741    pravastatin (PRAVACHOL) tablet 20 mg, 20 mg, Oral, Daily With Dinner, Isaac Chamorro DO, 20 mg at 02/23/24 1624    rivaroxaban (XARELTO) tablet 10 mg, 10 mg, Oral, Daily With Dinner, Tatiana Cheema PA-C, 10 mg at 02/23/24 1624    tamsulosin (FLOMAX) capsule 0.4 mg, 0.4 mg, Oral, Daily With Dinner, Isaac Chamorro DO, 0.4 mg at 02/23/24 1624  Allergies   Allergen Reactions    Apixaban Other (See Comments)         Intake/Output Summary (Last 24 hours) at 2/24/2024 0834  Last data filed at 2/24/2024 0509  Gross per 24 hour   Intake 1030 ml   Output 1250 ml   Net -220 ml       Invasive Devices       Peripheral Intravenous Line  Duration             Peripheral IV 02/22/24 Left Antecubital 2 days                    Review of Systems   Constitutional:  Negative for activity change.   Respiratory:  Positive for cough. Negative for chest tightness, shortness of breath and wheezing.    Cardiovascular:  Negative for chest pain, palpitations and leg swelling.   Musculoskeletal:  Negative for gait problem.   Skin:  Negative for color change.   Neurological:  Positive for weakness. Negative for dizziness, tremors, syncope, light-headedness and headaches.   Psychiatric/Behavioral:  Negative for agitation and confusion.        Physical Exam  Vitals reviewed.   Constitutional:       General: He is not in acute distress.     Appearance: He is well-developed.   HENT:      Head: Normocephalic and atraumatic.   Neck:      Thyroid: No thyromegaly.      Vascular: JVD present. No carotid bruit.      Trachea: No tracheal deviation.   Cardiovascular:      Rate and Rhythm: Normal rate. Rhythm irregularly irregular.      Pulses: Normal pulses.      Heart  "sounds: Murmur heard.      Crescendo decrescendo systolic murmur is present with a grade of 2/6.      No friction rub. No gallop.   Pulmonary:      Effort: Pulmonary effort is normal. No respiratory distress.      Breath sounds: Normal breath sounds. No wheezing, rhonchi or rales.   Chest:      Chest wall: No tenderness.   Musculoskeletal:      Cervical back: Normal range of motion and neck supple.      Right lower le+ Pitting Edema present.      Left lower le+ Pitting Edema present.   Skin:     General: Skin is warm and dry.   Neurological:      General: No focal deficit present.      Mental Status: He is alert and oriented to person, place, and time.   Psychiatric:         Mood and Affect: Mood normal.         Behavior: Behavior normal.         Thought Content: Thought content normal.         Judgment: Judgment normal.         ======================================================     Imaging:   I have personally reviewed pertinent reports.        EKG: Atrial Fibrillation at a rate of 88 bpm.       Portions of the record may have been created with voice recognition software. Occasional wrong word or \"sound a like\" substitutions may have occurred due to the inherent limitations of voice recognition software. Read the chart carefully and recognize, using context, where substitutions have occurred.    "

## 2024-02-24 NOTE — ASSESSMENT & PLAN NOTE
Declined TAVR due to concerns for workup causing worsening renal dysfunction.  Follows with cardiology as an outpatient  Repeat ECHO showing EF 33%. Systolic function severely reduced. Aortic valve showing critical aortic stenosis  Discussed with family and patient agreeable to palliative consult at discharge to assist with symptom management

## 2024-02-24 NOTE — ASSESSMENT & PLAN NOTE
Wt Readings from Last 3 Encounters:   02/24/24 76.2 kg (167 lb 15.9 oz)   01/30/24 82.1 kg (181 lb)   01/04/24 77.6 kg (171 lb)     Probable acute component of chronic diastolic CHF  Follows with Dr. Pollack.   Continue IV furosemide decreased to 40 mg BID  Repeat chest x ray and albumin level in the AM  Cardiology and nephrology following  to assist with diuresis

## 2024-02-24 NOTE — ASSESSMENT & PLAN NOTE
Lab Results   Component Value Date    EGFR 23 02/24/2024    EGFR 22 02/23/2024    EGFR 25 02/22/2024    CREATININE 2.41 (H) 02/24/2024    CREATININE 2.43 (H) 02/23/2024    CREATININE 2.23 (H) 02/22/2024     Follows with Dr. Kincaid as an outpatient.  Appears to be at baseline  Continue iv lasix   Nephrology following appreciate recommendations

## 2024-02-24 NOTE — ASSESSMENT & PLAN NOTE
Continue metoprolol.  Prior to admission on Xarelto.  Previously on eliquis but developed chest discomfort   Restarted Xarelto at lower dose of 10 mg per cardiology recommendations   Cardiology following

## 2024-02-24 NOTE — ASSESSMENT & PLAN NOTE
Family reported increased confusion since coming into the hospital. Patient does report continued cough   Procal negative x 2   Chest x ray  without consolidation   UA, COVID/FLU/RSV unremarkable   Discussed with patient today, alert and oriented x 3, waking up feeling confused and having poor sleep while in the hospital resolves with reorientation

## 2024-02-24 NOTE — PROGRESS NOTES
[facility]  Progress Note  Name: Pro Steele I  MRN: [id number]  Unit/Bed#: [place] [address] -01 I Date of Admission: 2/21/2024   Date of Service: 2/24/2024 I Hospital Day: 3    Assessment/Plan   * BRBPR (bright red blood per rectum)  Assessment & Plan  History of atrial fibrillation AS BPH and CKD 4 presents with 4 episodes of BRBPR  Denies any previous episode.  Last colonoscopy 2021 diverticuli without bleeding  Likely diverticular bleed.  GI signed off today, bleeding resolved hemoglobin stable  Discussed risk and benefits of restarting Xarelto, family and patient were agreeable to restart, began lower dose of Xaretlo 10 mg daily per cardiology recommendations on 2/23  Monitor stool output and hemoglobin     Results from last 7 days   Lab Units 02/24/24  0838 02/23/24  0432 02/22/24  1211 02/22/24  0557   HEMOGLOBIN g/dL 10.9* 10.4* 10.8* 9.9*         Chronic diastolic (congestive) heart failure (HCC)  Assessment & Plan  Wt Readings from Last 3 Encounters:   02/24/24 76.2 kg (167 lb 15.9 oz)   01/30/24 82.1 kg (181 lb)   01/04/24 77.6 kg (171 lb)     Probable acute component of chronic diastolic CHF  Follows with Dr. Pollack.   Continue IV furosemide decreased to 40 mg BID  Repeat chest x ray and albumin level in the AM  Cardiology and nephrology following  to assist with diuresis       Altered mental status  Assessment & Plan  Family reported increased confusion since coming into the hospital. Patient does report continued cough   Procal negative x 2   Chest x ray  without consolidation   UA, COVID/FLU/RSV unremarkable   Discussed with patient today, alert and oriented x 3, waking up feeling confused and having poor sleep while in the hospital resolves with reorientation      Paroxysmal atrial fibrillation (HCC)  Assessment & Plan  Continue metoprolol.  Prior to admission on Xarelto.  Previously on eliquis but developed chest discomfort   Restarted Xarelto at lower dose of 10 mg per  cardiology recommendations   Cardiology following     Elevated troponin  Assessment & Plan  Elevated troponins secondary to blood loss anemia CKD and CHF  Cardiology consulted   Denies chest pain    Results from last 7 days   Lab Units 02/21/24  1056 02/21/24  0847 02/21/24  0638   HS TNI 0HR ng/L  --   --  126*   HS TNI 2HR ng/L  --  120*  --    HS TNI 4HR ng/L 103*  --   --          Aortic stenosis, severe  Assessment & Plan  Declined TAVR due to concerns for workup causing worsening renal dysfunction.  Follows with cardiology as an outpatient  Repeat ECHO showing EF 33%. Systolic function severely reduced. Aortic valve showing critical aortic stenosis  Discussed with family and patient agreeable to palliative consult at discharge to assist with symptom management     CKD (chronic kidney disease) stage 4, GFR 15-29 ml/min ([facility])  Assessment & Plan  Lab Results   Component Value Date    EGFR 23 02/24/2024    EGFR 22 02/23/2024    EGFR 25 02/22/2024    CREATININE 2.41 (H) 02/24/2024    CREATININE 2.43 (H) 02/23/2024    CREATININE 2.23 (H) 02/22/2024     Follows with Dr. Kincaid as an outpatient.  Appears to be at baseline  Continue iv lasix   Nephrology following appreciate recommendations     BPH (benign prostatic hyperplasia)  Assessment & Plan  No urinary symptoms.  Continue finasteride and tamsulosin           VTE Pharmacologic Prophylaxis:   Moderate Risk (Score 3-4) - Pharmacological DVT Prophylaxis Ordered: rivaroxaban (Xarelto).    Mobility:   Basic Mobility Inpatient Raw Score: 17  JH-HLM Goal: 5: Stand one or more mins  JH-HLM Achieved: 6: Walk 10 steps or more  HLM Goal achieved. Continue to encourage appropriate mobility.    Patient Centered Rounds: I performed bedside rounds with nursing staff today.   Discussions with Specialists or Other Care Team Provider: cm    Education and Discussions with Family / Patient:  will return to bedside to update family when they arrive today .     Total Time Spent on  Date of Encounter in care of patient: 30 mins. This time was spent on one or more of the following: performing physical exam; counseling and coordination of care; obtaining or reviewing history; documenting in the medical record; reviewing/ordering tests, medications or procedures; communicating with other healthcare professionals and discussing with patient's family/caregivers.    Current Length of Stay: 3 day(s)  Current Patient Status: Inpatient   Certification Statement: The patient will continue to require additional inpatient hospital stay due to chf exacerbation requiring iv diuresis   Discharge Plan: Anticipate discharge in 24-48 hrs to home with home services.    Code Status: Level 3 - DNAR and DNI    Subjective:   Patient was seen sitting in chair at bedside today. He reports feeling well. He denies chest pain, sob, abdominal pain, difficulty with urination or bowel movements. He reports continued cough     Objective:     Vitals:   Temp (24hrs), Av.6 °F (36.4 °C), Min:97.2 °F (36.2 °C), Max:98.1 °F (36.7 °C)    Temp:  [97.2 °F (36.2 °C)-98.1 °F (36.7 °C)] 97.3 °F (36.3 °C)  HR:  [] 69  Resp:  [15-20] 15  BP: ()/(64-89) 139/88  SpO2:  [92 %-96 %] 94 %  Body mass index is 24.81 kg/m².     Input and Output Summary (last 24 hours):     Intake/Output Summary (Last 24 hours) at 2024 1020  Last data filed at 2024 0509  Gross per 24 hour   Intake 790 ml   Output 1250 ml   Net -460 ml       Physical Exam:   Physical Exam  Vitals and nursing note reviewed.   Constitutional:       Appearance: Normal appearance.   HENT:      Head: Normocephalic and atraumatic.   Eyes:      General: No scleral icterus.  Cardiovascular:      Rate and Rhythm: Normal rate. Rhythm irregular.      Heart sounds: Murmur heard.   Pulmonary:      Effort: Pulmonary effort is normal.   Abdominal:      General: Abdomen is flat. Bowel sounds are normal.   Musculoskeletal:      Right lower leg: Edema present.      Left  lower leg: Edema present.   Neurological:      Mental Status: He is alert and oriented to person, place, and time. Mental status is at baseline.   Psychiatric:         Mood and Affect: Mood normal.         Behavior: Behavior normal.        Additional Data:     Labs:  Results from last 7 days   Lab Units 02/24/24  0838 02/21/24  1010 02/21/24  0638   WBC Thousand/uL 8.93   < > 8.33   HEMOGLOBIN g/dL 10.9*   < > 12.2   HEMATOCRIT % 33.9*   < > 39.2   PLATELETS Thousands/uL 208   < > 199   NEUTROS PCT %  --   --  64   LYMPHS PCT %  --   --  16   MONOS PCT %  --   --  13*   EOS PCT %  --   --  5    < > = values in this interval not displayed.     Results from last 7 days   Lab Units 02/24/24  0838 02/22/24  0557 02/21/24  0638   SODIUM mmol/L 141   < > 138   POTASSIUM mmol/L 3.5   < > 4.5   CHLORIDE mmol/L 102   < > 103   CO2 mmol/L 31   < > 27   BUN mg/dL 59*   < > 63*   CREATININE mg/dL 2.41*   < > 2.53*   ANION GAP mmol/L 8   < > 8   CALCIUM mg/dL 9.4   < > 9.6   ALBUMIN g/dL  --   --  3.4*   TOTAL BILIRUBIN mg/dL  --   --  0.91   ALK PHOS U/L  --   --  97   ALT U/L  --   --  29   AST U/L  --   --  33   GLUCOSE RANDOM mg/dL 106   < > 101    < > = values in this interval not displayed.     Results from last 7 days   Lab Units 02/21/24  0638   INR  2.49*     Results from last 7 days   Lab Units 02/23/24  2122   POC GLUCOSE mg/dl 119         Results from last 7 days   Lab Units 02/22/24  0557 02/21/24  0638   PROCALCITONIN ng/ml 0.11 0.09       Lines/Drains:  Invasive Devices       Peripheral Intravenous Line  Duration             Peripheral IV 02/22/24 Left Antecubital 2 days                      Telemetry:  Telemetry Orders (From admission, onward)               24 Hour Telemetry Monitoring  Continuous x 24 Hours (Telem)        Question:  Reason for 24 Hour Telemetry  Answer:  Decompensated CHF- and any one of the following: continuous diuretic infusion or total diuretic dose >200 mg daily, associated electrolyte  derangement (I.e. K < 3.0), ionotropic drip (continuous infusion), hx of ventricular arrhythmia, or new EF < 35%                     Telemetry Reviewed: Atrial fibrillation. HR averaging 90  Indication for Continued Telemetry Use: Arrthymias requiring medical therapy             Imaging: No pertinent imaging reviewed.    Recent Cultures (last 7 days):         Last 24 Hours Medication List:   Current Facility-Administered Medications   Medication Dose Route Frequency Provider Last Rate    Albumin 25%  12.5 g Intravenous BID Rocky Pollack MD      finasteride  5 mg Oral Daily Isaac Chamorro DO      furosemide  40 mg Intravenous Daily Rocky Pollack MD      metoprolol succinate  25 mg Oral BID Rocky Pollack MD      pravastatin  20 mg Oral Daily With Dinner Isaac Chamorro DO      rivaroxaban  10 mg Oral Daily With Dinner Tatiana Cheema PA-C      tamsulosin  0.4 mg Oral Daily With Dinner Isaac Chamorro DO          Today, Patient Was Seen By: Tatiana Cheema PA-C    **Please Note: This note may have been constructed using a voice recognition system.**

## 2024-02-24 NOTE — PLAN OF CARE
Problem: Prexisting or High Potential for Compromised Skin Integrity  Goal: Skin integrity is maintained or improved  Description: INTERVENTIONS:  - Identify patients at risk for skin breakdown  - Assess and monitor skin integrity  - Assess and monitor nutrition and hydration status  - Monitor labs   - Assess for incontinence   - Turn and reposition patient  - Assist with mobility/ambulation  - Relieve pressure over bony prominences  - Avoid friction and shearing  - Provide appropriate hygiene as needed including keeping skin clean and dry  - Evaluate need for skin moisturizer/barrier cream  - Collaborate with interdisciplinary team   - Patient/family teaching  - Consider wound care consult   Outcome: Progressing     Problem: PAIN - ADULT  Goal: Verbalizes/displays adequate comfort level or baseline comfort level  Description: Interventions:  - Encourage patient to monitor pain and request assistance  - Assess pain using appropriate pain scale  - Administer analgesics based on type and severity of pain and evaluate response  - Implement non-pharmacological measures as appropriate and evaluate response  - Consider cultural and social influences on pain and pain management  - Notify physician/advanced practitioner if interventions unsuccessful or patient reports new pain  Outcome: Progressing     Problem: INFECTION - ADULT  Goal: Absence or prevention of progression during hospitalization  Description: INTERVENTIONS:  - Assess and monitor for signs and symptoms of infection  - Monitor lab/diagnostic results  - Monitor all insertion sites, i.e. indwelling lines, tubes, and drains  - Monitor endotracheal if appropriate and nasal secretions for changes in amount and color  - [place] appropriate cooling/warming therapies per order  - Administer medications as ordered  - Instruct and encourage patient and family to use good hand hygiene technique  - Identify and instruct in appropriate isolation precautions for  identified infection/condition  Outcome: Progressing  Goal: Absence of fever/infection during neutropenic period  Description: INTERVENTIONS:  - Monitor WBC    Outcome: Progressing     Problem: SAFETY ADULT  Goal: Patient will remain free of falls  Description: INTERVENTIONS:  - Educate patient/family on patient safety including physical limitations  - Instruct patient to call for assistance with activity   - Consult OT/PT to assist with strengthening/mobility   - Keep Call bell within reach  - Keep bed low and locked with side rails adjusted as appropriate  - Keep care items and personal belongings within reach  - Initiate and maintain comfort rounds  - Make Fall Risk Sign visible to staff  - Offer Toileting every  Hours, in advance of need  - Initiate/Maintain alarm  - Obtain necessary fall risk management equipment:  - Apply yellow socks and bracelet for high fall risk patients  - Consider moving patient to room near nurses station  Outcome: Progressing  Goal: Maintain or return to baseline ADL function  Description: INTERVENTIONS:  -  Assess patient's ability to carry out ADLs; assess patient's baseline for ADL function and identify physical deficits which impact ability to perform ADLs (bathing, care of mouth/teeth, toileting, grooming, dressing, etc.)  - Assess/evaluate cause of self-care deficits   - Assess range of motion  - Assess patient's mobility; develop plan if impaired  - Assess patient's need for assistive devices and provide as appropriate  - Encourage maximum independence but intervene and supervise when necessary  - Involve family in performance of ADLs  - Assess for home care needs following discharge   - Consider OT consult to assist with ADL evaluation and planning for discharge  - Provide patient education as appropriate  Outcome: Progressing  Goal: Maintains/Returns to pre admission functional level  Description: INTERVENTIONS:  - Perform AM-PAC 6 Click Basic Mobility/ Daily Activity  assessment daily.  - Set and communicate daily mobility goal to care team and patient/family/caregiver.   - Collaborate with rehabilitation services on mobility goals if consulted  - Perform Range of Jerardo times a day.  - Reposition patient every  hours.  - Dangle patient times a day  - Stand patient  times a day  - Ambulate patient  times a day  - Out of bed to chair  times a day   - Out of bed for meals times a day  - Out of bed for toileting  - Record patient progress and toleration of activity level   Outcome: Progressing     Problem: DISCHARGE PLANNING  Goal: Discharge to home or other facility with appropriate resources  Description: INTERVENTIONS:  - Identify barriers to discharge w/patient and caregiver  - Arrange for needed discharge resources and transportation as appropriate  - Identify discharge learning needs (meds, wound care, etc.)  - Arrange for interpretive services to assist at discharge as needed  - Refer to Case Management Department for coordinating discharge planning if the patient needs post-hospital services based on physician/advanced practitioner order or complex needs related to functional status, cognitive ability, or social support system  Outcome: Progressing     Problem: Knowledge Deficit  Goal: Patient/family/caregiver demonstrates understanding of disease process, treatment plan, medications, and discharge instructions  Description: Complete learning assessment and assess knowledge base.  Interventions:  - Provide teaching at level of understanding  - Provide teaching via preferred learning methods  Outcome: Progressing

## 2024-02-24 NOTE — ASSESSMENT & PLAN NOTE
History of atrial fibrillation AS BPH and CKD 4 presents with 4 episodes of BRBPR  Denies any previous episode.  Last colonoscopy 2021 diverticuli without bleeding  Likely diverticular bleed.  GI signed off today, bleeding resolved hemoglobin stable  Discussed risk and benefits of restarting Xarelto, family and patient were agreeable to restart, began lower dose of Xaretlo 10 mg daily per cardiology recommendations on 2/23  Monitor stool output and hemoglobin     Results from last 7 days   Lab Units 02/24/24  0838 02/23/24  0432 02/22/24  1211 02/22/24  0557   HEMOGLOBIN g/dL 10.9* 10.4* 10.8* 9.9*

## 2024-02-24 NOTE — PLAN OF CARE
Problem: Prexisting or High Potential for Compromised Skin Integrity  Goal: Skin integrity is maintained or improved  Description: INTERVENTIONS:  - Identify patients at risk for skin breakdown  - Assess and monitor skin integrity  - Assess and monitor nutrition and hydration status  - Monitor labs   - Assess for incontinence   - Turn and reposition patient  - Assist with mobility/ambulation  - Relieve pressure over bony prominences  - Avoid friction and shearing  - Provide appropriate hygiene as needed including keeping skin clean and dry  - Evaluate need for skin moisturizer/barrier cream  - Collaborate with interdisciplinary team   - Patient/family teaching  - Consider wound care consult   Outcome: Progressing     Problem: PAIN - ADULT  Goal: Verbalizes/displays adequate comfort level or baseline comfort level  Description: Interventions:  - Encourage patient to monitor pain and request assistance  - Assess pain using appropriate pain scale  - Administer analgesics based on type and severity of pain and evaluate response  - Implement non-pharmacological measures as appropriate and evaluate response  - Consider cultural and social influences on pain and pain management  - Notify physician/advanced practitioner if interventions unsuccessful or patient reports new pain  Outcome: Progressing     Problem: INFECTION - ADULT  Goal: Absence or prevention of progression during hospitalization  Description: INTERVENTIONS:  - Assess and monitor for signs and symptoms of infection  - Monitor lab/diagnostic results  - Monitor all insertion sites, i.e. indwelling lines, tubes, and drains  - Monitor endotracheal if appropriate and nasal secretions for changes in amount and color  - [place] appropriate cooling/warming therapies per order  - Administer medications as ordered  - Instruct and encourage patient and family to use good hand hygiene technique  - Identify and instruct in appropriate isolation precautions for  identified infection/condition  Outcome: Progressing  Goal: Absence of fever/infection during neutropenic period  Description: INTERVENTIONS:  - Monitor WBC    Outcome: Progressing     Problem: SAFETY ADULT  Goal: Patient will remain free of falls  Description: INTERVENTIONS:  - Educate patient/family on patient safety including physical limitations  - Instruct patient to call for assistance with activity   - Consult OT/PT to assist with strengthening/mobility   - Keep Call bell within reach  - Keep bed low and locked with side rails adjusted as appropriate  - Keep care items and personal belongings within reach  - Initiate and maintain comfort rounds  - Make Fall Risk Sign visible to staff  - Offer Toileting every  Hours, in advance of need  - Initiate/Maintain alarm  - Obtain necessary fall risk management equipment:   - Apply yellow socks and bracelet for high fall risk patients  - Consider moving patient to room near nurses station  Outcome: Progressing  Goal: Maintain or return to baseline ADL function  Description: INTERVENTIONS:  -  Assess patient's ability to carry out ADLs; assess patient's baseline for ADL function and identify physical deficits which impact ability to perform ADLs (bathing, care of mouth/teeth, toileting, grooming, dressing, etc.)  - Assess/evaluate cause of self-care deficits   - Assess range of motion  - Assess patient's mobility; develop plan if impaired  - Assess patient's need for assistive devices and provide as appropriate  - Encourage maximum independence but intervene and supervise when necessary  - Involve family in performance of ADLs  - Assess for home care needs following discharge   - Consider OT consult to assist with ADL evaluation and planning for discharge  - Provide patient education as appropriate  Outcome: Progressing  Goal: Maintains/Returns to pre admission functional level  Description: INTERVENTIONS:  - Perform AM-PAC 6 Click Basic Mobility/ Daily Activity  assessment daily.  - Set and communicate daily mobility goal to care team and patient/family/caregiver.   - Collaborate with rehabilitation services on mobility goals if consulted  - Perform Range of Motion  times a day.  - Reposition patient every  hours.  - Dangle patient  times a day  - Stand patient  times a day  - Ambulate patient  times a day  - Out of bed to chair  times a day   - Out of bed for times a day  - Out of bed for toileting  - Record patient progress and toleration of activity level   Outcome: Progressing     Problem: DISCHARGE PLANNING  Goal: Discharge to home or other facility with appropriate resources  Description: INTERVENTIONS:  - Identify barriers to discharge w/patient and caregiver  - Arrange for needed discharge resources and transportation as appropriate  - Identify discharge learning needs (meds, wound care, etc.)  - Arrange for interpretive services to assist at discharge as needed  - Refer to Case Management Department for coordinating discharge planning if the patient needs post-hospital services based on physician/advanced practitioner order or complex needs related to functional status, cognitive ability, or social support system  Outcome: Progressing     Problem: Knowledge Deficit  Goal: Patient/family/caregiver demonstrates understanding of disease process, treatment plan, medications, and discharge instructions  Description: Complete learning assessment and assess knowledge base.  Interventions:  - Provide teaching at level of understanding  - Provide teaching via preferred learning methods  Outcome: Progressing

## 2024-02-25 ENCOUNTER — APPOINTMENT (INPATIENT)
Dept: RADIOLOGY | Facility: HOSPITAL | Age: 89
DRG: 377 | End: 2024-02-25
Payer: MEDICARE

## 2024-02-25 LAB
ALBUMIN SERPL BCP-MCNC: 3 G/DL (ref 3.5–5)
ANION GAP SERPL CALCULATED.3IONS-SCNC: 6 MMOL/L
BUN SERPL-MCNC: 58 MG/DL (ref 5–25)
CALCIUM SERPL-MCNC: 9.1 MG/DL (ref 8.4–10.2)
CHLORIDE SERPL-SCNC: 104 MMOL/L (ref 96–108)
CO2 SERPL-SCNC: 33 MMOL/L (ref 21–32)
CREAT SERPL-MCNC: 2.36 MG/DL (ref 0.6–1.3)
ERYTHROCYTE [DISTWIDTH] IN BLOOD BY AUTOMATED COUNT: 16.4 % (ref 11.6–15.1)
GFR SERPL CREATININE-BSD FRML MDRD: 23 ML/MIN/1.73SQ M
GLUCOSE SERPL-MCNC: 104 MG/DL (ref 65–140)
HCT VFR BLD AUTO: 29.2 % (ref 36.5–49.3)
HGB BLD-MCNC: 9.1 G/DL (ref 12–17)
MAGNESIUM SERPL-MCNC: 1.9 MG/DL (ref 1.9–2.7)
MCH RBC QN AUTO: 27.9 PG (ref 26.8–34.3)
MCHC RBC AUTO-ENTMCNC: 31.2 G/DL (ref 31.4–37.4)
MCV RBC AUTO: 90 FL (ref 82–98)
PLATELET # BLD AUTO: 194 THOUSANDS/UL (ref 149–390)
PMV BLD AUTO: 10.5 FL (ref 8.9–12.7)
POTASSIUM SERPL-SCNC: 3.9 MMOL/L (ref 3.5–5.3)
RBC # BLD AUTO: 3.26 MILLION/UL (ref 3.88–5.62)
SODIUM SERPL-SCNC: 143 MMOL/L (ref 135–147)
WBC # BLD AUTO: 8.51 THOUSAND/UL (ref 4.31–10.16)

## 2024-02-25 PROCEDURE — 71046 X-RAY EXAM CHEST 2 VIEWS: CPT

## 2024-02-25 PROCEDURE — 80048 BASIC METABOLIC PNL TOTAL CA: CPT | Performed by: INTERNAL MEDICINE

## 2024-02-25 PROCEDURE — 83735 ASSAY OF MAGNESIUM: CPT | Performed by: PHYSICIAN ASSISTANT

## 2024-02-25 PROCEDURE — 82040 ASSAY OF SERUM ALBUMIN: CPT | Performed by: INTERNAL MEDICINE

## 2024-02-25 PROCEDURE — 99232 SBSQ HOSP IP/OBS MODERATE 35: CPT | Performed by: INTERNAL MEDICINE

## 2024-02-25 PROCEDURE — 99232 SBSQ HOSP IP/OBS MODERATE 35: CPT

## 2024-02-25 PROCEDURE — 85027 COMPLETE CBC AUTOMATED: CPT

## 2024-02-25 RX ORDER — FUROSEMIDE 40 MG/1
40 TABLET ORAL
Status: DISCONTINUED | OUTPATIENT
Start: 2024-02-25 | End: 2024-02-26 | Stop reason: HOSPADM

## 2024-02-25 RX ADMIN — FUROSEMIDE 40 MG: 10 INJECTION, SOLUTION INTRAMUSCULAR; INTRAVENOUS at 09:28

## 2024-02-25 RX ADMIN — FUROSEMIDE 40 MG: 40 TABLET ORAL at 17:37

## 2024-02-25 RX ADMIN — FINASTERIDE 5 MG: 5 TABLET, FILM COATED ORAL at 09:28

## 2024-02-25 RX ADMIN — PRAVASTATIN SODIUM 20 MG: 20 TABLET ORAL at 17:37

## 2024-02-25 RX ADMIN — IRON SUCROSE 200 MG: 20 INJECTION, SOLUTION INTRAVENOUS at 13:10

## 2024-02-25 RX ADMIN — TAMSULOSIN HYDROCHLORIDE 0.4 MG: 0.4 CAPSULE ORAL at 17:37

## 2024-02-25 RX ADMIN — METOPROLOL SUCCINATE 25 MG: 25 TABLET, EXTENDED RELEASE ORAL at 21:49

## 2024-02-25 RX ADMIN — METOPROLOL SUCCINATE 25 MG: 25 TABLET, EXTENDED RELEASE ORAL at 09:28

## 2024-02-25 NOTE — PROGRESS NOTES
Progress Note - Cardiology   Pro Steele 88 y.o. male MRN: [id number]  Unit/Bed#: [place] [address] -01 Encounter: [id number]    Assessment:    Diastolic heart failure secondary to severe aortic stenosis and fluid overload from chronic kidney disease  Right small pleural effusion  Chronic cough improved  Longstanding persistent atrial fibrillation  Non myocardial ischemic troponin elevation  Severe aortic stenosis  Chronic kidney disease stage IV  Hypoalbuminemia  BPH  Right red blood per rectum on admission on Xarelto with recurrence last night    Plan:    Recommend discharge patient  Discharged on the following medications:  Finasteride 5 mg daily  Furosemide 40 mg 2 times a day  Metoprolol succinate 25 mg 2 times a day  Pravastatin tablet 20 mg with dinner  Tamsulosin 0.4 mg daily at bedtime  Recommend a nonfasting BMP in 8 days  Will send note to our office for patient to see me in 8 to 10 days.      Interval history:  Patient denies shortness of breath and appears much stronger than he was at the time of admission.  Chest x-ray demonstrates small right pleural effusion.  No significant pulmonary congestion.  Albumin 3.0.  Creatinine stable at 2.36 with a GFR of 23.  Patient had further rectal bleeding last night on Xarelto 10 mg daily and it was discontinued.  Patient would like to go home.    PROBLEM LIST:    Patient Active Problem List    Diagnosis Date Noted    Elevated troponin 11/10/2021    Pulmonary nodules 11/01/2021    Aortic stenosis, severe 11/01/2021    BPH (benign prostatic hyperplasia) 08/12/2021    CKD (chronic kidney disease) stage 4, GFR 15-29 ml/min (HCC) 08/11/2021    Chronic diastolic (congestive) heart failure (HCC) 08/11/2021    Pure hypercholesterolemia 08/22/2019    PVC (premature ventricular contraction) 08/22/2019    Palpitations 11/08/2018    PSVT (paroxysmal supraventricular tachycardia) 11/07/2018    Spondylolisthesis 06/30/2016    Chronic anemia 05/26/2016    Spinal stenosis of  "lumbar region 05/02/2016    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    Esophageal reflux 02/19/2013    Hypertension 08/23/2012    Altered mental status 02/23/2024    BRBPR (bright red blood per rectum) 02/21/2024    Paroxysmal atrial fibrillation (HCC) 05/05/2023    Secondary hyperparathyroidism of renal origin (HCC) 05/02/2023    Lesion of left lung 01/05/2023    Cigarette nicotine dependence in remission 12/16/2021    History of radiofrequency ablation  for SVT 08/20/2021       Vitals: /78   Pulse 71   Temp 97.5 °F (36.4 °C)   Resp 16   Ht 5' 9\" (1.753 m)   Wt 76.2 kg (167 lb 15.9 oz)   SpO2 94%   BMI 24.81 kg/m²   PREVIOUS WEIGHTS:   Weight (last 2 days)       Date/Time Weight    02/25/24 0558 76.2 (167.99)    02/24/24 0556 76.2 (167.99)    02/23/24 0542 77 (169.75)            Wt Readings from Last 2 Encounters:   02/25/24 76.2 kg (167 lb 15.9 oz)   01/30/24 82.1 kg (181 lb)       Labs/Data:        Results from last 7 days   Lab Units 02/25/24 0527 02/24/24  0838 02/23/24  0432   WBC Thousand/uL 8.51 8.93 7.88   HEMOGLOBIN g/dL 9.1* 10.9* 10.4*   HEMATOCRIT % 29.2* 33.9* 32.7*   MCV fL 90 89 89   MCH pg 27.9 28.5 28.3   MCHC g/dL 31.2* 32.2 31.8   PLATELETS Thousands/uL 194 208 204     Results from last 7 days   Lab Units 02/25/24 0527 02/24/24  0838 02/23/24  0432   EGFR ml/min/1.73sq m 23 23 22   SODIUM mmol/L 143 141 141   POTASSIUM mmol/L 3.9 3.5 3.8   CHLORIDE mmol/L 104 102 103   CO2 mmol/L 33* 31 31   BUN mg/dL 58* 59* 61*   CREATININE mg/dL 2.36* 2.41* 2.43*     Results from last 7 days   Lab Units 02/25/24 0527 02/24/24  0838 02/23/24  0432 02/22/24  0557 02/21/24  0638   CALCIUM mg/dL 9.1 9.4 9.4   < > 9.6   AST U/L  --   --   --   --  33   ALT U/L  --   --   --   --  29   ALK PHOS U/L  --   --   --   --  97   MAGNESIUM mg/dL 1.9  --  1.9  --  2.1    < > = values in this interval not displayed.     Lab Results   Component Value Date    NTBNP 4,038 (H) 05/27/2022    NTBNP 5,024 (H) 11/10/2021    " NTBNP 2,942 (H) 08/11/2021     Lab Results   Component Value Date    CHOLESTEROL 111 07/26/2023    TRIG 52 07/26/2023    HDL 53 07/26/2023    LDLCALC 48 07/26/2023       Results from last 7 days   Lab Units 02/21/24  0638   INR  2.49*   PROTIME seconds 27.0*          Current Facility-Administered Medications:     finasteride (PROSCAR) tablet 5 mg, 5 mg, Oral, Daily, Isaac Pedro Pablo, DO, 5 mg at 02/25/24 0928    furosemide (LASIX) tablet 40 mg, 40 mg, Oral, BID (diuretic), Rocky Pollack MD    metoprolol succinate (TOPROL-XL) 24 hr tablet 25 mg, 25 mg, Oral, BID, Rocky Pollack MD, 25 mg at 02/25/24 0928    pravastatin (PRAVACHOL) tablet 20 mg, 20 mg, Oral, Daily With Dinner, Isaac Pedro Pablo, DO, 20 mg at 02/24/24 1641    tamsulosin (FLOMAX) capsule 0.4 mg, 0.4 mg, Oral, Daily With Dinner, Isaac Pedro Pablo, DO, 0.4 mg at 02/24/24 1641  Allergies   Allergen Reactions    Apixaban Other (See Comments)         Intake/Output Summary (Last 24 hours) at 2/25/2024 1034  Last data filed at 2/24/2024 1045  Gross per 24 hour   Intake --   Output 200 ml   Net -200 ml       Invasive Devices       Peripheral Intravenous Line  Duration             Peripheral IV 02/22/24 Left Antecubital 3 days                    Review of Systems   Constitutional:  Negative for activity change.   Respiratory:  Positive for cough. Negative for chest tightness, shortness of breath and wheezing.    Cardiovascular:  Negative for chest pain, palpitations and leg swelling.   Musculoskeletal:  Negative for gait problem.   Skin:  Negative for color change.   Neurological:  Positive for weakness. Negative for dizziness, tremors, syncope, light-headedness and headaches.   Psychiatric/Behavioral:  Negative for agitation and confusion.        Physical Exam  Vitals reviewed.   Constitutional:       General: He is not in acute distress.     Appearance: He is well-developed.   HENT:      Head: Normocephalic and atraumatic.   Neck:      Thyroid: No thyromegaly.      " Vascular: JVD present. No carotid bruit.      Trachea: No tracheal deviation.   Cardiovascular:      Rate and Rhythm: Normal rate. Rhythm irregularly irregular.      Pulses: Normal pulses.      Heart sounds: Murmur heard.      Crescendo decrescendo systolic murmur is present with a grade of 2/6.      No friction rub. No gallop.   Pulmonary:      Effort: Pulmonary effort is normal. No respiratory distress.      Breath sounds: Normal breath sounds. No wheezing, rhonchi or rales.   Chest:      Chest wall: No tenderness.   Musculoskeletal:      Cervical back: Normal range of motion and neck supple.      Right lower le+ Pitting Edema present.      Left lower le+ Pitting Edema present.   Skin:     General: Skin is warm and dry.   Neurological:      General: No focal deficit present.      Mental Status: He is alert and oriented to person, place, and time.   Psychiatric:         Mood and Affect: Mood normal.         Behavior: Behavior normal.         Thought Content: Thought content normal.         Judgment: Judgment normal.         ======================================================     Imaging:         EKG: Atrial fibrillation with controlled rate      Portions of the record may have been created with voice recognition software. Occasional wrong word or \"sound a like\" substitutions may have occurred due to the inherent limitations of voice recognition software. Read the chart carefully and recognize, using context, where substitutions have occurred.    "

## 2024-02-25 NOTE — ASSESSMENT & PLAN NOTE
History of atrial fibrillation AS BPH and CKD 4 presents with 4 episodes of BRBPR  Denies any previous episode.  Last colonoscopy 2021 diverticuli without bleeding  Likely diverticular bleed.  GI signed off when bleed resolved and xarelto was restarted on 2/23, unfortunately bleeding reoccurred last evening and xarelto was discontinued   Hemoglobin decreased today, monitor in AM  Recieved dose of iv venofer  Monitor stool output and hemoglobin     Results from last 7 days   Lab Units 02/25/24  0527 02/24/24  0838 02/23/24  0432 02/22/24  1211   HEMOGLOBIN g/dL 9.1* 10.9* 10.4* 10.8*

## 2024-02-25 NOTE — ASSESSMENT & PLAN NOTE
Declined TAVR due to concerns for workup causing worsening renal dysfunction.  Follows with cardiology as an outpatient  Repeat ECHO showing EF 33%. Systolic function severely reduced. Aortic valve showing critical aortic stenosis  Discussed with family and patient agreeable to palliative consult at discharge to assist with symptom management   Will need paper script for walker

## 2024-02-25 NOTE — ASSESSMENT & PLAN NOTE
Continue metoprolol.  Prior to admission on Xarelto.  Previously on eliquis but developed chest discomfort   Restarted Xarelto at lower dose of 10 mg per cardiology recommendations, discontinued due to bleeding    Cardiology cleared for discharge

## 2024-02-25 NOTE — CASE MANAGEMENT
Case Management Discharge Planning Note    Patient name Pro Steele  Location [facility] 2 M* MRN [id number]  : 1935 Date 2024       Current Admission Date: 2024  Current Admission Diagnosis:BRBPR (bright red blood per rectum)   Patient Active Problem List    Diagnosis Date Noted    Altered mental status 2024    BRBPR (bright red blood per rectum) 2024    Paroxysmal atrial fibrillation (HCC) 2023    Secondary hyperparathyroidism of renal origin (HCC) 2023    Lesion of left lung 2023    Cigarette nicotine dependence in remission 2021    Elevated troponin 11/10/2021    Pulmonary nodules 2021    Aortic stenosis, severe 2021    History of radiofrequency ablation  for SVT 2021    BPH (benign prostatic hyperplasia) 2021    CKD (chronic kidney disease) stage 4, GFR 15-29 ml/min ([facility]) 2021    Chronic diastolic (congestive) heart failure ([facility]) 2021    Pure hypercholesterolemia 2019    PVC (premature ventricular contraction) 2019    Palpitations 2018    PSVT (paroxysmal supraventricular tachycardia) 2018    Spondylolisthesis 2016    Chronic anemia 2016    Spinal stenosis of lumbar region 2016    Linda esophagus 2013    Esophageal reflux 2013    Hypertension 2012      LOS (days): 4  Geometric Mean LOS (GMLOS) (days): 4.6  Days to GMLOS:0.4     OBJECTIVE:  Risk of Unplanned Readmission Score: 15.15         Current admission status: Inpatient   Preferred Pharmacy:   [facility] - [address] [address]  Phone: [phone] Fax: [phone]    Primary Care Provider: Andrew Schroeder DO    Primary Insurance: MEDICARE  Secondary Insurance: BLUE CROSS    DISCHARGE DETAILS:                                                                                        IMM Given (Date):: 24  IMM Given to::  Family     Additional Comments: IMM reviewed with pt's spouse over the phone. CM informed pt's spouse of pt's right to appeal the discharge. Verbally signed the IMM.

## 2024-02-25 NOTE — PLAN OF CARE
Problem: Prexisting or High Potential for Compromised Skin Integrity  Goal: Skin integrity is maintained or improved  Description: INTERVENTIONS:  - Identify patients at risk for skin breakdown  - Assess and monitor skin integrity  - Assess and monitor nutrition and hydration status  - Monitor labs   - Assess for incontinence   - Turn and reposition patient  - Assist with mobility/ambulation  - Relieve pressure over bony prominences  - Avoid friction and shearing  - Provide appropriate hygiene as needed including keeping skin clean and dry  - Evaluate need for skin moisturizer/barrier cream  - Collaborate with interdisciplinary team   - Patient/family teaching  - Consider wound care consult   Outcome: Progressing     Problem: PAIN - ADULT  Goal: Verbalizes/displays adequate comfort level or baseline comfort level  Description: Interventions:  - Encourage patient to monitor pain and request assistance  - Assess pain using appropriate pain scale  - Administer analgesics based on type and severity of pain and evaluate response  - Implement non-pharmacological measures as appropriate and evaluate response  - Consider cultural and social influences on pain and pain management  - Notify physician/advanced practitioner if interventions unsuccessful or patient reports new pain  Outcome: Progressing     Problem: INFECTION - ADULT  Goal: Absence or prevention of progression during hospitalization  Description: INTERVENTIONS:  - Assess and monitor for signs and symptoms of infection  - Monitor lab/diagnostic results  - Monitor all insertion sites, i.e. indwelling lines, tubes, and drains  - Monitor endotracheal if appropriate and nasal secretions for changes in amount and color  - [place] appropriate cooling/warming therapies per order  - Administer medications as ordered  - Instruct and encourage patient and family to use good hand hygiene technique  - Identify and instruct in appropriate isolation precautions for  identified infection/condition  Outcome: Progressing  Goal: Absence of fever/infection during neutropenic period  Description: INTERVENTIONS:  - Monitor WBC    Outcome: Progressing     Problem: SAFETY ADULT  Goal: Patient will remain free of falls  Description: INTERVENTIONS:  - Educate patient/family on patient safety including physical limitations  - Instruct patient to call for assistance with activity   - Consult OT/PT to assist with strengthening/mobility   - Keep Call bell within reach  - Keep bed low and locked with side rails adjusted as appropriate  - Keep care items and personal belongings within reach  - Initiate and maintain comfort rounds  - Make Fall Risk Sign visible to staff  - Offer Toileting every 2 Hours, in advance of need  - Initiate/Maintain bed alarm  - Obtain necessary fall risk management equipment.  - Apply yellow socks and bracelet for high fall risk patients  - Consider moving patient to room near nurses station  Outcome: Progressing  Goal: Maintain or return to baseline ADL function  Description: INTERVENTIONS:  -  Assess patient's ability to carry out ADLs; assess patient's baseline for ADL function and identify physical deficits which impact ability to perform ADLs (bathing, care of mouth/teeth, toileting, grooming, dressing, etc.)  - Assess/evaluate cause of self-care deficits   - Assess range of motion  - Assess patient's mobility; develop plan if impaired  - Assess patient's need for assistive devices and provide as appropriate  - Encourage maximum independence but intervene and supervise when necessary  - Involve family in performance of ADLs  - Assess for home care needs following discharge   - Consider OT consult to assist with ADL evaluation and planning for discharge  - Provide patient education as appropriate  Outcome: Progressing  Goal: Maintains/Returns to pre admission functional level  Description: INTERVENTIONS:  - Perform AM-PAC 6 Click Basic Mobility/ Daily Activity  assessment daily.  - Set and communicate daily mobility goal to care team and patient/family/caregiver.   - Collaborate with rehabilitation services on mobility goals if consulted  - Perform Range of Motion 2 times a day.  - Reposition patient every 2 hours.  - Dangle patient 2 times a day  - Stand patient 2 times a day  - Ambulate patient 2 times a day  - Out of bed to chair 2 times a day   - Out of bed for meals 2 times a day  - Out of bed for toileting  - Record patient progress and toleration of activity level   Outcome: Progressing     Problem: DISCHARGE PLANNING  Goal: Discharge to home or other facility with appropriate resources  Description: INTERVENTIONS:  - Identify barriers to discharge w/patient and caregiver  - Arrange for needed discharge resources and transportation as appropriate  - Identify discharge learning needs (meds, wound care, etc.)  - Arrange for interpretive services to assist at discharge as needed  - Refer to Case Management Department for coordinating discharge planning if the patient needs post-hospital services based on physician/advanced practitioner order or complex needs related to functional status, cognitive ability, or social support system  Outcome: Progressing     Problem: Knowledge Deficit  Goal: Patient/family/caregiver demonstrates understanding of disease process, treatment plan, medications, and discharge instructions  Description: Complete learning assessment and assess knowledge base.  Interventions:  - Provide teaching at level of understanding  - Provide teaching via preferred learning methods  Outcome: Progressing

## 2024-02-25 NOTE — ASSESSMENT & PLAN NOTE
Wt Readings from Last 3 Encounters:   02/25/24 76.2 kg (167 lb 15.9 oz)   01/30/24 82.1 kg (181 lb)   01/04/24 77.6 kg (171 lb)     Probable acute component of chronic diastolic CHF  Follows with Dr. Pollack.   Continue IV furosemide decreased to 40 mg BID  Cardiology and nephrology cleared for discharge

## 2024-02-25 NOTE — NURSING NOTE
Pt got up to go to bathroom, RN noted some spot of blood on bed and pt robe.  Noted a moderate amount of BRBPR in toilet.   Pt denies pain.  Notifed SLIM.  Will continue to monitor.

## 2024-02-25 NOTE — PROGRESS NOTES
[facility]  Progress Note  Name: Pro Steele I  MRN: [id number]  Unit/Bed#: [place] [address] -01 I Date of Admission: 2/21/2024   Date of Service: 2/25/2024 I Hospital Day: 4    Assessment/Plan   * BRBPR (bright red blood per rectum)  Assessment & Plan  History of atrial fibrillation AS BPH and CKD 4 presents with 4 episodes of BRBPR  Denies any previous episode.  Last colonoscopy 2021 diverticuli without bleeding  Likely diverticular bleed.  GI signed off when bleed resolved and xarelto was restarted on 2/23, unfortunately bleeding reoccurred last evening and xarelto was discontinued   Hemoglobin decreased today, monitor in AM  Recieved dose of iv venofer  Monitor stool output and hemoglobin     Results from last 7 days   Lab Units 02/25/24  0527 02/24/24  0838 02/23/24  0432 02/22/24  1211   HEMOGLOBIN g/dL 9.1* 10.9* 10.4* 10.8*         Chronic diastolic (congestive) heart failure (HCC)  Assessment & Plan  Wt Readings from Last 3 Encounters:   02/25/24 76.2 kg (167 lb 15.9 oz)   01/30/24 82.1 kg (181 lb)   01/04/24 77.6 kg (171 lb)     Probable acute component of chronic diastolic CHF  Follows with Dr. Pollack.   Continue IV furosemide decreased to 40 mg BID  Cardiology and nephrology cleared for discharge       Altered mental status  Assessment & Plan  Family reported increased confusion since coming into the hospital. Patient does report continued cough   Procal negative x 2   Chest x ray  without consolidation   UA, COVID/FLU/RSV unremarkable   Discussed with patient today, alert and oriented x 3, waking up feeling confused and having poor sleep while in the hospital resolves with reorientation      Paroxysmal atrial fibrillation (HCC)  Assessment & Plan  Continue metoprolol.  Prior to admission on Xarelto.  Previously on eliquis but developed chest discomfort   Restarted Xarelto at lower dose of 10 mg per cardiology recommendations, discontinued due to bleeding    Cardiology  cleared for discharge     Elevated troponin  Assessment & Plan  Elevated troponins secondary to blood loss anemia CKD and CHF  Cardiology consulted   Denies chest pain    Results from last 7 days   Lab Units 02/21/24  1056 02/21/24  0847 02/21/24  0638   HS TNI 0HR ng/L  --   --  126*   HS TNI 2HR ng/L  --  120*  --    HS TNI 4HR ng/L 103*  --   --          Aortic stenosis, severe  Assessment & Plan  Declined TAVR due to concerns for workup causing worsening renal dysfunction.  Follows with cardiology as an outpatient  Repeat ECHO showing EF 33%. Systolic function severely reduced. Aortic valve showing critical aortic stenosis  Discussed with family and patient agreeable to palliative consult at discharge to assist with symptom management   Will need paper script for walker    CKD (chronic kidney disease) stage 4, GFR 15-29 ml/min ([facility])  Assessment & Plan  Lab Results   Component Value Date    EGFR 23 02/25/2024    EGFR 23 02/24/2024    EGFR 22 02/23/2024    CREATININE 2.36 (H) 02/25/2024    CREATININE 2.41 (H) 02/24/2024    CREATININE 2.43 (H) 02/23/2024     Follows with Dr. Kincaid as an outpatient.  Appears to be at baseline      BPH (benign prostatic hyperplasia)  Assessment & Plan  No urinary symptoms.  Continue finasteride and tamsulosin             VTE Pharmacologic Prophylaxis:   Moderate Risk (Score 3-4) - Pharmacological DVT Prophylaxis Contraindicated. Sequential Compression Devices Ordered.    Mobility:   Basic Mobility Inpatient Raw Score: 17  JH-HLM Goal: 5: Stand one or more mins  JH-HLM Achieved: 7: Walk 25 feet or more  HLM Goal achieved. Continue to encourage appropriate mobility.    Patient Centered Rounds: I performed bedside rounds with nursing staff today.   Discussions with Specialists or Other Care Team Provider: jo    Education and Discussions with Family / Patient: Updated  (son and daughter) at bedside.    Total Time Spent on Date of Encounter in care of patient: 30- mins.  This time was spent on one or more of the following: performing physical exam; counseling and coordination of care; obtaining or reviewing history; documenting in the medical record; reviewing/ordering tests, medications or procedures; communicating with other healthcare professionals and discussing with patient's family/caregivers.    Current Length of Stay: 4 day(s)  Current Patient Status: Inpatient   Certification Statement: The patient will continue to require additional inpatient hospital stay due to pending am hemoglobin  Discharge Plan: Anticipate discharge tomorrow to home.    Code Status: Level 3 - DNAR and DNI    Subjective:   Patient was seen sitting in chair at bedside with family present. He reports feeling well. States that cough is improving. Denies bloody stools since last night     Objective:     Vitals:   Temp (24hrs), Av.7 °F (36.5 °C), Min:97.5 °F (36.4 °C), Max:97.9 °F (36.6 °C)    Temp:  [97.5 °F (36.4 °C)-97.9 °F (36.6 °C)] 97.7 °F (36.5 °C)  HR:  [60-96] 60  Resp:  [16-18] 17  BP: (104-127)/(64-82) 112/76  SpO2:  [91 %-97 %] 93 %  Body mass index is 24.81 kg/m².     Input and Output Summary (last 24 hours):   No intake or output data in the 24 hours ending 24 2489    Physical Exam:   Physical Exam  Vitals and nursing note reviewed.   Constitutional:       Appearance: Normal appearance.   HENT:      Head: Normocephalic and atraumatic.   Eyes:      General: No scleral icterus.  Cardiovascular:      Rate and Rhythm: Normal rate and regular rhythm.      Heart sounds: Murmur heard.   Abdominal:      General: Abdomen is flat. Bowel sounds are normal.   Musculoskeletal:      Right lower leg: Edema present.      Left lower leg: Edema present.   Neurological:      Mental Status: Mental status is at baseline.   Psychiatric:         Mood and Affect: Mood normal.         Behavior: Behavior normal.          Additional Data:     Labs:  Results from last 7 days   Lab Units 24  6484  02/21/24  1010 02/21/24  0638   WBC Thousand/uL 8.51   < > 8.33   HEMOGLOBIN g/dL 9.1*   < > 12.2   HEMATOCRIT % 29.2*   < > 39.2   PLATELETS Thousands/uL 194   < > 199   NEUTROS PCT %  --   --  64   LYMPHS PCT %  --   --  16   MONOS PCT %  --   --  13*   EOS PCT %  --   --  5    < > = values in this interval not displayed.     Results from last 7 days   Lab Units 02/25/24  0527 02/22/24  0557 02/21/24  0638   SODIUM mmol/L 143   < > 138   POTASSIUM mmol/L 3.9   < > 4.5   CHLORIDE mmol/L 104   < > 103   CO2 mmol/L 33*   < > 27   BUN mg/dL 58*   < > 63*   CREATININE mg/dL 2.36*   < > 2.53*   ANION GAP mmol/L 6   < > 8   CALCIUM mg/dL 9.1   < > 9.6   ALBUMIN g/dL 3.0*  --  3.4*   TOTAL BILIRUBIN mg/dL  --   --  0.91   ALK PHOS U/L  --   --  97   ALT U/L  --   --  29   AST U/L  --   --  33   GLUCOSE RANDOM mg/dL 104   < > 101    < > = values in this interval not displayed.     Results from last 7 days   Lab Units 02/21/24  0638   INR  2.49*     Results from last 7 days   Lab Units 02/23/24  2122   POC GLUCOSE mg/dl 119         Results from last 7 days   Lab Units 02/22/24  0557 02/21/24  0638   PROCALCITONIN ng/ml 0.11 0.09       Lines/Drains:  Invasive Devices       Peripheral Intravenous Line  Duration             Peripheral IV 02/22/24 Left Antecubital 3 days                      Telemetry:  Telemetry Orders (From admission, onward)               24 Hour Telemetry Monitoring  Continuous x 24 Hours (Telem)        Question:  Reason for 24 Hour Telemetry  Answer:  Decompensated CHF- and any one of the following: continuous diuretic infusion or total diuretic dose >200 mg daily, associated electrolyte derangement (I.e. K < 3.0), ionotropic drip (continuous infusion), hx of ventricular arrhythmia, or new EF < 35%                     Telemetry Reviewed: Atrial fibrillation. HR averaging 90  Indication for Continued Telemetry Use: Arrthymias requiring medical therapy             Imaging: No pertinent imaging  reviewed.    Recent Cultures (last 7 days):         Last 24 Hours Medication List:   Current Facility-Administered Medications   Medication Dose Route Frequency Provider Last Rate    finasteride  5 mg Oral Daily Isaac Chamorro DO      furosemide  40 mg Oral BID (diuretic) Rocky Pollack MD      metoprolol succinate  25 mg Oral BID Rocky Pollack MD      pravastatin  20 mg Oral Daily With Dinner Isaac Chamorro DO      tamsulosin  0.4 mg Oral Daily With Dinner Isaac Chamorro DO          Today, Patient Was Seen By: Tatiana Cheema PA-C    **Please Note: This note may have been constructed using a voice recognition system.**

## 2024-02-25 NOTE — ASSESSMENT & PLAN NOTE
Lab Results   Component Value Date    EGFR 23 02/25/2024    EGFR 23 02/24/2024    EGFR 22 02/23/2024    CREATININE 2.36 (H) 02/25/2024    CREATININE 2.41 (H) 02/24/2024    CREATININE 2.43 (H) 02/23/2024     Follows with Dr. Kincaid as an outpatient.  Appears to be at baseline

## 2024-02-25 NOTE — PLAN OF CARE
Problem: Prexisting or High Potential for Compromised Skin Integrity  Goal: Skin integrity is maintained or improved  Description: INTERVENTIONS:  - Identify patients at risk for skin breakdown  - Assess and monitor skin integrity  - Assess and monitor nutrition and hydration status  - Monitor labs   - Assess for incontinence   - Turn and reposition patient  - Assist with mobility/ambulation  - Relieve pressure over bony prominences  - Avoid friction and shearing  - Provide appropriate hygiene as needed including keeping skin clean and dry  - Evaluate need for skin moisturizer/barrier cream  - Collaborate with interdisciplinary team   - Patient/family teaching  - Consider wound care consult   Outcome: Progressing     Problem: PAIN - ADULT  Goal: Verbalizes/displays adequate comfort level or baseline comfort level  Description: Interventions:  - Encourage patient to monitor pain and request assistance  - Assess pain using appropriate pain scale  - Administer analgesics based on type and severity of pain and evaluate response  - Implement non-pharmacological measures as appropriate and evaluate response  - Consider cultural and social influences on pain and pain management  - Notify physician/advanced practitioner if interventions unsuccessful or patient reports new pain  Outcome: Progressing     Problem: INFECTION - ADULT  Goal: Absence or prevention of progression during hospitalization  Description: INTERVENTIONS:  - Assess and monitor for signs and symptoms of infection  - Monitor lab/diagnostic results  - Monitor all insertion sites, i.e. indwelling lines, tubes, and drains  - Monitor endotracheal if appropriate and nasal secretions for changes in amount and color  - [place] appropriate cooling/warming therapies per order  - Administer medications as ordered  - Instruct and encourage patient and family to use good hand hygiene technique  - Identify and instruct in appropriate isolation precautions for  identified infection/condition  Outcome: Progressing  Goal: Absence of fever/infection during neutropenic period  Description: INTERVENTIONS:  - Monitor WBC    Outcome: Progressing     Problem: SAFETY ADULT  Goal: Patient will remain free of falls  Description: INTERVENTIONS:  - Educate patient/family on patient safety including physical limitations  - Instruct patient to call for assistance with activity   - Consult OT/PT to assist with strengthening/mobility   - Keep Call bell within reach  - Keep bed low and locked with side rails adjusted as appropriate  - Keep care items and personal belongings within reach  - Initiate and maintain comfort rounds  - Make Fall Risk Sign visible to staff  - Offer Toileting every 2 Hours, in advance of need  - Initiate/Maintain bed/chair alarm  - Obtain necessary fall risk management equipment: walker.  - Apply yellow socks and bracelet for high fall risk patients  - Consider moving patient to room near nurses station  Outcome: Progressing  Goal: Maintain or return to baseline ADL function  Description: INTERVENTIONS:  -  Assess patient's ability to carry out ADLs; assess patient's baseline for ADL function and identify physical deficits which impact ability to perform ADLs (bathing, care of mouth/teeth, toileting, grooming, dressing, etc.)  - Assess/evaluate cause of self-care deficits   - Assess range of motion  - Assess patient's mobility; develop plan if impaired  - Assess patient's need for assistive devices and provide as appropriate  - Encourage maximum independence but intervene and supervise when necessary  - Involve family in performance of ADLs  - Assess for home care needs following discharge   - Consider OT consult to assist with ADL evaluation and planning for discharge  - Provide patient education as appropriate  Outcome: Progressing  Goal: Maintains/Returns to pre admission functional level  Description: INTERVENTIONS:  - Perform AM-PAC 6 Click Basic Mobility/  Daily Activity assessment daily.  - Set and communicate daily mobility goal to care team and patient/family/caregiver.   - Collaborate with rehabilitation services on mobility goals if consulted  - Perform Range of Motion 3 times a day.  - Reposition patient every 2 hours.  - Dangle patient 3 times a day  - Stand patient 3 times a day  - Ambulate patient 3 times a day  - Out of bed to chair 3 times a day   - Out of bed for meals 3 times a day  - Out of bed for toileting  - Record patient progress and toleration of activity level   Outcome: Progressing     Problem: DISCHARGE PLANNING  Goal: Discharge to home or other facility with appropriate resources  Description: INTERVENTIONS:  - Identify barriers to discharge w/patient and caregiver  - Arrange for needed discharge resources and transportation as appropriate  - Identify discharge learning needs (meds, wound care, etc.)  - Arrange for interpretive services to assist at discharge as needed  - Refer to Case Management Department for coordinating discharge planning if the patient needs post-hospital services based on physician/advanced practitioner order or complex needs related to functional status, cognitive ability, or social support system  Outcome: Progressing     Problem: Knowledge Deficit  Goal: Patient/family/caregiver demonstrates understanding of disease process, treatment plan, medications, and discharge instructions  Description: Complete learning assessment and assess knowledge base.  Interventions:  - Provide teaching at level of understanding  - Provide teaching via preferred learning methods  Outcome: Progressing

## 2024-02-26 VITALS
TEMPERATURE: 97.6 F | HEART RATE: 89 BPM | BODY MASS INDEX: 24.75 KG/M2 | DIASTOLIC BLOOD PRESSURE: 70 MMHG | WEIGHT: 167.11 LBS | RESPIRATION RATE: 17 BRPM | OXYGEN SATURATION: 90 % | HEIGHT: 69 IN | SYSTOLIC BLOOD PRESSURE: 103 MMHG

## 2024-02-26 LAB
ANION GAP SERPL CALCULATED.3IONS-SCNC: 5 MMOL/L
BUN SERPL-MCNC: 60 MG/DL (ref 5–25)
CALCIUM SERPL-MCNC: 9.2 MG/DL (ref 8.4–10.2)
CHLORIDE SERPL-SCNC: 103 MMOL/L (ref 96–108)
CO2 SERPL-SCNC: 36 MMOL/L (ref 21–32)
CREAT SERPL-MCNC: 2.34 MG/DL (ref 0.6–1.3)
DME PARACHUTE DELIVERY DATE ACTUAL: NORMAL
DME PARACHUTE DELIVERY DATE EXPECTED: NORMAL
DME PARACHUTE DELIVERY DATE REQUESTED: NORMAL
DME PARACHUTE ITEM DESCRIPTION: NORMAL
DME PARACHUTE ORDER STATUS: NORMAL
DME PARACHUTE SUPPLIER NAME: NORMAL
DME PARACHUTE SUPPLIER PHONE: NORMAL
ERYTHROCYTE [DISTWIDTH] IN BLOOD BY AUTOMATED COUNT: 16.7 % (ref 11.6–15.1)
GFR SERPL CREATININE-BSD FRML MDRD: 23 ML/MIN/1.73SQ M
GLUCOSE SERPL-MCNC: 95 MG/DL (ref 65–140)
HCT VFR BLD AUTO: 27.7 % (ref 36.5–49.3)
HGB BLD-MCNC: 8.7 G/DL (ref 12–17)
MCH RBC QN AUTO: 28.7 PG (ref 26.8–34.3)
MCHC RBC AUTO-ENTMCNC: 31.4 G/DL (ref 31.4–37.4)
MCV RBC AUTO: 91 FL (ref 82–98)
PLATELET # BLD AUTO: 197 THOUSANDS/UL (ref 149–390)
PMV BLD AUTO: 10.5 FL (ref 8.9–12.7)
POTASSIUM SERPL-SCNC: 3.6 MMOL/L (ref 3.5–5.3)
RBC # BLD AUTO: 3.03 MILLION/UL (ref 3.88–5.62)
SODIUM SERPL-SCNC: 144 MMOL/L (ref 135–147)
WBC # BLD AUTO: 8.42 THOUSAND/UL (ref 4.31–10.16)

## 2024-02-26 PROCEDURE — 85027 COMPLETE CBC AUTOMATED: CPT

## 2024-02-26 PROCEDURE — 99239 HOSP IP/OBS DSCHRG MGMT >30: CPT

## 2024-02-26 PROCEDURE — 80048 BASIC METABOLIC PNL TOTAL CA: CPT | Performed by: INTERNAL MEDICINE

## 2024-02-26 RX ORDER — FUROSEMIDE 40 MG/1
40 TABLET ORAL 2 TIMES DAILY
Qty: 60 TABLET | Refills: 0 | Status: SHIPPED | OUTPATIENT
Start: 2024-02-26 | End: 2024-03-27

## 2024-02-26 RX ORDER — METOPROLOL SUCCINATE 25 MG/1
25 TABLET, EXTENDED RELEASE ORAL 2 TIMES DAILY
Qty: 60 TABLET | Refills: 0 | Status: SHIPPED | OUTPATIENT
Start: 2024-02-26 | End: 2024-03-27

## 2024-02-26 RX ORDER — FERROUS SULFATE 324(65)MG
324 TABLET, DELAYED RELEASE (ENTERIC COATED) ORAL
Qty: 30 TABLET | Refills: 0 | Status: SHIPPED | OUTPATIENT
Start: 2024-02-26 | End: 2024-03-27

## 2024-02-26 RX ADMIN — FINASTERIDE 5 MG: 5 TABLET, FILM COATED ORAL at 08:47

## 2024-02-26 RX ADMIN — IRON SUCROSE 200 MG: 20 INJECTION, SOLUTION INTRAVENOUS at 08:47

## 2024-02-26 NOTE — ASSESSMENT & PLAN NOTE
Declined TAVR due to concerns for workup causing worsening renal dysfunction.  Follows with cardiology as an outpatient  Repeat ECHO showing EF 33%. Systolic function severely reduced. Aortic valve showing critical aortic stenosis  Discussed with family and patient agreeable to palliative consult at discharge to assist with symptom management   Sent with paper script for walker

## 2024-02-26 NOTE — ASSESSMENT & PLAN NOTE
Lab Results   Component Value Date    EGFR 23 02/26/2024    EGFR 23 02/25/2024    EGFR 23 02/24/2024    CREATININE 2.34 (H) 02/26/2024    CREATININE 2.36 (H) 02/25/2024    CREATININE 2.41 (H) 02/24/2024     Follows with Dr. Kincaid as an outpatient.  Appears to be at baseline

## 2024-02-26 NOTE — PLAN OF CARE
Problem: Prexisting or High Potential for Compromised Skin Integrity  Goal: Skin integrity is maintained or improved  Description: INTERVENTIONS:  - Identify patients at risk for skin breakdown  - Assess and monitor skin integrity  - Assess and monitor nutrition and hydration status  - Monitor labs   - Assess for incontinence   - Turn and reposition patient  - Assist with mobility/ambulation  - Relieve pressure over bony prominences  - Avoid friction and shearing  - Provide appropriate hygiene as needed including keeping skin clean and dry  - Evaluate need for skin moisturizer/barrier cream  - Collaborate with interdisciplinary team   - Patient/family teaching  - Consider wound care consult   Outcome: Progressing     Problem: PAIN - ADULT  Goal: Verbalizes/displays adequate comfort level or baseline comfort level  Description: Interventions:  - Encourage patient to monitor pain and request assistance  - Assess pain using appropriate pain scale  - Administer analgesics based on type and severity of pain and evaluate response  - Implement non-pharmacological measures as appropriate and evaluate response  - Consider cultural and social influences on pain and pain management  - Notify physician/advanced practitioner if interventions unsuccessful or patient reports new pain  Outcome: Progressing     Problem: INFECTION - ADULT  Goal: Absence or prevention of progression during hospitalization  Description: INTERVENTIONS:  - Assess and monitor for signs and symptoms of infection  - Monitor lab/diagnostic results  - Monitor all insertion sites, i.e. indwelling lines, tubes, and drains  - Monitor endotracheal if appropriate and nasal secretions for changes in amount and color  - [place] appropriate cooling/warming therapies per order  - Administer medications as ordered  - Instruct and encourage patient and family to use good hand hygiene technique  - Identify and instruct in appropriate isolation precautions for  identified infection/condition  Outcome: Progressing  Goal: Absence of fever/infection during neutropenic period  Description: INTERVENTIONS:  - Monitor WBC    Outcome: Progressing     Problem: SAFETY ADULT  Goal: Patient will remain free of falls  Description: INTERVENTIONS:  - Educate patient/family on patient safety including physical limitations  - Instruct patient to call for assistance with activity   - Consult OT/PT to assist with strengthening/mobility   - Keep Call bell within reach  - Keep bed low and locked with side rails adjusted as appropriate  - Keep care items and personal belongings within reach  - Initiate and maintain comfort rounds  - Make Fall Risk Sign visible to staff  - Offer Toileting every 2 Hours, in advance of need  - Initiate/Maintain bed alarm  - Obtain necessary fall risk management equipment.  - Apply yellow socks and bracelet for high fall risk patients  - Consider moving patient to room near nurses station  Outcome: Progressing  Goal: Maintain or return to baseline ADL function  Description: INTERVENTIONS:  -  Assess patient's ability to carry out ADLs; assess patient's baseline for ADL function and identify physical deficits which impact ability to perform ADLs (bathing, care of mouth/teeth, toileting, grooming, dressing, etc.)  - Assess/evaluate cause of self-care deficits   - Assess range of motion  - Assess patient's mobility; develop plan if impaired  - Assess patient's need for assistive devices and provide as appropriate  - Encourage maximum independence but intervene and supervise when necessary  - Involve family in performance of ADLs  - Assess for home care needs following discharge   - Consider OT consult to assist with ADL evaluation and planning for discharge  - Provide patient education as appropriate  Outcome: Progressing  Goal: Maintains/Returns to pre admission functional level  Description: INTERVENTIONS:  - Perform AM-PAC 6 Click Basic Mobility/ Daily Activity  assessment daily.  - Set and communicate daily mobility goal to care team and patient/family/caregiver.   - Collaborate with rehabilitation services on mobility goals if consulted  - Perform Range of Motion 2 times a day.  - Reposition patient every 2 hours.  - Dangle patient 2 times a day  - Stand patient 2 times a day  - Ambulate patient 2 times a day  - Out of bed to chair 2 times a day   - Out of bed for meals 2 times a day  - Out of bed for toileting  - Record patient progress and toleration of activity level   Outcome: Progressing     Problem: DISCHARGE PLANNING  Goal: Discharge to home or other facility with appropriate resources  Description: INTERVENTIONS:  - Identify barriers to discharge w/patient and caregiver  - Arrange for needed discharge resources and transportation as appropriate  - Identify discharge learning needs (meds, wound care, etc.)  - Arrange for interpretive services to assist at discharge as needed  - Refer to Case Management Department for coordinating discharge planning if the patient needs post-hospital services based on physician/advanced practitioner order or complex needs related to functional status, cognitive ability, or social support system  Outcome: Progressing     Problem: Knowledge Deficit  Goal: Patient/family/caregiver demonstrates understanding of disease process, treatment plan, medications, and discharge instructions  Description: Complete learning assessment and assess knowledge base.  Interventions:  - Provide teaching at level of understanding  - Provide teaching via preferred learning methods  Outcome: Progressing

## 2024-02-26 NOTE — ASSESSMENT & PLAN NOTE
History of atrial fibrillation AS BPH and CKD 4 presents with 4 episodes of BRBPR  Denies any previous episode.  Last colonoscopy 2021 diverticuli without bleeding  Likely diverticular bleed.  GI signed off when bleed resolved and xarelto was restarted on 2/23, unfortunately bleeding reoccurred evening of 2/24 and xarelto was discontinued   No episodes of bleeding overnight   Recieved 2 doses of IV venofer  Continue to monitor stool at home and repeat cbc in one week   Follow up with family doctor     Results from last 7 days   Lab Units 02/26/24  0445 02/25/24  0527 02/24/24  0838 02/23/24  0432   HEMOGLOBIN g/dL 8.7* 9.1* 10.9* 10.4*

## 2024-02-26 NOTE — PLAN OF CARE
Problem: Prexisting or High Potential for Compromised Skin Integrity  Goal: Skin integrity is maintained or improved  Description: INTERVENTIONS:  - Identify patients at risk for skin breakdown  - Assess and monitor skin integrity  - Assess and monitor nutrition and hydration status  - Monitor labs   - Assess for incontinence   - Turn and reposition patient  - Assist with mobility/ambulation  - Relieve pressure over bony prominences  - Avoid friction and shearing  - Provide appropriate hygiene as needed including keeping skin clean and dry  - Evaluate need for skin moisturizer/barrier cream  - Collaborate with interdisciplinary team   - Patient/family teaching  - Consider wound care consult   Outcome: Adequate for Discharge     Problem: SAFETY ADULT  Goal: Patient will remain free of falls  Description: INTERVENTIONS:  - Educate patient/family on patient safety including physical limitations  - Instruct patient to call for assistance with activity   - Consult OT/PT to assist with strengthening/mobility   - Keep Call bell within reach  - Keep bed low and locked with side rails adjusted as appropriate  - Keep care items and personal belongings within reach  - Initiate and maintain comfort rounds  - Make Fall Risk Sign visible to staff  - Offer Toileting every 2 Hours, in advance of need  - Initiate/Maintain bed/chair alarm  - Obtain necessary fall risk management equipment: walker.  - Apply yellow socks and bracelet for high fall risk patients  - Consider moving patient to room near nurses station  Outcome: Adequate for Discharge     Problem: DISCHARGE PLANNING  Goal: Discharge to home or other facility with appropriate resources  Description: INTERVENTIONS:  - Identify barriers to discharge w/patient and caregiver  - Arrange for needed discharge resources and transportation as appropriate  - Identify discharge learning needs (meds, wound care, etc.)  - Arrange for interpretive services to assist at discharge as  needed  - Refer to Case Management Department for coordinating discharge planning if the patient needs post-hospital services based on physician/advanced practitioner order or complex needs related to functional status, cognitive ability, or social support system  Outcome: Adequate for Discharge     Problem: Knowledge Deficit  Goal: Patient/family/caregiver demonstrates understanding of disease process, treatment plan, medications, and discharge instructions  Description: Complete learning assessment and assess knowledge base.  Interventions:  - Provide teaching at level of understanding  - Provide teaching via preferred learning methods  Outcome: Adequate for Discharge

## 2024-02-26 NOTE — DISCHARGE INSTR - AVS FIRST PAGE
Dear Pro Steele,     It was our pleasure to care for you here at [facility].  It is our hope that we were always able to exceed the expected standards for your care during your stay.  You were hospitalized due to bright red blood per rectum.  You were cared for on the south 2 floor by Tatiana Cheema PA-C under the service of Robbi Rust DO with the [facility] who covers for your primary care physician (PCP), Andrew Schroeder DO, while you were hospitalized.  If you have any questions or concerns related to this hospitalization, you may contact us at .  For follow up as well as any medication refills, we recommend that you follow up with your primary care physician.  A registered nurse will reach out to you by phone within a few days after your discharge to answer any additional questions that you may have after going home.  However, at this time we provide for you here, the most important instructions / recommendations at discharge:     Notable Medication Adjustments -   Start iron tablets   Change dose of lasix to 40 mg BID  Change dose of toprol to 25 mg BID  Testing Required after Discharge -   Repeat bmp and CBC in one week- sent with orders can be completed by [facility] visiting nurses  ** Please contact your PCP to request testing orders for any of the testing recommended here **  Important follow up information -   Follow up with cardiology in 8-10 days  Follow up with family doctor   Other Instructions -   Continue to monitor your weights at home and reach out to cardiology if you have an increase in weight of 3 lbs in one day or 5 lbs in one week   Please review this entire after visit summary as additional general instructions including medication list, appointments, activity, diet, any pertinent wound care, and other additional recommendations from your care team that may be provided for you.      Sincerely,      Tatiana Cheema PA-C

## 2024-02-26 NOTE — CASE MANAGEMENT
Case Management Discharge Planning Note    Patient name Pro Steele  Location [facility] 2 /South 2 M* MRN [id number]  : 1935 Date 2024       Current Admission Date: 2024  Current Admission Diagnosis:BRBPR (bright red blood per rectum)   Patient Active Problem List    Diagnosis Date Noted    Altered mental status 2024    BRBPR (bright red blood per rectum) 2024    Paroxysmal atrial fibrillation (HCC) 2023    Secondary hyperparathyroidism of renal origin (HCC) 2023    Lesion of left lung 2023    Cigarette nicotine dependence in remission 2021    Elevated troponin 11/10/2021    Pulmonary nodules 2021    Aortic stenosis, severe 2021    History of radiofrequency ablation  for SVT 2021    BPH (benign prostatic hyperplasia) 2021    CKD (chronic kidney disease) stage 4, GFR 15-29 ml/min ([facility]) 2021    Chronic diastolic (congestive) heart failure ([facility]) 2021    Pure hypercholesterolemia 2019    PVC (premature ventricular contraction) 2019    Palpitations 2018    PSVT (paroxysmal supraventricular tachycardia) 2018    Spondylolisthesis 2016    Chronic anemia 2016    Spinal stenosis of lumbar region 2016    Linda esophagus 2013    Esophageal reflux 2013    Hypertension 2012      LOS (days): 5  Geometric Mean LOS (GMLOS) (days): 4.6  Days to GMLOS:-0.5     OBJECTIVE:  Risk of Unplanned Readmission Score: 15.87         Current admission status: Inpatient   Preferred Pharmacy:   [email or web address] - [address] [address]  Phone: [phone] Fax: [phone]    Primary Care Provider: Andrew Schroeder DO    Primary Insurance: MEDICARE  Secondary Insurance: BLUE CROSS    DISCHARGE DETAILS:                                     DME Referral Provided  Referral made for DME?: Yes  DME referral completed for the following items::  Hospital Bed, Shower Chair (Shower chair (script to be sent w/ pt on dc to obtain from [facility] pharmacy))  DME Supplier Name:: [employer], Other (Add supplier name in comments) ([facility] pharmacy)         Would you like to participate in our [facility] service program?  : No - Declined    Treatment Team Recommendation: Home with Home Health Care  Discharge Destination Plan:: Home with Home Health Care ([facility] and [facility] in the Home)  Transport at Discharge : Auto with designated , Family

## 2024-02-26 NOTE — PROGRESS NOTES
Cardiology Follow Up    [facility] [place]  [address] [place] [id number]  PHONE: [phone]  FAX: [phone]    Pro Steele  1935  [id number]      Assessment/Plan:    Longstanding persistent atrial fibrillation  He is in Afib with rate control on Toprol 25 mg twice daily.  He was on Xarelto 15 mg daily prior to his recent hospitalization.  During that time he was trialed on slightly lower dose Xarelto 10 mg daily however there was still recurring BRBPR.  So Xarelto was discontinued entirely.    HFrEF, 02/21/2024 LVEF 33%, NYHA Class III-IV, Stage C, secondary to severe aortic stenosis.  Neurohormonal Blockade:  --Beta Blocker: Toprol 25 mg BID   --ARNi / ACEi / ARB: kidney function precludes  --Aldosterone Antagonist: kidney function precludes  --SGLT2 Inhibitor: kidney function precludes  --Home Diuretic: Furosemide 40 mg 2 times a day     Sudden Cardiac Death Risk Reduction:  --ICD: Patient is not interested. He is DNR/DNI.    Patient does not want TAVR since it will most likely place him on dialysis. Patient is clear about his goals of care. Palliative care worker is coming for the initial visit tomorrow with pt's family present. Palliative care is a great option for him.  He has been struggling with daytime somnolence and some confusion (?sundowning) per daughter. I discussed with her that this can be from residual hospital delirium, CKD nearing end stage, low flow state from aortic stenosis and also today, hypotension.  Would like to trial him off of Flomax which can sometimes decrease the BP.  I asked the daughter to measure the BP daily and call back the office with the readings.  Encouraged diet compliance which is fluid restriction of 64 fl oz daily and lo-salt 2G sodium restriction. I encouraged his protein intake.    CKD stage IV  Waiting for BMP to be drawn next week (3/5).       Interval History:   Pro Steele is a 88 y.o. male with past  medical history of vitamin D deficiency, iron deficiency anemia, diverticular bleed, diverticulosis, primary blood per rectum, severe aortic stenosis, CKD stage IV, heart failure with reduced ejection fraction, combined systolic and diastolic heart failure, permanent atrial fibrillation, BPH & PSVT with history of radiofrequency ablation.  He presents to the office today for close outpatient follow-up after a hospitalization at [facility] from 2/21 - 2/26/2024 for BRBPR thought to be from diverticulosis bleeding while on Xarelto.  The bleeding stopped once Xarelto was held in the hospital.  When they tried to resume Xarelto for bleeding returned.  So Xarelto was discontinued.  Also during this hospitalization the patient did have volume overload manifested as pleural effusions which was treated with IV diuretic.  His overload was thought to be from his chronic kidney disease, cardiorenal syndrome.  On discharge his weight was 167# and he was noted to have clear lungs and still 1+ pitting edema bl le.    Patient endorses his continued complaint of dyspnea on exertion from his aortic stenosis. This symptom has not changed or worsened since his discharge from the hospital. He denies chest pain or pressure. His ambulation and activity level at home is very minimal. He denies palpitations, skipped heartbeats, lightheadedness, dizziness, presyncope or syncope.  There is peripheral edema which improves with elevating the legs. He denies further BRBPR or other bleeding events.    He has been struggling with somnolence and some confusion per daughter. I educated her that this can be from residual hospital delirium, CKD nearing end stage, aortic stenosis and also today, hypotension.    The family has been assisting the patient with taking his medications without access issues, doses or side effects.     Review of Systems   Constitutional: Negative for decreased appetite, weight gain and weight loss.   Cardiovascular:  Negative for  chest pain, irregular heartbeat and palpitations.   Respiratory:  Negative for sleep disturbances due to breathing and wheezing.         Chronic rosas    Mild cough is improving since his dc from the hospital   Hematologic/Lymphatic: Does not bruise/bleed easily.   Genitourinary:         He reports there is still an adequate urinary output response with po lasix bid.   Neurological:  Positive for excessive daytime sleepiness. Negative for dizziness and light-headedness.   Psychiatric/Behavioral:  Positive for hallucinations. The patient has insomnia.        Medical Problems       Problem List       Chronic anemia    Linda esophagus    Esophageal reflux    Hypertension    Spinal stenosis of lumbar region    Spondylolisthesis    PSVT (paroxysmal supraventricular tachycardia)    Palpitations    Pure hypercholesterolemia    PVC (premature ventricular contraction)    CKD (chronic kidney disease) stage 4, GFR 15-29 ml/min ([facility])    Lab Results   Component Value Date    EGFR 23 02/26/2024    EGFR 23 02/25/2024    EGFR 23 02/24/2024    CREATININE 2.34 (H) 02/26/2024    CREATININE 2.36 (H) 02/25/2024    CREATININE 2.41 (H) 02/24/2024         Chronic diastolic (congestive) heart failure (HCC)    Wt Readings from Last 3 Encounters:   02/28/24 76.1 kg (167 lb 12.8 oz)   02/26/24 75.8 kg (167 lb 1.7 oz)   01/30/24 82.1 kg (181 lb)                 BPH (benign prostatic hyperplasia)    History of radiofrequency ablation  for SVT    Pulmonary nodules    Aortic stenosis, severe (Chronic)    Elevated troponin    Cigarette nicotine dependence in remission    Overview Signed 12/16/2021 12:45 PM by Erendira White DO     20 pack-year smoking history.  He quit 55 years ago         Lesion of left lung    Secondary hyperparathyroidism of renal origin (HCC)    Paroxysmal atrial fibrillation (HCC)    Overview Signed 9/5/2023 12:27 PM by Andrew Schroeder DO     Previously had adverse reaction to Eliquis which caused significant chest  discomfort, tolerating Xarelto         BRBPR (bright red blood per rectum)    Altered mental status         Past Medical History:   Diagnosis Date    Aortic stenosis, severe 2021    Atrial fibrillation (HCC)     CHF (congestive heart failure) (HCC)     Colon polyp     GERD (gastroesophageal reflux disease)     Hearing loss     last assessed 17    Hypertension     Rheumatic fever     Stenosis of aorta       Past Surgical History:   Procedure Laterality Date    APPENDECTOMY      BACK SURGERY      lower    CARDIAC SURGERY      ablation    CATARACT EXTRACTION      JOINT REPLACEMENT Left     knee    KNEE SURGERY Left     OH PROSTATE NEEDLE BIOPSY ANY APPROACH N/A 3/16/2021    Procedure: Transperineal prostate biopsy with biplanar transrectal ultrasound, using the perineal logic kit;  Surgeon: Derrick Jackson MD;  Location: [facility];  Service: Urology    TONSILLECTOMY AND ADENOIDECTOMY        Family History   Problem Relation Age of Onset    Other Mother         old age    Esophageal cancer Father     Other Father         old age    Alcohol abuse Son         alcoholism      Social History     Socioeconomic History    Marital status: /Civil Union     Spouse name: Not on file    Number of children: Not on file    Years of education: Not on file    Highest education level: Not on file   Occupational History    Not on file   Tobacco Use    Smoking status: Former     Current packs/day: 0.00     Average packs/day: 1 pack/day for 16.0 years (16.0 ttl pk-yrs)     Types: Cigarettes     Start date:      Quit date:      Years since quittin.1    Smokeless tobacco: Never   Vaping Use    Vaping status: Never Used   Substance and Sexual Activity    Alcohol use: Yes     Comment: occ    Drug use: No    Sexual activity: Yes     Partners: Female   Other Topics Concern    Not on file   Social History Narrative    Not on file     Social Determinants of Health     Financial Resource Strain: Low Risk  (2023)     Overall Financial Resource Strain (CARDIA)     Difficulty of Paying Living Expenses: Not hard at all   Food Insecurity: No Food Insecurity (2/23/2024)    Hunger Vital Sign     Worried About Running Out of Food in the Last Year: Never true     Ran Out of Food in the Last Year: Never true   Transportation Needs: No Transportation Needs (2/23/2024)    PRAPARE - Transportation     Lack of Transportation (Medical): No     Lack of Transportation (Non-Medical): No   Physical Activity: Not on file   Stress: Not on file   Social Connections: Not on file   Intimate Partner Violence: Not on file   Housing Stability: Low Risk  (2/23/2024)    Housing Stability Vital Sign     Unable to Pay for Housing in the Last Year: No     Number of Places Lived in the Last Year: 1     Unstable Housing in the Last Year: No        Current Outpatient Medications:     Cholecalciferol (VITAMIN D3) 125 MCG (5000 UT) CAPS, 2 (two) times a week , Disp: , Rfl:     ferrous sulfate 324 (65 Fe) mg, Take 1 tablet (324 mg total) by mouth daily before breakfast, Disp: 30 tablet, Rfl: 0    finasteride (PROSCAR) 5 mg tablet, Take 1 tablet (5 mg total) by mouth daily, Disp: 90 tablet, Rfl: 1    furosemide (LASIX) 40 mg tablet, Take 1 tablet (40 mg total) by mouth 2 (two) times a day, Disp: 60 tablet, Rfl: 0    metoprolol succinate (TOPROL-XL) 25 mg 24 hr tablet, Take 1 tablet (25 mg total) by mouth 2 (two) times a day, Disp: 60 tablet, Rfl: 0    Misc. Devices (Bath/Shower Seat) MISC, Use daily as needed (showering), Disp: 1 each, Rfl: 0    Multiple Vitamin (MULTI-VITAMIN DAILY) TABS, Take by mouth, Disp: , Rfl:     nitroglycerin (NITROSTAT) 0.4 mg SL tablet, Place 1 tablet (0.4 mg total) under the tongue once as needed for chest pain for up to 1 dose Lay down supine before taking and wait 15 minutes to get up.  Get up very slowly.  If pain continues, call 911, Disp: 25 tablet, Rfl: 2    omeprazole (PriLOSEC) 20 mg delayed release capsule, Take 1 capsule (20 mg  total) by mouth daily, Disp: 90 capsule, Rfl: 1    rosuvastatin (CRESTOR) 5 mg tablet, Take 1 tablet (5 mg total) by mouth daily, Disp: 90 tablet, Rfl: 1    tamsulosin (FLOMAX) 0.4 mg, Take 1 capsule (0.4 mg total) by mouth daily with dinner, Disp: 30 capsule, Rfl: 2    Misc. Devices MISC, Use daily as needed (apply to legs) Sequential compression boots and machine (Patient not taking: Reported on 2/28/2024), Disp: 1 each, Rfl: 0     Allergies   Allergen Reactions    Apixaban Other (See Comments)       Physical Exam:  Vitals:    02/28/24 0832   BP: 90/60   Pulse: 65   SpO2: 94%     Vitals:    02/28/24 0832   Weight: 76.1 kg (167 lb 12.8 oz)         Body mass index is 24.78 kg/m².    Physical Exam  Vitals and nursing note reviewed.   Constitutional:       Comments: Somnolent    When he is awake he is A&O & participatory   HENT:      Head: Normocephalic and atraumatic.      Nose: No congestion.      Mouth/Throat:      Mouth: Mucous membranes are moist.   Eyes:      Conjunctiva/sclera: Conjunctivae normal.   Neck:      Comments: No JVD noted.  Cardiovascular:      Pulses: Normal pulses.      Heart sounds: Murmur heard.      Comments: Normal S1 and S2.  Rhythm is irregularly irregular.  His heart rate during my encounter is 88 bpm.  There is grade 3 crescendo decrescendo systolic murmur of aortic stenosis  Pulmonary:      Comments: Mild cough noted which is wet sounding from which he recovers easily    No wheezing, no increased work of breathing at rest or with ambulation.  There are very mild crackles bibasilar left greater than right.  Abdominal:      Palpations: Abdomen is soft.      Tenderness: There is no abdominal tenderness.   Musculoskeletal:      Comments: 3+ pitting edema to the knees and some dependent edema in the bl thighs   Skin:     Findings: No bruising or rash.      Comments: Gray colored skin related to ckd    Neurological:      Sensory: No sensory deficit.      Motor: No weakness.      Gait: Gait  normal.   Psychiatric:         Thought Content: Thought content normal.         Data:  Labs:     Chemistry        Component Value Date/Time     05/15/2015 1059    K 3.6 02/26/2024 0445    K 4.4 05/15/2015 1059     02/26/2024 0445     05/15/2015 1059    CO2 36 (H) 02/26/2024 0445    CO2 30 05/15/2015 1059    BUN 60 (H) 02/26/2024 0445    BUN 28 (H) 05/15/2015 1059    CREATININE 2.34 (H) 02/26/2024 0445    CREATININE 1.37 (H) 05/15/2015 1059        Component Value Date/Time    CALCIUM 9.2 02/26/2024 0445    CALCIUM 9.7 05/15/2015 1059    ALKPHOS 97 02/21/2024 0638    ALKPHOS 83 05/15/2015 1059    AST 33 02/21/2024 0638    AST 21 05/15/2015 1059    ALT 29 02/21/2024 0638    ALT 23 05/15/2015 1059    BILITOT 0.40 05/15/2015 1059            Lab Results   Component Value Date    CHOL 188 05/15/2015    CHOL 177 05/09/2014    HDL 53 07/26/2023    LDLCALC 48 07/26/2023    TRIG 52 07/26/2023         Echo: Result Date: 2/21/2024  Narrative:   Left Ventricle: Left ventricular cavity size is normal. Wall thickness is severely increased. There is moderate to severe concentric hypertrophy. There is concentric remodeling. The left ventricular ejection fraction is 33% by biplane measurement. Systolic function is severely reduced. There is severe global hypokinesis.   Right Ventricle: Right ventricular cavity size is mild to moderately dilated. Systolic function is mildly reduced. Abnormal tricuspid annular plane systolic excursion (TAPSE) < 1.7 cm.   Left Atrium: The atrium is moderately dilated (42-48 mL/m2).   Right Atrium: The atrium is mildly dilated.   Aortic Valve: The aortic valve is trileaflet. The leaflets are severely thickened. The leaflets are severely calcified. There is severely reduced mobility. There is mild regurgitation. There is critical stenosis. The aortic valve peak velocity is 3.61 m/s. The aortic valve mean gradient is 33 mmHg. The dimensionless velocity index is 0.16. The aortic valve area  is 0.54 cm2. The stroke volume index is 22.60 ml/m2.   Mitral Valve: The anterior leaflet is thickened and calcified with reduced motion.  The posterior leaflet is not well-seen due to mitral annulus calcification. There is moderate annular calcification. There is moderate regurgitation.   Tricuspid Valve: There is moderate regurgitation. There is no evidence of stenosis. The right ventricular systolic pressure is moderately to severely elevated. The estimated right ventricular systolic pressure is 65.00 mmHg.           Michelle Smith PA-C  [facility]

## 2024-02-26 NOTE — DISCHARGE SUMMARY
[facility]  Discharge- Pro Steele 1935, 88 y.o. male MRN: [id number]  Unit/Bed#: [place] [address] -01 Encounter: [id number]  Primary Care Provider: Andrew Schroeder DO   Date and time admitted to hospital: 2/21/2024  6:29 AM    * BRBPR (bright red blood per rectum)  Assessment & Plan  History of atrial fibrillation AS BPH and CKD 4 presents with 4 episodes of BRBPR  Denies any previous episode.  Last colonoscopy 2021 diverticuli without bleeding  Likely diverticular bleed.  GI signed off when bleed resolved and xarelto was restarted on 2/23, unfortunately bleeding reoccurred evening of 2/24 and xarelto was discontinued   No episodes of bleeding overnight   Recieved 2 doses of IV venofer  Continue to monitor stool at home and repeat cbc in one week   Follow up with family doctor     Results from last 7 days   Lab Units 02/26/24  0445 02/25/24  0527 02/24/24  0838 02/23/24  0432   HEMOGLOBIN g/dL 8.7* 9.1* 10.9* 10.4*         Chronic diastolic (congestive) heart failure (HCC)  Assessment & Plan  Wt Readings from Last 3 Encounters:   02/26/24 75.8 kg (167 lb 1.7 oz)   01/30/24 82.1 kg (181 lb)   01/04/24 77.6 kg (171 lb)     Probable acute component of chronic diastolic CHF  Follows with Dr. Pollack. See outpatient in 8-10 days repeat bmp prior to visit   Continue lasix 40 mg BID  Continue to monitor weights at home   Cardiology and nephrology cleared for discharge       Altered mental status  Assessment & Plan  Family reported increased confusion since coming into the hospital. Patient does report continued cough   Procal negative x 2   Chest x ray  without consolidation   UA, COVID/FLU/RSV unremarkable   Discussed with patient today, alert and oriented x 3, waking up feeling confused and having poor sleep while in the hospital resolves with reorientation      Paroxysmal atrial fibrillation (HCC)  Assessment & Plan  Continue metoprolol.  Prior to admission on Xarelto.  Previously  on eliquis but developed chest discomfort   Restarted Xarelto at lower dose of 10 mg per cardiology recommendations, discontinued due to bleeding    Cardiology cleared for discharge     Elevated troponin  Assessment & Plan  Elevated troponins secondary to blood loss anemia CKD and CHF  Cardiology consulted   Denies chest pain    Results from last 7 days   Lab Units 02/21/24  1056 02/21/24  0847 02/21/24  0638   HS TNI 0HR ng/L  --   --  126*   HS TNI 2HR ng/L  --  120*  --    HS TNI 4HR ng/L 103*  --   --          Aortic stenosis, severe  Assessment & Plan  Declined TAVR due to concerns for workup causing worsening renal dysfunction.  Follows with cardiology as an outpatient  Repeat ECHO showing EF 33%. Systolic function severely reduced. Aortic valve showing critical aortic stenosis  Discussed with family and patient agreeable to palliative consult at discharge to assist with symptom management   Sent with paper script for walker    CKD (chronic kidney disease) stage 4, GFR 15-29 ml/min ([facility])  Assessment & Plan  Lab Results   Component Value Date    EGFR 23 02/26/2024    EGFR 23 02/25/2024    EGFR 23 02/24/2024    CREATININE 2.34 (H) 02/26/2024    CREATININE 2.36 (H) 02/25/2024    CREATININE 2.41 (H) 02/24/2024     Follows with Dr. Kincaid as an outpatient.  Appears to be at baseline      BPH (benign prostatic hyperplasia)  Assessment & Plan  No urinary symptoms.  Continue finasteride and tamsulosin      Medical Problems       Resolved Problems  Date Reviewed: 2/26/2024            Resolved    Cough 2/21/2024     Resolved by  Admin Ambulatory        Discharging Physician / Practitioner: Tatiana Cheema PA-C  PCP: Andrew Schroeder,   Admission Date:   Admission Orders (From admission, onward)       Ordered        02/21/24 0852  INPATIENT ADMISSION  Once                          Discharge Date: 02/26/24    Consultations During Hospital Stay:  Nephrology, cardiology, gastroenterology    Procedures Performed:    none  Significant Findings / Test Results:   XR chest pa & lateral   Final Result by Tejinder Montelongo MD (02/25 1323)      Small bilateral pleural effusions. No focal consolidations.               Resident: Tejinder Craft I, the attending radiologist, have reviewed the images and agree with the final report above.      Workstation performed: [id number]         XR chest 1 view portable   ED Interpretation by Jordan Nayak MD (02/21 8874)   I have reviewed the film, per my independent interpretation : Mild vascular congestion and possibly an effusion on the right.  No free air visualized.  Formal read per radiology.        Final Result by Rohan Cole MD (02/21 1405)      Small right pleural effusion. No focal consolidation.            Workstation performed: [id number]              Incidental Findings:   none    Test Results Pending at Discharge (will require follow up):   None     Outpatient Tests Requested:  Repeat BMP and CBC in 1 week    Complications: None    Reason for Admission: Bright red blood per rectum    Hospital Course:   Pro Steele is a 88 y.o. male patient who originally presented to the hospital on 2/21/2024 due to rectal bleeding.  Patient had 4 episodes of bloody stools at home and then presented to the hospital.  Patient's Xarelto was held and GI was consulted.  Patient was also noted to be volume overloaded and cardiology was consulted and patient was started on IV diuretic medications.  Repeat echo showed critical aortic stenosis.  Patient's hemoglobin remained stable and bloody stools resolved off Xarelto.  Reviewed risks versus benefits of restarting Xarelto with the patient and family.  Decision was made to resume Xarelto at lower dose.  Unfortunately 2 days after resuming Xarelto patient began having bloody stools again.  Xarelto was then discontinued.  Patient was then transition to oral diuretics.  He received 2 doses of IV Venofer while inpatient  "and will continue on oral iron at discharge.  His diuretics were increased from 40 mg daily to 40 mg twice a day.  His Toprol was increased to 25 mg twice daily.  He will get repeat CBC and BMP in 1 week.  He will follow-up with cardiology in 8 to 10 days.  He will follow-up with his family doctor. He was referred to palliative care.  Hospital bed is being ordered.  Order for shower chair in order for compression sent with family    On the way  Please see above list of diagnoses and related plan for additional information.     Condition at Discharge: stable    Discharge Day Visit / Exam:   Subjective: Patient seen sitting in chair at bedside today.  He reports feeling well today.  He denies any shortness of breath, chest pain, abdominal pain nausea or vomiting.  He states that his cough continues to improve.  He denies any blood in his stools.  Vitals: Blood Pressure: 103/70 (02/26/24 0846)  Pulse: 89 (02/26/24 0846)  Temperature: 97.6 °F (36.4 °C) (02/25/24 2312)  Temp Source: Oral (02/25/24 1914)  Respirations: 17 (02/26/24 0729)  Height: 5' 9\" (175.3 cm) (02/21/24 1624)  Weight - Scale: 75.8 kg (167 lb 1.7 oz) (02/26/24 0553)  SpO2: 90 % (02/26/24 0846)  Exam:   Physical Exam  Vitals and nursing note reviewed.   Constitutional:       Appearance: Normal appearance.   HENT:      Head: Normocephalic and atraumatic.   Eyes:      General: No scleral icterus.  Cardiovascular:      Rate and Rhythm: Normal rate.      Heart sounds: Murmur heard.   Pulmonary:      Effort: Pulmonary effort is normal.      Breath sounds: Normal breath sounds.   Abdominal:      General: Abdomen is flat. Bowel sounds are normal.      Palpations: Abdomen is soft.   Musculoskeletal:      Right lower leg: Edema present.      Left lower leg: Edema present.   Skin:     General: Skin is warm and dry.   Neurological:      Mental Status: He is alert. Mental status is at baseline.   Psychiatric:         Mood and Affect: Mood normal.         Behavior: " Behavior normal.          Discussion with Family: Updated  (son and daughter) at bedside.    Discharge instructions/Information to patient and family:   See after visit summary for information provided to patient and family.      Provisions for Follow-Up Care:  See after visit summary for information related to follow-up care and any pertinent home health orders.      Mobility at time of Discharge:   Basic Mobility Inpatient Raw Score: 17  JH-HLM Goal: 5: Stand one or more mins  JH-HLM Achieved: 7: Walk 25 feet or more  HLM Goal achieved. Continue to encourage appropriate mobility.     Disposition:   Home with VNA Services (Reminder: Complete face to face encounter)    Planned Readmission: none     Discharge Statement:  I spent 60 minutes discharging the patient. This time was spent on the day of discharge. I had direct contact with the patient on the day of discharge. Greater than 50% of the total time was spent examining patient, answering all patient questions, arranging and discussing plan of care with patient as well as directly providing post-discharge instructions.  Additional time then spent on discharge activities.    Discharge Medications:  See after visit summary for reconciled discharge medications provided to patient and/or family.      **Please Note: This note may have been constructed using a voice recognition system**

## 2024-02-26 NOTE — ASSESSMENT & PLAN NOTE
Wt Readings from Last 3 Encounters:   02/26/24 75.8 kg (167 lb 1.7 oz)   01/30/24 82.1 kg (181 lb)   01/04/24 77.6 kg (171 lb)     Probable acute component of chronic diastolic CHF  Follows with Dr. Pollack. See outpatient in 8-10 days repeat bmp prior to visit   Continue lasix 40 mg BID  Continue to monitor weights at home   Cardiology and nephrology cleared for discharge

## 2024-02-27 ENCOUNTER — TRANSITIONAL CARE MANAGEMENT (OUTPATIENT)
Dept: INTERNAL MEDICINE CLINIC | Facility: CLINIC | Age: 89
End: 2024-02-27

## 2024-02-27 ENCOUNTER — HOME CARE VISIT (OUTPATIENT)
Dept: HOME HEALTH SERVICES | Facility: HOME HEALTHCARE | Age: 89
End: 2024-02-27

## 2024-02-28 ENCOUNTER — OFFICE VISIT (OUTPATIENT)
Dept: CARDIOLOGY CLINIC | Facility: CLINIC | Age: 89
End: 2024-02-28
Payer: MEDICARE

## 2024-02-28 VITALS
DIASTOLIC BLOOD PRESSURE: 60 MMHG | OXYGEN SATURATION: 94 % | SYSTOLIC BLOOD PRESSURE: 90 MMHG | BODY MASS INDEX: 24.78 KG/M2 | HEART RATE: 65 BPM | WEIGHT: 167.8 LBS

## 2024-02-28 DIAGNOSIS — I35.0 AORTIC STENOSIS, SEVERE: Chronic | ICD-10-CM

## 2024-02-28 DIAGNOSIS — I48.0 PAROXYSMAL ATRIAL FIBRILLATION (HCC): ICD-10-CM

## 2024-02-28 DIAGNOSIS — R40.0 SOMNOLENCE: ICD-10-CM

## 2024-02-28 DIAGNOSIS — I50.32 CHRONIC DIASTOLIC (CONGESTIVE) HEART FAILURE (HCC): Primary | ICD-10-CM

## 2024-02-28 DIAGNOSIS — N18.4 CKD (CHRONIC KIDNEY DISEASE) STAGE 4, GFR 15-29 ML/MIN (HCC): ICD-10-CM

## 2024-02-28 PROCEDURE — 99215 OFFICE O/P EST HI 40 MIN: CPT | Performed by: PHYSICIAN ASSISTANT

## 2024-02-28 NOTE — PATIENT INSTRUCTIONS
Please stop the Flomax.  This can lower your blood pressure.  If you have trouble passing urine you can add the Flomax back every other day.    Continue the lasix as is.    In the meantime please check the blood pressure once a day.  My goal is for blood pressure 110s not in the 90s.    Please call the office around Friday 3/1 with the blood pressure readings and an update after the palliative care meeting.    I will work on getting you the brand name of the leg pumps from the hospital.    Getting the blood test on Monday 3/4 is sufficient.  You do not need to fast for that blood test.

## 2024-02-29 ENCOUNTER — TELEPHONE (OUTPATIENT)
Age: 89
End: 2024-02-29

## 2024-02-29 ENCOUNTER — HOME CARE VISIT (OUTPATIENT)
Dept: HOME HEALTH SERVICES | Facility: HOME HEALTHCARE | Age: 89
End: 2024-02-29

## 2024-02-29 ENCOUNTER — HOME CARE VISIT (OUTPATIENT)
Dept: HOME HEALTH SERVICES | Facility: HOME HEALTHCARE | Age: 89
End: 2024-02-29
Payer: MEDICARE

## 2024-02-29 ENCOUNTER — LAB REQUISITION (OUTPATIENT)
Dept: LAB | Facility: HOSPITAL | Age: 89
End: 2024-02-29
Payer: MEDICARE

## 2024-02-29 VITALS
BODY MASS INDEX: 24.44 KG/M2 | WEIGHT: 165.5 LBS | OXYGEN SATURATION: 98 % | SYSTOLIC BLOOD PRESSURE: 118 MMHG | DIASTOLIC BLOOD PRESSURE: 60 MMHG | TEMPERATURE: 97.5 F | RESPIRATION RATE: 18 BRPM | HEART RATE: 74 BPM

## 2024-02-29 DIAGNOSIS — I35.0 AORTIC STENOSIS, SEVERE: Chronic | ICD-10-CM

## 2024-02-29 DIAGNOSIS — I50.32 CHRONIC DIASTOLIC (CONGESTIVE) HEART FAILURE (HCC): ICD-10-CM

## 2024-02-29 DIAGNOSIS — R06.09 DOE (DYSPNEA ON EXERTION): ICD-10-CM

## 2024-02-29 DIAGNOSIS — K92.2 GASTROINTESTINAL HEMORRHAGE, UNSPECIFIED: ICD-10-CM

## 2024-02-29 DIAGNOSIS — N18.4 CKD (CHRONIC KIDNEY DISEASE) STAGE 4, GFR 15-29 ML/MIN (HCC): Primary | ICD-10-CM

## 2024-02-29 LAB
ANION GAP SERPL CALCULATED.3IONS-SCNC: 9 MMOL/L
BASOPHILS # BLD AUTO: 0.06 THOUSANDS/ÂΜL (ref 0–0.1)
BASOPHILS NFR BLD AUTO: 1 % (ref 0–1)
BUN SERPL-MCNC: 66 MG/DL (ref 5–25)
CALCIUM SERPL-MCNC: 10.1 MG/DL (ref 8.4–10.2)
CHLORIDE SERPL-SCNC: 98 MMOL/L (ref 96–108)
CO2 SERPL-SCNC: 34 MMOL/L (ref 21–32)
CREAT SERPL-MCNC: 2.56 MG/DL (ref 0.6–1.3)
EOSINOPHIL # BLD AUTO: 0.65 THOUSAND/ÂΜL (ref 0–0.61)
EOSINOPHIL NFR BLD AUTO: 7 % (ref 0–6)
ERYTHROCYTE [DISTWIDTH] IN BLOOD BY AUTOMATED COUNT: 16.7 % (ref 11.6–15.1)
GFR SERPL CREATININE-BSD FRML MDRD: 21 ML/MIN/1.73SQ M
GLUCOSE SERPL-MCNC: 96 MG/DL (ref 65–140)
HCT VFR BLD AUTO: 32.3 % (ref 36.5–49.3)
HGB BLD-MCNC: 9.8 G/DL (ref 12–17)
IMM GRANULOCYTES # BLD AUTO: 0.02 THOUSAND/UL (ref 0–0.2)
IMM GRANULOCYTES NFR BLD AUTO: 0 % (ref 0–2)
LYMPHOCYTES # BLD AUTO: 1.2 THOUSANDS/ÂΜL (ref 0.6–4.47)
LYMPHOCYTES NFR BLD AUTO: 13 % (ref 14–44)
MCH RBC QN AUTO: 28.3 PG (ref 26.8–34.3)
MCHC RBC AUTO-ENTMCNC: 30.3 G/DL (ref 31.4–37.4)
MCV RBC AUTO: 93 FL (ref 82–98)
MONOCYTES # BLD AUTO: 1.02 THOUSAND/ÂΜL (ref 0.17–1.22)
MONOCYTES NFR BLD AUTO: 11 % (ref 4–12)
NEUTROPHILS # BLD AUTO: 6.16 THOUSANDS/ÂΜL (ref 1.85–7.62)
NEUTS SEG NFR BLD AUTO: 68 % (ref 43–75)
NRBC BLD AUTO-RTO: 0 /100 WBCS
PLATELET # BLD AUTO: 260 THOUSANDS/UL (ref 149–390)
PMV BLD AUTO: 11 FL (ref 8.9–12.7)
POTASSIUM SERPL-SCNC: 4.3 MMOL/L (ref 3.5–5.3)
RBC # BLD AUTO: 3.46 MILLION/UL (ref 3.88–5.62)
SODIUM SERPL-SCNC: 141 MMOL/L (ref 135–147)
WBC # BLD AUTO: 9.11 THOUSAND/UL (ref 4.31–10.16)

## 2024-02-29 PROCEDURE — 400013 VN SOC

## 2024-02-29 PROCEDURE — 85025 COMPLETE CBC W/AUTO DIFF WBC: CPT

## 2024-02-29 PROCEDURE — G0299 HHS/HOSPICE OF RN EA 15 MIN: HCPCS

## 2024-02-29 PROCEDURE — 80048 BASIC METABOLIC PNL TOTAL CA: CPT

## 2024-02-29 PROCEDURE — 10330081 VN NO-PAY CLAIM PROCEDURE

## 2024-02-29 NOTE — TELEPHONE ENCOUNTER
Nurse Practitioner from  calling on behalf of patient, will fax over home visit notes from today, but family was requesting if all future office visits could be virtual because patient is having increased trouble getting around and out of the house. Would like to know is scheduled f/u visit next and in April could be virtual. Please call back Daughter [phone]

## 2024-02-29 NOTE — TELEPHONE ENCOUNTER
[facility] called would like an order for skilled nursing. [phone] direct line to Nicole 24 hr # [phone] fax [phone] would need last face to face visit.

## 2024-03-01 ENCOUNTER — HOME CARE VISIT (OUTPATIENT)
Dept: HOME HEALTH SERVICES | Facility: HOME HEALTHCARE | Age: 89
End: 2024-03-01
Payer: MEDICARE

## 2024-03-01 ENCOUNTER — TELEPHONE (OUTPATIENT)
Dept: CARDIOLOGY CLINIC | Facility: CLINIC | Age: 89
End: 2024-03-01

## 2024-03-01 VITALS — DIASTOLIC BLOOD PRESSURE: 62 MMHG | HEART RATE: 68 BPM | OXYGEN SATURATION: 97 % | SYSTOLIC BLOOD PRESSURE: 100 MMHG

## 2024-03-01 DIAGNOSIS — N18.4 CKD (CHRONIC KIDNEY DISEASE) STAGE 4, GFR 15-29 ML/MIN (HCC): Primary | ICD-10-CM

## 2024-03-01 DIAGNOSIS — R06.09 DOE (DYSPNEA ON EXERTION): ICD-10-CM

## 2024-03-01 DIAGNOSIS — I35.0 AORTIC STENOSIS, SEVERE: Chronic | ICD-10-CM

## 2024-03-01 DIAGNOSIS — I35.0 AORTIC STENOSIS, SEVERE: ICD-10-CM

## 2024-03-01 DIAGNOSIS — I50.32 CHRONIC DIASTOLIC (CONGESTIVE) HEART FAILURE (HCC): ICD-10-CM

## 2024-03-01 PROCEDURE — G0151 HHCP-SERV OF PT,EA 15 MIN: HCPCS

## 2024-03-01 NOTE — CASE COMMUNICATION
[facility] has admitted your patient to Home Health service with the following disciplines:      PT, OT, SN.  MSW added 2nd family requesting more help in home.  This report is informational only, no response is needed  Primary focus of home health care  CHF  Patient stated goals of care  I want to live another 5 years  Anticipated visit pattern and next visit date 1w1, 2w2,   See medication list - meds as per AVS post Cardio appt   Significant clinical findings  3+ pitting LE edema up to a few inches above knees  Potential barriers to goal achievement  comorbidities    Thank you for allowing us to participate in the care of your patient.

## 2024-03-01 NOTE — CASE COMMUNICATION
Pt doesn't go to [facility].  ----- Message -----  From: Kaitlyn Thomson RN  Sent: 2/29/2024  12:57 PM EST  To: Geno Heath RN      [facility] orders entered.  Expected 3/4/24

## 2024-03-01 NOTE — TELEPHONE ENCOUNTER
Daughter calling to report Weight and Bps as requested by Michelle at OV on 2/28/24 2/29/24     wt 165.5  /80   3/01/24     wt 158.5  /90  Told daughter would forward info to Michelle MURPHY.

## 2024-03-04 ENCOUNTER — TELEMEDICINE (OUTPATIENT)
Dept: INTERNAL MEDICINE CLINIC | Facility: CLINIC | Age: 89
End: 2024-03-04
Payer: MEDICARE

## 2024-03-04 ENCOUNTER — HOME CARE VISIT (OUTPATIENT)
Dept: HOME HEALTH SERVICES | Facility: HOME HEALTHCARE | Age: 89
End: 2024-03-04
Payer: MEDICARE

## 2024-03-04 ENCOUNTER — TELEPHONE (OUTPATIENT)
Age: 89
End: 2024-03-04

## 2024-03-04 VITALS
SYSTOLIC BLOOD PRESSURE: 130 MMHG | DIASTOLIC BLOOD PRESSURE: 92 MMHG | HEART RATE: 97 BPM | BODY MASS INDEX: 24.69 KG/M2 | WEIGHT: 167.2 LBS

## 2024-03-04 DIAGNOSIS — R33.9 INCOMPLETE BLADDER EMPTYING: ICD-10-CM

## 2024-03-04 DIAGNOSIS — N18.4 CKD (CHRONIC KIDNEY DISEASE) STAGE 4, GFR 15-29 ML/MIN (HCC): ICD-10-CM

## 2024-03-04 DIAGNOSIS — N13.8 BPH WITH OBSTRUCTION/LOWER URINARY TRACT SYMPTOMS: ICD-10-CM

## 2024-03-04 DIAGNOSIS — I35.0 AORTIC STENOSIS, SEVERE: Primary | Chronic | ICD-10-CM

## 2024-03-04 DIAGNOSIS — R39.12 WEAK URINARY STREAM: ICD-10-CM

## 2024-03-04 DIAGNOSIS — N40.1 BPH WITH OBSTRUCTION/LOWER URINARY TRACT SYMPTOMS: ICD-10-CM

## 2024-03-04 DIAGNOSIS — K62.5 BRBPR (BRIGHT RED BLOOD PER RECTUM): ICD-10-CM

## 2024-03-04 PROCEDURE — 99496 TRANSJ CARE MGMT HIGH F2F 7D: CPT | Performed by: INTERNAL MEDICINE

## 2024-03-04 NOTE — TELEPHONE ENCOUNTER
Pts son is with the pt waiting for a virtual visit. Pt is on a computer through [employer] and doesn't know how to connect with the link. Please advise [phone] thank you.

## 2024-03-04 NOTE — PROGRESS NOTES
Virtual TCM Visit:    Verification of patient location:    Patient is located at Home in the following state in which I hold an active license [place]    Assessment/Plan:     Medical history of rheumatic fever with resulting severe aortic stenosis, hypertension, hyperlipidemia, BPH, paroxysmal SVT, chronic diastolic CHF, CKD stage 4, spinal stenosis, left knee replacement, lung nodule, atrial fibrillation    Admitted at [facility] from 2/21 to 2/26.  Presented with rectal bleeding at home.  Xarelto placed on hold, patient seen by GI.    Decision made to resume Xarelto at lower dose.  2 days after resuming Xarelto patient developed recurrent bloody stools.  Xarelto was then discontinued    Noted to be volume overloaded on exam, underwent IV diuresis.  Repeat echo showing critical aortic stenosis, he has known history of severe aortic stenosis.  He was transitioned to oral diuretics    Reduction in LVEF on 2D echo down to 33%, previously 60% May 2023    Received 2 doses of IV Venofer during admission, transitioned to oral iron on discharge    States he has been doing better since discharge.  A little bit more active.  Does not have any significant concerns today      Plan:  -Continue increased dose of Lasix 40 mg twice daily  -Toprol XL increased to 25 mg twice daily  -Repeat CBC and BMP in 1 week  -Patient inquires about resuming tamsulosin which apparently was held in the setting of lower BP in the hospital.  He has been tracking BP closely at home and this will also be monitored by visiting nurses.  Last few readings 118/80, 112/90.  Okay to resume tamsulosin from my standpoint, can discontinue if decrease in BP/symptomatic hypotension  -Continue home nursing, PT/OT  -He asked about aspirin for stroke prevention.  States in the past he took 2 baby aspirin and did well on this. He is concerned as he is not on any meds for stroke prevention with his afib.  We did discuss limited utility of aspirin for stroke  prevention especially in light of his recent GI bleed.  He does seem to be in favor of restarting aspirin and if he chooses to do so I did ask that he only take aspirin 81 mg daily as opposed to 162 mg daily as there is likely no added benefit with increase in bleeding risk           Problem List Items Addressed This Visit     CKD (chronic kidney disease) stage 4, GFR 15-29 ml/min ([facility])    Aortic stenosis, severe - Primary (Chronic)    BRBPR (bright red blood per rectum)        Reason for visit is TCM    Encounter provider Andrew Schroeder DO     Provider located at [address] [place] [address] INTERNAL MEDICINE [address] [address]    Recent Visits  No visits were found meeting these conditions.  Showing recent visits within past 7 days and meeting all other requirements  Today's Visits  Date Type Provider Dept   03/04/24 Telemedicine Andrew Schroeder DO  Internal Med [facility]   Showing today's visits and meeting all other requirements  Future Appointments  No visits were found meeting these conditions.  Showing future appointments within next 150 days and meeting all other requirements       After connecting through [employer], the patient was identified by name and date of birth. Pro Steele was informed that this is a telemedicine visit and that the visit is being conducted through the Epic Embedded platform. He agrees to proceed..  My office door was closed. No one else was in the room.  He acknowledged consent and understanding of privacy and security of the video platform. The patient has agreed to participate and understands they can discontinue the visit at any time.    Patient is aware this is a billable service.    Transitional Care Management Review:  Pro Steele is a 88 y.o. male here for TCM follow up.     During the TCM phone call patient stated:    TCM Call     Date and time call was made  2/27/2024  1:22 PM    Hospital care reviewed  Records  reviewed    Patient was hospitialized at  [facility]    Date of Admission  02/21/24    Date of discharge  02/26/24    Diagnosis  BRBPR (bright red blood per rectum)    Disposition  Home    Were the patients medications reviewed and updated  No    Current Symptoms  None      TCM Call     Post hospital issues  None    Should patient be enrolled in anticoag monitoring?  No    Scheduled for follow up?  Yes    Did you obtain your prescribed medications  Yes    Do you need help managing your prescriptions or medications  No    Is transportation to your appointment needed  No    I have advised the patient to call PCP with any new or worsening symptoms  JERICA Canales    Living Arrangements  Spouse or Significiant other    Support System  Family; Spouse    The type of support provided  Emotional; Physical    Do you have social support  Yes, as much as I need    Are you recieving any outpatient services  No    Have you fallen in the last 12 months  No    Interperter language line needed  No    Counseling  Patient        Subjective:     Patient ID: Pro Steele is a 88 y.o. male.    HPI  Review of Systems      Objective:    Vitals:    03/04/24 1054   BP: 130/92   BP Location: Right arm   Patient Position: Sitting   Cuff Size: Standard   Pulse: 97   Weight: 75.8 kg (167 lb 3.2 oz)       Physical Exam        I spent minutes with the patient during this visit.    Andrew Schroeder, DO      VIRTUAL VISIT DISCLAIMER    Pro Steele verbally agrees to participate in Virtual Care Services. Pt is aware that Virtual Care Services could be limited without vital signs or the ability to perform a full hands-on physical exam. Pro Steele understands he or the provider may request at any time to terminate the video visit and request the patient to seek care or treatment in person.

## 2024-03-04 NOTE — TELEPHONE ENCOUNTER
Patients dghtr called and faxed over [facility] ppwk so that she can help take care of her father.  The ppwk was faxed over 15 mins ago.  She would like call when the ppwk is fillouted and ready to be picked up.    [phone]

## 2024-03-05 ENCOUNTER — HOME CARE VISIT (OUTPATIENT)
Dept: HOME HEALTH SERVICES | Facility: HOME HEALTHCARE | Age: 89
End: 2024-03-05
Payer: MEDICARE

## 2024-03-05 VITALS
TEMPERATURE: 97.5 F | HEART RATE: 79 BPM | SYSTOLIC BLOOD PRESSURE: 118 MMHG | OXYGEN SATURATION: 98 % | DIASTOLIC BLOOD PRESSURE: 68 MMHG

## 2024-03-05 PROCEDURE — G0299 HHS/HOSPICE OF RN EA 15 MIN: HCPCS

## 2024-03-05 RX ORDER — TAMSULOSIN HYDROCHLORIDE 0.4 MG/1
0.4 CAPSULE ORAL
Qty: 90 CAPSULE | Refills: 1 | Status: SHIPPED | OUTPATIENT
Start: 2024-03-05

## 2024-03-06 ENCOUNTER — HOME CARE VISIT (OUTPATIENT)
Dept: HOME HEALTH SERVICES | Facility: HOME HEALTHCARE | Age: 89
End: 2024-03-06
Payer: MEDICARE

## 2024-03-06 VITALS — SYSTOLIC BLOOD PRESSURE: 112 MMHG | OXYGEN SATURATION: 97 % | HEART RATE: 83 BPM | DIASTOLIC BLOOD PRESSURE: 64 MMHG

## 2024-03-06 PROCEDURE — G0151 HHCP-SERV OF PT,EA 15 MIN: HCPCS

## 2024-03-07 ENCOUNTER — TELEPHONE (OUTPATIENT)
Age: 89
End: 2024-03-07

## 2024-03-07 ENCOUNTER — HOME CARE VISIT (OUTPATIENT)
Dept: HOME HEALTH SERVICES | Facility: HOME HEALTHCARE | Age: 89
End: 2024-03-07
Payer: MEDICARE

## 2024-03-07 ENCOUNTER — TELEPHONE (OUTPATIENT)
Dept: CARDIOLOGY CLINIC | Facility: CLINIC | Age: 89
End: 2024-03-07

## 2024-03-07 VITALS
HEART RATE: 92 BPM | DIASTOLIC BLOOD PRESSURE: 60 MMHG | OXYGEN SATURATION: 98 % | BODY MASS INDEX: 24.87 KG/M2 | SYSTOLIC BLOOD PRESSURE: 114 MMHG | RESPIRATION RATE: 20 BRPM | WEIGHT: 168.4 LBS | TEMPERATURE: 97.4 F

## 2024-03-07 VITALS — HEART RATE: 87 BPM | DIASTOLIC BLOOD PRESSURE: 60 MMHG | SYSTOLIC BLOOD PRESSURE: 110 MMHG | OXYGEN SATURATION: 97 %

## 2024-03-07 PROCEDURE — G0299 HHS/HOSPICE OF RN EA 15 MIN: HCPCS

## 2024-03-07 PROCEDURE — G0152 HHCP-SERV OF OT,EA 15 MIN: HCPCS | Performed by: OCCUPATIONAL THERAPIST

## 2024-03-07 PROCEDURE — G0155 HHCP-SVS OF CSW,EA 15 MIN: HCPCS

## 2024-03-07 NOTE — TELEPHONE ENCOUNTER
Patients daughter called in to see if her dads FMLA paper work is ready to be picked up -     Please call Nicole at  and advise. It was faxed over on 3/5/2024.    Thank you .

## 2024-03-07 NOTE — TELEPHONE ENCOUNTER
[facility] with a 2 lb weight gain overnight for Manuel.  Wt 166.4 lbs to 168.4.  He has some edema of left ankle and crackles in LL lobe. Otherwise, asymptomatic. On Monday she is drawing labs, such as CBC and CMP for PCP.  He is on Furosemide 40 mgs BID.  Any new dosing of diuretic or advisement for patient?  Cindi states he is compliant with food choices, for his daughter is a dietician and keeps his meals salt free.

## 2024-03-08 ENCOUNTER — HOME CARE VISIT (OUTPATIENT)
Dept: HOME HEALTH SERVICES | Facility: HOME HEALTHCARE | Age: 89
End: 2024-03-08
Payer: MEDICARE

## 2024-03-08 VITALS — HEART RATE: 74 BPM | DIASTOLIC BLOOD PRESSURE: 66 MMHG | SYSTOLIC BLOOD PRESSURE: 100 MMHG | OXYGEN SATURATION: 98 %

## 2024-03-08 DIAGNOSIS — N13.8 BPH WITH OBSTRUCTION/LOWER URINARY TRACT SYMPTOMS: ICD-10-CM

## 2024-03-08 DIAGNOSIS — N40.1 BPH WITH OBSTRUCTION/LOWER URINARY TRACT SYMPTOMS: ICD-10-CM

## 2024-03-08 DIAGNOSIS — R33.9 INCOMPLETE BLADDER EMPTYING: ICD-10-CM

## 2024-03-08 PROCEDURE — G0151 HHCP-SERV OF PT,EA 15 MIN: HCPCS

## 2024-03-08 RX ORDER — FINASTERIDE 5 MG/1
5 TABLET, FILM COATED ORAL DAILY
Qty: 90 TABLET | Refills: 1 | Status: SHIPPED | OUTPATIENT
Start: 2024-03-08

## 2024-03-08 NOTE — TELEPHONE ENCOUNTER
Reason for call:   [x] Refill   [] Prior Auth  [] Other:     Office:   [x] PCP/Provider -   [] Specialty/Provider -     Medication: FINASTERIDE    Dose/Frequency: 5 MG    Quantity: 90    Pharmacy:   [facility] - [address] [address]  Phone: [phone]  Fax: [phone]     Does the patient have enough for 3 days?   [x] Yes   [] No - Send as HP to POD

## 2024-03-11 ENCOUNTER — APPOINTMENT (OUTPATIENT)
Dept: LAB | Facility: CLINIC | Age: 89
End: 2024-03-11
Payer: MEDICARE

## 2024-03-11 ENCOUNTER — HOME CARE VISIT (OUTPATIENT)
Dept: HOME HEALTH SERVICES | Facility: HOME HEALTHCARE | Age: 89
End: 2024-03-11
Payer: MEDICARE

## 2024-03-11 VITALS
HEART RATE: 90 BPM | BODY MASS INDEX: 25.18 KG/M2 | WEIGHT: 170.5 LBS | DIASTOLIC BLOOD PRESSURE: 64 MMHG | SYSTOLIC BLOOD PRESSURE: 102 MMHG | RESPIRATION RATE: 22 BRPM | OXYGEN SATURATION: 98 % | TEMPERATURE: 97.5 F

## 2024-03-11 DIAGNOSIS — K92.2 ACUTE GASTROINTESTINAL HEMORRHAGE: ICD-10-CM

## 2024-03-11 DIAGNOSIS — I50.9 CHRONIC HEART FAILURE, UNSPECIFIED HEART FAILURE TYPE (HCC): ICD-10-CM

## 2024-03-11 DIAGNOSIS — I25.9 CHEST PAIN DUE TO MYOCARDIAL ISCHEMIA, UNSPECIFIED ISCHEMIC CHEST PAIN TYPE: ICD-10-CM

## 2024-03-11 LAB
ANION GAP SERPL CALCULATED.3IONS-SCNC: 11 MMOL/L
BASOPHILS # BLD AUTO: 0.05 THOUSANDS/ÂΜL (ref 0–0.1)
BASOPHILS NFR BLD AUTO: 1 % (ref 0–1)
BUN SERPL-MCNC: 58 MG/DL (ref 5–25)
CALCIUM SERPL-MCNC: 9.8 MG/DL (ref 8.4–10.2)
CHLORIDE SERPL-SCNC: 97 MMOL/L (ref 96–108)
CO2 SERPL-SCNC: 31 MMOL/L (ref 21–32)
CREAT SERPL-MCNC: 2.32 MG/DL (ref 0.6–1.3)
EOSINOPHIL # BLD AUTO: 0.56 THOUSAND/ÂΜL (ref 0–0.61)
EOSINOPHIL NFR BLD AUTO: 7 % (ref 0–6)
ERYTHROCYTE [DISTWIDTH] IN BLOOD BY AUTOMATED COUNT: 17.4 % (ref 11.6–15.1)
GFR SERPL CREATININE-BSD FRML MDRD: 24 ML/MIN/1.73SQ M
GLUCOSE P FAST SERPL-MCNC: 111 MG/DL (ref 65–99)
HCT VFR BLD AUTO: 35.1 % (ref 36.5–49.3)
HGB BLD-MCNC: 10.8 G/DL (ref 12–17)
IMM GRANULOCYTES # BLD AUTO: 0.01 THOUSAND/UL (ref 0–0.2)
IMM GRANULOCYTES NFR BLD AUTO: 0 % (ref 0–2)
LYMPHOCYTES # BLD AUTO: 0.88 THOUSANDS/ÂΜL (ref 0.6–4.47)
LYMPHOCYTES NFR BLD AUTO: 12 % (ref 14–44)
MCH RBC QN AUTO: 28.1 PG (ref 26.8–34.3)
MCHC RBC AUTO-ENTMCNC: 30.8 G/DL (ref 31.4–37.4)
MCV RBC AUTO: 91 FL (ref 82–98)
MONOCYTES # BLD AUTO: 0.73 THOUSAND/ÂΜL (ref 0.17–1.22)
MONOCYTES NFR BLD AUTO: 10 % (ref 4–12)
NEUTROPHILS # BLD AUTO: 5.45 THOUSANDS/ÂΜL (ref 1.85–7.62)
NEUTS SEG NFR BLD AUTO: 70 % (ref 43–75)
NRBC BLD AUTO-RTO: 0 /100 WBCS
PLATELET # BLD AUTO: 291 THOUSANDS/UL (ref 149–390)
PMV BLD AUTO: 11 FL (ref 8.9–12.7)
POTASSIUM SERPL-SCNC: 4.3 MMOL/L (ref 3.5–5.3)
RBC # BLD AUTO: 3.84 MILLION/UL (ref 3.88–5.62)
SODIUM SERPL-SCNC: 139 MMOL/L (ref 135–147)
WBC # BLD AUTO: 7.68 THOUSAND/UL (ref 4.31–10.16)

## 2024-03-11 PROCEDURE — G0299 HHS/HOSPICE OF RN EA 15 MIN: HCPCS

## 2024-03-11 PROCEDURE — 85025 COMPLETE CBC W/AUTO DIFF WBC: CPT

## 2024-03-11 PROCEDURE — 80048 BASIC METABOLIC PNL TOTAL CA: CPT

## 2024-03-11 PROCEDURE — 36415 COLL VENOUS BLD VENIPUNCTURE: CPT

## 2024-03-11 RX ORDER — NITROGLYCERIN 0.4 MG/1
0.4 TABLET SUBLINGUAL ONCE AS NEEDED
Qty: 25 TABLET | Refills: 2 | Status: SHIPPED | OUTPATIENT
Start: 2024-03-11

## 2024-03-12 ENCOUNTER — HOME CARE VISIT (OUTPATIENT)
Dept: HOME HEALTH SERVICES | Facility: HOME HEALTHCARE | Age: 89
End: 2024-03-12
Payer: MEDICARE

## 2024-03-12 VITALS — DIASTOLIC BLOOD PRESSURE: 64 MMHG | HEART RATE: 103 BPM | SYSTOLIC BLOOD PRESSURE: 100 MMHG | OXYGEN SATURATION: 97 %

## 2024-03-12 PROCEDURE — G0151 HHCP-SERV OF PT,EA 15 MIN: HCPCS

## 2024-03-13 ENCOUNTER — HOME CARE VISIT (OUTPATIENT)
Dept: HOME HEALTH SERVICES | Facility: HOME HEALTHCARE | Age: 89
End: 2024-03-13
Payer: MEDICARE

## 2024-03-13 PROCEDURE — G0152 HHCP-SERV OF OT,EA 15 MIN: HCPCS | Performed by: OCCUPATIONAL THERAPIST

## 2024-03-14 ENCOUNTER — HOME CARE VISIT (OUTPATIENT)
Dept: HOME HEALTH SERVICES | Facility: HOME HEALTHCARE | Age: 89
End: 2024-03-14
Payer: MEDICARE

## 2024-03-14 VITALS — HEART RATE: 74 BPM | DIASTOLIC BLOOD PRESSURE: 60 MMHG | SYSTOLIC BLOOD PRESSURE: 104 MMHG | OXYGEN SATURATION: 98 %

## 2024-03-14 VITALS — DIASTOLIC BLOOD PRESSURE: 80 MMHG | OXYGEN SATURATION: 98 % | HEART RATE: 72 BPM | SYSTOLIC BLOOD PRESSURE: 118 MMHG

## 2024-03-14 PROCEDURE — G0151 HHCP-SERV OF PT,EA 15 MIN: HCPCS

## 2024-03-14 PROCEDURE — G0299 HHS/HOSPICE OF RN EA 15 MIN: HCPCS

## 2024-03-15 ENCOUNTER — HOME CARE VISIT (OUTPATIENT)
Dept: HOME HEALTH SERVICES | Facility: HOME HEALTHCARE | Age: 89
End: 2024-03-15
Payer: MEDICARE

## 2024-03-15 PROCEDURE — G0152 HHCP-SERV OF OT,EA 15 MIN: HCPCS | Performed by: OCCUPATIONAL THERAPIST

## 2024-03-16 VITALS
DIASTOLIC BLOOD PRESSURE: 50 MMHG | HEART RATE: 58 BPM | RESPIRATION RATE: 20 BRPM | OXYGEN SATURATION: 95 % | WEIGHT: 169.2 LBS | SYSTOLIC BLOOD PRESSURE: 102 MMHG | TEMPERATURE: 97.2 F | BODY MASS INDEX: 24.99 KG/M2

## 2024-03-16 VITALS — DIASTOLIC BLOOD PRESSURE: 78 MMHG | OXYGEN SATURATION: 94 % | HEART RATE: 76 BPM | SYSTOLIC BLOOD PRESSURE: 110 MMHG

## 2024-03-18 NOTE — CASE COMMUNICATION
OT discussed continuation of care versus discharge.  Patient denies need for additional OT services and is agreeable for discharge.  OT performed discharge at patient's request as patient is comfortable with current level of function with performance of dressing/bathing and has met OT goals met.

## 2024-03-21 ENCOUNTER — HOME CARE VISIT (OUTPATIENT)
Dept: HOME HEALTH SERVICES | Facility: HOME HEALTHCARE | Age: 89
End: 2024-03-21
Payer: MEDICARE

## 2024-03-21 VITALS
OXYGEN SATURATION: 98 % | DIASTOLIC BLOOD PRESSURE: 72 MMHG | TEMPERATURE: 97.2 F | SYSTOLIC BLOOD PRESSURE: 114 MMHG | BODY MASS INDEX: 25.31 KG/M2 | RESPIRATION RATE: 20 BRPM | HEART RATE: 76 BPM | WEIGHT: 171.4 LBS

## 2024-03-21 PROCEDURE — G0299 HHS/HOSPICE OF RN EA 15 MIN: HCPCS

## 2024-03-28 ENCOUNTER — HOME CARE VISIT (OUTPATIENT)
Dept: HOME HEALTH SERVICES | Facility: HOME HEALTHCARE | Age: 89
End: 2024-03-28
Payer: MEDICARE

## 2024-03-28 VITALS
OXYGEN SATURATION: 98 % | TEMPERATURE: 97.4 F | RESPIRATION RATE: 18 BRPM | WEIGHT: 174.1 LBS | DIASTOLIC BLOOD PRESSURE: 60 MMHG | SYSTOLIC BLOOD PRESSURE: 100 MMHG | BODY MASS INDEX: 25.71 KG/M2 | HEART RATE: 71 BPM

## 2024-03-28 PROCEDURE — G0299 HHS/HOSPICE OF RN EA 15 MIN: HCPCS

## 2024-03-30 PROBLEM — I48.11 LONGSTANDING PERSISTENT ATRIAL FIBRILLATION (HCC): Status: ACTIVE | Noted: 2023-05-05

## 2024-03-30 PROBLEM — I50.42 CHRONIC COMBINED SYSTOLIC AND DIASTOLIC CONGESTIVE HEART FAILURE (HCC): Status: ACTIVE | Noted: 2021-08-11

## 2024-03-30 PROBLEM — I47.10 PSVT (PAROXYSMAL SUPRAVENTRICULAR TACHYCARDIA): Status: RESOLVED | Noted: 2018-11-07 | Resolved: 2024-03-30

## 2024-04-02 ENCOUNTER — OFFICE VISIT (OUTPATIENT)
Dept: CARDIOLOGY CLINIC | Facility: CLINIC | Age: 89
End: 2024-04-02
Payer: MEDICARE

## 2024-04-02 VITALS
HEART RATE: 79 BPM | HEIGHT: 69 IN | BODY MASS INDEX: 27.4 KG/M2 | WEIGHT: 185 LBS | SYSTOLIC BLOOD PRESSURE: 126 MMHG | DIASTOLIC BLOOD PRESSURE: 76 MMHG

## 2024-04-02 DIAGNOSIS — F17.211 CIGARETTE NICOTINE DEPENDENCE IN REMISSION: ICD-10-CM

## 2024-04-02 DIAGNOSIS — E78.00 PURE HYPERCHOLESTEROLEMIA: ICD-10-CM

## 2024-04-02 DIAGNOSIS — I50.42 CHRONIC COMBINED SYSTOLIC AND DIASTOLIC CONGESTIVE HEART FAILURE (HCC): ICD-10-CM

## 2024-04-02 DIAGNOSIS — N18.4 CKD (CHRONIC KIDNEY DISEASE) STAGE 4, GFR 15-29 ML/MIN (HCC): ICD-10-CM

## 2024-04-02 DIAGNOSIS — I10 PRIMARY HYPERTENSION: ICD-10-CM

## 2024-04-02 DIAGNOSIS — Z98.890 HISTORY OF RADIOFREQUENCY ABLATION PROCEDURE FOR CARDIAC ARRHYTHMIA: ICD-10-CM

## 2024-04-02 DIAGNOSIS — I35.0 AORTIC STENOSIS, SEVERE: Primary | Chronic | ICD-10-CM

## 2024-04-02 DIAGNOSIS — I49.3 PVC (PREMATURE VENTRICULAR CONTRACTION): ICD-10-CM

## 2024-04-02 DIAGNOSIS — I48.11 LONGSTANDING PERSISTENT ATRIAL FIBRILLATION (HCC): ICD-10-CM

## 2024-04-02 PROCEDURE — 99214 OFFICE O/P EST MOD 30 MIN: CPT | Performed by: INTERNAL MEDICINE

## 2024-04-02 NOTE — PROGRESS NOTES
[facility]  [address] [place] [address]  Phone#  [phone]   Fax#  [phone]  *-*-*-*-*-*-*-*-*-*-*-*-*-*-*-*-*-*-*-*-*-*-*-*-*-*-*-*-*-*-*-*-*-*-*-*-*-*-*-*-*-*-*-*-*-*-*-*-*-*-*-*-*-*                                   Cardiology Follow Up      ENCOUNTER DATE: 24 11:30 AM  PATIENT NAME: Pro Steele   : 1935    MRN: [id number]  AGE:88 y.o.      SEX: male  ENCOUNTER PROVIDER:Rocky Pollack MD     PRIMARY CARE PHYSICIAN: Andrew Schroeder DO    ACTIVE DIAGNOSIS THIS VISIT  1. Aortic stenosis, severe        2. Chronic combined systolic and diastolic congestive heart failure (HCC)        3. Longstanding persistent atrial fibrillation (HCC)        4. PVC (premature ventricular contraction)        5. Pure hypercholesterolemia        6. Primary hypertension        7. CKD (chronic kidney disease) stage 4, GFR 15-29 ml/min (HCC)        8. History of radiofrequency ablation  for SVT        9. Cigarette nicotine dependence in remission          ACTIVE PROBLEM LIST  Patient Active Problem List   Diagnosis    Chronic anemia    Linda esophagus    Esophageal reflux    Hypertension    Spinal stenosis of lumbar region    Spondylolisthesis    Palpitations    Pure hypercholesterolemia    PVC (premature ventricular contraction)    CKD (chronic kidney disease) stage 4, GFR 15-29 ml/min (HCC)    Chronic combined systolic and diastolic congestive heart failure (HCC)    BPH (benign prostatic hyperplasia)    History of radiofrequency ablation  for SVT    Pulmonary nodules    Aortic stenosis, severe    Elevated troponin    Cigarette nicotine dependence in remission    Lesion of left lung    Secondary hyperparathyroidism of renal origin (HCC)    Longstanding persistent atrial fibrillation (HCC)    BRBPR (bright red blood per rectum)    Altered mental status       CARDIOLOGY SPECIALTY COMMENTS  Pro Steele is a 85 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 Symptoms were suggestive of bronchitis and chest x-ray was negative in the ED. He was diagnosed with cardio renal syndrome with fluid overload. Diuresis was limited by his renal function. His respiratory status has returned back to baseline and he is no longer short of breath.     11/10-11/11/2021 hospitalized [facility] with hemoptysis and right lower lobe pneumonia    11/22/2021 ECHOCARDIOGRAM LIMITED:   Normal left ventricular systolic and diastolic function, EF 54%. Moderate to severe aortic stenosis with moderate aortic regurgitation. The aortic valve regurgitant jet pressure half time was 374 MS. The aortic valve mean gradient is 34.5 mmHg. The valve area is 0.8 cm2. The left ventricular stroke index is 41.1 mL/m2. By my calculation, the dimensionless index was 0.27 which is close to severe (severe less than 0.25)    08/19/2022 [facility] right leg swelling    10/05/2022 echocardiogram: Normal left ventricular systolic function, EF 55% grade 2 diastolic dysfunction. Mild left atrial enlargement. Severe aortic stenosis with mild-to-moderate aortic regurgitation. Aortic valve maximum velocity 3.9 M/S. Aortic valve mean gradient 40 mmHg. MARLY 0.65 cm2. LVOT stroke volume index 33.3 mL/M2, DVI 0.19.  Mild-to-moderate MR with moderate TR.  PASP 60 mmHg.  Aortic root mildly dilated.    5/1/2023 echocardiogram: Normal systolic function, LVEF 60%.  Mild concentric LVH.  Mild left atrial enlargement.  Aortic stenosis with peak velocity 3.1M/S, mean gradient 26 mmHg, aortic valve area 0.9 cm² moderate to severe mitral regurgitation.  Mild tricuspid regurgitation with PASP 56 mmHg.  Ascending aorta is 4 cm    10/10/2023  [facility] congestive heart failure  BNP 1043    2/21-2/26/2024  a campus: Bright red blood per rectum.   Combined systolic and diastolic congestive heart failure secondary to severe AS.  Presently off anticoagulation.  Chronic cough    2/21/2024 echocardiogram: LVEF 33% with moderate to severe concentric LVH and global hypokinesis.  Right ventricle is mild to moderately dilated.  Left atrium moderately dilated and right atrium mildly dilated.  Severe aortic stenosis max velocity 3.6 mean gradient 33 mmHg DVI 0.1 6 aortic valve area 0.5 cm², stroke-volume index 22.6 mL/M2.  Moderate MR.  Moderate TR.  PASP monitor to severely elevated at 65 mmHg.    INTERVAL HISTORY:        Patient severe aortic stenosis, congestive heart failure, chronic kidney disease stage IV returns.  He is not in any acute distress.  He is obviously becoming weaker.  His weight is up 16 pounds since he left the hospital.  He has bilateral 4+ lower extremity pitting edema.  He is presently on furosemide 40 mg twice a day.    He denies chest pain or shortness of breath.  He has no palpitations.  He knows that his weight is up but he thinks that it is from eating too many desserts.      DISCUSSION/PLAN:          Will send the note to patient's nephrologist, Dr. Kincaid, to see how he would feel if I would add to patient's regiment metolazone 2.5 mg once a week.  Return in 3 months.    Lab Studies:    Lab Results   Component Value Date    CHOLESTEROL 111 07/26/2023    CHOLESTEROL 132 05/27/2022    CHOLESTEROL 146 09/03/2020     Lab Results   Component Value Date    TRIG 52 07/26/2023    TRIG 75 05/27/2022    TRIG 69 09/03/2020     Lab Results   Component Value Date    HDL 53 07/26/2023    HDL 50 05/27/2022    HDL 52 09/03/2020     Lab Results   Component Value Date    LDLCALC 48 07/26/2023    LDLCALC 67 05/27/2022    LDLCALC 80 09/03/2020         Lab Results   Component Value Date    NTBNP 4,038 (H) 05/27/2022    NTBNP 5,024 (H) 11/10/2021    NTBNP 2,942 (H) 08/11/2021     Lab Results   Component Value Date    EGFR 24 03/11/2024    EGFR 21 02/29/2024     EGFR 23 02/26/2024    SODIUM 139 03/11/2024    SODIUM 141 02/29/2024    SODIUM 144 02/26/2024    K 4.3 03/11/2024    K 4.3 02/29/2024    K 3.6 02/26/2024    CL 97 03/11/2024    CL 98 02/29/2024     02/26/2024    CO2 31 03/11/2024    CO2 34 (H) 02/29/2024    CO2 36 (H) 02/26/2024    ANIONGAP 7 05/15/2015    ANIONGAP 8 05/09/2014    BUN 58 (H) 03/11/2024    BUN 66 (H) 02/29/2024    BUN 60 (H) 02/26/2024    CREATININE 2.32 (H) 03/11/2024    CREATININE 2.56 (H) 02/29/2024    CREATININE 2.34 (H) 02/26/2024     Lab Results   Component Value Date    WBC 7.68 03/11/2024    WBC 9.11 02/29/2024    WBC 8.42 02/26/2024    HGB 10.8 (L) 03/11/2024    HGB 9.8 (L) 02/29/2024    HGB 8.7 (L) 02/26/2024    HCT 35.1 (L) 03/11/2024    HCT 32.3 (L) 02/29/2024    HCT 27.7 (L) 02/26/2024    MCV 91 03/11/2024    MCV 93 02/29/2024    MCV 91 02/26/2024    MCH 28.1 03/11/2024    MCH 28.3 02/29/2024    MCH 28.7 02/26/2024    MCHC 30.8 (L) 03/11/2024    MCHC 30.3 (L) 02/29/2024    MCHC 31.4 02/26/2024     03/11/2024     02/29/2024     02/26/2024      Lab Results   Component Value Date    GLUCOSE 100 05/15/2015    GLUCOSE 97 05/09/2014    CALCIUM 9.8 03/11/2024    CALCIUM 10.1 02/29/2024    CALCIUM 9.2 02/26/2024    AST 33 02/21/2024    AST 23 10/10/2023    AST 21 11/17/2021    ALT 29 02/21/2024    ALT 30 10/10/2023    ALT 25 11/17/2021    ALKPHOS 97 02/21/2024    ALKPHOS 109 (H) 10/10/2023    ALKPHOS 84 11/17/2021    PROT 7.4 05/15/2015    PROT 7.4 05/09/2014    BILITOT 0.40 05/15/2015    BILITOT 0.4 05/09/2014    MG 1.9 02/25/2024    MG 1.9 02/23/2024    MG 2.1 02/21/2024       Lab Results   Component Value Date    DDIMER 1.48 (H) 11/10/2021       Lab Results   Component Value Date    FERRITIN 54 02/22/2024    IRON 21 (L) 02/22/2024    TIBC 246 (L) 02/22/2024       No results found for this visit on 04/02/24.      Current Outpatient Medications:     Cholecalciferol (VITAMIN D3) 125 MCG (5000 UT) CAPS, 2 (two) times  a week , Disp: , Rfl:     ferrous sulfate 324 (65 Fe) mg, Take 1 tablet (324 mg total) by mouth daily before breakfast, Disp: 30 tablet, Rfl: 0    finasteride (PROSCAR) 5 mg tablet, Take 1 tablet (5 mg total) by mouth daily, Disp: 90 tablet, Rfl: 1    furosemide (LASIX) 40 mg tablet, Take 1 tablet (40 mg total) by mouth 2 (two) times a day, Disp: 60 tablet, Rfl: 0    metoprolol succinate (TOPROL-XL) 25 mg 24 hr tablet, Take 1 tablet (25 mg total) by mouth 2 (two) times a day, Disp: 60 tablet, Rfl: 0    Multiple Vitamin (MULTI-VITAMIN DAILY) TABS, Take by mouth, Disp: , Rfl:     nitroglycerin (NITROSTAT) 0.4 mg SL tablet, Place 1 tablet (0.4 mg total) under the tongue once as needed for chest pain for up to 1 dose Lay down supine before taking and wait 15 minutes to get up.  Get up very slowly.  If pain continues, call 911, Disp: 25 tablet, Rfl: 2    omeprazole (PriLOSEC) 20 mg delayed release capsule, Take 1 capsule (20 mg total) by mouth daily, Disp: 90 capsule, Rfl: 1    rosuvastatin (CRESTOR) 5 mg tablet, Take 1 tablet (5 mg total) by mouth daily, Disp: 90 tablet, Rfl: 1    tamsulosin (FLOMAX) 0.4 mg, Take 1 capsule (0.4 mg total) by mouth daily with dinner, Disp: 90 capsule, Rfl: 1    Misc. Devices (Bath/Shower Seat) MISC, Use daily as needed (showering), Disp: 1 each, Rfl: 0    Misc. Devices MISC, Use daily as needed (apply to legs) Sequential compression boots and machine, Disp: 1 each, Rfl: 0  Allergies   Allergen Reactions    Apixaban Other (See Comments)       Past Medical History:   Diagnosis Date    Aortic stenosis, severe 11/01/2021    Atrial fibrillation (HCC)     CHF (congestive heart failure) (HCC)     Colon polyp     GERD (gastroesophageal reflux disease)     Hearing loss     last assessed 11/8/17    Hypertension     Rheumatic fever     Stenosis of aorta      Social History     Socioeconomic History    Marital status: /Civil Union     Spouse name: Not on file    Number of children: Not on file     Years of education: Not on file    Highest education level: Not on file   Occupational History    Not on file   Tobacco Use    Smoking status: Former     Current packs/day: 0.00     Average packs/day: 1 pack/day for 16.0 years (16.0 ttl pk-yrs)     Types: Cigarettes     Start date:      Quit date:      Years since quittin.2    Smokeless tobacco: Never   Vaping Use    Vaping status: Never Used   Substance and Sexual Activity    Alcohol use: Yes     Comment: occ    Drug use: No    Sexual activity: Yes     Partners: Female   Other Topics Concern    Not on file   Social History Narrative    Not on file     Social Determinants of Health     Financial Resource Strain: Low Risk  (2023)    Overall Financial Resource Strain (CARDIA)     Difficulty of Paying Living Expenses: Not hard at all   Food Insecurity: No Food Insecurity (2024)    Hunger Vital Sign     Worried About Running Out of Food in the Last Year: Never true     Ran Out of Food in the Last Year: Never true   Transportation Needs: No Transportation Needs (2024)    PRAPARE - Transportation     Lack of Transportation (Medical): No     Lack of Transportation (Non-Medical): No   Physical Activity: Not on file   Stress: Not on file   Social Connections: Not on file   Intimate Partner Violence: Not on file   Housing Stability: Low Risk  (2024)    Housing Stability Vital Sign     Unable to Pay for Housing in the Last Year: No     Number of Places Lived in the Last Year: 1     Unstable Housing in the Last Year: No      Family History   Problem Relation Age of Onset    Other Mother         old age    Esophageal cancer Father     Other Father         old age    Alcohol abuse Son         alcoholism     Past Surgical History:   Procedure Laterality Date    APPENDECTOMY      BACK SURGERY      lower    CARDIAC SURGERY      ablation    CATARACT EXTRACTION      JOINT REPLACEMENT Left     knee    KNEE SURGERY Left     MD PROSTATE NEEDLE BIOPSY  "ANY APPROACH N/A 3/16/2021    Procedure: Transperineal prostate biopsy with biplanar transrectal ultrasound, using the perineal logic kit;  Surgeon: Derrick Jackson MD;  Location: [facility];  Service: Urology    TONSILLECTOMY AND ADENOIDECTOMY         PREVIOUS WEIGHTS:   Wt Readings from Last 10 Encounters:   04/02/24 83.9 kg (185 lb)   03/28/24 79 kg (174 lb 1.6 oz)   03/21/24 77.7 kg (171 lb 6.4 oz)   03/14/24 76.7 kg (169 lb 3.2 oz)   03/11/24 77.3 kg (170 lb 8 oz)   03/07/24 76.4 kg (168 lb 6.4 oz)   03/04/24 75.8 kg (167 lb 3.2 oz)   02/29/24 75.1 kg (165 lb 8 oz)   02/28/24 76.1 kg (167 lb 12.8 oz)   02/26/24 75.8 kg (167 lb 1.7 oz)        Review of Systems:  Review of Systems   Constitutional:  Negative for activity change.   Respiratory:  Negative for cough, chest tightness, shortness of breath and wheezing.    Cardiovascular:  Negative for chest pain, palpitations and leg swelling.   Musculoskeletal:  Positive for gait problem.   Skin:  Negative for color change.   Neurological:  Positive for weakness. Negative for dizziness, tremors, syncope, light-headedness and headaches.   Psychiatric/Behavioral:  Negative for agitation and confusion.        Physical Exam:  /76   Pulse 79   Ht 5' 9\" (1.753 m)   Wt 83.9 kg (185 lb)   BMI 27.32 kg/m²     Physical Exam  Vitals reviewed.   Constitutional:       General: He is not in acute distress.     Appearance: He is ill-appearing.   HENT:      Head: Normocephalic and atraumatic.   Neck:      Thyroid: No thyromegaly.      Vascular: JVD present. No carotid bruit.      Trachea: No tracheal deviation.   Cardiovascular:      Rate and Rhythm: Normal rate. Rhythm irregularly irregular.      Pulses: Normal pulses.      Heart sounds: Murmur heard.      Crescendo decrescendo systolic murmur is present with a grade of 2/6.      No friction rub. No gallop.   Pulmonary:      Effort: Pulmonary effort is normal. No respiratory distress.      Breath sounds: Examination of the " "right-lower field reveals rales. Examination of the left-lower field reveals rales. Rales present. No wheezing or rhonchi.   Chest:      Chest wall: No tenderness.   Musculoskeletal:      Cervical back: Normal range of motion and neck supple.      Right lower le+ Pitting Edema present.      Left lower le+ Pitting Edema present.   Skin:     General: Skin is warm and dry.   Neurological:      General: No focal deficit present.      Mental Status: He is alert and oriented to person, place, and time.      Motor: Weakness present.      Gait: Gait abnormal.   Psychiatric:         Mood and Affect: Mood normal.         Behavior: Behavior normal.         Thought Content: Thought content normal.         Judgment: Judgment normal.       ======================================================  Imaging:   I have personally reviewed pertinent reports.      Portions of the record may have been created with voice recognition software. Occasional wrong word or \"sound a like\" substitutions may have occurred due to the inherent limitations of voice recognition software. Read the chart carefully and recognize, using context, where substitutions have occurred.    SIGNATURES:   Rocky Pollack MD   "

## 2024-04-04 ENCOUNTER — HOME CARE VISIT (OUTPATIENT)
Dept: HOME HEALTH SERVICES | Facility: HOME HEALTHCARE | Age: 89
End: 2024-04-04
Payer: MEDICARE

## 2024-04-04 VITALS
TEMPERATURE: 97.1 F | HEART RATE: 60 BPM | BODY MASS INDEX: 26.15 KG/M2 | WEIGHT: 177.1 LBS | DIASTOLIC BLOOD PRESSURE: 70 MMHG | OXYGEN SATURATION: 96 % | RESPIRATION RATE: 20 BRPM | SYSTOLIC BLOOD PRESSURE: 110 MMHG

## 2024-04-04 DIAGNOSIS — I50.32 CHRONIC DIASTOLIC (CONGESTIVE) HEART FAILURE (HCC): ICD-10-CM

## 2024-04-04 DIAGNOSIS — I10 PRIMARY HYPERTENSION: ICD-10-CM

## 2024-04-04 PROCEDURE — G0299 HHS/HOSPICE OF RN EA 15 MIN: HCPCS

## 2024-04-04 RX ORDER — METOPROLOL SUCCINATE 25 MG/1
25 TABLET, EXTENDED RELEASE ORAL 2 TIMES DAILY
Qty: 60 TABLET | Refills: 1 | Status: SHIPPED | OUTPATIENT
Start: 2024-04-04 | End: 2024-05-04

## 2024-04-04 NOTE — TELEPHONE ENCOUNTER
The patient's son called the office and left a message on the prescription line stating that the patient is needing his metoprolol and lasix refilled.

## 2024-04-06 RX ORDER — FUROSEMIDE 40 MG/1
40 TABLET ORAL 2 TIMES DAILY
Qty: 180 TABLET | Refills: 3 | Status: SHIPPED | OUTPATIENT
Start: 2024-04-06 | End: 2025-04-06

## 2024-04-08 ENCOUNTER — TELEPHONE (OUTPATIENT)
Age: 89
End: 2024-04-08

## 2024-04-08 NOTE — TELEPHONE ENCOUNTER
Patient's son called to verify the medication on patient's active medication list. No further questions or concerns at this time.

## 2024-04-15 ENCOUNTER — TELEPHONE (OUTPATIENT)
Dept: INTERNAL MEDICINE CLINIC | Facility: CLINIC | Age: 89
End: 2024-04-15

## 2024-04-15 NOTE — TELEPHONE ENCOUNTER
"Spoke with patient's daughter, Nicole, over the phone. Manuel's health has been in decline.  He has been very confused, short of breath at times, retaining fluid.  Saw cardiology several weeks ago and it was felt that there was not much more that could be done per the daughter.  He has \"moments of clarity\" but is overall confused    Manuel has been following with palliative care and Nicole states she did speak with nurse practitioner with [facility] hospice services, Kaylah Panchal, about her father and that someone from hospice services will be coming out to the home    I do feel Manuel is hospice appropriate with his comorbidities of severe aortic stenosis, systolic CHF, stage IV chronic kidney disease -now appears to be decompensating with refractory edema, altered mental status    Will await outcome of hospice evaluation and go from there. Asked Nicole to reach out if anything needed from my standpoint   "

## 2024-04-15 NOTE — TELEPHONE ENCOUNTER
"----- Message from Gemma Berger sent at 4/12/2024 12:05 PM EDT -----  Regarding: FW: Decline in health  Contact: [phone]    ----- Message -----  From: Irene Angulo RN  Sent: 4/12/2024  10:04 AM EDT  To: [facility]  Subject: FW: Decline in health                            Please advise.   ----- Message -----  From: Pro Steele \"Manuel\"  Sent: 4/12/2024  10:02 AM EDT  To: Primary Care [facility] Pod Clinical  Subject: Decline in health                                My name is Nicole Nuñez, daughter and health care POA. My Dad is having a quick decline in his state of health. He is short of breath with minimal exertion and confused most of the time. As a family, we realize that my dad’s chronic conditions are causing these issues. My Dad has expressed that he no longer wants to return to the hospital and is tired of being poked and prodded. Is it appropriate at this time to consult hospice?       "

## 2024-04-21 DIAGNOSIS — I50.43 ACUTE ON CHRONIC COMBINED SYSTOLIC AND DIASTOLIC CONGESTIVE HEART FAILURE (HCC): Primary | ICD-10-CM

## 2024-04-21 RX ORDER — METOLAZONE 2.5 MG/1
2.5 TABLET ORAL
Qty: 3 TABLET | Refills: 0 | Status: SHIPPED | OUTPATIENT
Start: 2024-04-21

## 2024-05-13 ENCOUNTER — TELEPHONE (OUTPATIENT)
Age: 89
End: 2024-05-13

## (undated) DEVICE — SYSTEM TRANSPERINEAL ACCESS PRECISIONPOINT